# Patient Record
Sex: FEMALE | Race: WHITE | NOT HISPANIC OR LATINO | Employment: OTHER | URBAN - METROPOLITAN AREA
[De-identification: names, ages, dates, MRNs, and addresses within clinical notes are randomized per-mention and may not be internally consistent; named-entity substitution may affect disease eponyms.]

---

## 2017-01-12 DIAGNOSIS — J96.11 CHRONIC RESPIRATORY FAILURE WITH HYPOXIA (HCC): ICD-10-CM

## 2017-01-12 RX ORDER — ALBUTEROL SULFATE 90 UG/1
AEROSOL, METERED RESPIRATORY (INHALATION)
Qty: 1 INHALER | Refills: 5 | Status: SHIPPED | OUTPATIENT
Start: 2017-01-12 | End: 2017-07-18 | Stop reason: SDUPTHER

## 2017-01-12 NOTE — TELEPHONE ENCOUNTER
Was the patient seen in the last year in this department? Yes     Does patient have an active prescription for medications requested? No     Received Request Via: Patient Can

## 2017-01-12 NOTE — TELEPHONE ENCOUNTER
From: Eun Be  To: Libertad Estrella M.D.  Sent: 1/12/2017 8:53 AM PST  Subject: Medication Renewal Request    Original authorizing provider: JULIANNE Whitney would like a refill of the following medications:  VENTOLIN  (90 BASE) MCG/ACT Aero Soln inhalation aerosol [Libertad Estrella M.D.]    Preferred pharmacy: [facility]/PHARMACY #[id number] - [place], [place] - [address].    Comment:

## 2017-01-12 NOTE — TELEPHONE ENCOUNTER
Pt states she is out    Was the patient seen in the last year in this department? Yes 12/07/2016    Does patient have an active prescription for medications requested? No     Received Request Via: Patient

## 2017-01-13 RX ORDER — ALBUTEROL SULFATE 90 UG/1
2 AEROSOL, METERED RESPIRATORY (INHALATION) EVERY 6 HOURS
Qty: 18 INHALER | Refills: 0 | OUTPATIENT
Start: 2017-01-13

## 2017-01-25 ENCOUNTER — OFFICE VISIT (OUTPATIENT)
Dept: MEDICAL GROUP | Facility: PHYSICIAN GROUP | Age: 82
End: 2017-01-25
Payer: MEDICARE

## 2017-01-25 VITALS
SYSTOLIC BLOOD PRESSURE: 116 MMHG | HEIGHT: 70 IN | DIASTOLIC BLOOD PRESSURE: 52 MMHG | RESPIRATION RATE: 16 BRPM | HEART RATE: 70 BPM | BODY MASS INDEX: 18.61 KG/M2 | WEIGHT: 130 LBS | TEMPERATURE: 98.1 F | OXYGEN SATURATION: 93 %

## 2017-01-25 DIAGNOSIS — M48.061 SPINAL STENOSIS OF LUMBAR REGION: Primary | ICD-10-CM

## 2017-01-25 DIAGNOSIS — F51.04 CHRONIC INSOMNIA: ICD-10-CM

## 2017-01-25 DIAGNOSIS — F11.20 OPIOID TYPE DEPENDENCE, CONTINUOUS (HCC): ICD-10-CM

## 2017-01-25 DIAGNOSIS — Z02.89 PAIN MANAGEMENT CONTRACT SIGNED: ICD-10-CM

## 2017-01-25 DIAGNOSIS — E05.90 HYPERTHYROIDISM: ICD-10-CM

## 2017-01-25 PROCEDURE — G8482 FLU IMMUNIZE ORDER/ADMIN: HCPCS | Performed by: NURSE PRACTITIONER

## 2017-01-25 PROCEDURE — 1101F PT FALLS ASSESS-DOCD LE1/YR: CPT | Performed by: NURSE PRACTITIONER

## 2017-01-25 PROCEDURE — 99214 OFFICE O/P EST MOD 30 MIN: CPT | Performed by: NURSE PRACTITIONER

## 2017-01-25 PROCEDURE — 4040F PNEUMOC VAC/ADMIN/RCVD: CPT | Performed by: NURSE PRACTITIONER

## 2017-01-25 PROCEDURE — G8432 DEP SCR NOT DOC, RNG: HCPCS | Performed by: NURSE PRACTITIONER

## 2017-01-25 PROCEDURE — G8419 CALC BMI OUT NRM PARAM NOF/U: HCPCS | Performed by: NURSE PRACTITIONER

## 2017-01-25 PROCEDURE — 1036F TOBACCO NON-USER: CPT | Performed by: NURSE PRACTITIONER

## 2017-01-25 PROCEDURE — G8598 ASA/ANTIPLAT THER USED: HCPCS | Performed by: NURSE PRACTITIONER

## 2017-01-25 RX ORDER — OXYCODONE HYDROCHLORIDE 5 MG/1
5 TABLET ORAL EVERY 8 HOURS PRN
Qty: 90 TAB | Refills: 0 | Status: SHIPPED | OUTPATIENT
Start: 2017-01-25 | End: 2017-04-18 | Stop reason: SDUPTHER

## 2017-01-25 NOTE — MR AVS SNAPSHOT
"        Eun Be   2017 11:20 AM   Office Visit   MRN: [id number]    Department:  [facility]   Dept Phone:  [phone]    Description:  Female : 1935   Provider:  JAYCEE Grimaldo           Reason for Visit     Medication Refill oxycodone, Xanax      Allergies as of 2017     Allergen Noted Reactions    Asa [Aspirin] 2012       Latex 2009       Penicillins 2009       Tape 2009   Hives, Itching, Swelling    Wasp Venom 2016   Swelling      You were diagnosed with     Spinal stenosis of lumbar region   [618566]  -  Primary     Chronic insomnia   [007351]       Hyperthyroidism   [898416]       Opioid type dependence, continuous (CMS-HCC)   [304.01.ICD-9-CM]       Pain management contract signed   [[id number]]         Vital Signs     Blood Pressure Pulse Temperature Respirations Height Weight    116/52 mmHg 70 36.7 °C (98.1 °F) 16 1.778 m (5' 10\") 58.968 kg (130 lb)    Body Mass Index Oxygen Saturation Last Menstrual Period Smoking Status          18.65 kg/m2 93% 1963 Former Smoker        Basic Information     Date Of Birth Sex Race Ethnicity Preferred Language    1935 Female White Non- English      Your appointments     2017  1:00 PM   Follow Up Visit with Fadi Najjar, M.D.   [facility] ([facility])    [address]  [place] [address] [phone]           You will be receiving a confirmation call a few days before your appointment from our automated call confirmation system.            2017 10:15 AM   FOLLOW UP with Theresa Gruber M.D.   [facility] (--)    [address] [place] [address] [address]  [place] [address] [phone]            2017 11:20 AM   Established Patient with Libertad Estrella M.D.   [facility] [place]    [address] [facility] [place] [address] [phone]          " You will be receiving a confirmation call a few days before your appointment from our automated call confirmation system.              Problem List              ICD-10-CM Priority Class Noted - Resolved    Hypertension I10 Medium  9/28/2010 - Present    Hyperlipidemia E78.5 Low  9/28/2010 - Present    Chronic insomnia F51.04   9/28/2010 - Present    Vitamin D deficiency E55.9   9/28/2010 - Present    Osteopenia M85.80   9/28/2010 - Present    Atrial fibrillation (CMS-HCC) I48.91   11/5/2010 - Present    Chronic obstructive pulmonary disease (COPD) (CMS-HCC) J44.9   11/5/2010 - Present    Gastroesophageal reflux disease K21.9 Low  4/11/2011 - Present    Polycystic kidney disease Q61.3   11/30/2012 - Present    Mild cognitive impairment G31.84   2/25/2013 - Present    Carotid atherosclerosis I65.29   3/12/2013 - Present    Anxiety and depression F41.9, F32.9   3/12/2013 - Present    Schatzki's ring K22.2   4/12/2013 - Present    Nocturnal hypoxemia --- COPD G47.34 Medium  5/14/2013 - Present    Chronic lower back pain M54.5, G89.29   3/11/2014 - Present    Anaphylactic reaction to bee sting T63.441A   5/8/2014 - Present    Hyperthyroidism E05.90 High  6/27/2014 - Present    Spinal stenosis of lumbar region M48.06   7/7/2014 - Present    CKD (chronic kidney disease) N18.9   12/29/2014 - Present    Chronic respiratory failure (CMS-HCC) J96.10 Medium  5/29/2015 - Present    Opioid type dependence, continuous (CMS-HCC) F11.20 Medium  5/29/2015 - Present    Sedative, 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   6/3/2015 - Present    Risk for falls Z91.81   6/3/2015 - Present      Health Maintenance        Date Due Completion Dates    IMM ZOSTER VACCINE 11/29/1995 ---    IMM DTaP/Tdap/Td Vaccine (1 - Tdap) 12/18/2015 12/17/2015    BONE DENSITY 4/5/2021 4/5/2016, 10/12/2012, 10/11/2010            Current Immunizations     13-VALENT PCV PREVNAR 10/20/2016    Influenza TIV (IM) 10/1/2013    Influenza Vaccine Adult HD  10/20/2016    Influenza Vaccine Quad Inj (Pf) 12/17/2015    Pneumococcal polysaccharide vaccine (PPSV-23) 9/28/2010    TD Vaccine 12/17/2015      Below and/or attached are the medications your provider expects you to take. Review all of your home medications and newly ordered medications with your provider and/or pharmacist. Follow medication instructions as directed by your provider and/or pharmacist. Please keep your medication list with you and share with your provider. Update the information when medications are discontinued, doses are changed, or new medications (including over-the-counter products) are added; and carry medication information at all times in the event of emergency situations     Allergies:  ASA - (reactions not documented)     LATEX - (reactions not documented)     PENICILLINS - (reactions not documented)     TAPE - Hives,Itching,Swelling     WASP VENOM - Swelling               Medications  Valid as of: January 25, 2017 - 11:43 AM    Generic Name Brand Name Tablet Size Instructions for use    Albuterol Sulfate (Nebu Soln) PROVENTIL 2.5mg/3ml 3 mL by Nebulization route every four hours as needed for Shortness of Breath.        Albuterol Sulfate (Aero Soln) albuterol 108 (90 BASE) MCG/ACT INHALE 2 PUFFS BY MOUTH EVERY 6 HOURS AS NEEDED FOR SHORTNESS OF BREATH.        ALPRAZolam (Tab) XANAX 1 MG Take 1-2 Tabs by mouth at bedtime as needed for Sleep.        Clobetasol Propionate (Cream) TEMOVATE 0.05 %         Clopidogrel Bisulfate (Tab) PLAVIX 75 MG Take 1 Tab by mouth every day.        Clopidogrel Bisulfate (Tab) PLAVIX 75 MG TAKE 1 TABLET BY MOUTH EVERY DAY        Cyanocobalamin (Cap) B-12 1000 MCG Take 1,000 mg by mouth.        Fenofibrate Micronized (Cap) LOFIBRA 134 MG TAKE 1 CAPSULE BY MOUTH EVERY DAY.        Fiber   Take  by mouth.        Fluticasone Propionate (Suspension) FLONASE 50 MCG/ACT Spray 1 Spray in nose 2 times a day. Each Nostril        Fluticasone-Salmeterol (AEROSOL POWDER,  BREATH ACTIVATED) ADVAIR 250-50 MCG/DOSE Inhale 1 Puff by mouth every 12 hours.        Levothyroxine Sodium (Tab) SYNTHROID 100 MCG TAKE 1 TABLET BY MOUTH EVERY DAY.        Metoprolol Tartrate (Tab) LOPRESSOR 25 MG TAKE 1 TAB BY MOUTH EVERY DAY.        Omeprazole (CAPSULE DELAYED RELEASE) PRILOSEC 40 MG TAKE ONE CAPSULE BY MOUTH EVERY DAY        OxyCODONE HCl (Tab) ROXICODONE 5 MG Take 1 Tab by mouth every 8 hours as needed for Severe Pain.        OxyCODONE HCl (Tab) ROXICODONE 5 MG Take 1 Tab by mouth every 8 hours as needed for Severe Pain.        OxyCODONE HCl (Tab) ROXICODONE 5 MG Take 1 Tab by mouth every 8 hours as needed for Severe Pain.        Probiotic Product   Take  by mouth.        Propafenone HCl (Tab) RYTHMOL 150 MG Take 1 Tab by mouth 2 Times a Day.        Simvastatin (Tab) ZOCOR 20 MG TAKE 1 TABLET BY MOUTH IN THE EVENING        .                 Medicines prescribed today were sent to:     [facility]/PHARMACY #[id number] - [place], [place] - [address]    [address] [place] [address] [place] [address]    Phone: [phone] Fax: [phone]    Open 24 Hours?: No      Medication refill instructions:       If your prescription bottle indicates you have medication refills left, it is not necessary to call your provider’s office. Please contact your pharmacy and they will refill your medication.    If your prescription bottle indicates you do not have any refills left, you may request refills at any time through one of the following ways: The online [employer] system (except Urgent Care), by calling your provider’s office, or by asking your pharmacy to contact your provider’s office with a refill request. Medication refills are processed only during regular business hours and may not be available until the next business day. Your provider may request additional information or to have a follow-up visit with you prior to refilling your medication.   *Please Note: Medication refills are assigned a new Rx number when refilled  electronically. Your pharmacy may indicate that no refills were authorized even though a new prescription for the same medication is available at the pharmacy. Please request the medicine by name with the pharmacy before contacting your provider for a refill.        Your To Do List     Future Labs/Procedures Complete By Expires    TSH WITH REFLEX TO FT4  As directed 1/25/2018         [employer] Access Code: Activation code not generated  Current [employer] Status: Active

## 2017-01-25 NOTE — PROGRESS NOTES
Chief Complaint   Patient presents with   • Medication Refill     oxycodone, Xanax       HISTORY OF PRESENT ILLNESS: Patient is a 81 y.o. female established patient who presents today with her daughter to discuss medication refills.  She is a patient of Dr. Estrella, this is her first visit with myself.    Chronic pain recheck:   Last dose of controlled substance: 1/24/17  Chronic pain treated with Oxycodone taken 2-3 times a day.  She does admit that there are days that she would benefit from a fourth tablet.  Her pain level depends on how active she is.  Current alcohol or substance use: yes.  Alcohol was detected and her last urine sample in May.  Patient states that she does attend weekly champagne brunch on Sundays.  This is the only time a week that she has 1-2 glasses of champagne.  She states that she often spaces out her pain medication prior to and after the event.    Consequences of Chronic Opiate therapy:  (5 A's)  Analgesia:  improved  Activity:  improved  Adverse Events:  none  Aberrant Behaviors: no inappropriate refills requested, lost or stolen meds reported.   Affect/Mood: good grooming, full facial expressions, normal speech pattern and content, normal thought patterns, normal perception, good insight, normal reasoning    Nonnarcotic treatments that are being used: Tylenol occasionally, no NSAIDS due to GI upset    Pain management agreement initiated/updated and signed on: 5/2016  Urine drug screen done: 5/2016, which was reviewed today and is not consistent with prescribed medications.  Discussed positive alcohol result.  See above    I have reviewed the medical records, the Prescription Monitoring Program and I have determined that controlled substance treatment is medically indicated.    Allergies:Asa; Latex; Penicillins; Tape; and Wasp venom    Current Outpatient Prescriptions Ordered in [facility]   Medication Sig Dispense Refill   • oxycodone immediate-release (ROXICODONE) 5 MG Tab Take 1 Tab by  mouth every 8 hours as needed for Severe Pain. 90 Tab 0   • oxycodone immediate-release (ROXICODONE) 5 MG Tab Take 1 Tab by mouth every 8 hours as needed for Severe Pain. 90 Tab 0   • oxycodone immediate-release (ROXICODONE) 5 MG Tab Take 1 Tab by mouth every 8 hours as needed for Severe Pain. 90 Tab 0   • albuterol (VENTOLIN HFA) 108 (90 BASE) MCG/ACT Aero Soln inhalation aerosol INHALE 2 PUFFS BY MOUTH EVERY 6 HOURS AS NEEDED FOR SHORTNESS OF BREATH. 1 Inhaler 5   • albuterol (PROVENTIL) 2.5mg/3ml Nebu Soln solution for nebulization 3 mL by Nebulization route every four hours as needed for Shortness of Breath. 75 mL 1   • fluticasone (FLONASE) 50 MCG/ACT nasal spray Spray 1 Spray in nose 2 times a day. Each Nostril 1 Bottle 1   • fenofibrate micronized (LOFIBRA) 134 MG capsule TAKE 1 CAPSULE BY MOUTH EVERY DAY. 90 Cap 0   • levothyroxine (SYNTHROID) 100 MCG Tab TAKE 1 TABLET BY MOUTH EVERY DAY. 90 Tab 0   • simvastatin (ZOCOR) 20 MG Tab TAKE 1 TABLET BY MOUTH IN THE EVENING 90 Tab 0   • metoprolol (LOPRESSOR) 25 MG Tab TAKE 1 TAB BY MOUTH EVERY DAY. 90 Tab 0   • omeprazole (PRILOSEC) 40 MG delayed-release capsule TAKE ONE CAPSULE BY MOUTH EVERY DAY 90 Cap 0   • FIBER SELECT GUMMIES PO Take  by mouth.     • Probiotic Product (PROBIOTIC PO) Take  by mouth.     • alprazolam (XANAX) 1 MG Tab Take 1-2 Tabs by mouth at bedtime as needed for Sleep. 180 Tab 1   • clopidogrel (PLAVIX) 75 MG Tab TAKE 1 TABLET BY MOUTH EVERY DAY 90 Tab 3   • propafenone (RYTHMOL) 150 MG Tab Take 1 Tab by mouth 2 Times a Day. 180 Tab 3   • fluticasone-salmeterol (ADVAIR) 250-50 MCG/DOSE AEROSOL POWDER, BREATH ACTIVATED Inhale 1 Puff by mouth every 12 hours. 3 Inhaler 3   • Cyanocobalamin (B-12) 1000 MCG CAPS Take 1,000 mg by mouth.     • clobetasol (TEMOVATE) 0.05 % Cream   5   • clopidogrel (PLAVIX) 75 MG TABS Take 1 Tab by mouth every day. (Patient not taking: Reported on 12/7/2016) 90 Tab 2     No current Epic-ordered facility-administered  medications on file.       Past Medical History   Diagnosis Date   • Heart burn      hiatal hernia   • Pain      left side of back   • Personal history of venous thrombosis and embolism      right leg   • Unspecified disorder of thyroid    • CATARACT    • Hypertension    • Arrhythmia    • Rheumatic fever      4 or 5 years old   • Bronchitis    • Pneumonia         • Renal disorder      cysts   • Arthritis    • Backpain    • IGT (impaired glucose tolerance)    • GERD (gastroesophageal reflux disease)    • Hypothyroid    • Osteopenia    • Anxiety    • HTN (hypertension)    • COPD    • EMPHYSEMA    • MEDICAL HOME    • Hypertriglyceridemia 2013   • Chronic lower back pain 3/11/2014   • Rheumatic fever      as a kid per patient.       Social History   Substance Use Topics   • Smoking status: Former Smoker     Types: Cigarettes   • Smokeless tobacco: Never Used      Comment: quit  ppd x ?yrs (on and off for years since age 15)   • Alcohol Use: 0.0 oz/week     0 Standard drinks or equivalent per week      Comment: occasional/rare alcohol use       Family Status   Relation Status Death Age   • Mother     • Father     • Brother Alive      Family History   Problem Relation Age of Onset   • Heart Disease Other    • Stroke Brother        ROS: see above    Review of Systems   Constitutional: Negative for fever, chills, weight loss and malaise/fatigue.   HENT: Negative for ear pain, nosebleeds, congestion, sore throat and neck pain.    Eyes: Negative for blurred vision.   Respiratory: Negative for cough, sputum production, shortness of breath and wheezing.    Cardiovascular: Negative for chest pain, palpitations, orthopnea and leg swelling.   Gastrointestinal: Negative for heartburn, nausea, vomiting and abdominal pain.   Genitourinary: Negative for dysuria, urgency and frequency.   Musculoskeletal: Negative for myalgias, back pain and joint pain.   Skin: Negative for rash and itching.  "  Neurological: Negative for dizziness, tingling, tremors, sensory change, focal weakness and headaches.   Endo/Heme/Allergies: Does not bruise/bleed easily.   Psychiatric/Behavioral: Negative for depression, suicidal ideas and memory loss.  The patient is not nervous/anxious and does not have insomnia.        Exam:  Blood pressure 116/52, pulse 70, temperature 36.7 °C (98.1 °F), resp. rate 16, height 1.778 m (5' 10\"), weight 58.968 kg (130 lb), last menstrual period 01/01/1963, SpO2 93 %.  General:  Well nourished, well developed female in NAD  Head is grossly normal.  Neck: Thyroid is not enlarged.  Pulmonary: Normal effort.  Cardiovascular: Regular rate.   Extremities: no clubbing, cyanosis, or edema.  Psych:  Mood and affect are normal.  Answering questions appropriately with good eye contact.      Please note that this dictation was created using voice recognition software. I have made every reasonable attempt to correct obvious errors, but I expect that there are errors of grammar and possibly content that I did not discover before finalizing the note.    Assessment/Plan:    1. Spinal stenosis of lumbar region  oxycodone immediate-release (ROXICODONE) 5 MG Tab    oxycodone immediate-release (ROXICODONE) 5 MG Tab    oxycodone immediate-release (ROXICODONE) 5 MG Tab   2. Chronic insomnia     3. Hyperthyroidism  TSH WITH REFLEX TO FT4   4. Opioid type dependence, continuous (CMS-[facility])  oxycodone immediate-release (ROXICODONE) 5 MG Tab    oxycodone immediate-release (ROXICODONE) 5 MG Tab    oxycodone immediate-release (ROXICODONE) 5 MG Tab   5. Pain management contract signed  oxycodone immediate-release (ROXICODONE) 5 MG Tab    oxycodone immediate-release (ROXICODONE) 5 MG Tab    oxycodone immediate-release (ROXICODONE) 5 MG Tab        1.  Refill pain medication as previously prescribed, reiterated the concern of using Xanax, opiates and alcohol.  Patient verbalizes understanding.  2.  Plan on returning to clinic " in 3 months for pain medication refill with PCP.  3.  Patient appears to be due for fasting labs, orders given to combine with upcoming nephrology labs.

## 2017-02-22 RX ORDER — SIMVASTATIN 20 MG
TABLET ORAL
Qty: 90 TAB | Refills: 0 | Status: SHIPPED | OUTPATIENT
Start: 2017-02-22 | End: 2017-05-25 | Stop reason: SDUPTHER

## 2017-02-22 RX ORDER — OMEPRAZOLE 40 MG/1
CAPSULE, DELAYED RELEASE ORAL
Qty: 90 CAP | Refills: 0 | Status: SHIPPED | OUTPATIENT
Start: 2017-02-22 | End: 2017-05-22 | Stop reason: SDUPTHER

## 2017-02-22 RX ORDER — LEVOTHYROXINE SODIUM 0.1 MG/1
TABLET ORAL
Qty: 90 TAB | Refills: 0 | Status: SHIPPED | OUTPATIENT
Start: 2017-02-22 | End: 2017-05-22 | Stop reason: SDUPTHER

## 2017-02-22 NOTE — TELEPHONE ENCOUNTER
Was the patient seen in the last year in this department? Yes     Does patient have an active prescription for medications requested? No     Received Request Via: Pharmacy      Pt met protocol?: Yes, LABS FOR TSH AND LIPID 11/15 ORDERS 12/16 AND 1/17, OV OP 1/17 /52

## 2017-02-23 RX ORDER — LEVOTHYROXINE SODIUM 0.1 MG/1
TABLET ORAL
Refills: 0 | OUTPATIENT
Start: 2017-02-23

## 2017-02-27 RX ORDER — FENOFIBRATE 134 MG/1
CAPSULE ORAL
Qty: 90 CAP | Refills: 0 | Status: SHIPPED | OUTPATIENT
Start: 2017-02-27 | End: 2017-06-24 | Stop reason: SDUPTHER

## 2017-02-27 NOTE — TELEPHONE ENCOUNTER
Was the patient seen in the last year in this department? Yes     Does patient have an active prescription for medications requested? No     Received Request Via: Pharmacy      Pt met protocol?: Yes, OV last month, lipids checked 11/15.

## 2017-03-07 RX ORDER — FENOFIBRATE 134 MG/1
CAPSULE ORAL
Refills: 0 | OUTPATIENT
Start: 2017-03-07

## 2017-04-04 ENCOUNTER — HOSPITAL ENCOUNTER (OUTPATIENT)
Dept: LAB | Facility: MEDICAL CENTER | Age: 82
End: 2017-04-04
Attending: FAMILY MEDICINE
Payer: MEDICARE

## 2017-04-04 ENCOUNTER — HOSPITAL ENCOUNTER (OUTPATIENT)
Dept: LAB | Facility: MEDICAL CENTER | Age: 82
End: 2017-04-04
Attending: INTERNAL MEDICINE
Payer: MEDICARE

## 2017-04-04 DIAGNOSIS — E78.49 OTHER HYPERLIPIDEMIA: ICD-10-CM

## 2017-04-04 DIAGNOSIS — I10 ESSENTIAL HYPERTENSION: ICD-10-CM

## 2017-04-04 DIAGNOSIS — E05.90 HYPERTHYROIDISM: ICD-10-CM

## 2017-04-04 DIAGNOSIS — N18.9 CKD (CHRONIC KIDNEY DISEASE), UNSPECIFIED STAGE: ICD-10-CM

## 2017-04-04 LAB
ANION GAP SERPL CALC-SCNC: 9 MMOL/L (ref 0–11.9)
BUN SERPL-MCNC: 22 MG/DL (ref 8–22)
CALCIUM SERPL-MCNC: 10.6 MG/DL (ref 8.5–10.5)
CHLORIDE SERPL-SCNC: 105 MMOL/L (ref 96–112)
CHOLEST SERPL-MCNC: 172 MG/DL (ref 100–199)
CO2 SERPL-SCNC: 26 MMOL/L (ref 20–33)
CREAT SERPL-MCNC: 0.76 MG/DL (ref 0.5–1.4)
CREAT SERPL-MCNC: 0.8 MG/DL (ref 0.5–1.4)
GFR SERPL CREATININE-BSD FRML MDRD: >60 ML/MIN/1.73 M 2
GFR SERPL CREATININE-BSD FRML MDRD: >60 ML/MIN/1.73 M 2
GLUCOSE SERPL-MCNC: 88 MG/DL (ref 65–99)
HDLC SERPL-MCNC: 72 MG/DL
LDLC SERPL CALC-MCNC: 84 MG/DL
POTASSIUM SERPL-SCNC: 3.9 MMOL/L (ref 3.6–5.5)
SODIUM SERPL-SCNC: 140 MMOL/L (ref 135–145)
TRIGL SERPL-MCNC: 79 MG/DL (ref 0–149)
TSH SERPL DL<=0.005 MIU/L-ACNC: 0.63 UIU/ML (ref 0.3–3.7)

## 2017-04-04 PROCEDURE — 80048 BASIC METABOLIC PNL TOTAL CA: CPT

## 2017-04-12 ENCOUNTER — OFFICE VISIT (OUTPATIENT)
Dept: NEPHROLOGY | Facility: MEDICAL CENTER | Age: 82
End: 2017-04-12
Payer: MEDICARE

## 2017-04-12 VITALS
DIASTOLIC BLOOD PRESSURE: 70 MMHG | SYSTOLIC BLOOD PRESSURE: 110 MMHG | HEART RATE: 62 BPM | TEMPERATURE: 98 F | BODY MASS INDEX: 18.62 KG/M2 | RESPIRATION RATE: 14 BRPM | HEIGHT: 71 IN | WEIGHT: 133 LBS

## 2017-04-12 DIAGNOSIS — I10 ESSENTIAL HYPERTENSION: ICD-10-CM

## 2017-04-12 DIAGNOSIS — Q61.3 POLYCYSTIC KIDNEY DISEASE: ICD-10-CM

## 2017-04-12 DIAGNOSIS — N18.9 CKD (CHRONIC KIDNEY DISEASE), UNSPECIFIED STAGE: ICD-10-CM

## 2017-04-12 DIAGNOSIS — G89.29 CHRONIC BACK PAIN, UNSPECIFIED BACK LOCATION, UNSPECIFIED BACK PAIN LATERALITY: ICD-10-CM

## 2017-04-12 DIAGNOSIS — M54.9 CHRONIC BACK PAIN, UNSPECIFIED BACK LOCATION, UNSPECIFIED BACK PAIN LATERALITY: ICD-10-CM

## 2017-04-12 PROCEDURE — 4040F PNEUMOC VAC/ADMIN/RCVD: CPT | Performed by: INTERNAL MEDICINE

## 2017-04-12 PROCEDURE — 1036F TOBACCO NON-USER: CPT | Performed by: INTERNAL MEDICINE

## 2017-04-12 PROCEDURE — G8598 ASA/ANTIPLAT THER USED: HCPCS | Performed by: INTERNAL MEDICINE

## 2017-04-12 PROCEDURE — 99214 OFFICE O/P EST MOD 30 MIN: CPT | Performed by: INTERNAL MEDICINE

## 2017-04-12 PROCEDURE — G8420 CALC BMI NORM PARAMETERS: HCPCS | Performed by: INTERNAL MEDICINE

## 2017-04-12 PROCEDURE — 1101F PT FALLS ASSESS-DOCD LE1/YR: CPT | Performed by: INTERNAL MEDICINE

## 2017-04-12 PROCEDURE — G8432 DEP SCR NOT DOC, RNG: HCPCS | Performed by: INTERNAL MEDICINE

## 2017-04-12 ASSESSMENT — ENCOUNTER SYMPTOMS
SHORTNESS OF BREATH: 0
HYPERTENSION: 1
VOMITING: 0
BACK PAIN: 1
CHILLS: 0
FEVER: 0
NAUSEA: 0

## 2017-04-12 NOTE — PROGRESS NOTES
"Subjective:      Eun Be is a 81 y.o. female who presents with Hypertension and Chronic Kidney Disease            Hypertension  This is a chronic problem. The current episode started more than 1 year ago. The problem is unchanged. The problem is controlled. Pertinent negatives include no chest pain, peripheral edema or shortness of breath. Agents associated with hypertension include thyroid hormones. Risk factors for coronary artery disease include post-menopausal state. Past treatments include beta blockers. The current treatment provides significant improvement. There are no compliance problems.  Hypertensive end-organ damage includes kidney disease. Identifiable causes of hypertension include chronic renal disease.   Chronic Kidney Disease  This is a chronic problem. The current episode started more than 1 year ago. The problem occurs constantly. The problem has been unchanged. Pertinent negatives include no chest pain, chills, fever, nausea, urinary symptoms or vomiting.       Review of Systems   Constitutional: Negative for fever and chills.   Respiratory: Negative for shortness of breath.    Cardiovascular: Negative for chest pain and leg swelling.   Gastrointestinal: Negative for nausea and vomiting.   Genitourinary: Negative for dysuria, urgency and frequency.   Musculoskeletal: Positive for back pain and joint pain.          Objective:     /70 mmHg  Pulse 62  Temp(Src) 36.7 °C (98 °F) (Temporal)  Resp 14  Ht 1.803 m (5' 11\")  Wt 60.328 kg (133 lb)  BMI 18.56 kg/m2  LMP 01/01/1963     Physical Exam   Constitutional: She is oriented to person, place, and time. She appears well-developed and well-nourished. No distress.   HENT:   Head: Normocephalic.   Nose: Nose normal.   Eyes: Right eye exhibits no discharge. Left eye exhibits no discharge. No scleral icterus.   Cardiovascular: Normal rate and regular rhythm.    Pulmonary/Chest: Effort normal and breath sounds normal. "   Musculoskeletal: She exhibits no edema.   Neurological: She is alert and oriented to person, place, and time.   Skin: Skin is warm and dry. She is not diaphoretic.   Psychiatric: She has a normal mood and affect. Her behavior is normal.   Nursing note and vitals reviewed.              Assessment/Plan:     1. Essential hypertension  Controlled  Continue low-sodium diet    2. CKD (chronic kidney disease), unspecified stage  Stable  No uremic symptoms  Renal dose all medication  Avoid nephrotoxins    3. Polycystic kidney disease    4. Chronic back pain, unspecified back location, unspecified back pain laterality  Consider physical therapy

## 2017-04-12 NOTE — MR AVS SNAPSHOT
"        Eun Be   2017 1:00 PM   Office Visit   MRN: [id number]    Department:  [facility]   Dept Phone:  [phone]    Description:  Female : 1935   Provider:  Fadi Najjar, M.D.           Reason for Visit     Follow-Up           Allergies as of 2017     Allergen Noted Reactions    Asa [Aspirin] 2012       Latex 2009       Penicillins 2009       Tape 2009   Hives, Itching, Swelling    Wasp Venom 2016   Swelling      You were diagnosed with     Essential hypertension   [3103394]       CKD (chronic kidney disease), unspecified stage   [4653546]       Polycystic kidney disease   [940387]       Chronic back pain, unspecified back location, unspecified back pain laterality   [6644910]         Vital Signs     Blood Pressure Pulse Temperature Respirations Height Weight    110/70 mmHg 62 36.7 °C (98 °F) (Temporal) 14 1.803 m (5' 11\") 60.328 kg (133 lb)    Body Mass Index Last Menstrual Period Smoking Status             18.56 kg/m2 1963 Former Smoker         Basic Information     Date Of Birth Sex Race Ethnicity Preferred Language    1935 Female White Non- English      Your appointments     2017 10:15 AM   FOLLOW UP with Theresa Gruber M.D.   [facility] (--)    [address] [place] [address]  [facility] [id number] [phone]            2017 11:20 AM   Established Patient with Libertad Estrella M.D.   [facility] [address] [address] [phone]           You will be receiving a confirmation call a few days before your appointment from our automated call confirmation system.              Problem List              ICD-10-CM Priority Class Noted - Resolved    Hypertension I10 Medium  2010 - Present    Hyperlipidemia E78.5 Low  2010 - Present    Chronic insomnia F51.04   2010 - Present    Vitamin D deficiency " E55.9   9/28/2010 - Present    Osteopenia M85.80   9/28/2010 - Present    Atrial fibrillation (CMS-HCC) I48.91   11/5/2010 - Present    Chronic obstructive pulmonary disease (COPD) (CMS-HCC) J44.9   11/5/2010 - Present    Gastroesophageal reflux disease K21.9 Low  4/11/2011 - Present    Polycystic kidney disease Q61.3   11/30/2012 - Present    Mild cognitive impairment G31.84   2/25/2013 - Present    Carotid atherosclerosis I65.29   3/12/2013 - Present    Anxiety and depression F41.9, F32.9   3/12/2013 - Present    Schatzki's ring K22.2   4/12/2013 - Present    Nocturnal hypoxemia --- COPD G47.34 Medium  5/14/2013 - Present    Chronic lower back pain M54.5, G89.29   3/11/2014 - Present    Anaphylactic reaction to bee sting T63.441A   5/8/2014 - Present    Hyperthyroidism E05.90 High  6/27/2014 - Present    Spinal stenosis of lumbar region M48.06   7/7/2014 - Present    CKD (chronic kidney disease) N18.9   12/29/2014 - Present    Chronic respiratory failure (CMS-HCC) J96.10 Medium  5/29/2015 - Present    Opioid type dependence, continuous (CMS-HCC) F11.20 Medium  5/29/2015 - Present    Sedative, 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   6/3/2015 - Present    Risk for falls Z91.81   6/3/2015 - Present      Health Maintenance        Date Due Completion Dates    IMM ZOSTER VACCINE 11/29/1995 ---    IMM DTaP/Tdap/Td Vaccine (1 - Tdap) 12/18/2015 12/17/2015    BONE DENSITY 4/5/2021 4/5/2016, 10/12/2012, 10/11/2010            Current Immunizations     13-VALENT PCV PREVNAR 10/20/2016    Influenza TIV (IM) 10/1/2013    Influenza Vaccine Adult HD 10/20/2016    Influenza Vaccine Quad Inj (Pf) 12/17/2015    Pneumococcal polysaccharide vaccine (PPSV-23) 9/28/2010    TD Vaccine 12/17/2015      Below and/or attached are the medications your provider expects you to take. Review all of your home medications and newly ordered medications with your provider and/or pharmacist. Follow medication instructions as directed by  your provider and/or pharmacist. Please keep your medication list with you and share with your provider. Update the information when medications are discontinued, doses are changed, or new medications (including over-the-counter products) are added; and carry medication information at all times in the event of emergency situations     Allergies:  ASA - (reactions not documented)     LATEX - (reactions not documented)     PENICILLINS - (reactions not documented)     TAPE - Hives,Itching,Swelling     WASP VENOM - Swelling               Medications  Valid as of: April 12, 2017 -  1:18 PM    Generic Name Brand Name Tablet Size Instructions for use    Albuterol Sulfate (Nebu Soln) PROVENTIL 2.5mg/3ml 3 mL by Nebulization route every four hours as needed for Shortness of Breath.        Albuterol Sulfate (Aero Soln) albuterol 108 (90 BASE) MCG/ACT INHALE 2 PUFFS BY MOUTH EVERY 6 HOURS AS NEEDED FOR SHORTNESS OF BREATH.        ALPRAZolam (Tab) XANAX 1 MG Take 1-2 Tabs by mouth at bedtime as needed for Sleep.        Clobetasol Propionate (Cream) TEMOVATE 0.05 %         Clopidogrel Bisulfate (Tab) PLAVIX 75 MG Take 1 Tab by mouth every day.        Clopidogrel Bisulfate (Tab) PLAVIX 75 MG TAKE 1 TABLET BY MOUTH EVERY DAY        Cyanocobalamin (Cap) B-12 1000 MCG Take 1,000 mg by mouth.        Fenofibrate Micronized (Cap) LOFIBRA 134 MG TAKE 1 CAPSULE BY MOUTH EVERY DAY.        Fiber   Take  by mouth.        Fluticasone Propionate (Suspension) FLONASE 50 MCG/ACT Spray 1 Spray in nose 2 times a day. Each Nostril        Fluticasone-Salmeterol (AEROSOL POWDER, BREATH ACTIVATED) ADVAIR 250-50 MCG/DOSE Inhale 1 Puff by mouth every 12 hours.        Levothyroxine Sodium (Tab) SYNTHROID 100 MCG TAKE 1 TABLET BY MOUTH EVERY DAY.        Metoprolol Tartrate (Tab) LOPRESSOR 25 MG TAKE 1 TAB BY MOUTH EVERY DAY.        Omeprazole (CAPSULE DELAYED RELEASE) PRILOSEC 40 MG TAKE ONE CAPSULE BY MOUTH EVERY DAY        OxyCODONE HCl (Tab)  ROXICODONE 5 MG Take 1 Tab by mouth every 8 hours as needed for Severe Pain.        OxyCODONE HCl (Tab) ROXICODONE 5 MG Take 1 Tab by mouth every 8 hours as needed for Severe Pain.        OxyCODONE HCl (Tab) ROXICODONE 5 MG Take 1 Tab by mouth every 8 hours as needed for Severe Pain.        Probiotic Product   Take  by mouth.        Propafenone HCl (Tab) RYTHMOL 150 MG Take 1 Tab by mouth 2 Times a Day.        Simvastatin (Tab) ZOCOR 20 MG TAKE 1 TABLET BY MOUTH IN THE EVENING        .                 Medicines prescribed today were sent to:     [facility]/PHARMACY #[id number] - [place], [place] - [address] [place] [address]    [email or web address] [facility]. [place] [address]    Phone: [phone] Fax: [phone]    Open 24 Hours?: No      Medication refill instructions:       If your prescription bottle indicates you have medication refills left, it is not necessary to call your provider’s office. Please contact your pharmacy and they will refill your medication.    If your prescription bottle indicates you do not have any refills left, you may request refills at any time through one of the following ways: The online [employer] system (except Urgent Care), by calling your provider’s office, or by asking your pharmacy to contact your provider’s office with a refill request. Medication refills are processed only during regular business hours and may not be available until the next business day. Your provider may request additional information or to have a follow-up visit with you prior to refilling your medication.   *Please Note: Medication refills are assigned a new Rx number when refilled electronically. Your pharmacy may indicate that no refills were authorized even though a new prescription for the same medication is available at the pharmacy. Please request the medicine by name with the pharmacy before contacting your provider for a refill.        Your To Do List     Future Labs/Procedures Complete By Expires    BASIC METABOLIC PANEL  As directed  4/12/2018         [employer] Access Code: Activation code not generated  Current [employer] Status: Active

## 2017-04-14 ENCOUNTER — OFFICE VISIT (OUTPATIENT)
Dept: CARDIOLOGY | Facility: MEDICAL CENTER | Age: 82
End: 2017-04-14
Payer: MEDICARE

## 2017-04-14 VITALS
WEIGHT: 131 LBS | HEIGHT: 71 IN | DIASTOLIC BLOOD PRESSURE: 70 MMHG | OXYGEN SATURATION: 91 % | HEART RATE: 62 BPM | BODY MASS INDEX: 18.34 KG/M2 | SYSTOLIC BLOOD PRESSURE: 120 MMHG

## 2017-04-14 DIAGNOSIS — F41.9 ANXIETY AND DEPRESSION: ICD-10-CM

## 2017-04-14 DIAGNOSIS — I48.0 PAROXYSMAL ATRIAL FIBRILLATION (HCC): ICD-10-CM

## 2017-04-14 DIAGNOSIS — F32.A ANXIETY AND DEPRESSION: ICD-10-CM

## 2017-04-14 PROCEDURE — 99214 OFFICE O/P EST MOD 30 MIN: CPT | Performed by: INTERNAL MEDICINE

## 2017-04-14 PROCEDURE — 4040F PNEUMOC VAC/ADMIN/RCVD: CPT | Performed by: INTERNAL MEDICINE

## 2017-04-14 PROCEDURE — G8419 CALC BMI OUT NRM PARAM NOF/U: HCPCS | Performed by: INTERNAL MEDICINE

## 2017-04-14 PROCEDURE — G8598 ASA/ANTIPLAT THER USED: HCPCS | Performed by: INTERNAL MEDICINE

## 2017-04-14 PROCEDURE — 1036F TOBACCO NON-USER: CPT | Performed by: INTERNAL MEDICINE

## 2017-04-14 PROCEDURE — G8432 DEP SCR NOT DOC, RNG: HCPCS | Performed by: INTERNAL MEDICINE

## 2017-04-14 PROCEDURE — 1101F PT FALLS ASSESS-DOCD LE1/YR: CPT | Performed by: INTERNAL MEDICINE

## 2017-04-14 ASSESSMENT — ENCOUNTER SYMPTOMS
NERVOUS/ANXIOUS: 0
BRUISES/BLEEDS EASILY: 0
HEARTBURN: 0
DIZZINESS: 0
PALPITATIONS: 0
WEIGHT LOSS: 0
NAUSEA: 0
INSOMNIA: 0
LOSS OF CONSCIOUSNESS: 0

## 2017-04-14 NOTE — PROGRESS NOTES
Subjective:   Eun Be is a 80 y.o. female who presents today in follow-up in regards to her persistent atrial fibrillation on antiarrhythmics therapy and AV jared blockade    Her pain and anxiety are much more under control. She is very pleased with her primary doctor and has been stable for more than 6 months    She has rare palpitations, she has not changed anything else    Past Medical History   Diagnosis Date   • Heart burn      hiatal hernia   • Pain      left side of back   • Personal history of venous thrombosis and embolism      right leg   • Unspecified disorder of thyroid    • CATARACT    • Hypertension    • Arrhythmia    • Rheumatic fever      4 or 5 years old   • Bronchitis    • Pneumonia      1974   • Renal disorder      cysts   • Arthritis    • Backpain    • IGT (impaired glucose tolerance)    • GERD (gastroesophageal reflux disease)    • Hypothyroid    • Osteopenia    • Anxiety    • HTN (hypertension)    • COPD    • EMPHYSEMA    • MEDICAL HOME    • Hypertriglyceridemia 11/7/2013   • Chronic lower back pain 3/11/2014   • Rheumatic fever      as a kid per patient.     Past Surgical History   Procedure Laterality Date   • Hysterectomy, total abdominal  26 years old   • Pr excis/unroof renal cyst  1977   • Hysterectomy radical  1963   • Other  1963     hemorrhoidectomy   • Other  1977     cysts removed from left kidney   • Hemorrhoidectomy  1963     Family History   Problem Relation Age of Onset   • Heart Disease Other    • Stroke Brother      History   Smoking status   • Former Smoker   • Types: Cigarettes   Smokeless tobacco   • Never Used     Comment: quit 1990 1/2 ppd x ?yrs (on and off for years since age 15)     Allergies   Allergen Reactions   • Asa [Aspirin]    • Latex    • Penicillins    • Tape Hives, Itching and Swelling   • Wasp Venom Swelling     Outpatient Encounter Prescriptions as of 4/14/2017   Medication Sig Dispense Refill   • fenofibrate micronized (LOFIBRA) 134 MG capsule  TAKE 1 CAPSULE BY MOUTH EVERY DAY. 90 Cap 0   • levothyroxine (SYNTHROID) 100 MCG Tab TAKE 1 TABLET BY MOUTH EVERY DAY. 90 Tab 0   • simvastatin (ZOCOR) 20 MG Tab TAKE 1 TABLET BY MOUTH IN THE EVENING 90 Tab 0   • metoprolol (LOPRESSOR) 25 MG Tab TAKE 1 TAB BY MOUTH EVERY DAY. 90 Tab 0   • omeprazole (PRILOSEC) 40 MG delayed-release capsule TAKE ONE CAPSULE BY MOUTH EVERY DAY 90 Cap 0   • oxycodone immediate-release (ROXICODONE) 5 MG Tab Take 1 Tab by mouth every 8 hours as needed for Severe Pain. 90 Tab 0   • albuterol (VENTOLIN HFA) 108 (90 BASE) MCG/ACT Aero Soln inhalation aerosol INHALE 2 PUFFS BY MOUTH EVERY 6 HOURS AS NEEDED FOR SHORTNESS OF BREATH. 1 Inhaler 5   • albuterol (PROVENTIL) 2.5mg/3ml Nebu Soln solution for nebulization 3 mL by Nebulization route every four hours as needed for Shortness of Breath. 75 mL 1   • fluticasone (FLONASE) 50 MCG/ACT nasal spray Spray 1 Spray in nose 2 times a day. Each Nostril 1 Bottle 1   • FIBER SELECT GUMMIES PO Take  by mouth.     • Probiotic Product (PROBIOTIC PO) Take  by mouth.     • alprazolam (XANAX) 1 MG Tab Take 1-2 Tabs by mouth at bedtime as needed for Sleep. 180 Tab 1   • clopidogrel (PLAVIX) 75 MG Tab TAKE 1 TABLET BY MOUTH EVERY DAY 90 Tab 3   • clobetasol (TEMOVATE) 0.05 % Cream   5   • propafenone (RYTHMOL) 150 MG Tab Take 1 Tab by mouth 2 Times a Day. 180 Tab 3   • fluticasone-salmeterol (ADVAIR) 250-50 MCG/DOSE AEROSOL POWDER, BREATH ACTIVATED Inhale 1 Puff by mouth every 12 hours. 3 Inhaler 3   • Cyanocobalamin (B-12) 1000 MCG CAPS Take 1,000 mg by mouth.     • oxycodone immediate-release (ROXICODONE) 5 MG Tab Take 1 Tab by mouth every 8 hours as needed for Severe Pain. 90 Tab 0   • oxycodone immediate-release (ROXICODONE) 5 MG Tab Take 1 Tab by mouth every 8 hours as needed for Severe Pain. 90 Tab 0   • clopidogrel (PLAVIX) 75 MG TABS Take 1 Tab by mouth every day. (Patient not taking: Reported on 12/7/2016) 90 Tab 2     No facility-administered  "encounter medications on file as of 4/14/2017.     Review of Systems   Constitutional: Negative for weight loss and malaise/fatigue.   Cardiovascular: Negative for chest pain, palpitations (much better) and leg swelling.   Gastrointestinal: Negative for heartburn and nausea.   Musculoskeletal:        Stable on meds   Neurological: Negative for dizziness and loss of consciousness.   Endo/Heme/Allergies: Does not bruise/bleed easily.   Psychiatric/Behavioral: The patient is not nervous/anxious and does not have insomnia.    All other systems reviewed and are negative.       Objective:   /70 mmHg  Pulse 62  Ht 1.803 m (5' 11\")  Wt 59.421 kg (131 lb)  BMI 18.28 kg/m2  SpO2 91%  LMP 01/01/1963    Physical Exam   Constitutional: She is oriented to person, place, and time. She appears well-developed and well-nourished.   HENT:   Mouth/Throat: Oropharynx is clear and moist.   Eyes: Conjunctivae and EOM are normal.   Neck: Neck supple. No JVD present. No thyroid mass present.   Cardiovascular: Normal rate, regular rhythm, S1 normal, S2 normal, intact distal pulses and normal pulses.  PMI is not displaced.    No murmur heard.  Pulses:       Carotid pulses are 2+ on the right side, and 2+ on the left side.       Radial pulses are 2+ on the right side, and 2+ on the left side.   No peripheral edema.   Pulmonary/Chest: Effort normal and breath sounds normal. No respiratory distress.   Abdominal: Normal appearance. She exhibits no abdominal bruit. There is no hepatosplenomegaly.   Musculoskeletal: Normal range of motion. She exhibits no edema.        Lumbar back: She exhibits no tenderness and no spasm.   Neurological: She is alert and oriented to person, place, and time. She has normal strength.   Skin: Skin is warm and dry. No cyanosis. Nails show no clubbing.   Psychiatric: She has a normal mood and affect.       Assessment:     1. Anxiety and depression     2. Paroxysmal atrial fibrillation (CMS-HCC)   "       Medical Decision Making:  Today's Assessment / Status / Plan:     Atrial fibrillation, she is not interested aspirin therapy. She is not interested in systemic anticoagulation. She has been on Plavix because of a purported aspirin allergy. Because of this, we have elected to use low-dose anti-rhythmic therapy with AV jared blockade. We discussed this again in the setting of her risk of stroke. Her heart rhythm does sound normal today. We will be due for echo in 2018. We will do screening echocardiograms once every year or 2, we reviewed her last echocardiogram and we'll plan on doing it when I see her back in the next calendar year      Carotid duplex is due 2018. She has mild disease  Her anxiety and stress are amazingly well controlled.    RTC 12 months

## 2017-04-14 NOTE — MR AVS SNAPSHOT
"        Eun Erazo Haile   2017 10:15 AM   Office Visit   MRN: [id number]    Department:  [facility]   Dept Phone:  [phone]    Description:  Female : 1935   Provider:  Theresa Gruber M.D.           Reason for Visit     Follow-Up           Allergies as of 2017     Allergen Noted Reactions    Asa [Aspirin] 2012       Latex 2009       Penicillins 2009       Tape 2009   Hives, Itching, Swelling    Wasp Venom 2016   Swelling      Vital Signs     Blood Pressure Pulse Height Weight Body Mass Index Oxygen Saturation    120/70 mmHg 62 1.803 m (5' 11\") 59.421 kg (131 lb) 18.28 kg/m2 91%    Last Menstrual Period Smoking Status                1963 Former Smoker          Basic Information     Date Of Birth Sex Race Ethnicity Preferred Language    1935 Female White Non- English      Your appointments     2017 11:20 AM   Established Patient with Libertad Estrella M.D.   [address] [address]   [phone]           You will be receiving a confirmation call a few days before your appointment from our automated call confirmation system.              Problem List              ICD-10-CM Priority Class Noted - Resolved    Hypertension I10 Medium  2010 - Present    Hyperlipidemia E78.5 Low  2010 - Present    Chronic insomnia F51.04   2010 - Present    Vitamin D deficiency E55.9   2010 - Present    Osteopenia M85.80   2010 - Present    Atrial fibrillation (CMS-HCC) I48.91   2010 - Present    Chronic obstructive pulmonary disease (COPD) (CMS-HCC) J44.9   2010 - Present    Gastroesophageal reflux disease K21.9 Low  2011 - Present    Polycystic kidney disease Q61.3   2012 - Present    Mild cognitive impairment G31.84   2013 - Present    Carotid atherosclerosis I65.29   3/12/2013 - Present    Anxiety and depression F41.9, F32.9   " 3/12/2013 - Present    Schatzki's ring K22.2   4/12/2013 - Present    Nocturnal hypoxemia --- COPD G47.34 Medium  5/14/2013 - Present    Chronic lower back pain M54.5, G89.29   3/11/2014 - Present    Anaphylactic reaction to bee sting T63.441A   5/8/2014 - Present    Hyperthyroidism E05.90 High  6/27/2014 - Present    Spinal stenosis of lumbar region M48.06   7/7/2014 - Present    CKD (chronic kidney disease) N18.9   12/29/2014 - Present    Chronic respiratory failure (CMS-HCC) J96.10 Medium  5/29/2015 - Present    Opioid type dependence, continuous (CMS-HCC) F11.20 Medium  5/29/2015 - Present    Sedative, 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   6/3/2015 - Present    Risk for falls Z91.81   6/3/2015 - Present      Health Maintenance        Date Due Completion Dates    IMM ZOSTER VACCINE 11/29/1995 ---    IMM DTaP/Tdap/Td Vaccine (1 - Tdap) 12/18/2015 12/17/2015    BONE DENSITY 4/5/2021 4/5/2016, 10/12/2012, 10/11/2010            Current Immunizations     13-VALENT PCV PREVNAR 10/20/2016    Influenza TIV (IM) 10/1/2013    Influenza Vaccine Adult HD 10/20/2016    Influenza Vaccine Quad Inj (Pf) 12/17/2015    Pneumococcal polysaccharide vaccine (PPSV-23) 9/28/2010    TD Vaccine 12/17/2015      Below and/or attached are the medications your provider expects you to take. Review all of your home medications and newly ordered medications with your provider and/or pharmacist. Follow medication instructions as directed by your provider and/or pharmacist. Please keep your medication list with you and share with your provider. Update the information when medications are discontinued, doses are changed, or new medications (including over-the-counter products) are added; and carry medication information at all times in the event of emergency situations     Allergies:  ASA - (reactions not documented)     LATEX - (reactions not documented)     PENICILLINS - (reactions not documented)     TAPE - Hives,Itching,Swelling      WASP VENOM - Swelling               Medications  Valid as of: April 14, 2017 - 11:09 AM    Generic Name Brand Name Tablet Size Instructions for use    Albuterol Sulfate (Nebu Soln) PROVENTIL 2.5mg/3ml 3 mL by Nebulization route every four hours as needed for Shortness of Breath.        Albuterol Sulfate (Aero Soln) albuterol 108 (90 BASE) MCG/ACT INHALE 2 PUFFS BY MOUTH EVERY 6 HOURS AS NEEDED FOR SHORTNESS OF BREATH.        ALPRAZolam (Tab) XANAX 1 MG Take 1-2 Tabs by mouth at bedtime as needed for Sleep.        Clobetasol Propionate (Cream) TEMOVATE 0.05 %         Clopidogrel Bisulfate (Tab) PLAVIX 75 MG Take 1 Tab by mouth every day.        Clopidogrel Bisulfate (Tab) PLAVIX 75 MG TAKE 1 TABLET BY MOUTH EVERY DAY        Cyanocobalamin (Cap) B-12 1000 MCG Take 1,000 mg by mouth.        Fenofibrate Micronized (Cap) LOFIBRA 134 MG TAKE 1 CAPSULE BY MOUTH EVERY DAY.        Fiber   Take  by mouth.        Fluticasone Propionate (Suspension) FLONASE 50 MCG/ACT Spray 1 Spray in nose 2 times a day. Each Nostril        Fluticasone-Salmeterol (AEROSOL POWDER, BREATH ACTIVATED) ADVAIR 250-50 MCG/DOSE Inhale 1 Puff by mouth every 12 hours.        Levothyroxine Sodium (Tab) SYNTHROID 100 MCG TAKE 1 TABLET BY MOUTH EVERY DAY.        Metoprolol Tartrate (Tab) LOPRESSOR 25 MG TAKE 1 TAB BY MOUTH EVERY DAY.        Omeprazole (CAPSULE DELAYED RELEASE) PRILOSEC 40 MG TAKE ONE CAPSULE BY MOUTH EVERY DAY        OxyCODONE HCl (Tab) ROXICODONE 5 MG Take 1 Tab by mouth every 8 hours as needed for Severe Pain.        OxyCODONE HCl (Tab) ROXICODONE 5 MG Take 1 Tab by mouth every 8 hours as needed for Severe Pain.        OxyCODONE HCl (Tab) ROXICODONE 5 MG Take 1 Tab by mouth every 8 hours as needed for Severe Pain.        Probiotic Product   Take  by mouth.        Propafenone HCl (Tab) RYTHMOL 150 MG Take 1 Tab by mouth 2 Times a Day.        Simvastatin (Tab) ZOCOR 20 MG TAKE 1 TABLET BY MOUTH IN THE EVENING        .                    Medicines prescribed today were sent to:     [facility]/PHARMACY #[id number] - [place], [place] - [address] [place] [address]    [address] [place] [address] [place] [address]    Phone: [phone] Fax: [phone]    Open 24 Hours?: No      Medication refill instructions:       If your prescription bottle indicates you have medication refills left, it is not necessary to call your provider’s office. Please contact your pharmacy and they will refill your medication.    If your prescription bottle indicates you do not have any refills left, you may request refills at any time through one of the following ways: The online [employer] system (except Urgent Care), by calling your provider’s office, or by asking your pharmacy to contact your provider’s office with a refill request. Medication refills are processed only during regular business hours and may not be available until the next business day. Your provider may request additional information or to have a follow-up visit with you prior to refilling your medication.   *Please Note: Medication refills are assigned a new Rx number when refilled electronically. Your pharmacy may indicate that no refills were authorized even though a new prescription for the same medication is available at the pharmacy. Please request the medicine by name with the pharmacy before contacting your provider for a refill.           [employer] Access Code: Activation code not generated  Current [employer] Status: Active

## 2017-04-18 ENCOUNTER — TELEPHONE (OUTPATIENT)
Dept: MEDICAL GROUP | Facility: PHYSICIAN GROUP | Age: 82
End: 2017-04-18

## 2017-04-18 ENCOUNTER — OFFICE VISIT (OUTPATIENT)
Dept: MEDICAL GROUP | Facility: PHYSICIAN GROUP | Age: 82
End: 2017-04-18
Payer: MEDICARE

## 2017-04-18 VITALS
DIASTOLIC BLOOD PRESSURE: 72 MMHG | TEMPERATURE: 98.6 F | HEIGHT: 70 IN | HEART RATE: 70 BPM | BODY MASS INDEX: 19.26 KG/M2 | RESPIRATION RATE: 16 BRPM | WEIGHT: 134.5 LBS | SYSTOLIC BLOOD PRESSURE: 126 MMHG | OXYGEN SATURATION: 95 %

## 2017-04-18 DIAGNOSIS — F11.20 OPIOID TYPE DEPENDENCE, CONTINUOUS (HCC): ICD-10-CM

## 2017-04-18 DIAGNOSIS — F41.9 ANXIETY: ICD-10-CM

## 2017-04-18 DIAGNOSIS — M62.81 GENERALIZED MUSCLE WEAKNESS: ICD-10-CM

## 2017-04-18 DIAGNOSIS — R53.1 GENERALIZED WEAKNESS: ICD-10-CM

## 2017-04-18 DIAGNOSIS — Z79.891 CHRONIC USE OF OPIATE FOR THERAPEUTIC PURPOSE: ICD-10-CM

## 2017-04-18 DIAGNOSIS — Z02.89 PAIN MANAGEMENT CONTRACT SIGNED: ICD-10-CM

## 2017-04-18 DIAGNOSIS — M48.061 SPINAL STENOSIS OF LUMBAR REGION: ICD-10-CM

## 2017-04-18 DIAGNOSIS — Z12.11 SCREENING FOR COLON CANCER: ICD-10-CM

## 2017-04-18 PROCEDURE — G8598 ASA/ANTIPLAT THER USED: HCPCS | Performed by: FAMILY MEDICINE

## 2017-04-18 PROCEDURE — 1036F TOBACCO NON-USER: CPT | Performed by: FAMILY MEDICINE

## 2017-04-18 PROCEDURE — G8420 CALC BMI NORM PARAMETERS: HCPCS | Performed by: FAMILY MEDICINE

## 2017-04-18 PROCEDURE — G8432 DEP SCR NOT DOC, RNG: HCPCS | Performed by: FAMILY MEDICINE

## 2017-04-18 PROCEDURE — 99214 OFFICE O/P EST MOD 30 MIN: CPT | Performed by: FAMILY MEDICINE

## 2017-04-18 PROCEDURE — 1101F PT FALLS ASSESS-DOCD LE1/YR: CPT | Performed by: FAMILY MEDICINE

## 2017-04-18 PROCEDURE — 4040F PNEUMOC VAC/ADMIN/RCVD: CPT | Performed by: FAMILY MEDICINE

## 2017-04-18 RX ORDER — OXYCODONE HYDROCHLORIDE 5 MG/1
5 TABLET ORAL EVERY 8 HOURS PRN
Qty: 90 TAB | Refills: 0 | Status: SHIPPED | OUTPATIENT
Start: 2017-04-18 | End: 2017-07-18 | Stop reason: SDUPTHER

## 2017-04-18 RX ORDER — ALPRAZOLAM 1 MG/1
1-2 TABLET ORAL NIGHTLY PRN
Qty: 180 TAB | Refills: 1 | Status: SHIPPED
Start: 2017-04-18 | End: 2017-07-18 | Stop reason: SDUPTHER

## 2017-04-18 NOTE — MR AVS SNAPSHOT
"        Eun Erazo McNeal   2017 11:20 AM   Office Visit   MRN: [id number]    Department:  [facility]   Dept Phone:  [phone]    Description:  Female : 1935   Provider:  Libertad Estrella M.D.           Reason for Visit     Follow-Up labs    Medication Refill pain med, xanax      Allergies as of 2017     Allergen Noted Reactions    Asa [Aspirin] 2012       Latex 2009       Penicillins 2009       Tape 2009   Hives, Itching, Swelling    Wasp Venom 2016   Swelling      You were diagnosed with     Anxiety   [771502]       Screening for colon cancer   [264412]       Opioid type dependence, continuous (CMS-HCC)   [304.01.ICD-9-CM]       Spinal stenosis of lumbar region   [695781]       Pain management contract signed   [[id number]]       Chronic use of opiate for therapeutic purpose   [5358942]         Vital Signs     Blood Pressure Pulse Temperature Respirations Height Weight    126/72 mmHg 70 37 °C (98.6 °F) 16 1.778 m (5' 10\") 61.009 kg (134 lb 8 oz)    Body Mass Index Oxygen Saturation Last Menstrual Period Smoking Status          19.30 kg/m2 95% 1963 Former Smoker        Basic Information     Date Of Birth Sex Race Ethnicity Preferred Language    1935 Female White Non- English      Your appointments     2017 11:00 AM   Established Patient with Libertad Estrella M.D.   [address] [id number] [phone]           You will be receiving a confirmation call a few days before your appointment from our automated call confirmation system.              Problem List              ICD-10-CM Priority Class Noted - Resolved    Hypertension I10 Medium  2010 - Present    Hyperlipidemia E78.5 Low  2010 - Present    Chronic insomnia F51.04   2010 - Present    Vitamin D deficiency E55.9   2010 - Present    Osteopenia M85.80   2010 - Present    Atrial " fibrillation (CMS-HCC) I48.91   11/5/2010 - Present    Chronic obstructive pulmonary disease (COPD) (CMS-HCC) J44.9   11/5/2010 - Present    Gastroesophageal reflux disease K21.9 Low  4/11/2011 - Present    Polycystic kidney disease Q61.3   11/30/2012 - Present    Mild cognitive impairment G31.84   2/25/2013 - Present    Carotid atherosclerosis I65.29   3/12/2013 - Present    Anxiety and depression F41.9, F32.9   3/12/2013 - Present    Schatzki's ring K22.2   4/12/2013 - Present    Nocturnal hypoxemia --- COPD G47.34 Medium  5/14/2013 - Present    Chronic lower back pain M54.5, G89.29   3/11/2014 - Present    Anaphylactic reaction to bee sting T63.441A   5/8/2014 - Present    Hyperthyroidism E05.90 High  6/27/2014 - Present    Spinal stenosis of lumbar region M48.06   7/7/2014 - Present    CKD (chronic kidney disease) N18.9   12/29/2014 - Present    Chronic respiratory failure (CMS-HCC) J96.10 Medium  5/29/2015 - Present    Opioid type dependence, continuous (CMS-HCC) F11.20 Medium  5/29/2015 - Present    Sedative, 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   6/3/2015 - Present    Risk for falls Z91.81   6/3/2015 - Present      Health Maintenance        Date Due Completion Dates    IMM ZOSTER VACCINE 11/29/1995 ---    IMM DTaP/Tdap/Td Vaccine (1 - Tdap) 12/18/2015 12/17/2015    BONE DENSITY 4/5/2021 4/5/2016, 10/12/2012, 10/11/2010            Current Immunizations     13-VALENT PCV PREVNAR 10/20/2016    Influenza TIV (IM) 10/1/2013    Influenza Vaccine Adult HD 10/20/2016    Influenza Vaccine Quad Inj (Pf) 12/17/2015    Pneumococcal polysaccharide vaccine (PPSV-23) 9/28/2010    TD Vaccine 12/17/2015      Below and/or attached are the medications your provider expects you to take. Review all of your home medications and newly ordered medications with your provider and/or pharmacist. Follow medication instructions as directed by your provider and/or pharmacist. Please keep your medication list with you and  share with your provider. Update the information when medications are discontinued, doses are changed, or new medications (including over-the-counter products) are added; and carry medication information at all times in the event of emergency situations     Allergies:  ASA - (reactions not documented)     LATEX - (reactions not documented)     PENICILLINS - (reactions not documented)     TAPE - Hives,Itching,Swelling     WASP VENOM - Swelling               Medications  Valid as of: April 18, 2017 - 11:50 AM    Generic Name Brand Name Tablet Size Instructions for use    Albuterol Sulfate (Nebu Soln) PROVENTIL 2.5mg/3ml 3 mL by Nebulization route every four hours as needed for Shortness of Breath.        Albuterol Sulfate (Aero Soln) albuterol 108 (90 BASE) MCG/ACT INHALE 2 PUFFS BY MOUTH EVERY 6 HOURS AS NEEDED FOR SHORTNESS OF BREATH.        ALPRAZolam (Tab) XANAX 1 MG Take 1-2 Tabs by mouth at bedtime as needed for Sleep.        Clobetasol Propionate (Cream) TEMOVATE 0.05 %         Clopidogrel Bisulfate (Tab) PLAVIX 75 MG TAKE 1 TABLET BY MOUTH EVERY DAY        Cyanocobalamin (Cap) B-12 1000 MCG Take 1,000 mg by mouth.        Fenofibrate Micronized (Cap) LOFIBRA 134 MG TAKE 1 CAPSULE BY MOUTH EVERY DAY.        Fiber   Take  by mouth.        Fluticasone Propionate (Suspension) FLONASE 50 MCG/ACT Spray 1 Spray in nose 2 times a day. Each Nostril        Fluticasone-Salmeterol (AEROSOL POWDER, BREATH ACTIVATED) ADVAIR 250-50 MCG/DOSE Inhale 1 Puff by mouth every 12 hours.        Levothyroxine Sodium (Tab) SYNTHROID 100 MCG TAKE 1 TABLET BY MOUTH EVERY DAY.        Metoprolol Tartrate (Tab) LOPRESSOR 25 MG TAKE 1 TAB BY MOUTH EVERY DAY.        Omeprazole (CAPSULE DELAYED RELEASE) PRILOSEC 40 MG TAKE ONE CAPSULE BY MOUTH EVERY DAY        OxyCODONE HCl (Tab) ROXICODONE 5 MG Take 1 Tab by mouth every 8 hours as needed for Severe Pain.        OxyCODONE HCl (Tab) ROXICODONE 5 MG Take 1 Tab by mouth every 8 hours as needed for  Severe Pain.        OxyCODONE HCl (Tab) ROXICODONE 5 MG Take 1 Tab by mouth every 8 hours as needed for Severe Pain.        Probiotic Product   Take  by mouth.        Propafenone HCl (Tab) RYTHMOL 150 MG Take 1 Tab by mouth 2 Times a Day.        Simvastatin (Tab) ZOCOR 20 MG TAKE 1 TABLET BY MOUTH IN THE EVENING        .                 Medicines prescribed today were sent to:     [id number] - [place], [place] - [address]    [address] [place] [address]    Phone: [phone] Fax: [phone]    Open 24 Hours?: No      Medication refill instructions:       If your prescription bottle indicates you have medication refills left, it is not necessary to call your provider’s office. Please contact your pharmacy and they will refill your medication.    If your prescription bottle indicates you do not have any refills left, you may request refills at any time through one of the following ways: The online "[employer] system (except Urgent Care), by calling your provider’s office, or by asking your pharmacy to contact your provider’s office with a refill request. Medication refills are processed only during regular business hours and may not be available until the next business day. Your provider may request additional information or to have a follow-up visit with you prior to refilling your medication.   *Please Note: Medication refills are assigned a new Rx number when refilled electronically. Your pharmacy may indicate that no refills were authorized even though a new prescription for the same medication is available at the pharmacy. Please request the medicine by name with the pharmacy before contacting your provider for a refill.        Your To Do List     Future Labs/Procedures Complete By Expires    MISCELLANEOUS TEST  As directed 4/18/2018    Comments:    Nehemias         "[employer] Access Code: Activation code not generated  Current "[employer] Status: Active

## 2017-04-18 NOTE — PROGRESS NOTES
Chief Complaint   Patient presents with   • Follow-Up     labs   • Medication Refill     pain med, xanax       HISTORY OF PRESENT ILLNESS: Patient is a 81 y.o. female established patient here today for the following concerns:    1. Anxiety  Here today for follow up.  Needs refill on xanax 1 mg.  Has been stable on this medication for many years.  Still under tremendous stress at home with caring for her .      2. Screening for colon cancer  Requests Cologuard screen.      3. Opioid type dependence, continuous (CMS-HCC)  4. Spinal stenosis of lumbar region  5. Pain management contract signed  6. Chronic use of opiate for therapeutic purpose      Chronic pain recheck:   Last dose of controlled substance: today  Chronic pain treated with oxycodone taken 3 times a day    She  reports that she drinks alcohol.  She  reports that she does not use illicit drugs.    Consequences of Chronic Opiate therapy:  (5 A's)  Analgesia: Compared to no treatment or prior treatment, pain is currently improved  Activity: improved  Adverse Events: She denies dry mouth, itchy skin, nausea and sedation  Aberrant Behaviors: She reports she is taking medication as prescribed and is not veering from agreed treatment regimen. There have been no inappropriate refills or lost/stolen meds reported.   Affect/Mood: Pain is not impacting patient's mood.  Patient denies depression/ + anxiety.    Nonnarcotic treatments that are being used: Tylenol.   Treatment goals discussed.    Opioid Risk Score: due    Interpretation of Opioid Risk Score   Score 0-3 = Low risk of abuse. Do UDS at least once per year.  Score 4-7 = Moderate risk of abuse. Do UDS 1-4 times per year.  Score 8+ = High risk of abuse. Refer to specialist.    No order of CONTROLLED SUBSTANCE TREATMENT AGREEMENT is found.     UDS Summary     Patient has no health maintenance due at this time        Most recent UDS reviewed today and is consistent with prescribed medications.    I have  reviewed the medical records, the Prescription Monitoring Program and I have determined that controlled substance treatment is medically indicated.        Patient would like to start some PT to help with her generalized strength.  She has had generalized weakness and fatigue.  She is hoping some formal PT will help to restore some of the strength.     Past Medical, Social, and Family history reviewed and updated in EPIC    Allergies:Asa; Latex; Penicillins; Tape; and Wasp venom    Current Outpatient Prescriptions   Medication Sig Dispense Refill   • alprazolam (XANAX) 1 MG Tab Take 1-2 Tabs by mouth at bedtime as needed for Sleep. 180 Tab 1   • oxycodone immediate-release (ROXICODONE) 5 MG Tab Take 1 Tab by mouth every 8 hours as needed for Severe Pain. 90 Tab 0   • oxycodone immediate-release (ROXICODONE) 5 MG Tab Take 1 Tab by mouth every 8 hours as needed for Severe Pain. 90 Tab 0   • oxycodone immediate-release (ROXICODONE) 5 MG Tab Take 1 Tab by mouth every 8 hours as needed for Severe Pain. 90 Tab 0   • fenofibrate micronized (LOFIBRA) 134 MG capsule TAKE 1 CAPSULE BY MOUTH EVERY DAY. 90 Cap 0   • levothyroxine (SYNTHROID) 100 MCG Tab TAKE 1 TABLET BY MOUTH EVERY DAY. 90 Tab 0   • simvastatin (ZOCOR) 20 MG Tab TAKE 1 TABLET BY MOUTH IN THE EVENING 90 Tab 0   • metoprolol (LOPRESSOR) 25 MG Tab TAKE 1 TAB BY MOUTH EVERY DAY. 90 Tab 0   • omeprazole (PRILOSEC) 40 MG delayed-release capsule TAKE ONE CAPSULE BY MOUTH EVERY DAY 90 Cap 0   • albuterol (VENTOLIN HFA) 108 (90 BASE) MCG/ACT Aero Soln inhalation aerosol INHALE 2 PUFFS BY MOUTH EVERY 6 HOURS AS NEEDED FOR SHORTNESS OF BREATH. 1 Inhaler 5   • albuterol (PROVENTIL) 2.5mg/3ml Nebu Soln solution for nebulization 3 mL by Nebulization route every four hours as needed for Shortness of Breath. 75 mL 1   • fluticasone (FLONASE) 50 MCG/ACT nasal spray Spray 1 Spray in nose 2 times a day. Each Nostril 1 Bottle 1   • FIBER SELECT GUMMIES PO Take  by mouth.     •  "Probiotic Product (PROBIOTIC PO) Take  by mouth.     • clopidogrel (PLAVIX) 75 MG Tab TAKE 1 TABLET BY MOUTH EVERY DAY 90 Tab 3   • clobetasol (TEMOVATE) 0.05 % Cream   5   • propafenone (RYTHMOL) 150 MG Tab Take 1 Tab by mouth 2 Times a Day. 180 Tab 3   • fluticasone-salmeterol (ADVAIR) 250-50 MCG/DOSE AEROSOL POWDER, BREATH ACTIVATED Inhale 1 Puff by mouth every 12 hours. 3 Inhaler 3   • Cyanocobalamin (B-12) 1000 MCG CAPS Take 1,000 mg by mouth.       No current facility-administered medications for this visit.         ROS:  Review of Systems   Constitutional: Negative for fever, chills, weight loss and malaise/fatigue.   HENT: Negative for ear pain, nosebleeds, congestion, sore throat and neck pain.    Eyes: Negative for blurred vision.   Respiratory: Negative for cough, sputum production, shortness of breath and wheezing.    Cardiovascular: Negative for chest pain, palpitations,  and leg swelling.   Gastrointestinal: Negative for heartburn, nausea, vomiting, diarrhea and abdominal pain.   Genitourinary: Negative for dysuria, urgency and frequency.   Musculoskeletal: Negative for myalgias, back pain and joint pain.   Skin: Negative for rash and itching.   Neurological: Negative for dizziness, tingling, tremors, sensory change, focal weakness and headaches.   Endo/Heme/Allergies: Does not bruise/bleed easily.   Psychiatric/Behavioral: Negative for depression, anxiety, suicidal ideas, insomnia and memory loss.      Exam:  Blood pressure 126/72, pulse 70, temperature 37 °C (98.6 °F), resp. rate 16, height 1.778 m (5' 10\"), weight 61.009 kg (134 lb 8 oz), last menstrual period 01/01/1963, SpO2 95 %.    General:  Well nourished, well developed in NAD  Head is grossly normal.  Neck: Supple without JVD   Pulmonary:  Normal effort.   Cardiovascular: Regular rate  Extremities: no clubbing, cyanosis, or edema.  Psych: affect appropriate      Most recent labs reviewed and stable.     Please note that this dictation was " created using voice recognition software. I have made every reasonable attempt to correct obvious errors, but I expect that there are errors of grammar and possibly content that I did not discover before finalizing the note.    Assessment/Plan:  1. Anxiety  Renewed.    - alprazolam (XANAX) 1 MG Tab; Take 1-2 Tabs by mouth at bedtime as needed for Sleep.  Dispense: 180 Tab; Refill: 1    2. Screening for colon cancer  Cologuard  - MISCELLANEOUS TEST; Future    3. Opioid type dependence, continuous (CMS-HCC)  Overall impression that this patient is benefiting from opioid therapy and that the   benefits outweigh the risks of continued use. yes      - Controlled Substance Use Agreement current or updated today   - Urine drug screen current or ordered today   - Additional lab: CMP to assess liver and renal function   - Rx refill prescriptions are done for 3 months, No early refills. See orders   - Referral to pain management no      - oxycodone immediate-release (ROXICODONE) 5 MG Tab; Take 1 Tab by mouth every 8 hours as needed for Severe Pain.  Dispense: 90 Tab; Refill: 0  - oxycodone immediate-release (ROXICODONE) 5 MG Tab; Take 1 Tab by mouth every 8 hours as needed for Severe Pain.  Dispense: 90 Tab; Refill: 0  - oxycodone immediate-release (ROXICODONE) 5 MG Tab; Take 1 Tab by mouth every 8 hours as needed for Severe Pain.  Dispense: 90 Tab; Refill: 0    4. Spinal stenosis of lumbar region    - oxycodone immediate-release (ROXICODONE) 5 MG Tab; Take 1 Tab by mouth every 8 hours as needed for Severe Pain.  Dispense: 90 Tab; Refill: 0  - oxycodone immediate-release (ROXICODONE) 5 MG Tab; Take 1 Tab by mouth every 8 hours as needed for Severe Pain.  Dispense: 90 Tab; Refill: 0  - oxycodone immediate-release (ROXICODONE) 5 MG Tab; Take 1 Tab by mouth every 8 hours as needed for Severe Pain.  Dispense: 90 Tab; Refill: 0    5. Pain management contract signed    - oxycodone immediate-release (ROXICODONE) 5 MG Tab; Take 1 Tab  by mouth every 8 hours as needed for Severe Pain.  Dispense: 90 Tab; Refill: 0  - oxycodone immediate-release (ROXICODONE) 5 MG Tab; Take 1 Tab by mouth every 8 hours as needed for Severe Pain.  Dispense: 90 Tab; Refill: 0  - oxycodone immediate-release (ROXICODONE) 5 MG Tab; Take 1 Tab by mouth every 8 hours as needed for Severe Pain.  Dispense: 90 Tab; Refill: 0    6. Chronic use of opiate for therapeutic purpose      7. Generalized Weakness  Referral to PT

## 2017-05-04 DIAGNOSIS — I48.0 PAROXYSMAL ATRIAL FIBRILLATION (HCC): ICD-10-CM

## 2017-05-04 RX ORDER — PROPAFENONE HYDROCHLORIDE 150 MG/1
TABLET, COATED ORAL
Qty: 180 TAB | Refills: 1 | Status: SHIPPED | OUTPATIENT
Start: 2017-05-04 | End: 2017-11-28 | Stop reason: SDUPTHER

## 2017-05-22 RX ORDER — LEVOTHYROXINE SODIUM 0.1 MG/1
TABLET ORAL
Qty: 90 TAB | Refills: 3 | Status: SHIPPED | OUTPATIENT
Start: 2017-05-22 | End: 2018-03-31 | Stop reason: SDUPTHER

## 2017-05-22 RX ORDER — OMEPRAZOLE 40 MG/1
CAPSULE, DELAYED RELEASE ORAL
Qty: 90 CAP | Refills: 3 | Status: SHIPPED | OUTPATIENT
Start: 2017-05-22 | End: 2018-04-25 | Stop reason: SDUPTHER

## 2017-05-22 NOTE — TELEPHONE ENCOUNTER
Was the patient seen in the last year in this department? Yes     Does patient have an active prescription for medications requested? No     Received Request Via: Pharmacy      Pt met protocol?: Yes    LAST OV 04/18/2017    LAST TSH 04/04/2017    BP Readings from Last 1 Encounters:   04/18/17 126/72

## 2017-05-22 NOTE — TELEPHONE ENCOUNTER
Patient was last seen by PCP 4/17. Last TSH read 0.63 (4/17). Will refill for 12 months for TSH and PPI.

## 2017-05-25 RX ORDER — SIMVASTATIN 20 MG
TABLET ORAL
Qty: 90 TAB | Refills: 3 | Status: SHIPPED | OUTPATIENT
Start: 2017-05-25 | End: 2018-05-19 | Stop reason: SDUPTHER

## 2017-05-25 NOTE — TELEPHONE ENCOUNTER
Was the patient seen in the last year in this department? Yes     Does patient have an active prescription for medications requested? No     Received Request Via: Pharmacy      Pt met protocol?: Yes. OV 4/17   Lab Results   Component Value Date/Time    TRIGLYCERIDES 247* 10/04/2010 12:00 AM    HDL 72 04/04/2017 11:01 AM    HDL-P (TOTAL) 41.5 10/04/2010 12:00 AM

## 2017-05-26 NOTE — TELEPHONE ENCOUNTER
Pt has had OV within the 12 month protocol and lipid panel is current from 4/17. 12 month supply sent to pharmacy.   Lab Results   Component Value Date/Time    CHOLESTEROL, 04/04/2017 11:01 AM    LDL 84 04/04/2017 11:01 AM    HDL 72 04/04/2017 11:01 AM    TRIGLYCERIDES 79 04/04/2017 11:01 AM       Lab Results   Component Value Date/Time    SODIUM 140 04/04/2017 11:01 AM    POTASSIUM 3.9 04/04/2017 11:01 AM    CHLORIDE 105 04/04/2017 11:01 AM    CO2 26 04/04/2017 11:01 AM    GLUCOSE 88 04/04/2017 11:01 AM    BUN 22 04/04/2017 11:01 AM    CREATININE 0.80 04/04/2017 11:01 AM    CREATININE 0.76 04/04/2017 11:01 AM    BUN-CREATININE RATIO 25 03/21/2011 12:00 AM    GLOM FILT RATE, EST >59 03/21/2011 12:00 AM     Lab Results   Component Value Date/Time    ALKALINE PHOSPHATASE 27* 11/28/2015 10:46 AM    AST(SGOT) 12 11/28/2015 10:46 AM    ALT(SGPT) 12 11/28/2015 10:46 AM    TOTAL BILIRUBIN 0.8 11/28/2015 10:46 AM

## 2017-05-30 RX ORDER — FENOFIBRATE 134 MG/1
CAPSULE ORAL
Qty: 90 CAP | Refills: 1 | Status: SHIPPED | OUTPATIENT
Start: 2017-05-30 | End: 2017-06-26

## 2017-05-30 NOTE — TELEPHONE ENCOUNTER
Was the patient seen in the last year in this department? Yes     Does patient have an active prescription for medications requested? No     Received Request Via: Pharmacy      Pt met protocol?: Yes, OV last month   Lab Results   Component Value Date/Time    TRIGLYCERIDES 247* 10/04/2010 12:00 AM    HDL 72 04/04/2017 11:01 AM    HDL-P (TOTAL) 41.5 10/04/2010 12:00 AM

## 2017-05-30 NOTE — TELEPHONE ENCOUNTER
Pt has had OV within the 12 month protocol and lipid panel is current. 6 month supply sent to pharmacy.   Lab Results   Component Value Date/Time    CHOLESTEROL, 04/04/2017 11:01 AM    LDL 84 04/04/2017 11:01 AM    HDL 72 04/04/2017 11:01 AM    TRIGLYCERIDES 79 04/04/2017 11:01 AM       Lab Results   Component Value Date/Time    SODIUM 140 04/04/2017 11:01 AM    POTASSIUM 3.9 04/04/2017 11:01 AM    CHLORIDE 105 04/04/2017 11:01 AM    CO2 26 04/04/2017 11:01 AM    GLUCOSE 88 04/04/2017 11:01 AM    BUN 22 04/04/2017 11:01 AM    CREATININE 0.80 04/04/2017 11:01 AM    CREATININE 0.76 04/04/2017 11:01 AM    BUN-CREATININE RATIO 25 03/21/2011 12:00 AM    GLOM FILT RATE, EST >59 03/21/2011 12:00 AM     Lab Results   Component Value Date/Time    ALKALINE PHOSPHATASE 27* 11/28/2015 10:46 AM    AST(SGOT) 12 11/28/2015 10:46 AM    ALT(SGPT) 12 11/28/2015 10:46 AM    TOTAL BILIRUBIN 0.8 11/28/2015 10:46 AM

## 2017-06-06 RX ORDER — FENOFIBRATE 134 MG/1
CAPSULE ORAL
Refills: 0 | OUTPATIENT
Start: 2017-06-06

## 2017-06-07 RX ORDER — FENOFIBRATE 134 MG/1
CAPSULE ORAL
Refills: 0 | OUTPATIENT
Start: 2017-06-07

## 2017-06-07 NOTE — TELEPHONE ENCOUNTER
CALLED [facility] 06/07/2017 AND CONFIRMED PT STILL HAS REFILLS ON FILE. ELLEN

## 2017-06-09 RX ORDER — FENOFIBRATE 134 MG/1
CAPSULE ORAL
Refills: 0 | OUTPATIENT
Start: 2017-06-09

## 2017-06-12 RX ORDER — FENOFIBRATE 134 MG/1
CAPSULE ORAL
Qty: 90 CAP | Refills: 0 | OUTPATIENT
Start: 2017-06-12

## 2017-06-19 RX ORDER — FENOFIBRATE 134 MG/1
134 CAPSULE ORAL
Qty: 90 CAP | Refills: 1 | OUTPATIENT
Start: 2017-06-19

## 2017-06-19 NOTE — TELEPHONE ENCOUNTER
Was the patient seen in the last year in this department? Yes 04/18/2017    Does patient have an active prescription for medications requested? No     Received Request Via: Patient

## 2017-06-20 RX ORDER — FENOFIBRATE 134 MG/1
CAPSULE ORAL
Refills: 0 | OUTPATIENT
Start: 2017-06-20

## 2017-06-26 RX ORDER — FENOFIBRATE 134 MG/1
CAPSULE ORAL
Qty: 90 CAP | Refills: 1 | Status: SHIPPED | OUTPATIENT
Start: 2017-06-26 | End: 2017-12-20 | Stop reason: SDUPTHER

## 2017-06-26 NOTE — TELEPHONE ENCOUNTER
*CALLED [facility] 06/26/2017 CONFIRMED THEY DID NOT RECEIVE Rx WRITTEN ON 05/30/2017*  Was the patient seen in the last year in this department? Yes     Does patient have an active prescription for medications requested? Yes     Received Request Via: Pharmacy      Pt met protocol?: Yes    LAST OV 04/18/2017    BP Readings from Last 1 Encounters:   04/18/17 126/72     Lab Results   Component Value Date/Time    GLYCOHEMOGLOBIN 5.7 10/09/2012 10:42 AM        Lab Results   Component Value Date/Time    TRIGLYCERIDES 247* 10/04/2010 12:00 AM    HDL 72 04/04/2017 11:01 AM    HDL-P (TOTAL) 41.5 10/04/2010 12:00 AM

## 2017-07-17 ENCOUNTER — TELEPHONE (OUTPATIENT)
Dept: MEDICAL GROUP | Facility: PHYSICIAN GROUP | Age: 82
End: 2017-07-17

## 2017-07-17 NOTE — TELEPHONE ENCOUNTER
ESTABLISHED PATIENT PRE-VISIT PLANNING     Note: Patient will not be contacted if there is no indication to call.     1.  Reviewed notes from the last few office visits within the medical group: Yes    2.  If any orders were placed at last visit or intended to be done for this visit (i.e. 6 mos follow-up), do we have Results/Consult Notes?        •  Labs - Labs ordered, completed and results are in chart.       •  Imaging - Imaging was not ordered at last office visit.       •  Referrals - Referral ordered, patient was seen and consult notes are in chart. Care Teams updated  NO.    3. Is this appointment scheduled as a Hospital Follow-Up? No    4.  Immunizations were updated in Epic using WebIZ?: Epic matches WebIZ       •  Web Iz Recommendations: TDAP and ZOSTAVAX (Shingles)    5.  Patient is due for the following Health Maintenance Topics:   Health Maintenance Due   Topic Date Due   • IMM ZOSTER VACCINE  11/29/1995   • PFT SCREENING-FEV1 AND FEV/FVC RATIO / SPIROMETRY SHOULD BE PERFORMED ANNUALLY  09/28/2011   • IMM DTaP/Tdap/Td Vaccine (1 - Tdap) 12/18/2015   • Annual Wellness Visit  06/03/2016           6.  Patient was informed to arrive 15 min prior to their scheduled appointment and bring in their medication bottles.

## 2017-07-18 ENCOUNTER — OFFICE VISIT (OUTPATIENT)
Dept: MEDICAL GROUP | Facility: PHYSICIAN GROUP | Age: 82
End: 2017-07-18
Payer: MEDICARE

## 2017-07-18 VITALS
DIASTOLIC BLOOD PRESSURE: 62 MMHG | WEIGHT: 129 LBS | TEMPERATURE: 97.7 F | HEIGHT: 70 IN | HEART RATE: 58 BPM | OXYGEN SATURATION: 92 % | BODY MASS INDEX: 18.47 KG/M2 | RESPIRATION RATE: 16 BRPM | SYSTOLIC BLOOD PRESSURE: 122 MMHG

## 2017-07-18 DIAGNOSIS — M54.50 CHRONIC MIDLINE LOW BACK PAIN WITHOUT SCIATICA: ICD-10-CM

## 2017-07-18 DIAGNOSIS — F11.20 OPIOID TYPE DEPENDENCE, CONTINUOUS (HCC): ICD-10-CM

## 2017-07-18 DIAGNOSIS — F41.9 ANXIETY: ICD-10-CM

## 2017-07-18 DIAGNOSIS — Z02.89 PAIN MANAGEMENT CONTRACT SIGNED: ICD-10-CM

## 2017-07-18 DIAGNOSIS — G89.29 CHRONIC MIDLINE LOW BACK PAIN WITHOUT SCIATICA: ICD-10-CM

## 2017-07-18 DIAGNOSIS — J96.11 CHRONIC RESPIRATORY FAILURE WITH HYPOXIA (HCC): ICD-10-CM

## 2017-07-18 DIAGNOSIS — M48.061 SPINAL STENOSIS OF LUMBAR REGION: ICD-10-CM

## 2017-07-18 PROCEDURE — 99213 OFFICE O/P EST LOW 20 MIN: CPT | Performed by: FAMILY MEDICINE

## 2017-07-18 RX ORDER — OXYCODONE HYDROCHLORIDE 5 MG/1
5 TABLET ORAL EVERY 8 HOURS PRN
Qty: 90 TAB | Refills: 0 | Status: SHIPPED | OUTPATIENT
Start: 2017-07-18 | End: 2017-10-18

## 2017-07-18 RX ORDER — OXYCODONE HYDROCHLORIDE 5 MG/1
5 TABLET ORAL EVERY 8 HOURS PRN
Qty: 90 TAB | Refills: 0 | Status: SHIPPED | OUTPATIENT
Start: 2017-07-18 | End: 2017-09-21

## 2017-07-18 RX ORDER — ALPRAZOLAM 1 MG/1
1-2 TABLET ORAL NIGHTLY PRN
Qty: 180 TAB | Refills: 1 | Status: SHIPPED | OUTPATIENT
Start: 2017-07-18 | End: 2017-10-18 | Stop reason: SDUPTHER

## 2017-07-18 RX ORDER — ALBUTEROL SULFATE 90 UG/1
AEROSOL, METERED RESPIRATORY (INHALATION)
Qty: 1 INHALER | Refills: 5 | Status: SHIPPED | OUTPATIENT
Start: 2017-07-18 | End: 2018-01-18 | Stop reason: SDUPTHER

## 2017-07-18 RX ORDER — ALBUTEROL SULFATE 90 UG/1
AEROSOL, METERED RESPIRATORY (INHALATION)
Qty: 1 INHALER | Refills: 5 | Status: SHIPPED | OUTPATIENT
Start: 2017-07-18 | End: 2017-07-18 | Stop reason: SDUPTHER
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
## 2017-07-18 NOTE — MR AVS SNAPSHOT
"        Eun Erazo Haile   2017 11:00 AM   Office Visit   MRN: [id number]    Department:  [facility]   Dept Phone:  [phone]    Description:  Female : 1935   Provider:  Libertad Estrella M.D.           Reason for Visit     Follow-Up 3 mo fv     Medication Refill pain med      Allergies as of 2017     Allergen Noted Reactions    Asa [Aspirin] 2012       Latex 2009       Penicillins 2009       Tape 2009   Hives, Itching, Swelling    Wasp Venom 2016   Swelling      You were diagnosed with     Chronic respiratory failure with hypoxia (CMS-HCC)   [085668]       Opioid type dependence, continuous (CMS-HCC)   [304.01.ICD-9-CM]       Spinal stenosis of lumbar region   [750590]       Pain management contract signed   [[id number]]       Anxiety   [873498]       Chronic midline low back pain without sciatica   [5057943]         Vital Signs     Blood Pressure Pulse Temperature Respirations Height Weight    122/62 mmHg 58 36.5 °C (97.7 °F) 16 1.778 m (5' 10\") 58.514 kg (129 lb)    Body Mass Index Oxygen Saturation Last Menstrual Period Smoking Status          18.51 kg/m2 92% 1963 Former Smoker        Basic Information     Date Of Birth Sex Race Ethnicity Preferred Language    1935 Female White Non- English      Problem List              ICD-10-CM Priority Class Noted - Resolved    Hypertension I10 Medium  2010 - Present    Hyperlipidemia E78.5 Low  2010 - Present    Chronic insomnia F51.04   2010 - Present    Vitamin D deficiency E55.9   2010 - Present    Osteopenia M85.80   2010 - Present    Atrial fibrillation (CMS-HCC) I48.91   2010 - Present    Chronic obstructive pulmonary disease (COPD) (CMS-HCC) J44.9   2010 - Present    Gastroesophageal reflux disease K21.9 Low  2011 - Present    Polycystic kidney disease Q61.3   2012 - Present    Mild cognitive impairment G31.84   2013 - Present    Carotid " atherosclerosis I65.29   3/12/2013 - Present    Anxiety and depression F41.9, F32.9   3/12/2013 - Present    Schatzki's ring K22.2   4/12/2013 - Present    Nocturnal hypoxemia --- COPD G47.34 Medium  5/14/2013 - Present    Chronic lower back pain M54.5, G89.29   3/11/2014 - Present    Anaphylactic reaction to bee sting T63.441A   5/8/2014 - Present    Hyperthyroidism E05.90 High  6/27/2014 - Present    Spinal stenosis of lumbar region M48.06   7/7/2014 - Present    CKD (chronic kidney disease) N18.9   12/29/2014 - Present    Chronic respiratory failure (CMS-HCC) J96.10 Medium  5/29/2015 - Present    Opioid type dependence, continuous (CMS-HCC) F11.20 Medium  5/29/2015 - Present    Sedative, 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   6/3/2015 - Present    Risk for falls Z91.81   6/3/2015 - Present      Health Maintenance        Date Due Completion Dates    IMM ZOSTER VACCINE 11/29/1995 ---    COLON CANCER SCREENING ANNUAL FIT 8/16/2012 8/16/2011    IMM DTaP/Tdap/Td Vaccine (1 - Tdap) 12/18/2015 12/17/2015    IMM INFLUENZA (1) 9/1/2017 10/20/2016, 12/17/2015, 10/1/2013    BONE DENSITY 4/5/2021 4/5/2016, 10/12/2012, 10/11/2010            Current Immunizations     13-VALENT PCV PREVNAR 10/20/2016    Influenza TIV (IM) 10/1/2013    Influenza Vaccine Adult HD 10/20/2016    Influenza Vaccine Quad Inj (Pf) 12/17/2015    Pneumococcal polysaccharide vaccine (PPSV-23) 9/28/2010    TD Vaccine 12/17/2015      Below and/or attached are the medications your provider expects you to take. Review all of your home medications and newly ordered medications with your provider and/or pharmacist. Follow medication instructions as directed by your provider and/or pharmacist. Please keep your medication list with you and share with your provider. Update the information when medications are discontinued, doses are changed, or new medications (including over-the-counter products) are added; and carry medication information at all  times in the event of emergency situations     Allergies:  ASA - (reactions not documented)     LATEX - (reactions not documented)     PENICILLINS - (reactions not documented)     TAPE - Hives,Itching,Swelling     WASP VENOM - Swelling               Medications  Valid as of: July 18, 2017 - 11:25 AM    Generic Name Brand Name Tablet Size Instructions for use    Albuterol Sulfate (Nebu Soln) PROVENTIL 2.5mg/3ml 3 mL by Nebulization route every four hours as needed for Shortness of Breath.        Albuterol Sulfate (Aero Soln) albuterol 108 (90 BASE) MCG/ACT INHALE 2 PUFFS BY MOUTH EVERY 6 HOURS AS NEEDED FOR SHORTNESS OF BREATH.        ALPRAZolam (Tab) XANAX 1 MG Take 1-2 Tabs by mouth at bedtime as needed for Sleep or Anxiety.        Clobetasol Propionate (Cream) TEMOVATE 0.05 %         Clopidogrel Bisulfate (Tab) PLAVIX 75 MG TAKE 1 TABLET BY MOUTH EVERY DAY        Cyanocobalamin (Cap) B-12 1000 MCG Take 1,000 mg by mouth.        Fenofibrate Micronized (Cap) LOFIBRA 134 MG TAKE 1 CAPSULE BY MOUTH EVERY DAY.        Fiber   Take  by mouth.        Fluticasone Propionate (Suspension) FLONASE 50 MCG/ACT Spray 1 Spray in nose 2 times a day. Each Nostril        Fluticasone-Salmeterol (AEROSOL POWDER, BREATH ACTIVATED) ADVAIR 250-50 MCG/DOSE Inhale 1 Puff by mouth every 12 hours.        Levothyroxine Sodium (Tab) SYNTHROID 100 MCG TAKE 1 TABLET BY MOUTH EVERY DAY.        Metoprolol Tartrate (Tab) LOPRESSOR 25 MG TAKE 1 TAB BY MOUTH EVERY DAY.        Omeprazole (CAPSULE DELAYED RELEASE) PRILOSEC 40 MG TAKE ONE CAPSULE BY MOUTH EVERY DAY        OxyCODONE HCl (Tab) ROXICODONE 5 MG Take 1 Tab by mouth every 8 hours as needed for Severe Pain.        OxyCODONE HCl (Tab) ROXICODONE 5 MG Take 1 Tab by mouth every 8 hours as needed for Severe Pain. May refill today        OxyCODONE HCl (Tab) ROXICODONE 5 MG Take 1 Tab by mouth every 8 hours as needed for Severe Pain.        Probiotic Product   Take  by mouth.        Propafenone HCl  (Tab) RYTHMOL 150 MG TAKE 1 TAB BY MOUTH 2 TIMES A DAY.        Simvastatin (Tab) ZOCOR 20 MG TAKE 1 TABLET BY MOUTH IN THE EVENING        .                 Medicines prescribed today were sent to:     [facility] #[id number] - [place], [place] - [address] [address]    [address] [place] [address] [place] [address]    Phone: [phone] Fax: [phone]    Open 24 Hours?: No      Medication refill instructions:       If your prescription bottle indicates you have medication refills left, it is not necessary to call your provider’s office. Please contact your pharmacy and they will refill your medication.    If your prescription bottle indicates you do not have any refills left, you may request refills at any time through one of the following ways: The online [facility] system (except Urgent Care), by calling your provider’s office, or by asking your pharmacy to contact your provider’s office with a refill request. Medication refills are processed only during regular business hours and may not be available until the next business day. Your provider may request additional information or to have a follow-up visit with you prior to refilling your medication.   *Please Note: Medication refills are assigned a new Rx number when refilled electronically. Your pharmacy may indicate that no refills were authorized even though a new prescription for the same medication is available at the pharmacy. Please request the medicine by name with the pharmacy before contacting your provider for a refill.           [facility] Access Code: Activation code not generated  Current [facility] Status: Active

## 2017-08-15 ENCOUNTER — TELEPHONE (OUTPATIENT)
Dept: MEDICAL GROUP | Facility: PHYSICIAN GROUP | Age: 82
End: 2017-08-15

## 2017-08-15 NOTE — TELEPHONE ENCOUNTER
1. Caller Name: Eun Be                                           Call Back Number: [phone] (home)         Patient approves a detailed voicemail message: N\A    Pt needs an order for oxygen sent to [facility].  She has been using [facility]

## 2017-08-28 DIAGNOSIS — T78.40XD ALLERGY, SUBSEQUENT ENCOUNTER: ICD-10-CM

## 2017-08-29 RX ORDER — FLUTICASONE PROPIONATE 50 MCG
1 SPRAY, SUSPENSION (ML) NASAL 2 TIMES DAILY
Qty: 3 BOTTLE | Refills: 3 | Status: SHIPPED | OUTPATIENT
Start: 2017-08-29 | End: 2017-09-07

## 2017-08-29 NOTE — TELEPHONE ENCOUNTER
Was the patient seen in the last year in this department? Yes     Does patient have an active prescription for medications requested? No     Received Request Via: Pharmacy      Pt met protocol?: Yes, labs 4/17 ov 7/17

## 2017-09-07 DIAGNOSIS — T78.40XD ALLERGY, SUBSEQUENT ENCOUNTER: ICD-10-CM

## 2017-09-07 RX ORDER — FLUTICASONE PROPIONATE 50 MCG
1 SPRAY, SUSPENSION (ML) NASAL 2 TIMES DAILY
Qty: 3 BOTTLE | Refills: 3 | Status: SHIPPED | OUTPATIENT
Start: 2017-09-07 | End: 2018-09-11

## 2017-09-13 ENCOUNTER — TELEPHONE (OUTPATIENT)
Dept: CARDIOLOGY | Facility: MEDICAL CENTER | Age: 82
End: 2017-09-13

## 2017-09-14 NOTE — TELEPHONE ENCOUNTER
Theresa Gruber M.D.   Lazara Kramer R.N. 31 minutes ago (9:13 AM)      Agree   Would advise 12 lead ecg too when she can (Routing comment)      =============================================================    Called Nitish at [facility] and advised him of Dr. Gruber's instructions.    Patient will have the ECG done at her upcoming appointment (scheduled for 9/21/2017).    ENIO ANAYA

## 2017-09-14 NOTE — TELEPHONE ENCOUNTER
----- Message from Christine Hi sent at 9/13/2017  2:05 PM PDT -----  Regarding: Physical Therapist concerned about low heart rate  [facility]/Lazara Talbert @ [facility] said they have been working with the patient, and monitoring her heart rate, and they are a little concerned because it seems to be dropping down into the 40s. He can be reached at [phone].

## 2017-09-14 NOTE — TELEPHONE ENCOUNTER
Spoke to therapist who states her pulse has started going down in 40's last couple visits,  He sees her for generalized weakness and strengthening, He has been seeing her for months, he advised to call dtr Faviola who states it happens more when she is walking around, she is ok at rest. She advised to speak to pt, pt states she gets very breathless but has COPD,  Pt takes most of her meds in the am, pt is losing weight, no gaining but not sleeping well at night, no c/o edema, has taken no BP, offered appt in am with [facility] but they are unable to make, appt next week 9/21 SM advised check BP's and pulse with and without lightheadedness until seen and bring in, meds as per med list, to [facility] for further advice

## 2017-09-21 ENCOUNTER — OFFICE VISIT (OUTPATIENT)
Dept: CARDIOLOGY | Facility: MEDICAL CENTER | Age: 82
End: 2017-09-21
Payer: MEDICARE

## 2017-09-21 VITALS
BODY MASS INDEX: 18.18 KG/M2 | HEIGHT: 70 IN | RESPIRATION RATE: 14 BRPM | OXYGEN SATURATION: 95 % | DIASTOLIC BLOOD PRESSURE: 74 MMHG | WEIGHT: 127 LBS | HEART RATE: 72 BPM | SYSTOLIC BLOOD PRESSURE: 158 MMHG

## 2017-09-21 DIAGNOSIS — I49.1 PREMATURE ATRIAL BEATS: ICD-10-CM

## 2017-09-21 DIAGNOSIS — I48.0 PAROXYSMAL ATRIAL FIBRILLATION (HCC): ICD-10-CM

## 2017-09-21 DIAGNOSIS — I10 ESSENTIAL HYPERTENSION: ICD-10-CM

## 2017-09-21 DIAGNOSIS — E78.2 MIXED HYPERLIPIDEMIA: ICD-10-CM

## 2017-09-21 LAB — EKG IMPRESSION: NORMAL

## 2017-09-21 PROCEDURE — 99214 OFFICE O/P EST MOD 30 MIN: CPT | Performed by: NURSE PRACTITIONER

## 2017-09-21 PROCEDURE — 93000 ELECTROCARDIOGRAM COMPLETE: CPT | Performed by: NURSE PRACTITIONER

## 2017-09-21 ASSESSMENT — ENCOUNTER SYMPTOMS
CLAUDICATION: 0
ABDOMINAL PAIN: 0
MYALGIAS: 0
PND: 0
WEAKNESS: 0
DIZZINESS: 1
ORTHOPNEA: 0
COUGH: 0
INSOMNIA: 1
PALPITATIONS: 0
SHORTNESS OF BREATH: 1

## 2017-09-21 NOTE — PROGRESS NOTES
Subjective:   Eun Be is a 81 y.o. female who presents today With her daughter, Faviola, to follow-up on atrial fibrillation and a slow heart rate.    The patient has a physical therapist who is working with her at home. He noticed that during or after exercise her pulse was occasionally down in the 40s. The patient does not recognize any symptoms ago along with a slow pulse. The physical therapist was checking the pulse with a pulse oximeter.    Patient denies any palpitations or chest discomfort. She does get very short of breath with any exertion which causes her to feel lightheaded. She has COPD.    She is under a increased amount of stress as her brother  recently and her  is in the hospital dying. She also lost her cat recently.    She has a history of paroxysmal atrial fibrillation and is on Plavix for anticoagulation as she is allergic to aspirin. In the past she has not wanted to take full anticoagulation. She is on propafenone for rhythm control.   Past Medical History:   Diagnosis Date   • Chronic lower back pain 3/11/2014   • Hypertriglyceridemia 2013   • Anxiety    • Arrhythmia    • Arthritis    • Backpain    • Bronchitis    • CATARACT    • COPD    • EMPHYSEMA    • GERD (gastroesophageal reflux disease)    • Heart burn     hiatal hernia   • HTN (hypertension)    • Hypertension    • Hypothyroid    • IGT (impaired glucose tolerance)    • MEDICAL HOME    • Osteopenia    • Pain     left side of back   • Paroxysmal atrial fibrillation (CMS-HCC)    • Personal history of venous thrombosis and embolism     right leg   • Pneumonia        • Renal disorder     cysts   • Rheumatic fever     4 or 5 years old   • Rheumatic fever     as a kid per patient.   • Unspecified disorder of thyroid      Past Surgical History:   Procedure Laterality Date   • PB EXCIS/UNROOF RENAL CYST     • OTHER      cysts removed from left kidney   • HYSTERECTOMY RADICAL     • OTHER       hemorrhoidectomy   • HEMORRHOIDECTOMY  1963   • HYSTERECTOMY, TOTAL ABDOMINAL  26 years old     Family History   Problem Relation Age of Onset   • Stroke Brother    • Heart Disease Other      History   Smoking Status   • Former Smoker   • Types: Cigarettes   Smokeless Tobacco   • Never Used     Comment: quit 1990 1/2 ppd x ?yrs (on and off for years since age 15)     Allergies   Allergen Reactions   • Asa [Aspirin]    • Latex    • Penicillins    • Tape Hives, Itching and Swelling   • Wasp Venom Swelling     Outpatient Encounter Prescriptions as of 9/21/2017   Medication Sig Dispense Refill   • metoprolol (LOPRESSOR) 25 MG Tab Take 1/2 tablet in the AM and 1 tablet in the  Tab 3   • rivaroxaban (XARELTO) 15 MG Tab tablet Take 1 Tab by mouth with dinner. 30 Tab 11   • fluticasone (FLONASE) 50 MCG/ACT nasal spray SPRAY 1 SPRAY IN NOSE 2 TIMES A DAY. EACH NOSTRIL 3 Bottle 3   • albuterol (VENTOLIN HFA) 108 (90 BASE) MCG/ACT Aero Soln inhalation aerosol INHALE 2 PUFFS BY MOUTH EVERY 6 HOURS AS NEEDED FOR SHORTNESS OF BREATH. 1 Inhaler 5   • oxycodone immediate-release (ROXICODONE) 5 MG Tab Take 1 Tab by mouth every 8 hours as needed for Severe Pain. May refill today 90 Tab 0   • oxycodone immediate-release (ROXICODONE) 5 MG Tab Take 1 Tab by mouth every 8 hours as needed for Severe Pain. 90 Tab 0   • alprazolam (XANAX) 1 MG Tab Take 1-2 Tabs by mouth at bedtime as needed for Sleep or Anxiety. 180 Tab 1   • fenofibrate micronized (LOFIBRA) 134 MG capsule TAKE 1 CAPSULE BY MOUTH EVERY DAY. 90 Cap 1   • simvastatin (ZOCOR) 20 MG Tab TAKE 1 TABLET BY MOUTH IN THE EVENING 90 Tab 3   • levothyroxine (SYNTHROID) 100 MCG Tab TAKE 1 TABLET BY MOUTH EVERY DAY. 90 Tab 3   • omeprazole (PRILOSEC) 40 MG delayed-release capsule TAKE ONE CAPSULE BY MOUTH EVERY DAY 90 Cap 3   • propafenone (RYTHMOL) 150 MG Tab TAKE 1 TAB BY MOUTH 2 TIMES A DAY. 180 Tab 1   • FIBER SELECT GUMMIES PO Take  by mouth.     • Probiotic Product  "(PROBIOTIC PO) Take  by mouth.     • clobetasol (TEMOVATE) 0.05 % Cream Apply 1 Each to affected area(s) as needed.  5   • fluticasone-salmeterol (ADVAIR) 250-50 MCG/DOSE AEROSOL POWDER, BREATH ACTIVATED Inhale 1 Puff by mouth every 12 hours. 3 Inhaler 3   • Cyanocobalamin (B-12) 1000 MCG CAPS Take 1,000 mg by mouth every day at 6 PM.     • [DISCONTINUED] clopidogrel (PLAVIX) 75 MG Tab TAKE 1 TABLET BY MOUTH EVERY DAY 90 Tab 3   • [DISCONTINUED] oxycodone immediate-release (ROXICODONE) 5 MG Tab Take 1 Tab by mouth every 8 hours as needed for Severe Pain. (Patient not taking: Reported on 9/21/2017) 90 Tab 0   • [DISCONTINUED] metoprolol (LOPRESSOR) 25 MG Tab TAKE 1 TAB BY MOUTH EVERY DAY. 90 Tab 1   • albuterol (PROVENTIL) 2.5mg/3ml Nebu Soln solution for nebulization 3 mL by Nebulization route every four hours as needed for Shortness of Breath. 75 mL 1     No facility-administered encounter medications on file as of 9/21/2017.      Review of Systems   Constitutional: Positive for malaise/fatigue.   Respiratory: Positive for shortness of breath (exertion). Negative for cough.    Cardiovascular: Negative for chest pain, palpitations, orthopnea, claudication, leg swelling and PND.   Gastrointestinal: Negative for abdominal pain.   Musculoskeletal: Negative for myalgias.   Neurological: Positive for dizziness (when walking and breathing hard.). Negative for weakness.   Psychiatric/Behavioral: The patient has insomnia.         Objective:   /74   Pulse 72   Resp 14   Ht 1.778 m (5' 10\")   Wt 57.6 kg (127 lb)   LMP 01/01/1963   SpO2 95%   BMI 18.22 kg/m²     Physical Exam   Constitutional: She is oriented to person, place, and time. She appears well-developed and well-nourished.   Cachectic appearing female in no acute distress.   HENT:   Head: Normocephalic.   Eyes: Conjunctivae are normal.   Neck: No JVD present. No thyromegaly present.   Cardiovascular: Normal rate, regular rhythm, S1 normal, S2 normal and " normal heart sounds.   Occasional extrasystoles are present. Exam reveals no gallop, no S3, no S4 and no friction rub.    No murmur heard.  Pulmonary/Chest: Effort normal. No respiratory distress. She has decreased breath sounds (severely diminished throughout with some expiratory wheezes.). She has no wheezes. She has no rales.   Abdominal: Soft. Bowel sounds are normal. She exhibits no distension. There is no tenderness.   Musculoskeletal: She exhibits no edema.   Neurological: She is alert and oriented to person, place, and time.   Skin: Skin is warm and dry.   Psychiatric: She has a normal mood and affect.     Results for JULES DUNCAN (MRN [id number]) as of 9/21/2017 16:48   Ref. Range 4/4/2017 11:01   Sodium Latest Ref Range: 135 - 145 mmol/L 140   Potassium Latest Ref Range: 3.6 - 5.5 mmol/L 3.9   Chloride Latest Ref Range: 96 - 112 mmol/L 105   Co2 Latest Ref Range: 20 - 33 mmol/L 26   Anion Gap Latest Ref Range: 0.0 - 11.9  9.0   Glucose Latest Ref Range: 65 - 99 mg/dL 88   Bun Latest Ref Range: 8 - 22 mg/dL 22   Creatinine Latest Ref Range: 0.50 - 1.40 mg/dL 0.80   GFR If  Latest Ref Range: >60 mL/min/1.73 m 2 >60   GFR If Non  Latest Ref Range: >60 mL/min/1.73 m 2 >60   Calcium Latest Ref Range: 8.5 - 10.5 mg/dL 10.6 (H)   Results for JULES DUNCAN (MRN [id number]) as of 9/21/2017 16:48   Ref. Range 4/4/2017 11:01   Cholesterol,Tot Latest Ref Range: 100 - 199 mg/dL 172   Triglycerides Latest Ref Range: 0 - 149 mg/dL 79   HDL Latest Ref Range: >=40 mg/dL 72   LDL Latest Ref Range: <100 mg/dL 84       Today: EKG shows sinus rhythm with PACs. No atrial fibrillation.    Assessment:     1. Paroxysmal atrial fibrillation (CMS-[facility])  [facility] EPIPHANY EKG (Clinic Performed)   2. Premature atrial beats     3. Essential hypertension     4. Mixed hyperlipidemia         Medical Decision Making:  Today's Assessment / Status / Plan:   Paroxysmal atrial fibrillation: She is in sinus  rhythm today with PACs. She may of had some bradycardia from her antiarrhythmic in combination with metoprolol. Her heart rate of 40 may have been due to ectopy as well.    So that her heart does not go too slow or her blood pressure gets too low I will reduce metoprolol 25 mg to one half tablet in the morning and a full tablet in the evening. She will continue on her current dose of propafenone.    We had a discussion regarding the risk of stroke with atrial fibrillation. After understanding atrial fibrillation and the possibility of stroke and patient is willing to take anticoagulation. Even though she is not in atrial fibrillation currently she is willing to start Xarelto 15 mg daily. I will give further reduce dose since she has a history of chronic kidney disease.    Hypertension: Blood pressure is slightly elevated in the office today. I would not want her blood pressure too low. I would like her systolic blood pressure between 120 and 140 with occasional 150. She will monitor her blood pressure. I've encouraged her to not use the wrist cuff but to obtain an arm cuff.    Hyperlipidemia: Last lipids done in April were to goal. Continue current medication.    We will have her follow-up in 4-6 months with Dr. Theresa Gruber. She will follow up sooner with myself if problems.    Collaborating Provider: Dr. Theresa Gruber.

## 2017-10-18 ENCOUNTER — OFFICE VISIT (OUTPATIENT)
Dept: MEDICAL GROUP | Facility: PHYSICIAN GROUP | Age: 82
End: 2017-10-18
Payer: MEDICARE

## 2017-10-18 VITALS
TEMPERATURE: 97.4 F | RESPIRATION RATE: 16 BRPM | DIASTOLIC BLOOD PRESSURE: 76 MMHG | BODY MASS INDEX: 18.9 KG/M2 | HEIGHT: 70 IN | HEART RATE: 68 BPM | SYSTOLIC BLOOD PRESSURE: 130 MMHG | WEIGHT: 132 LBS | OXYGEN SATURATION: 94 %

## 2017-10-18 DIAGNOSIS — M48.062 SPINAL STENOSIS OF LUMBAR REGION WITH NEUROGENIC CLAUDICATION: ICD-10-CM

## 2017-10-18 DIAGNOSIS — F43.21 GRIEF REACTION: ICD-10-CM

## 2017-10-18 DIAGNOSIS — F11.90 CHRONIC, CONTINUOUS USE OF OPIOIDS: ICD-10-CM

## 2017-10-18 DIAGNOSIS — F41.9 ANXIETY: ICD-10-CM

## 2017-10-18 PROCEDURE — 99214 OFFICE O/P EST MOD 30 MIN: CPT | Performed by: FAMILY MEDICINE

## 2017-10-18 RX ORDER — ALPRAZOLAM 1 MG/1
1-2 TABLET ORAL NIGHTLY PRN
Qty: 180 TAB | Refills: 1 | Status: SHIPPED | OUTPATIENT
Start: 2017-10-18 | End: 2018-04-11 | Stop reason: SDUPTHER

## 2017-10-18 RX ORDER — OXYCODONE HYDROCHLORIDE AND ACETAMINOPHEN 5; 325 MG/1; MG/1
1 TABLET ORAL EVERY 8 HOURS PRN
Qty: 90 TAB | Refills: 0 | Status: SHIPPED | OUTPATIENT
Start: 2017-10-18 | End: 2018-01-18 | Stop reason: SINTOL

## 2017-10-18 NOTE — PROGRESS NOTES
"Chief Complaint   Patient presents with   • Follow-Up     3 mo fv        HISTORY OF PRESENT ILLNESS: Patient is a 81 y.o. female established patient here today for the following concerns:    1. Grief reaction  In the last 3 months, Eun has lost her brother, her beloved cat and her .  She has been having more trouble sleeping and \"shutting her mind off\".  She continues to have support with her daughter which has helped greatly.    2. Anxiety  Due for refill on xanax for sleep and anxiety which she finds has helped her cope with the insomnia.      3. Spinal stenosis of lumbar region with neurogenic claudication  4. Chronic, continuous use of opioids  HPI    Chronic pain recheck:   Last dose of controlled substance: yesterday  Chronic pain treated with oxycodone 5 mg IR taken 3 times a day    She  reports that she drinks alcohol.  She  reports that she does not use drugs.    Consequences of Chronic Opiate therapy:  (5 A's)  Analgesia: Compared to no treatment or prior treatment, pain is currently improved  Activity: improved  Adverse Events: She denies dry mouth, itchy skin, nausea and sedation  Aberrant Behaviors: She reports she is taking medication as prescribed and is not veering from agreed treatment regimen. There have been no inappropriate refills or lost/stolen meds reported.   Affect/Mood: Pain is impacting patient's mood.  Patient denies depression/anxiety.    Nonnarcotic treatments that are being used: muscle relaxers and topical agents.   Treatment goals discussed.    Opioid Risk Score: No Value exists for the : MERLE#[id number]    Interpretation of Opioid Risk Score   Score 0-3 = Low risk of abuse. Do UDS at least once per year.  Score 4-7 = Moderate risk of abuse. Do UDS 1-4 times per year.  Score 8+ = High risk of abuse. Refer to specialist.    No order of CONTROLLED SUBSTANCE TREATMENT AGREEMENT is found.     UDS Summary     Patient has no health maintenance due at this time        Most recent UDS " reviewed today and is consistent with prescribed medications.    I have reviewed the medical records, the Prescription Monitoring Program and I have determined that controlled substance treatment is medically indicated.      Past Medical, Social, and Family history reviewed and updated in EPIC    Allergies:Asa [aspirin]; Latex; Penicillins; Tape; and Wasp venom    Current Outpatient Prescriptions   Medication Sig Dispense Refill   • alprazolam (XANAX) 1 MG Tab Take 1-2 Tabs by mouth at bedtime as needed for Sleep or Anxiety. 180 Tab 1   • oxycodone-acetaminophen (PERCOCET) 5-325 MG Tab Take 1 Tab by mouth every 8 hours as needed for Severe Pain. 90 Tab 0   • oxycodone-acetaminophen (PERCOCET) 5-325 MG Tab Take 1 Tab by mouth every 8 hours as needed. 90 Tab 0   • oxycodone-acetaminophen (PERCOCET) 5-325 MG Tab Take 1 Tab by mouth every 8 hours as needed. 90 Tab 0   • metoprolol (LOPRESSOR) 25 MG Tab Take 1/2 tablet in the AM and 1 tablet in the  Tab 3   • rivaroxaban (XARELTO) 15 MG Tab tablet Take 1 Tab by mouth with dinner. 30 Tab 11   • fluticasone (FLONASE) 50 MCG/ACT nasal spray SPRAY 1 SPRAY IN NOSE 2 TIMES A DAY. EACH NOSTRIL 3 Bottle 3   • albuterol (VENTOLIN HFA) 108 (90 BASE) MCG/ACT Aero Soln inhalation aerosol INHALE 2 PUFFS BY MOUTH EVERY 6 HOURS AS NEEDED FOR SHORTNESS OF BREATH. 1 Inhaler 5   • fenofibrate micronized (LOFIBRA) 134 MG capsule TAKE 1 CAPSULE BY MOUTH EVERY DAY. 90 Cap 1   • simvastatin (ZOCOR) 20 MG Tab TAKE 1 TABLET BY MOUTH IN THE EVENING 90 Tab 3   • levothyroxine (SYNTHROID) 100 MCG Tab TAKE 1 TABLET BY MOUTH EVERY DAY. 90 Tab 3   • omeprazole (PRILOSEC) 40 MG delayed-release capsule TAKE ONE CAPSULE BY MOUTH EVERY DAY 90 Cap 3   • propafenone (RYTHMOL) 150 MG Tab TAKE 1 TAB BY MOUTH 2 TIMES A DAY. 180 Tab 1   • FIBER SELECT GUMMIES PO Take  by mouth.     • Probiotic Product (PROBIOTIC PO) Take  by mouth.     • clobetasol (TEMOVATE) 0.05 % Cream Apply 1 Each to affected area(s)  "as needed.  5   • fluticasone-salmeterol (ADVAIR) 250-50 MCG/DOSE AEROSOL POWDER, BREATH ACTIVATED Inhale 1 Puff by mouth every 12 hours. 3 Inhaler 3   • Cyanocobalamin (B-12) 1000 MCG CAPS Take 1,000 mg by mouth every day at 6 PM.     • albuterol (PROVENTIL) 2.5mg/3ml Nebu Soln solution for nebulization 3 mL by Nebulization route every four hours as needed for Shortness of Breath. 75 mL 1     No current facility-administered medications for this visit.          ROS:  Review of Systems   Constitutional: Negative for fever, chills, weight loss and malaise/fatigue.   HENT: Negative for ear pain, nosebleeds, congestion, sore throat and neck pain.    Eyes: Negative for blurred vision.   Respiratory: Negative for cough, sputum production, shortness of breath and wheezing.    Cardiovascular: Negative for chest pain, palpitations,  and leg swelling.   Gastrointestinal: Negative for heartburn, nausea, vomiting, diarrhea and abdominal pain.   Genitourinary: Negative for dysuria, urgency and frequency.   Musculoskeletal: Negative for myalgias, back pain and joint pain.   Skin: Negative for rash and itching.   Neurological: Negative for dizziness, tingling, tremors, sensory change, focal weakness and headaches.   Endo/Heme/Allergies: Does not bruise/bleed easily.   Psychiatric/Behavioral: Negative for depression, anxiety, suicidal ideas, insomnia and memory loss.      Exam:  Blood pressure 130/76, pulse 68, temperature 36.3 °C (97.4 °F), resp. rate 16, height 1.778 m (5' 10\"), weight 59.9 kg (132 lb), last menstrual period 01/01/1963, SpO2 94 %.    General:  Well nourished, well developed in NAD  Head is grossly normal.  Neck: Supple without JVD   Pulmonary:  Normal effort.   Cardiovascular: Regular rate  Extremities: no clubbing, cyanosis, or edema.  Psych: affect appropriate      Please note that this dictation was created using voice recognition software. I have made every reasonable attempt to correct obvious errors, but I " expect that there are errors of grammar and possibly content that I did not discover before finalizing the note.    Assessment/Plan:  1. Grief reaction  Continue supportive care with family, if needed we can involve grief counseling.  May continue xanax.     2. Anxiety  Counseled again about BBW and FDA warnings about accidental overdose of use of both medications. She will not take the xanax with her oxycodone or with any alcohol.   - alprazolam (XANAX) 1 MG Tab; Take 1-2 Tabs by mouth at bedtime as needed for Sleep or Anxiety.  Dispense: 180 Tab; Refill: 1    3. Spinal stenosis of lumbar region with neurogenic claudication  Overall impression that this patient is benefiting from opioid therapy and that the   benefits outweigh the risks of continued use. yes      - Controlled Substance Use Agreement current or updated today   - Urine drug screen current or ordered today   - Additional lab: CMP to assess liver and renal function   - Rx refill prescriptions are done for 3 months, No early refills. See orders   - Referral to pain management no      - oxycodone-acetaminophen (PERCOCET) 5-325 MG Tab; Take 1 Tab by mouth every 8 hours as needed for Severe Pain.  Dispense: 90 Tab; Refill: 0  - oxycodone-acetaminophen (PERCOCET) 5-325 MG Tab; Take 1 Tab by mouth every 8 hours as needed.  Dispense: 90 Tab; Refill: 0  - oxycodone-acetaminophen (PERCOCET) 5-325 MG Tab; Take 1 Tab by mouth every 8 hours as needed.  Dispense: 90 Tab; Refill: 0    4. Chronic, continuous use of opioids    - oxycodone-acetaminophen (PERCOCET) 5-325 MG Tab; Take 1 Tab by mouth every 8 hours as needed for Severe Pain.  Dispense: 90 Tab; Refill: 0  - oxycodone-acetaminophen (PERCOCET) 5-325 MG Tab; Take 1 Tab by mouth every 8 hours as needed.  Dispense: 90 Tab; Refill: 0  - oxycodone-acetaminophen (PERCOCET) 5-325 MG Tab; Take 1 Tab by mouth every 8 hours as needed.  Dispense: 90 Tab; Refill: 0    3 month follow up sooner prn.

## 2017-10-31 NOTE — TELEPHONE ENCOUNTER
Dr Kevin Mckeon was recently written by cardiology to metoprolol 25 1/2 tab in AM and 1 tab in PM. Would you like to keep this sig?

## 2017-10-31 NOTE — TELEPHONE ENCOUNTER
*Medication that is being requested is a different dose that has been prescribed by cardio provider*  Was the patient seen in the last year in this department? Yes     Does patient have an active prescription for medications requested? Yes     Received Request Via: Pharmacy      Pt met protocol?: Yes Last OV 10/2017  BP Readings from Last 1 Encounters:   10/18/17 130/76

## 2017-11-28 DIAGNOSIS — I48.0 PAROXYSMAL ATRIAL FIBRILLATION (HCC): ICD-10-CM

## 2017-11-29 RX ORDER — PROPAFENONE HYDROCHLORIDE 150 MG/1
TABLET, COATED ORAL
Qty: 180 TAB | Refills: 1 | Status: SHIPPED | OUTPATIENT
Start: 2017-11-29 | End: 2018-04-23 | Stop reason: SDUPTHER

## 2017-12-20 RX ORDER — FENOFIBRATE 134 MG/1
CAPSULE ORAL
Qty: 90 CAP | Refills: 1 | Status: SHIPPED | OUTPATIENT
Start: 2017-12-20 | End: 2018-06-30 | Stop reason: SDUPTHER

## 2017-12-20 NOTE — TELEPHONE ENCOUNTER
Was the patient seen in the last year in this department? Yes     Does patient have an active prescription for medications requested? No     Received Request Via: Pharmacy      Pt met protocol?: Yes    OV 10/17

## 2017-12-21 NOTE — TELEPHONE ENCOUNTER
Pt has had OV within the 12 month protocol and lipid panel is current. 6 month supply sent to pharmacy.   Lab Results   Component Value Date/Time    CHOLSTRLTOT 172 04/04/2017 11:01 AM    LDL 84 04/04/2017 11:01 AM    HDL 72 04/04/2017 11:01 AM    TRIGLYCERIDE 79 04/04/2017 11:01 AM       Lab Results   Component Value Date/Time    SODIUM 140 04/04/2017 11:01 AM    POTASSIUM 3.9 04/04/2017 11:01 AM    CHLORIDE 105 04/04/2017 11:01 AM    CO2 26 04/04/2017 11:01 AM    GLUCOSE 88 04/04/2017 11:01 AM    BUN 22 04/04/2017 11:01 AM    CREATININE 0.80 04/04/2017 11:01 AM    CREATININE 0.76 04/04/2017 11:01 AM    CREATININE 0.76 03/21/2011 12:00 AM    BUNCREATRAT 25 03/21/2011 12:00 AM    GLOMRATE >59 03/21/2011 12:00 AM     Lab Results   Component Value Date/Time    ALKPHOSPHAT 27 (L) 11/28/2015 10:46 AM    ASTSGOT 12 11/28/2015 10:46 AM    ALTSGPT 12 11/28/2015 10:46 AM    TBILIRUBIN 0.8 11/28/2015 10:46 AM

## 2018-01-03 DIAGNOSIS — I48.91 ATRIAL FIBRILLATION, UNSPECIFIED TYPE (HCC): ICD-10-CM

## 2018-01-18 ENCOUNTER — OFFICE VISIT (OUTPATIENT)
Dept: MEDICAL GROUP | Facility: PHYSICIAN GROUP | Age: 83
End: 2018-01-18
Payer: MEDICARE

## 2018-01-18 VITALS
SYSTOLIC BLOOD PRESSURE: 128 MMHG | DIASTOLIC BLOOD PRESSURE: 74 MMHG | HEIGHT: 70 IN | WEIGHT: 130.5 LBS | OXYGEN SATURATION: 92 % | BODY MASS INDEX: 18.68 KG/M2 | TEMPERATURE: 97.7 F | RESPIRATION RATE: 16 BRPM | HEART RATE: 64 BPM

## 2018-01-18 DIAGNOSIS — G89.29 CHRONIC MIDLINE LOW BACK PAIN WITHOUT SCIATICA: ICD-10-CM

## 2018-01-18 DIAGNOSIS — M54.50 CHRONIC MIDLINE LOW BACK PAIN WITHOUT SCIATICA: ICD-10-CM

## 2018-01-18 DIAGNOSIS — I48.0 PAROXYSMAL ATRIAL FIBRILLATION (HCC): ICD-10-CM

## 2018-01-18 DIAGNOSIS — F41.9 ANXIETY AND DEPRESSION: ICD-10-CM

## 2018-01-18 DIAGNOSIS — F32.A ANXIETY AND DEPRESSION: ICD-10-CM

## 2018-01-18 DIAGNOSIS — J96.11 CHRONIC RESPIRATORY FAILURE WITH HYPOXIA (HCC): ICD-10-CM

## 2018-01-18 DIAGNOSIS — J43.9 PULMONARY EMPHYSEMA, UNSPECIFIED EMPHYSEMA TYPE (HCC): ICD-10-CM

## 2018-01-18 DIAGNOSIS — M48.062 SPINAL STENOSIS OF LUMBAR REGION WITH NEUROGENIC CLAUDICATION: ICD-10-CM

## 2018-01-18 DIAGNOSIS — Z23 NEED FOR INFLUENZA VACCINATION: ICD-10-CM

## 2018-01-18 PROCEDURE — 90662 IIV NO PRSV INCREASED AG IM: CPT | Performed by: FAMILY MEDICINE

## 2018-01-18 PROCEDURE — 99214 OFFICE O/P EST MOD 30 MIN: CPT | Mod: 25 | Performed by: FAMILY MEDICINE

## 2018-01-18 PROCEDURE — G0008 ADMIN INFLUENZA VIRUS VAC: HCPCS | Performed by: FAMILY MEDICINE

## 2018-01-18 RX ORDER — ALBUTEROL SULFATE 90 UG/1
AEROSOL, METERED RESPIRATORY (INHALATION)
Qty: 1 INHALER | Refills: 11 | Status: SHIPPED | OUTPATIENT
Start: 2018-01-18 | End: 2019-04-03

## 2018-01-18 ASSESSMENT — PATIENT HEALTH QUESTIONNAIRE - PHQ9
CLINICAL INTERPRETATION OF PHQ2 SCORE: 1
5. POOR APPETITE OR OVEREATING: 0 - NOT AT ALL
SUM OF ALL RESPONSES TO PHQ QUESTIONS 1-9: 9

## 2018-01-18 NOTE — PROGRESS NOTES
Chief Complaint   Patient presents with   • Follow-Up     meds       HISTORY OF PRESENT ILLNESS: Patient is a 82 y.o. female established patient here today for the following concerns:    Eun is brought in by her daughter today for follow up.  She reports that she has since started on xarelto.  She is concerned it may be causing a bit of a skin rash and upset stomach but is unsure if it is this medication.  She is still struggling a bit with anxiety and depression through grieving.  She reports that she has been using the xanax when needed for anxiety and her sensation of breathlessness with COPD.  She has been using the oxygen a bit more at home and at night.  She continues on her inhaled medications and does request a refill on albuterol.      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  She has been using percocet but reports that it only take the edge off for about 1 hour and then is in pain again.  She is very much willing to stop this medication because of the side effects she has heard about and finds it to not be very helpful.       Past Medical, Social, and Family history reviewed and updated in EPIC    Allergies:Asa [aspirin]; Latex; Penicillins; Tape; and Wasp venom    Current Outpatient Prescriptions   Medication Sig Dispense Refill   • albuterol (VENTOLIN HFA) 108 (90 Base) MCG/ACT Aero Soln inhalation aerosol INHALE 2 PUFFS BY MOUTH EVERY 6 HOURS AS NEEDED FOR SHORTNESS OF BREATH. 1 Inhaler 11   • rivaroxaban (XARELTO) 15 MG Tab tablet Take 1 Tab by mouth with dinner. 90 Tab 3   • fenofibrate micronized (LOFIBRA) 134 MG capsule TAKE 1 CAPSULE BY MOUTH EVERY DAY. 90 Cap 1   • propafenone (RYTHMOL) 150 MG Tab TAKE 1 TAB BY MOUTH 2 TIMES A DAY. 180 Tab 1   • alprazolam (XANAX) 1 MG Tab Take 1-2 Tabs by mouth at bedtime as needed for Sleep or Anxiety. 180 Tab 1   • metoprolol (LOPRESSOR) 25 MG Tab Take 1/2 tablet in the AM and 1 tablet in the   Tab 3   • fluticasone (FLONASE) 50 MCG/ACT nasal spray SPRAY 1 SPRAY IN NOSE 2 TIMES A DAY. EACH NOSTRIL 3 Bottle 3   • simvastatin (ZOCOR) 20 MG Tab TAKE 1 TABLET BY MOUTH IN THE EVENING 90 Tab 3   • levothyroxine (SYNTHROID) 100 MCG Tab TAKE 1 TABLET BY MOUTH EVERY DAY. 90 Tab 3   • omeprazole (PRILOSEC) 40 MG delayed-release capsule TAKE ONE CAPSULE BY MOUTH EVERY DAY 90 Cap 3   • albuterol (PROVENTIL) 2.5mg/3ml Nebu Soln solution for nebulization 3 mL by Nebulization route every four hours as needed for Shortness of Breath. 75 mL 1   • FIBER SELECT GUMMIES PO Take  by mouth.     • Probiotic Product (PROBIOTIC PO) Take  by mouth.     • clobetasol (TEMOVATE) 0.05 % Cream Apply 1 Each to affected area(s) as needed.  5   • fluticasone-salmeterol (ADVAIR) 250-50 MCG/DOSE AEROSOL POWDER, BREATH ACTIVATED Inhale 1 Puff by mouth every 12 hours. 3 Inhaler 3   • Cyanocobalamin (B-12) 1000 MCG CAPS Take 1,000 mg by mouth every day at 6 PM.     • metoprolol (LOPRESSOR) 25 MG Tab TAKE 1 TAB BY MOUTH EVERY DAY. 90 Tab 1     No current facility-administered medications for this visit.          ROS:  Review of Systems   Constitutional: Negative for fever, chills, weight loss and malaise/fatigue.   HENT: Negative for ear pain, nosebleeds, congestion, sore throat and neck pain.    Eyes: Negative for blurred vision.   Respiratory: Negative for cough, sputum production, shortness of breath and wheezing.    Cardiovascular: Negative for chest pain, palpitations,  and leg swelling.   Gastrointestinal: Negative for heartburn, nausea, vomiting, diarrhea and abdominal pain.   Genitourinary: Negative for dysuria, urgency and frequency.   Musculoskeletal: Negative for myalgias, back pain and joint pain.   Skin: Negative for rash and itching.   Neurological: Negative for dizziness, tingling, tremors, sensory change, focal weakness and headaches.   Endo/Heme/Allergies: Does not bruise/bleed easily.   Psychiatric/Behavioral: Negative  "for depression, anxiety, suicidal ideas, insomnia and memory loss.      Exam:  Blood pressure 128/74, pulse 64, temperature 36.5 °C (97.7 °F), resp. rate 16, height 1.778 m (5' 10\"), weight 59.2 kg (130 lb 8 oz), last menstrual period 01/01/1963, SpO2 92 %.    General:  Well nourished, well developed in NAD  Head is grossly normal.  Neck: Supple without JVD   Pulmonary:  Normal effort.   Cardiovascular: Regular rate  Extremities: no clubbing, cyanosis, or edema.  Psych: affect appropriate      Please note that this dictation was created using voice recognition software. I have made every reasonable attempt to correct obvious errors, but I expect that there are errors of grammar and possibly content that I did not discover before finalizing the note.    Assessment/Plan:  1. Chronic respiratory failure with hypoxia (CMS-HCC)  Renewed   - albuterol (VENTOLIN HFA) 108 (90 Base) MCG/ACT Aero Soln inhalation aerosol; INHALE 2 PUFFS BY MOUTH EVERY 6 HOURS AS NEEDED FOR SHORTNESS OF BREATH.  Dispense: 1 Inhaler; Refill: 11    2. Spinal stenosis of lumbar region with neurogenic claudication  Will start Tylenol ES Arthritis every 6-8 hours prn.  Stop oxycodone.      3. Chronic midline low back pain without sciatica  As above.     4. Paroxysmal atrial fibrillation (CMS-HCC)  Continue current medications, will follow up with cardiology to determine if she needs to change the xarelto.     5. Anxiety and depression  Continue with the xanax for now.  May consider alternatives after she has weaned off the percocet.  It does help a lot with her respiratory air hunger though.      7. Need for influenza vaccination    - INFLUENZA VACCINE, HIGH DOSE (65+ ONLY)    3 month follow up        "

## 2018-01-23 ENCOUNTER — TELEPHONE (OUTPATIENT)
Dept: CARDIOLOGY | Facility: MEDICAL CENTER | Age: 83
End: 2018-01-23

## 2018-01-23 ENCOUNTER — OFFICE VISIT (OUTPATIENT)
Dept: CARDIOLOGY | Facility: MEDICAL CENTER | Age: 83
End: 2018-01-23
Payer: MEDICARE

## 2018-01-23 VITALS
OXYGEN SATURATION: 91 % | SYSTOLIC BLOOD PRESSURE: 100 MMHG | HEIGHT: 70 IN | DIASTOLIC BLOOD PRESSURE: 60 MMHG | BODY MASS INDEX: 18.75 KG/M2 | WEIGHT: 131 LBS | HEART RATE: 72 BPM

## 2018-01-23 DIAGNOSIS — I48.20 CHRONIC ATRIAL FIBRILLATION (HCC): ICD-10-CM

## 2018-01-23 DIAGNOSIS — Z79.01 ANTICOAGULATED: ICD-10-CM

## 2018-01-23 DIAGNOSIS — I48.91 ATRIAL FIBRILLATION, UNSPECIFIED TYPE (HCC): ICD-10-CM

## 2018-01-23 PROCEDURE — 99214 OFFICE O/P EST MOD 30 MIN: CPT | Performed by: INTERNAL MEDICINE

## 2018-01-23 ASSESSMENT — ENCOUNTER SYMPTOMS
WEIGHT LOSS: 0
BRUISES/BLEEDS EASILY: 0
INSOMNIA: 0
NERVOUS/ANXIOUS: 0
PALPITATIONS: 0
NAUSEA: 0
DIZZINESS: 0
HEARTBURN: 0
PND: 0
LOSS OF CONSCIOUSNESS: 0

## 2018-01-23 NOTE — PROGRESS NOTES
Subjective:   Eun Be is a 82 y.o. female who presents today in follow-up in regards to her persistent atrial fibrillation on antiarrhythmics therapy and AV jared blockade    Her pain and anxiety are much more under control. She is still very pleased with her primary doctor and has been stable for more than 6 months    She has rare palpitations, she has not changed anything else  When they happen she gets extreme anxiety and takes Xanax  Seen by the nurse practitioner in September. Frustrated that she was put on anticoagulation. Off Plavix  Aspirin allergy    Thinks that anticoagulation is making her more short of breath  Only taking half of metoprolol in the morning because of purportedly low heart rates    Past Medical History:   Diagnosis Date   • Anxiety    • Arrhythmia    • Arthritis    • Backpain    • Bronchitis    • CATARACT    • Chronic lower back pain 3/11/2014   • COPD    • EMPHYSEMA    • GERD (gastroesophageal reflux disease)    • Heart burn     hiatal hernia   • HTN (hypertension)    • Hypertension    • Hypertriglyceridemia 11/7/2013   • Hypothyroid    • IGT (impaired glucose tolerance)    • MEDICAL HOME    • Osteopenia    • Pain     left side of back   • Paroxysmal atrial fibrillation (CMS-HCC)    • Personal history of venous thrombosis and embolism     right leg   • Pneumonia     1974   • Renal disorder     cysts   • Rheumatic fever     4 or 5 years old   • Rheumatic fever     as a kid per patient.   • Unspecified disorder of thyroid      Past Surgical History:   Procedure Laterality Date   • PB EXCIS/UNROOF RENAL CYST  1977   • OTHER  1977    cysts removed from left kidney   • HYSTERECTOMY RADICAL  1963   • OTHER  1963    hemorrhoidectomy   • HEMORRHOIDECTOMY  1963   • HYSTERECTOMY, TOTAL ABDOMINAL  26 years old     Family History   Problem Relation Age of Onset   • Stroke Brother    • Heart Disease Other      History   Smoking Status   • Former Smoker   • Types: Cigarettes   Smokeless  Tobacco   • Never Used     Comment: quit 1990 1/2 ppd x ?yrs (on and off for years since age 15)     Allergies   Allergen Reactions   • Asa [Aspirin]    • Latex    • Penicillins    • Tape Hives, Itching and Swelling   • Wasp Venom Swelling     Outpatient Encounter Prescriptions as of 1/23/2018   Medication Sig Dispense Refill   • rivaroxaban (XARELTO) 15 MG Tab tablet Take 1 Tab by mouth with dinner. 90 Tab 3   • albuterol (VENTOLIN HFA) 108 (90 Base) MCG/ACT Aero Soln inhalation aerosol INHALE 2 PUFFS BY MOUTH EVERY 6 HOURS AS NEEDED FOR SHORTNESS OF BREATH. 1 Inhaler 11   • fenofibrate micronized (LOFIBRA) 134 MG capsule TAKE 1 CAPSULE BY MOUTH EVERY DAY. 90 Cap 1   • propafenone (RYTHMOL) 150 MG Tab TAKE 1 TAB BY MOUTH 2 TIMES A DAY. 180 Tab 1   • alprazolam (XANAX) 1 MG Tab Take 1-2 Tabs by mouth at bedtime as needed for Sleep or Anxiety. 180 Tab 1   • metoprolol (LOPRESSOR) 25 MG Tab Take 1/2 tablet in the AM and 1 tablet in the  Tab 3   • fluticasone (FLONASE) 50 MCG/ACT nasal spray SPRAY 1 SPRAY IN NOSE 2 TIMES A DAY. EACH NOSTRIL 3 Bottle 3   • simvastatin (ZOCOR) 20 MG Tab TAKE 1 TABLET BY MOUTH IN THE EVENING 90 Tab 3   • levothyroxine (SYNTHROID) 100 MCG Tab TAKE 1 TABLET BY MOUTH EVERY DAY. 90 Tab 3   • omeprazole (PRILOSEC) 40 MG delayed-release capsule TAKE ONE CAPSULE BY MOUTH EVERY DAY 90 Cap 3   • albuterol (PROVENTIL) 2.5mg/3ml Nebu Soln solution for nebulization 3 mL by Nebulization route every four hours as needed for Shortness of Breath. 75 mL 1   • FIBER SELECT GUMMIES PO Take  by mouth.     • Probiotic Product (PROBIOTIC PO) Take  by mouth.     • clobetasol (TEMOVATE) 0.05 % Cream Apply 1 Each to affected area(s) as needed.  5   • Cyanocobalamin (B-12) 1000 MCG CAPS Take 1,000 mg by mouth every day at 6 PM.     • [DISCONTINUED] rivaroxaban (XARELTO) 15 MG Tab tablet Take 1 Tab by mouth with dinner. 90 Tab 3   • [DISCONTINUED] rivaroxaban (XARELTO) 15 MG Tab tablet Take 1 Tab by mouth  "with dinner. 90 Tab 3   • metoprolol (LOPRESSOR) 25 MG Tab TAKE 1 TAB BY MOUTH EVERY DAY. (Patient not taking: Reported on 1/23/2018) 90 Tab 1   • fluticasone-salmeterol (ADVAIR) 250-50 MCG/DOSE AEROSOL POWDER, BREATH ACTIVATED Inhale 1 Puff by mouth every 12 hours. (Patient not taking: Reported on 1/23/2018) 3 Inhaler 3     No facility-administered encounter medications on file as of 1/23/2018.      Review of Systems   Constitutional: Negative for malaise/fatigue and weight loss.   Cardiovascular: Negative for chest pain, palpitations (rare but bothersome), leg swelling and PND.   Gastrointestinal: Negative for heartburn and nausea.   Musculoskeletal:        Stable on meds   Skin: Negative for rash.   Neurological: Negative for dizziness and loss of consciousness.   Endo/Heme/Allergies: Does not bruise/bleed easily.   Psychiatric/Behavioral: The patient is not nervous/anxious and does not have insomnia.    All other systems reviewed and are negative.       Objective:   /60   Pulse 72   Ht 1.778 m (5' 10\")   Wt 59.4 kg (131 lb)   LMP 01/01/1963   SpO2 91%   BMI 18.80 kg/m²     Physical Exam   Constitutional: She is oriented to person, place, and time. She appears well-developed and well-nourished.   HENT:   Head: Normocephalic and atraumatic.   Mouth/Throat: Oropharynx is clear and moist.   Eyes: EOM are normal. Pupils are equal, round, and reactive to light.   Neck: Neck supple. No JVD present. No thyroid mass present.   Cardiovascular: Normal rate, regular rhythm, S1 normal, S2 normal, intact distal pulses and normal pulses.  PMI is not displaced.    No murmur heard.  Pulses:       Carotid pulses are 2+ on the right side, and 2+ on the left side.       Radial pulses are 2+ on the right side, and 2+ on the left side.   No peripheral edema.   Pulmonary/Chest: Effort normal and breath sounds normal. No respiratory distress.   Abdominal: Normal appearance and bowel sounds are normal. She exhibits no " abdominal bruit. There is no hepatosplenomegaly.   Musculoskeletal: Normal range of motion. She exhibits no edema.        Lumbar back: She exhibits no tenderness and no spasm.   Neurological: She is alert and oriented to person, place, and time. She has normal strength. Coordination normal.   Skin: Skin is warm and dry. No rash noted. No cyanosis. Nails show no clubbing.   Psychiatric: She has a normal mood and affect.       Assessment:     1. Chronic atrial fibrillation (CMS-HCC)     2. Anticoagulated     3. Atrial fibrillation, unspecified type (CMS-HCC)  rivaroxaban (XARELTO) 15 MG Tab tablet    DISCONTINUED: rivaroxaban (XARELTO) 15 MG Tab tablet       Medical Decision Making:  Today's Assessment / Status / Plan:     Atrial fibrillation, persistent   Normal echo 2014   Normal intervals and EKG last September   Talked for more than 10 minutes about the risks and benefits of and coagulation   CBC was ordered by primary, GFR is normal   It is a costly for her so I gave her a prescription for free sample month   Talked about the risk of stroke which is quite high. She voices understanding   I will call her in a week to see if she feels better   I did increase her metoprolol to a full pill twice a day   Extra propafenone when necessary , will order echocardiogram if still symptomatic     RTC 6 months   Discussed situational anxiety   Comfort her in regards to loss of her  earlier this year   She has a new dog after the death of her cat   In with her daughter as always       Carotid duplex is due later this year. She has mild disease  Xanax and pain medication through primary   RTC 6 months

## 2018-01-23 NOTE — TELEPHONE ENCOUNTER
----- Message from Theresa Gruber M.D. sent at 1/23/2018 12:34 PM PST -----  Regarding: FT  Can you please see my note 1/23 - see if she feels better..  Tx!!

## 2018-02-19 ENCOUNTER — OFFICE VISIT (OUTPATIENT)
Dept: URGENT CARE | Facility: PHYSICIAN GROUP | Age: 83
End: 2018-02-19
Payer: MEDICARE

## 2018-02-19 ENCOUNTER — HOSPITAL ENCOUNTER (OUTPATIENT)
Dept: RADIOLOGY | Facility: MEDICAL CENTER | Age: 83
End: 2018-02-19
Attending: EMERGENCY MEDICINE
Payer: MEDICARE

## 2018-02-19 VITALS
HEART RATE: 63 BPM | HEIGHT: 70 IN | WEIGHT: 134 LBS | DIASTOLIC BLOOD PRESSURE: 78 MMHG | SYSTOLIC BLOOD PRESSURE: 130 MMHG | OXYGEN SATURATION: 91 % | TEMPERATURE: 99.2 F | RESPIRATION RATE: 20 BRPM | BODY MASS INDEX: 19.18 KG/M2

## 2018-02-19 DIAGNOSIS — R06.02 SOB (SHORTNESS OF BREATH): ICD-10-CM

## 2018-02-19 PROCEDURE — 71046 X-RAY EXAM CHEST 2 VIEWS: CPT

## 2018-02-19 PROCEDURE — 99214 OFFICE O/P EST MOD 30 MIN: CPT | Performed by: EMERGENCY MEDICINE

## 2018-02-19 RX ORDER — METHYLPREDNISOLONE 4 MG/1
TABLET ORAL
Qty: 1 KIT | Refills: 0 | Status: SHIPPED | OUTPATIENT
Start: 2018-02-19 | End: 2018-07-26

## 2018-02-19 RX ORDER — DOXYCYCLINE HYCLATE 100 MG
100 TABLET ORAL 2 TIMES DAILY
Qty: 20 TAB | Refills: 0 | Status: SHIPPED | OUTPATIENT
Start: 2018-02-19 | End: 2018-07-19

## 2018-02-19 ASSESSMENT — ENCOUNTER SYMPTOMS
WHEEZING: 1
SENSORY CHANGE: 0
HALLUCINATIONS: 0
EYE DISCHARGE: 0
NAUSEA: 0
EYE REDNESS: 0
SINUS PAIN: 0
FEVER: 0
NECK PAIN: 0
MYALGIAS: 0
CHILLS: 0
COUGH: 1
NERVOUS/ANXIOUS: 0
SHORTNESS OF BREATH: 1
VOMITING: 0
SPEECH CHANGE: 0

## 2018-02-20 NOTE — PROGRESS NOTES
Subjective:      Eun Be is a 82 y.o. female who presents with Shortness of Breath (Wheezing, SOB x2weeks)            HPI  Patient is a pleasant female complaining of shortness of breath for the past 2 weeks. Patient has a history of COPD she declined to even consider going to the emergency department. She denies any nausea vomiting diarrhea.    Allergies   Allergen Reactions   • Asa [Aspirin]    • Latex    • Penicillins    • Tape Hives, Itching and Swelling   • Wasp Venom Swelling     Social History     Social History   • Marital status:      Spouse name: N/A   • Number of children: N/A   • Years of education: N/A     Occupational History   • Not on file.     Social History Main Topics   • Smoking status: Former Smoker     Packs/day: 0.25     Years: 30.00     Types: Cigarettes   • Smokeless tobacco: Never Used      Comment: quit 1990 1/2 ppd x ?yrs (on and off for years since age 15)   • Alcohol use 0.0 oz/week      Comment: occasional/rare alcohol use   • Drug use: No   • Sexual activity: No     Other Topics Concern   • Not on file     Social History Narrative   • No narrative on file     Past Medical History:   Diagnosis Date   • Anxiety    • Arrhythmia    • Arthritis    • Backpain    • Bronchitis    • CATARACT    • Chronic lower back pain 3/11/2014   • COPD    • EMPHYSEMA    • GERD (gastroesophageal reflux disease)    • Heart burn     hiatal hernia   • HTN (hypertension)    • Hypertension    • Hypertriglyceridemia 11/7/2013   • Hypothyroid    • IGT (impaired glucose tolerance)    • MEDICAL HOME    • Osteopenia    • Pain     left side of back   • Paroxysmal atrial fibrillation (CMS-[facility])    • Personal history of venous thrombosis and embolism     right leg   • Pneumonia     1974   • Renal disorder     cysts   • Rheumatic fever     4 or 5 years old   • Rheumatic fever     as a kid per patient.   • Unspecified disorder of thyroid      Current Outpatient Prescriptions on File Prior to Visit    Medication Sig Dispense Refill   • rivaroxaban (XARELTO) 15 MG Tab tablet Take 1 Tab by mouth with dinner. 90 Tab 3   • albuterol (VENTOLIN HFA) 108 (90 Base) MCG/ACT Aero Soln inhalation aerosol INHALE 2 PUFFS BY MOUTH EVERY 6 HOURS AS NEEDED FOR SHORTNESS OF BREATH. 1 Inhaler 11   • fenofibrate micronized (LOFIBRA) 134 MG capsule TAKE 1 CAPSULE BY MOUTH EVERY DAY. 90 Cap 1   • propafenone (RYTHMOL) 150 MG Tab TAKE 1 TAB BY MOUTH 2 TIMES A DAY. 180 Tab 1   • metoprolol (LOPRESSOR) 25 MG Tab TAKE 1 TAB BY MOUTH EVERY DAY. 90 Tab 1   • alprazolam (XANAX) 1 MG Tab Take 1-2 Tabs by mouth at bedtime as needed for Sleep or Anxiety. 180 Tab 1   • metoprolol (LOPRESSOR) 25 MG Tab Take 1/2 tablet in the AM and 1 tablet in the  Tab 3   • fluticasone (FLONASE) 50 MCG/ACT nasal spray SPRAY 1 SPRAY IN NOSE 2 TIMES A DAY. EACH NOSTRIL 3 Bottle 3   • simvastatin (ZOCOR) 20 MG Tab TAKE 1 TABLET BY MOUTH IN THE EVENING 90 Tab 3   • levothyroxine (SYNTHROID) 100 MCG Tab TAKE 1 TABLET BY MOUTH EVERY DAY. 90 Tab 3   • omeprazole (PRILOSEC) 40 MG delayed-release capsule TAKE ONE CAPSULE BY MOUTH EVERY DAY 90 Cap 3   • FIBER SELECT GUMMIES PO Take  by mouth.     • Probiotic Product (PROBIOTIC PO) Take  by mouth.     • clobetasol (TEMOVATE) 0.05 % Cream Apply 1 Each to affected area(s) as needed.  5   • fluticasone-salmeterol (ADVAIR) 250-50 MCG/DOSE AEROSOL POWDER, BREATH ACTIVATED Inhale 1 Puff by mouth every 12 hours. 3 Inhaler 3   • Cyanocobalamin (B-12) 1000 MCG CAPS Take 1,000 mg by mouth every day at 6 PM.     • albuterol (PROVENTIL) 2.5mg/3ml Nebu Soln solution for nebulization 3 mL by Nebulization route every four hours as needed for Shortness of Breath. 75 mL 1     No current facility-administered medications on file prior to visit.      Family History   Problem Relation Age of Onset   • Stroke Brother    • Heart Disease Other      Review of Systems   Constitutional: Negative for chills and fever.   HENT: Negative for  "congestion and sinus pain.    Eyes: Negative for discharge and redness.   Respiratory: Positive for cough, shortness of breath and wheezing.    Cardiovascular: Negative for chest pain.   Gastrointestinal: Negative for nausea and vomiting.   Musculoskeletal: Negative for myalgias and neck pain.   Skin: Negative for rash.   Neurological: Negative for sensory change and speech change.   Psychiatric/Behavioral: Negative for hallucinations. The patient is not nervous/anxious.           Objective:     /78   Pulse 63   Temp 37.3 °C (99.2 °F)   Resp 20   Ht 1.778 m (5' 10\")   Wt 60.8 kg (134 lb)   LMP 01/01/1963   SpO2 91%   Breastfeeding? No   BMI 19.23 kg/m²      Physical Exam   Constitutional: She appears well-developed and well-nourished. No distress.   Very spry alert female.   HENT:   Head: Normocephalic and atraumatic.   Right Ear: External ear normal.   Left Ear: External ear normal.   Eyes: Conjunctivae are normal. Left eye exhibits no discharge.   Neck: Normal range of motion. No tracheal deviation present. No thyromegaly present.   Cardiovascular: Normal rate.    Pulmonary/Chest: Effort normal. No respiratory distress. She has wheezes (patient used her albuterol prior to arrival wheezing at a minimum.).   Abdominal: She exhibits no distension. There is no tenderness.   Musculoskeletal: Normal range of motion. She exhibits no edema.   Skin: Skin is warm. No rash noted. She is not diaphoretic. No erythema.   Psychiatric: She has a normal mood and affect. Her behavior is normal.   Nursing note and vitals reviewed.            Chest x-ray COPD.  Assessment/Plan:     1. SOB (shortness of breath)    - DX-CHEST-2 VIEWS; Future  - MethylPREDNISolone (MEDROL DOSEPAK) 4 MG Tablet Therapy Pack; Follow the directions on the pack.  Dispense: 1 Kit; Refill: 0  - doxycycline (VIBRAMYCIN) 100 MG Tab; Take 1 Tab by mouth 2 times a day.  Dispense: 20 Tab; Refill: 0          As mentioned in the Patient was adamant " that she did not want to be admitted or even go to the emergency department.  I am recommending the patient initiate/ continue hydration efforts including the use of a vaporizer/humidifier/ netti pot. I also recommend the pt, initiate Mucinex.. In addition the patient will initiate the prescribed prescription medication/s: Patient will be given a Medrol Dosepak to use as directed as well as Vibramycin for this 100 mg twice a day for 10 days. If the patient's condition exacerbates with worsening dysphagia, shortness of breath, uncontrolled fever, headache or chest pressure he/she will return immediately to the urgent care or go to  the emergency department for further evaluation.    Patient does agree if she deteriorates she will reconsider going to the emergency department as a failure for outpatient therapy of COPD exacerbation.    Rocky Merrill

## 2018-03-05 DIAGNOSIS — J96.11 CHRONIC RESPIRATORY FAILURE WITH HYPOXIA (HCC): ICD-10-CM

## 2018-03-06 RX ORDER — ALBUTEROL SULFATE 90 UG/1
AEROSOL, METERED RESPIRATORY (INHALATION)
Qty: 18 INHALER | Refills: 3 | OUTPATIENT
Start: 2018-03-06

## 2018-03-29 ENCOUNTER — TELEPHONE (OUTPATIENT)
Dept: CARDIOLOGY | Facility: MEDICAL CENTER | Age: 83
End: 2018-03-29

## 2018-03-29 DIAGNOSIS — I48.91 ATRIAL FIBRILLATION, UNSPECIFIED TYPE (HCC): ICD-10-CM

## 2018-03-29 NOTE — TELEPHONE ENCOUNTER
"----- Message from Chichi Chacko sent at 3/29/2018  2:56 PM PDT -----  Regarding: xarelto samples?  Contact: [phone]  LA/fortino    Pt's daughter Faviola calling for (possibly) samples of xarelto.  She is asking what the name of the clinic is that LA spoke with pt about during 1/23 appt. LA told pt to take a script to \"the clinic\" to get lower cost xarelto.  Please call .    ===============================================================    Sample Rx for Xarelto sent to [facility]     Called daughter and notified, daughter appreciative and verbalizes understanding, discussed applying for patient assistance program also with daughter as we can't promise there will be a sample every month, daughter understands, information regarding Patient Assistance Program provided to pt, provided her also of the phone number and address of [facility]     "

## 2018-04-11 DIAGNOSIS — F41.9 ANXIETY: ICD-10-CM

## 2018-04-11 RX ORDER — ALPRAZOLAM 1 MG/1
TABLET ORAL
Qty: 60 TAB | Refills: 0 | Status: SHIPPED
Start: 2018-04-11 | End: 2018-05-11 | Stop reason: SDUPTHER

## 2018-04-16 ENCOUNTER — HOSPITAL ENCOUNTER (OUTPATIENT)
Dept: LAB | Facility: MEDICAL CENTER | Age: 83
End: 2018-04-16
Attending: FAMILY MEDICINE
Payer: MEDICARE

## 2018-04-16 DIAGNOSIS — F32.A ANXIETY AND DEPRESSION: ICD-10-CM

## 2018-04-16 DIAGNOSIS — J43.9 PULMONARY EMPHYSEMA, UNSPECIFIED EMPHYSEMA TYPE (HCC): ICD-10-CM

## 2018-04-16 DIAGNOSIS — J96.11 CHRONIC RESPIRATORY FAILURE WITH HYPOXIA (HCC): ICD-10-CM

## 2018-04-16 DIAGNOSIS — I48.0 PAROXYSMAL ATRIAL FIBRILLATION (HCC): ICD-10-CM

## 2018-04-16 DIAGNOSIS — F41.9 ANXIETY AND DEPRESSION: ICD-10-CM

## 2018-04-16 LAB
ALBUMIN SERPL BCP-MCNC: 4.1 G/DL (ref 3.2–4.9)
ALBUMIN/GLOB SERPL: 1.6 G/DL
ALP SERPL-CCNC: 31 U/L (ref 30–99)
ALT SERPL-CCNC: 9 U/L (ref 2–50)
ANION GAP SERPL CALC-SCNC: 5 MMOL/L (ref 0–11.9)
AST SERPL-CCNC: 12 U/L (ref 12–45)
BASOPHILS # BLD AUTO: 0.9 % (ref 0–1.8)
BASOPHILS # BLD: 0.03 K/UL (ref 0–0.12)
BILIRUB SERPL-MCNC: 0.8 MG/DL (ref 0.1–1.5)
BUN SERPL-MCNC: 23 MG/DL (ref 8–22)
CALCIUM SERPL-MCNC: 10.2 MG/DL (ref 8.5–10.5)
CHLORIDE SERPL-SCNC: 105 MMOL/L (ref 96–112)
CHOLEST SERPL-MCNC: 141 MG/DL (ref 100–199)
CO2 SERPL-SCNC: 31 MMOL/L (ref 20–33)
CREAT SERPL-MCNC: 0.91 MG/DL (ref 0.5–1.4)
EOSINOPHIL # BLD AUTO: 0.46 K/UL (ref 0–0.51)
EOSINOPHIL NFR BLD: 14.2 % (ref 0–6.9)
ERYTHROCYTE [DISTWIDTH] IN BLOOD BY AUTOMATED COUNT: 47.5 FL (ref 35.9–50)
GLOBULIN SER CALC-MCNC: 2.6 G/DL (ref 1.9–3.5)
GLUCOSE SERPL-MCNC: 89 MG/DL (ref 65–99)
HCT VFR BLD AUTO: 44.7 % (ref 37–47)
HDLC SERPL-MCNC: 67 MG/DL
HGB BLD-MCNC: 14.4 G/DL (ref 12–16)
IMM GRANULOCYTES # BLD AUTO: 0.01 K/UL (ref 0–0.11)
IMM GRANULOCYTES NFR BLD AUTO: 0.3 % (ref 0–0.9)
LDLC SERPL CALC-MCNC: 59 MG/DL
LYMPHOCYTES # BLD AUTO: 0.81 K/UL (ref 1–4.8)
LYMPHOCYTES NFR BLD: 25 % (ref 22–41)
MCH RBC QN AUTO: 31.6 PG (ref 27–33)
MCHC RBC AUTO-ENTMCNC: 32.2 G/DL (ref 33.6–35)
MCV RBC AUTO: 98.2 FL (ref 81.4–97.8)
MONOCYTES # BLD AUTO: 0.18 K/UL (ref 0–0.85)
MONOCYTES NFR BLD AUTO: 5.6 % (ref 0–13.4)
NEUTROPHILS # BLD AUTO: 1.75 K/UL (ref 2–7.15)
NEUTROPHILS NFR BLD: 54 % (ref 44–72)
NRBC # BLD AUTO: 0 K/UL
NRBC BLD-RTO: 0 /100 WBC
PLATELET # BLD AUTO: 178 K/UL (ref 164–446)
PMV BLD AUTO: 10.7 FL (ref 9–12.9)
POTASSIUM SERPL-SCNC: 4 MMOL/L (ref 3.6–5.5)
PROT SERPL-MCNC: 6.7 G/DL (ref 6–8.2)
RBC # BLD AUTO: 4.55 M/UL (ref 4.2–5.4)
SODIUM SERPL-SCNC: 141 MMOL/L (ref 135–145)
TRIGL SERPL-MCNC: 73 MG/DL (ref 0–149)
TSH SERPL DL<=0.005 MIU/L-ACNC: 1.07 UIU/ML (ref 0.38–5.33)
WBC # BLD AUTO: 3.2 K/UL (ref 4.8–10.8)

## 2018-04-16 PROCEDURE — 80061 LIPID PANEL: CPT

## 2018-04-16 PROCEDURE — 84443 ASSAY THYROID STIM HORMONE: CPT

## 2018-04-16 PROCEDURE — 85025 COMPLETE CBC W/AUTO DIFF WBC: CPT

## 2018-04-16 PROCEDURE — 36415 COLL VENOUS BLD VENIPUNCTURE: CPT

## 2018-04-16 PROCEDURE — 80053 COMPREHEN METABOLIC PANEL: CPT

## 2018-04-19 ENCOUNTER — OFFICE VISIT (OUTPATIENT)
Dept: MEDICAL GROUP | Facility: PHYSICIAN GROUP | Age: 83
End: 2018-04-19
Payer: MEDICARE

## 2018-04-19 VITALS
WEIGHT: 131.17 LBS | SYSTOLIC BLOOD PRESSURE: 126 MMHG | TEMPERATURE: 98 F | RESPIRATION RATE: 18 BRPM | DIASTOLIC BLOOD PRESSURE: 88 MMHG | BODY MASS INDEX: 18.78 KG/M2 | HEIGHT: 70 IN | HEART RATE: 64 BPM | OXYGEN SATURATION: 97 %

## 2018-04-19 DIAGNOSIS — R06.2 WHEEZING: ICD-10-CM

## 2018-04-19 DIAGNOSIS — L30.9 DERMATITIS: ICD-10-CM

## 2018-04-19 DIAGNOSIS — I48.20 CHRONIC ATRIAL FIBRILLATION (HCC): ICD-10-CM

## 2018-04-19 DIAGNOSIS — J44.1 COPD EXACERBATION (HCC): ICD-10-CM

## 2018-04-19 PROCEDURE — 94640 AIRWAY INHALATION TREATMENT: CPT | Performed by: FAMILY MEDICINE

## 2018-04-19 PROCEDURE — 99214 OFFICE O/P EST MOD 30 MIN: CPT | Mod: 25 | Performed by: FAMILY MEDICINE

## 2018-04-19 RX ORDER — IPRATROPIUM BROMIDE AND ALBUTEROL SULFATE 2.5; .5 MG/3ML; MG/3ML
3 SOLUTION RESPIRATORY (INHALATION) ONCE
Status: COMPLETED | OUTPATIENT
Start: 2018-04-19 | End: 2018-04-19

## 2018-04-19 RX ORDER — PREDNISONE 10 MG/1
TABLET ORAL
Qty: 63 TAB | Refills: 0 | Status: SHIPPED | OUTPATIENT
Start: 2018-04-19 | End: 2018-10-19

## 2018-04-19 RX ORDER — HYDROXYZINE 50 MG/1
50 TABLET, FILM COATED ORAL 3 TIMES DAILY PRN
Qty: 90 TAB | Refills: 0 | Status: SHIPPED | OUTPATIENT
Start: 2018-04-19 | End: 2018-05-16 | Stop reason: SDUPTHER

## 2018-04-19 RX ADMIN — IPRATROPIUM BROMIDE AND ALBUTEROL SULFATE 3 ML: 2.5; .5 SOLUTION RESPIRATORY (INHALATION) at 11:50

## 2018-04-19 NOTE — PROGRESS NOTES
"Chief Complaint   Patient presents with   • Follow-Up     3 mo fv    • Cough     x \"a couple of weeks\"   • Nasal Congestion   • Wheezing       HISTORY OF PRESENT ILLNESS: Patient is a 82 y.o. female established patient here today for the following concerns:      Eun is brought in by her daughter today for worsening respiratory symptoms of cough congestion wheezing increased need for rescue inhalers. She was seen in the urgent care with similar symptoms and given antibiotics and steroids which she reports did improve things initially but when she tapered off the steroids she had return of symptoms with a vengeance. She does not have her own nebulizer at home. Needing her rescue inhaler. Home oxygen testing she says has been around 89%. She does use oxygen in the evening. No fevers or chills. Cough is nonproductive.    In addition does have a rash over the extremities and the neck that is quite pruritic. She reports that any of the over-the-counter medications have not been helpful. She feels that it might be the Xarelto that she found causing it. It did improve with the use of oral steroids previously.    Past Medical, Social, and Family history reviewed and updated in EPIC    Allergies:Asa [aspirin]; Latex; Penicillins; Tape; and Wasp venom    Current Outpatient Prescriptions   Medication Sig Dispense Refill   • predniSONE (DELTASONE) 10 MG Tab Start with 60 mg for 3 days then reduce by 10 mg every 3 days until gone. 63 Tab 0   • ipratropium (ATROVENT) 0.02 % Solution 1 mL by Nebulization route 2 times a day. 100 Bullet 11   • albuterol (PROVENTIL) 2.5mg/0.5ml Nebu Soln 0.5 mL by Nebulization route every four hours as needed for Shortness of Breath. 100 mL 11   • hydrOXYzine HCl (ATARAX) 50 MG Tab Take 1 Tab by mouth 3 times a day as needed for Itching. 90 Tab 0   • ALPRAZolam (XANAX) 1 MG Tab TAKE 1 TO 2 TABLETS BY MOUTH AT BEDTIME AS NEEDED FOR SLEEP OR ANXIETY 60 Tab 0   • levothyroxine (SYNTHROID) 100 MCG " Tab TAKE 1 TABLET BY MOUTH EVERY DAY. 90 Tab 0   • rivaroxaban (XARELTO) 15 MG Tab tablet Take 1 Tab by mouth with dinner. 30 Tab 0   • albuterol (VENTOLIN HFA) 108 (90 Base) MCG/ACT Aero Soln inhalation aerosol INHALE 2 PUFFS BY MOUTH EVERY 6 HOURS AS NEEDED FOR SHORTNESS OF BREATH. 1 Inhaler 11   • fenofibrate micronized (LOFIBRA) 134 MG capsule TAKE 1 CAPSULE BY MOUTH EVERY DAY. 90 Cap 1   • propafenone (RYTHMOL) 150 MG Tab TAKE 1 TAB BY MOUTH 2 TIMES A DAY. 180 Tab 1   • metoprolol (LOPRESSOR) 25 MG Tab TAKE 1 TAB BY MOUTH EVERY DAY. 90 Tab 1   • metoprolol (LOPRESSOR) 25 MG Tab Take 1/2 tablet in the AM and 1 tablet in the  Tab 3   • fluticasone (FLONASE) 50 MCG/ACT nasal spray SPRAY 1 SPRAY IN NOSE 2 TIMES A DAY. EACH NOSTRIL 3 Bottle 3   • simvastatin (ZOCOR) 20 MG Tab TAKE 1 TABLET BY MOUTH IN THE EVENING 90 Tab 3   • omeprazole (PRILOSEC) 40 MG delayed-release capsule TAKE ONE CAPSULE BY MOUTH EVERY DAY 90 Cap 3   • albuterol (PROVENTIL) 2.5mg/3ml Nebu Soln solution for nebulization 3 mL by Nebulization route every four hours as needed for Shortness of Breath. 75 mL 1   • FIBER SELECT GUMMIES PO Take  by mouth.     • Probiotic Product (PROBIOTIC PO) Take  by mouth.     • clobetasol (TEMOVATE) 0.05 % Cream Apply 1 Each to affected area(s) as needed.  5   • fluticasone-salmeterol (ADVAIR) 250-50 MCG/DOSE AEROSOL POWDER, BREATH ACTIVATED Inhale 1 Puff by mouth every 12 hours. 3 Inhaler 3   • Cyanocobalamin (B-12) 1000 MCG CAPS Take 1,000 mg by mouth every day at 6 PM.     • MethylPREDNISolone (MEDROL DOSEPAK) 4 MG Tablet Therapy Pack Follow the directions on the pack. 1 Kit 0   • doxycycline (VIBRAMYCIN) 100 MG Tab Take 1 Tab by mouth 2 times a day. 20 Tab 0     Current Facility-Administered Medications   Medication Dose Route Frequency Provider Last Rate Last Dose   • ipratropium-albuterol (DUONEB) nebulizer solution 3 mL  3 mL Nebulization Once Libertad L Sameer, M.D.             ROS:  Review of Systems  "  Constitutional: Negative for fever, chills, weight loss and malaise/fatigue.   HENT: Negative for ear pain, nosebleeds, congestion, sore throat and neck pain.    Eyes: Negative for blurred vision.   Respiratory: Per HPI  Cardiovascular: Negative for chest pain, palpitations,  and leg swelling.   Gastrointestinal: Negative for heartburn, nausea, vomiting, diarrhea and abdominal pain.   Genitourinary: Negative for dysuria, urgency and frequency.   Musculoskeletal: Negative for myalgias, back pain and joint pain.   Skin: Positive for rash and itching.   Neurological: Negative for dizziness, tingling, tremors, sensory change, focal weakness and headaches.   Endo/Heme/Allergies: Does not bruise/bleed easily.   Psychiatric/Behavioral: Negative for depression, anxiety, suicidal ideas, insomnia and memory loss.      Exam:  Blood pressure 126/88, pulse 64, temperature 36.7 °C (98 °F), resp. rate 18, height 1.778 m (5' 10\"), weight 59.5 kg (131 lb 2.8 oz), last menstrual period 01/01/1963, SpO2 97 %.    General:  Well nourished, well developed in NAD  Head is grossly normal.  Neck: Supple without JVD   Pulmonary:  Normal effort. Scattered wheezing throughout both inspiratory and expiratory. No rhonchi or rales  Cardiovascular: Regular rate and rhythm  Extremities: no clubbing, cyanosis, or edema.  Psych: affect appropriate      Please note that this dictation was created using voice recognition software. I have made every reasonable attempt to correct obvious errors, but I expect that there are errors of grammar and possibly content that I did not discover before finalizing the note.    Assessment/Plan:  1. COPD exacerbation (CMS-[facility])  Resume steroids, home nebulizer. Treatment in office to improve aeration and decreased wheezing.  - predniSONE (DELTASONE) 10 MG Tab; Start with 60 mg for 3 days then reduce by 10 mg every 3 days until gone.  Dispense: 63 Tab; Refill: 0  - ipratropium-albuterol (DUONEB) nebulizer solution 3 " mL; 3 mL by Nebulization route Once.  - ipratropium (ATROVENT) 0.02 % Solution; 1 mL by Nebulization route 2 times a day.  Dispense: 100 Bullet; Refill: 11  - albuterol (PROVENTIL) 2.5mg/0.5ml Nebu Soln; 0.5 mL by Nebulization route every four hours as needed for Shortness of Breath.  Dispense: 100 mL; Refill: 11    2. Wheezing  - predniSONE (DELTASONE) 10 MG Tab; Start with 60 mg for 3 days then reduce by 10 mg every 3 days until gone.  Dispense: 63 Tab; Refill: 0  - ipratropium-albuterol (DUONEB) nebulizer solution 3 mL; 3 mL by Nebulization route Once.  - ipratropium (ATROVENT) 0.02 % Solution; 1 mL by Nebulization route 2 times a day.  Dispense: 100 Bullet; Refill: 11  - albuterol (PROVENTIL) 2.5mg/0.5ml Nebu Soln; 0.5 mL by Nebulization route every four hours as needed for Shortness of Breath.  Dispense: 100 mL; Refill: 11    3. Dermatitis  Trial of  - hydrOXYzine HCl (ATARAX) 50 MG Tab; Take 1 Tab by mouth 3 times a day as needed for Itching.  Dispense: 90 Tab; Refill: 0  Steroids should also help improve, if rash persists may need to look at possible drug allergy    4. Chronic afib  Rate controlled, anticoagulated.     Follow-up in 2 weeks sooner when necessary

## 2018-04-23 DIAGNOSIS — I48.0 PAROXYSMAL ATRIAL FIBRILLATION (HCC): ICD-10-CM

## 2018-04-24 ENCOUNTER — TELEPHONE (OUTPATIENT)
Dept: MEDICAL GROUP | Facility: PHYSICIAN GROUP | Age: 83
End: 2018-04-24

## 2018-04-24 NOTE — TELEPHONE ENCOUNTER
VOICEMAIL  1. Caller Name: Faviola/dtr                      Call Back Number: [phone]    2. Message: pt's dtr states they have had someone from [facility] come out twice to show them how to use her nebulizer and they still don't know how.  She is asking if they can bring it in sometime on Thurs and have someone here show them how.    3. Patient approves office to leave a detailed voicemail/MyChart message: N\A

## 2018-04-25 NOTE — TELEPHONE ENCOUNTER
Was the patient seen in the last year in this department? Yes     Does patient have an active prescription for medications requested? No     Received Request Via: Pharmacy      Pt met protocol?: Yes    LAST OV 04/19/2018

## 2018-04-26 ENCOUNTER — TELEPHONE (OUTPATIENT)
Dept: CARDIOLOGY | Facility: MEDICAL CENTER | Age: 83
End: 2018-04-26

## 2018-04-26 ENCOUNTER — NON-PROVIDER VISIT (OUTPATIENT)
Dept: MEDICAL GROUP | Facility: PHYSICIAN GROUP | Age: 83
End: 2018-04-26
Payer: MEDICARE

## 2018-04-26 DIAGNOSIS — I48.91 ATRIAL FIBRILLATION, UNSPECIFIED TYPE (HCC): ICD-10-CM

## 2018-04-26 RX ORDER — PROPAFENONE HYDROCHLORIDE 150 MG/1
TABLET, COATED ORAL
Qty: 180 TAB | Refills: 3 | Status: SHIPPED | OUTPATIENT
Start: 2018-04-26 | End: 2019-06-28 | Stop reason: SDUPTHER

## 2018-04-26 RX ORDER — OMEPRAZOLE 40 MG/1
CAPSULE, DELAYED RELEASE ORAL
Qty: 90 CAP | Refills: 0 | Status: SHIPPED | OUTPATIENT
Start: 2018-04-26 | End: 2018-07-22 | Stop reason: SDUPTHER

## 2018-04-26 NOTE — TELEPHONE ENCOUNTER
"----- Message from Chichi Chacko sent at 4/26/2018 11:51 AM PDT -----  Regarding: metoprolol script clarification  Contact: [phone]  [place]/fortino    Pt's daughter Faviola Morales calling for metoprolol script clarification, she \"may\" need a script called in sooner rather than later.     Faviola also asking if she can get another sample supply of xarelto.    Please call Faviola at [phone]    ==============================================================    Upon chart review last OV notes from Dr Gruber 1/23/18:    I did increase her metoprolol to a full pill twice a day.     Called pt's daughter (Faviola), per daughter they've picked up pt's Metoprolol today and her Rx stil says Metoprolol 25mg 1/2 tab in AM and 1 tab in PM, informed daughter will send updated Rx to [facility], she also requested for some more Xarelto samples to be sent to [facility], per daughter they are still working on the Patient Assistance Program application.     New Rx for Metoprolol 25mg PO BID sent to [facility]    Sample Rx for Xarelto sent to [facility]     "

## 2018-04-26 NOTE — PROGRESS NOTES
Eun Be is a 82 y.o. female here for a non-provider visit for nebulizer instruction    If abnormal was an in office provider notified today (if so, indicate provider)? No  Routed to PCP? No

## 2018-05-01 ENCOUNTER — OFFICE VISIT (OUTPATIENT)
Dept: NEPHROLOGY | Facility: MEDICAL CENTER | Age: 83
End: 2018-05-01
Payer: MEDICARE

## 2018-05-01 VITALS
BODY MASS INDEX: 19.04 KG/M2 | WEIGHT: 136 LBS | HEART RATE: 64 BPM | TEMPERATURE: 97.8 F | SYSTOLIC BLOOD PRESSURE: 126 MMHG | HEIGHT: 71 IN | DIASTOLIC BLOOD PRESSURE: 74 MMHG | OXYGEN SATURATION: 96 % | RESPIRATION RATE: 14 BRPM

## 2018-05-01 DIAGNOSIS — N18.30 STAGE 3 CHRONIC KIDNEY DISEASE (HCC): ICD-10-CM

## 2018-05-01 DIAGNOSIS — I10 ESSENTIAL HYPERTENSION: ICD-10-CM

## 2018-05-01 PROCEDURE — 99214 OFFICE O/P EST MOD 30 MIN: CPT | Performed by: INTERNAL MEDICINE

## 2018-05-01 ASSESSMENT — ENCOUNTER SYMPTOMS
WEAKNESS: 1
HYPERTENSION: 1
NAUSEA: 0
TREMORS: 1
VOMITING: 0
SHORTNESS OF BREATH: 0
DEPRESSION: 1
NERVOUS/ANXIOUS: 1

## 2018-05-01 NOTE — PROGRESS NOTES
"Subjective:      Eun Be is a 82 y.o. female who presents with Hypertension and Chronic Kidney Disease            Patient seems to be under a lot of stress, she lost her  as well as her brother in the last year      Hypertension   This is a chronic problem. The current episode started more than 1 year ago. The problem is unchanged. The problem is controlled. Pertinent negatives include no chest pain, peripheral edema or shortness of breath. Agents associated with hypertension include thyroid hormones. Risk factors for coronary artery disease include post-menopausal state. Past treatments include beta blockers. The current treatment provides significant improvement. There are no compliance problems.  Hypertensive end-organ damage includes kidney disease. Identifiable causes of hypertension include chronic renal disease.   Chronic Kidney Disease   This is a chronic problem. The current episode started more than 1 year ago. The problem occurs constantly. The problem has been unchanged. Associated symptoms include weakness. Pertinent negatives include no chest pain, nausea, urinary symptoms or vomiting.       Review of Systems   Respiratory: Negative for shortness of breath.    Cardiovascular: Negative for chest pain and leg swelling.   Gastrointestinal: Negative for nausea and vomiting.   Genitourinary: Negative for dysuria and hematuria.   Neurological: Positive for tremors and weakness.   Psychiatric/Behavioral: Positive for depression. The patient is nervous/anxious.           Objective:     /74   Pulse 64   Temp 36.6 °C (97.8 °F)   Resp 14   Ht 1.803 m (5' 11\")   Wt 61.7 kg (136 lb)   LMP 01/01/1963   SpO2 96%   BMI 18.97 kg/m²      Physical Exam   Constitutional: She is oriented to person, place, and time. She appears well-developed and well-nourished. No distress.   HENT:   Right Ear: External ear normal.   Left Ear: External ear normal.   Nose: Nose normal.   Eyes: Conjunctivae " are normal. Right eye exhibits no discharge. Left eye exhibits no discharge. No scleral icterus.   Cardiovascular: Normal rate and regular rhythm.    Pulmonary/Chest: Effort normal and breath sounds normal. No respiratory distress. She has no wheezes.   Musculoskeletal: She exhibits no edema.   Neurological: She is alert and oriented to person, place, and time.   Skin: Skin is warm. She is not diaphoretic.   Psychiatric: Her mood appears anxious. She exhibits a depressed mood.   Nursing note and vitals reviewed.              Assessment/Plan:     1. Essential hypertension  Blood pressure is controlled  Continue low-sodium diet    2. Stage 3 chronic kidney disease  Stable  No uremic symptoms  Renal dose all medication  Avoid nephrotoxins      I had a long discussion with the patient and her daughter about her stress and possible depression, I advised her to consider seeing a specialist.

## 2018-05-06 ENCOUNTER — OFFICE VISIT (OUTPATIENT)
Dept: URGENT CARE | Facility: PHYSICIAN GROUP | Age: 83
End: 2018-05-06
Payer: MEDICARE

## 2018-05-06 ENCOUNTER — HOSPITAL ENCOUNTER (OUTPATIENT)
Facility: MEDICAL CENTER | Age: 83
End: 2018-05-06
Attending: FAMILY MEDICINE
Payer: MEDICARE

## 2018-05-06 VITALS
BODY MASS INDEX: 19.18 KG/M2 | RESPIRATION RATE: 16 BRPM | DIASTOLIC BLOOD PRESSURE: 68 MMHG | HEIGHT: 71 IN | OXYGEN SATURATION: 93 % | TEMPERATURE: 98.9 F | WEIGHT: 137 LBS | SYSTOLIC BLOOD PRESSURE: 140 MMHG | HEART RATE: 56 BPM

## 2018-05-06 DIAGNOSIS — R82.90 ABNORMAL URINALYSIS: ICD-10-CM

## 2018-05-06 DIAGNOSIS — N39.0 ACUTE UTI: ICD-10-CM

## 2018-05-06 DIAGNOSIS — R53.83 FATIGUE, UNSPECIFIED TYPE: ICD-10-CM

## 2018-05-06 LAB
APPEARANCE UR: NORMAL
BILIRUB UR STRIP-MCNC: NORMAL MG/DL
COLOR UR AUTO: NORMAL
GLUCOSE UR STRIP.AUTO-MCNC: NORMAL MG/DL
KETONES UR STRIP.AUTO-MCNC: NORMAL MG/DL
LEUKOCYTE ESTERASE UR QL STRIP.AUTO: NORMAL
NITRITE UR QL STRIP.AUTO: NORMAL
PH UR STRIP.AUTO: 6 [PH] (ref 5–8)
PROT UR QL STRIP: NORMAL MG/DL
RBC UR QL AUTO: NORMAL
SP GR UR STRIP.AUTO: 1.01
UROBILINOGEN UR STRIP-MCNC: NORMAL MG/DL

## 2018-05-06 PROCEDURE — 81002 URINALYSIS NONAUTO W/O SCOPE: CPT | Performed by: FAMILY MEDICINE

## 2018-05-06 PROCEDURE — 87086 URINE CULTURE/COLONY COUNT: CPT

## 2018-05-06 PROCEDURE — 87077 CULTURE AEROBIC IDENTIFY: CPT

## 2018-05-06 PROCEDURE — 99214 OFFICE O/P EST MOD 30 MIN: CPT | Performed by: FAMILY MEDICINE

## 2018-05-06 PROCEDURE — 87186 SC STD MICRODIL/AGAR DIL: CPT

## 2018-05-06 ASSESSMENT — ENCOUNTER SYMPTOMS
MYALGIAS: 0
WEIGHT LOSS: 0
EYE REDNESS: 0
EYE DISCHARGE: 0
FLANK PAIN: 0

## 2018-05-06 NOTE — PROGRESS NOTES
"Subjective:      Eun Be is a 82 y.o. female who presents with Dizziness (dizziness, fatigue x2 weeks; recently finished a course of prednisone)            2 weeks lightheaded. Fatigue. Possibly associated with recent prednisone use. Urine frequency but drinking more fluids recently. Intermittent HA.  PMH atrial fibrillation and has had similar sx's with this in the past. No fever/chills. No OTC medications. No other aggravating or alleviating factors.          Review of Systems   Constitutional: Negative for weight loss.   Eyes: Negative for discharge and redness.   Cardiovascular: Negative for leg swelling.   Genitourinary: Negative for flank pain and hematuria.   Musculoskeletal: Negative for joint pain and myalgias.   Skin: Negative for itching and rash.     .  Medications, Allergies, and current problem list reviewed today in Epic         Objective:     /68   Pulse (!) 56   Temp 37.2 °C (98.9 °F)   Resp 16   Ht 1.803 m (5' 11\")   Wt 62.1 kg (137 lb)   LMP 01/01/1963   SpO2 93%   BMI 19.11 kg/m²      Physical Exam   Constitutional: She appears well-developed and well-nourished. No distress.   HENT:   Head: Normocephalic.   Right Ear: External ear normal.   Left Ear: External ear normal.   Eyes: Conjunctivae are normal.   Neck: Neck supple.   Cardiovascular: Normal rate, regular rhythm and normal heart sounds.    No murmur heard.  Pulmonary/Chest: Effort normal and breath sounds normal.   Lymphadenopathy:     She has no cervical adenopathy.   Neurological:   Speech is clear. Patient is appropriate and cooperative.     Skin: Skin is warm and dry. No rash noted.               Assessment/Plan:   UA reviewed    1. Fatigue, unspecified type  CBC WITH DIFFERENTIAL    BASIC METABOLIC PANEL    URINE CULTURE(NEW)    POCT Urinalysis   2. Abnormal urinalysis  URINE CULTURE(NEW)   3. Acute UTI  sulfamethoxazole-trimethoprim (BACTRIM DS) 800-160 MG tablet     f/u culture and labs    Differential " diagnosis, natural history, supportive care, and indications for immediate follow-up discussed at length.

## 2018-05-08 DIAGNOSIS — R53.83 FATIGUE, UNSPECIFIED TYPE: ICD-10-CM

## 2018-05-08 DIAGNOSIS — R82.90 ABNORMAL URINALYSIS: ICD-10-CM

## 2018-05-10 LAB
BACTERIA UR CULT: ABNORMAL
BACTERIA UR CULT: ABNORMAL
SIGNIFICANT IND 70042: ABNORMAL
SITE SITE: ABNORMAL
SOURCE SOURCE: ABNORMAL

## 2018-05-10 RX ORDER — SULFAMETHOXAZOLE AND TRIMETHOPRIM 800; 160 MG/1; MG/1
1 TABLET ORAL EVERY 12 HOURS
Qty: 10 TAB | Refills: 0 | Status: SHIPPED | OUTPATIENT
Start: 2018-05-10 | End: 2018-05-15

## 2018-05-11 DIAGNOSIS — F41.9 ANXIETY: ICD-10-CM

## 2018-05-14 RX ORDER — ALPRAZOLAM 1 MG/1
TABLET ORAL
Qty: 60 TAB | Refills: 0 | Status: SHIPPED
Start: 2018-05-14 | End: 2018-06-13

## 2018-05-15 DIAGNOSIS — F41.9 ANXIETY: ICD-10-CM

## 2018-05-15 RX ORDER — ALPRAZOLAM 1 MG/1
TABLET ORAL
Refills: 0 | OUTPATIENT
Start: 2018-05-15 | End: 2018-06-14

## 2018-05-16 DIAGNOSIS — L30.9 DERMATITIS: ICD-10-CM

## 2018-05-17 RX ORDER — HYDROXYZINE 50 MG/1
50 TABLET, FILM COATED ORAL 3 TIMES DAILY PRN
Qty: 90 TAB | Refills: 0 | Status: SHIPPED | OUTPATIENT
Start: 2018-05-17 | End: 2018-06-12

## 2018-05-21 RX ORDER — SIMVASTATIN 20 MG
TABLET ORAL
Qty: 90 TAB | Refills: 3 | Status: SHIPPED | OUTPATIENT
Start: 2018-05-21 | End: 2019-04-02 | Stop reason: SDUPTHER

## 2018-05-21 NOTE — TELEPHONE ENCOUNTER
Pt has had OV within the 12 month protocol and lipid panel is current. 12 month supply sent to pharmacy.   Lab Results   Component Value Date/Time    CHOLSTRLTOT 141 04/16/2018 10:29 AM    LDL 59 04/16/2018 10:29 AM    HDL 67 04/16/2018 10:29 AM    TRIGLYCERIDE 73 04/16/2018 10:29 AM       Lab Results   Component Value Date/Time    SODIUM 141 04/16/2018 10:29 AM    POTASSIUM 4.0 04/16/2018 10:29 AM    CHLORIDE 105 04/16/2018 10:29 AM    CO2 31 04/16/2018 10:29 AM    GLUCOSE 89 04/16/2018 10:29 AM    BUN 23 (H) 04/16/2018 10:29 AM    CREATININE 0.91 04/16/2018 10:29 AM    CREATININE 0.76 03/21/2011 12:00 AM    BUNCREATRAT 25 03/21/2011 12:00 AM    GLOMRATE >59 03/21/2011 12:00 AM     Lab Results   Component Value Date/Time    ALKPHOSPHAT 31 04/16/2018 10:29 AM    ASTSGOT 12 04/16/2018 10:29 AM    ALTSGPT 9 04/16/2018 10:29 AM    TBILIRUBIN 0.8 04/16/2018 10:29 AM

## 2018-05-31 ENCOUNTER — TELEPHONE (OUTPATIENT)
Dept: CARDIOLOGY | Facility: MEDICAL CENTER | Age: 83
End: 2018-05-31

## 2018-05-31 DIAGNOSIS — I48.91 ATRIAL FIBRILLATION, UNSPECIFIED TYPE (HCC): ICD-10-CM

## 2018-05-31 NOTE — TELEPHONE ENCOUNTER
----- Message from Chichi Chacko sent at 5/31/2018  2:22 PM PDT -----  Regarding: samples xarelto  Contact: [phone]  [place]/fortino    Pt's daughter Faviola calling to ask for more samples of xarelto. Please call Faviola at .    ======================================================    Sample Rx for Xarelto sent to [facility]    Called pt's daughter (Faviola) and notified, daughter verbalizes understanding

## 2018-06-11 DIAGNOSIS — L30.9 DERMATITIS: ICD-10-CM

## 2018-06-12 RX ORDER — HYDROXYZINE 50 MG/1
TABLET, FILM COATED ORAL
Qty: 90 TAB | Refills: 3 | Status: SHIPPED | OUTPATIENT
Start: 2018-06-12 | End: 2018-12-11 | Stop reason: SDUPTHER

## 2018-06-12 NOTE — TELEPHONE ENCOUNTER
Was the patient seen in the last year in this department? Yes     Does patient have an active prescription for medications requested? No     Received Request Via: Pharmacy      Pt met protocol?: No    OV 4/18

## 2018-06-14 DIAGNOSIS — F41.9 ANXIETY: ICD-10-CM

## 2018-06-15 RX ORDER — ALPRAZOLAM 1 MG/1
TABLET ORAL
Qty: 60 TAB | Refills: 0 | Status: SHIPPED
Start: 2018-06-15 | End: 2018-07-14 | Stop reason: SDUPTHER

## 2018-06-29 ENCOUNTER — TELEPHONE (OUTPATIENT)
Dept: CARDIOLOGY | Facility: MEDICAL CENTER | Age: 83
End: 2018-06-29

## 2018-06-29 DIAGNOSIS — I48.91 ATRIAL FIBRILLATION, UNSPECIFIED TYPE (HCC): ICD-10-CM

## 2018-06-29 NOTE — TELEPHONE ENCOUNTER
----- Message from Sherry Rivas sent at 6/29/2018  1:39 PM PDT -----  Regarding: Patient's daughter wants call back about getting samples of Xarelto  [place]/Debi    Patient's daughter, Faviola, wants a call back at [phone]. Her mother wants to get samples of Xarelto.    =============================================================    Sample Rx for Xarelto sent to [facility]    Called pt's daughter (Faviola) and notified, she verbalizes understanding

## 2018-07-02 RX ORDER — FENOFIBRATE 134 MG/1
CAPSULE ORAL
Qty: 90 CAP | Refills: 2 | Status: SHIPPED | OUTPATIENT
Start: 2018-07-02 | End: 2018-07-19

## 2018-07-02 NOTE — TELEPHONE ENCOUNTER
Pt has had OV within the 12 month protocol and lipid panel is current. 9 month supply sent to pharmacy.   Lab Results   Component Value Date/Time    CHOLSTRLTOT 141 04/16/2018 10:29 AM    LDL 59 04/16/2018 10:29 AM    HDL 67 04/16/2018 10:29 AM    TRIGLYCERIDE 73 04/16/2018 10:29 AM       Lab Results   Component Value Date/Time    SODIUM 141 04/16/2018 10:29 AM    POTASSIUM 4.0 04/16/2018 10:29 AM    CHLORIDE 105 04/16/2018 10:29 AM    CO2 31 04/16/2018 10:29 AM    GLUCOSE 89 04/16/2018 10:29 AM    BUN 23 (H) 04/16/2018 10:29 AM    CREATININE 0.91 04/16/2018 10:29 AM    CREATININE 0.76 03/21/2011 12:00 AM    BUNCREATRAT 25 03/21/2011 12:00 AM    GLOMRATE >59 03/21/2011 12:00 AM     Lab Results   Component Value Date/Time    ALKPHOSPHAT 31 04/16/2018 10:29 AM    ASTSGOT 12 04/16/2018 10:29 AM    ALTSGPT 9 04/16/2018 10:29 AM    TBILIRUBIN 0.8 04/16/2018 10:29 AM

## 2018-07-02 NOTE — TELEPHONE ENCOUNTER
Was the patient seen in the last year in this department? Yes     Does patient have an active prescription for medications requested? No     Received Request Via: Pharmacy      Pt met protocol?: Yes pt last ov 1/18   Lab Results  Component Value Date/Time   CHOLSTRLTOT 141 04/16/2018 1029   TRIGLYCERIDE 73 04/16/2018 1029   HDL 67 04/16/2018 1029   LDL 59 04/16/2018 1029

## 2018-07-03 ENCOUNTER — APPOINTMENT (RX ONLY)
Dept: URBAN - METROPOLITAN AREA CLINIC 4 | Facility: CLINIC | Age: 83
Setting detail: DERMATOLOGY
End: 2018-07-03

## 2018-07-03 DIAGNOSIS — L82.1 OTHER SEBORRHEIC KERATOSIS: ICD-10-CM

## 2018-07-03 DIAGNOSIS — L20.89 OTHER ATOPIC DERMATITIS: ICD-10-CM

## 2018-07-03 DIAGNOSIS — L81.4 OTHER MELANIN HYPERPIGMENTATION: ICD-10-CM

## 2018-07-03 DIAGNOSIS — T07XXXA INSECT BITE, NONVENOMOUS, OF OTHER, MULTIPLE, AND UNSPECIFIED SITES, WITHOUT MENTION OF INFECTION: ICD-10-CM

## 2018-07-03 DIAGNOSIS — D22 MELANOCYTIC NEVI: ICD-10-CM

## 2018-07-03 DIAGNOSIS — D18.0 HEMANGIOMA: ICD-10-CM

## 2018-07-03 PROBLEM — D22.62 MELANOCYTIC NEVI OF LEFT UPPER LIMB, INCLUDING SHOULDER: Status: ACTIVE | Noted: 2018-07-03

## 2018-07-03 PROBLEM — D18.01 HEMANGIOMA OF SKIN AND SUBCUTANEOUS TISSUE: Status: ACTIVE | Noted: 2018-07-03

## 2018-07-03 PROBLEM — E03.9 HYPOTHYROIDISM, UNSPECIFIED: Status: ACTIVE | Noted: 2018-07-03

## 2018-07-03 PROBLEM — I48.91 UNSPECIFIED ATRIAL FIBRILLATION: Status: ACTIVE | Noted: 2018-07-03

## 2018-07-03 PROBLEM — D22.39 MELANOCYTIC NEVI OF OTHER PARTS OF FACE: Status: ACTIVE | Noted: 2018-07-03

## 2018-07-03 PROBLEM — J44.9 CHRONIC OBSTRUCTIVE PULMONARY DISEASE, UNSPECIFIED: Status: ACTIVE | Noted: 2018-07-03

## 2018-07-03 PROBLEM — D22.61 MELANOCYTIC NEVI OF RIGHT UPPER LIMB, INCLUDING SHOULDER: Status: ACTIVE | Noted: 2018-07-03

## 2018-07-03 PROBLEM — L20.84 INTRINSIC (ALLERGIC) ECZEMA: Status: ACTIVE | Noted: 2018-07-03

## 2018-07-03 PROBLEM — S00.86XA INSECT BITE (NONVENOMOUS) OF OTHER PART OF HEAD, INITIAL ENCOUNTER: Status: ACTIVE | Noted: 2018-07-03

## 2018-07-03 PROBLEM — E78.5 HYPERLIPIDEMIA, UNSPECIFIED: Status: ACTIVE | Noted: 2018-07-03

## 2018-07-03 PROBLEM — I10 ESSENTIAL (PRIMARY) HYPERTENSION: Status: ACTIVE | Noted: 2018-07-03

## 2018-07-03 PROBLEM — D22.5 MELANOCYTIC NEVI OF TRUNK: Status: ACTIVE | Noted: 2018-07-03

## 2018-07-03 PROBLEM — D48.5 NEOPLASM OF UNCERTAIN BEHAVIOR OF SKIN: Status: ACTIVE | Noted: 2018-07-03

## 2018-07-03 PROCEDURE — 99214 OFFICE O/P EST MOD 30 MIN: CPT | Mod: 25

## 2018-07-03 PROCEDURE — 11100: CPT

## 2018-07-03 PROCEDURE — ? COUNSELING

## 2018-07-03 PROCEDURE — ? BIOPSY BY SHAVE METHOD

## 2018-07-03 PROCEDURE — ? PRESCRIPTION

## 2018-07-03 PROCEDURE — ? SUNSCREEN RECOMMENDATIONS

## 2018-07-03 RX ORDER — TRIAMCINOLONE ACETONIDE 1 MG/G
CREAM TOPICAL BID
Qty: 1 | Refills: 3 | Status: ERX | COMMUNITY
Start: 2018-07-03

## 2018-07-03 RX ADMIN — TRIAMCINOLONE ACETONIDE: 1 CREAM TOPICAL at 18:55

## 2018-07-03 ASSESSMENT — LOCATION ZONE DERM
LOCATION ZONE: NECK
LOCATION ZONE: FACE
LOCATION ZONE: ARM
LOCATION ZONE: TRUNK

## 2018-07-03 ASSESSMENT — LOCATION DETAILED DESCRIPTION DERM
LOCATION DETAILED: RIGHT PROXIMAL DORSAL FOREARM
LOCATION DETAILED: RIGHT SUPERIOR MEDIAL BUCCAL CHEEK
LOCATION DETAILED: MID TRAPEZIAL NECK
LOCATION DETAILED: LEFT SUPERIOR FOREHEAD
LOCATION DETAILED: LEFT MID-UPPER BACK
LOCATION DETAILED: LEFT SUPERIOR MEDIAL UPPER BACK
LOCATION DETAILED: UPPER STERNUM
LOCATION DETAILED: STERNAL NOTCH
LOCATION DETAILED: LEFT LATERAL SUPERIOR CHEST
LOCATION DETAILED: LEFT MEDIAL UPPER BACK
LOCATION DETAILED: RIGHT LATERAL SUPERIOR CHEST
LOCATION DETAILED: LEFT INFERIOR CENTRAL MALAR CHEEK
LOCATION DETAILED: LEFT PROXIMAL DORSAL FOREARM

## 2018-07-03 ASSESSMENT — LOCATION SIMPLE DESCRIPTION DERM
LOCATION SIMPLE: LEFT UPPER BACK
LOCATION SIMPLE: RIGHT FOREARM
LOCATION SIMPLE: LEFT FOREARM
LOCATION SIMPLE: TRAPEZIAL NECK
LOCATION SIMPLE: LEFT FOREHEAD
LOCATION SIMPLE: LEFT CHEEK
LOCATION SIMPLE: CHEST
LOCATION SIMPLE: RIGHT CHEEK

## 2018-07-03 NOTE — PROCEDURE: BIOPSY BY SHAVE METHOD
Biopsy Method: Personna blade
Post-Care Instructions: I reviewed with the patient in detail post-care instructions. Patient is to keep the biopsy site dry overnight, and then apply bacitracin twice daily until healed. Patient may apply hydrogen peroxide soaks to remove any crusting.
Lab Facility: 
Wound Care: Bacitracin
Type Of Destruction Used: Curettage
Billing Type: Third-Party Bill
Lab: [id number]
Depth Of Biopsy: dermis
Hemostasis: Drysol
Bill For Surgical Tray: no
Anesthesia Type: 1% lidocaine with epinephrine
Was A Bandage Applied: Yes
Detail Level: Detailed
Size Of Lesion In Cm: 0
Anesthesia Volume In Cc: 2
Cryotherapy Text: The wound bed was treated with cryotherapy after the biopsy was performed.
Dressing: bandage
Electrodesiccation And Curettage Text: The wound bed was treated with electrodesiccation and curettage after the biopsy was performed.
Curettage Text: The wound bed was treated with curettage after the biopsy was performed.
Silver Nitrate Text: The wound bed was treated with silver nitrate after the biopsy was performed.
Biopsy Type: H and E
Notification Instructions: Patient will be notified of biopsy results. However, patient instructed to call the office if not contacted within 2 weeks.
Consent: Written consent was obtained and risks were reviewed including but not limited to scarring, infection, bleeding, scabbing, incomplete removal, nerve damage and allergy to anesthesia.
Electrodesiccation Text: The wound bed was treated with electrodesiccation after the biopsy was performed.

## 2018-07-06 DIAGNOSIS — I48.91 ATRIAL FIBRILLATION, UNSPECIFIED TYPE (HCC): ICD-10-CM

## 2018-07-14 DIAGNOSIS — F41.9 ANXIETY: ICD-10-CM

## 2018-07-17 RX ORDER — ALPRAZOLAM 1 MG/1
TABLET ORAL
Qty: 60 TAB | Refills: 0 | Status: SHIPPED
Start: 2018-07-17 | End: 2018-08-15 | Stop reason: SDUPTHER

## 2018-07-19 ENCOUNTER — OFFICE VISIT (OUTPATIENT)
Dept: MEDICAL GROUP | Facility: PHYSICIAN GROUP | Age: 83
End: 2018-07-19
Payer: MEDICARE

## 2018-07-19 VITALS
RESPIRATION RATE: 16 BRPM | DIASTOLIC BLOOD PRESSURE: 62 MMHG | HEART RATE: 62 BPM | HEIGHT: 71 IN | OXYGEN SATURATION: 96 % | TEMPERATURE: 98 F | BODY MASS INDEX: 18.62 KG/M2 | SYSTOLIC BLOOD PRESSURE: 114 MMHG | WEIGHT: 133 LBS

## 2018-07-19 DIAGNOSIS — J43.9 PULMONARY EMPHYSEMA, UNSPECIFIED EMPHYSEMA TYPE (HCC): ICD-10-CM

## 2018-07-19 DIAGNOSIS — K52.9 CHRONIC DIARRHEA: ICD-10-CM

## 2018-07-19 DIAGNOSIS — K22.2 SCHATZKI'S RING: ICD-10-CM

## 2018-07-19 PROCEDURE — 99214 OFFICE O/P EST MOD 30 MIN: CPT | Performed by: FAMILY MEDICINE

## 2018-07-19 RX ORDER — MONTELUKAST SODIUM 4 MG/1
1-2 TABLET, CHEWABLE ORAL 2 TIMES DAILY
Qty: 180 TAB | Refills: 3 | Status: SHIPPED | OUTPATIENT
Start: 2018-07-19 | End: 2018-08-02 | Stop reason: SINTOL

## 2018-07-19 RX ORDER — MONTELUKAST SODIUM 4 MG/1
1-2 TABLET, CHEWABLE ORAL 2 TIMES DAILY
Qty: 180 TAB | Refills: 3 | Status: SHIPPED | OUTPATIENT
Start: 2018-07-19 | End: 2018-07-19 | Stop reason: SDUPTHER

## 2018-07-19 RX ORDER — COLESTIPOL HYDROCHLORIDE 5 G/5G
5 GRANULE, FOR SUSPENSION ORAL 2 TIMES DAILY
Qty: 60 PACKET | Refills: 11 | Status: SHIPPED | OUTPATIENT
Start: 2018-07-19 | End: 2018-07-26

## 2018-07-19 NOTE — PROGRESS NOTES
Chief Complaint   Patient presents with   • Follow-Up     3 mo fv        HISTORY OF PRESENT ILLNESS: Patient is a 82 y.o. female established patient here today for the following concerns:    Se is brought in by her daughter today for general follow up. Most bothersome has been irregular bowel habits with constipation and diarrhea.  She has been using occasional otc meds with explosive results.  This limits her ability to leave the house.      Has continued on PPI therapy for hiatal hernia and Schatski's ring.  She notes some dysphagia and choking at times but does not want to go back for dilation at this time.     Lastly, Rpeorts that she has not been using the albuterol nebs because she doesn't like the way it makes her feel.  She also reports that the cost of the inhaler is getting to be too much.  She has not had any recent COPD exacerbations.  She is using her oxygen nightly with some good improvements.  Her daughter wonders if she also needs it during the day.  Eun declines to check as she says should would not be caught wearing it in public or dragging around a tank  Past Medical, Social, and Family history reviewed and updated in EPIC    Allergies:Asa [aspirin]; Latex; Penicillins; Tape; and Wasp venom    Current Outpatient Prescriptions   Medication Sig Dispense Refill   • ALPRAZolam (XANAX) 1 MG Tab TAKE 1 TO 2 TABLETS BY MOUTH AT BEDTIME AS NEEDED FOR ANXIETY OR SLEEP FOR UP TO 30 DAYS 60 Tab 0   • hydrOXYzine HCl (ATARAX) 50 MG Tab TAKE 1 TABLET BY MOUTH 3 TIMES A DAY AS NEEDED FOR ITCHING. 90 Tab 3   • omeprazole (PRILOSEC) 40 MG delayed-release capsule TAKE ONE CAPSULE BY MOUTH EVERY DAY 90 Cap 0   • metoprolol (LOPRESSOR) 25 MG Tab Take 1 Tab by mouth 2 times a day. 180 Tab 2   • ipratropium (ATROVENT) 0.02 % Solution 1 mL by Nebulization route 2 times a day. 100 Bullet 11   • albuterol (PROVENTIL) 2.5mg/0.5ml Nebu Soln 0.5 mL by Nebulization route every four hours as needed for Shortness of  Breath. 100 mL 11   • levothyroxine (SYNTHROID) 100 MCG Tab TAKE 1 TABLET BY MOUTH EVERY DAY. 90 Tab 0   • albuterol (VENTOLIN HFA) 108 (90 Base) MCG/ACT Aero Soln inhalation aerosol INHALE 2 PUFFS BY MOUTH EVERY 6 HOURS AS NEEDED FOR SHORTNESS OF BREATH. 1 Inhaler 11   • fluticasone (FLONASE) 50 MCG/ACT nasal spray SPRAY 1 SPRAY IN NOSE 2 TIMES A DAY. EACH NOSTRIL 3 Bottle 3   • albuterol (PROVENTIL) 2.5mg/3ml Nebu Soln solution for nebulization 3 mL by Nebulization route every four hours as needed for Shortness of Breath. 75 mL 1   • rivaroxaban (XARELTO) 15 MG Tab tablet Take 1 Tab by mouth with dinner. 30 Tab 0   • simvastatin (ZOCOR) 20 MG Tab TAKE 1 TABLET BY MOUTH IN THE EVENING 90 Tab 3   • propafenone (RYTHMOL) 150 MG Tab TAKE 1 TABLET BY MOUTH TWICE A  Tab 3   • predniSONE (DELTASONE) 10 MG Tab Start with 60 mg for 3 days then reduce by 10 mg every 3 days until gone. 63 Tab 0   • MethylPREDNISolone (MEDROL DOSEPAK) 4 MG Tablet Therapy Pack Follow the directions on the pack. 1 Kit 0   • FIBER SELECT GUMMIES PO Take  by mouth.     • Probiotic Product (PROBIOTIC PO) Take  by mouth.     • clobetasol (TEMOVATE) 0.05 % Cream Apply 1 Each to affected area(s) as needed.  5   • Cyanocobalamin (B-12) 1000 MCG CAPS Take 1,000 mg by mouth every day at 6 PM.       No current facility-administered medications for this visit.          ROS:  Review of Systems   Constitutional: Negative for fever, chills, weight loss and malaise/fatigue.   HENT: Negative for ear pain, nosebleeds, congestion, sore throat and neck pain.    Eyes: Negative for blurred vision.   Respiratory: Negative for cough, sputum production, shortness of breath and wheezing.    Cardiovascular: Negative for chest pain, palpitations,  and leg swelling.   Gastrointestinal: Negative for heartburn, nausea, vomiting, diarrhea and abdominal pain.   Genitourinary: Negative for dysuria, urgency and frequency.   Musculoskeletal: Negative for myalgias, back  "pain and joint pain.   Skin: Negative for rash and itching.   Neurological: Negative for dizziness, tingling, tremors, sensory change, focal weakness and headaches.   Endo/Heme/Allergies: Does not bruise/bleed easily.   Psychiatric/Behavioral: Negative for depression, anxiety, suicidal ideas, insomnia and memory loss.      Exam:  Blood pressure 114/62, pulse 62, temperature 36.7 °C (98 °F), resp. rate 16, height 1.803 m (5' 11\"), weight 60.3 kg (133 lb), last menstrual period 01/01/1963, SpO2 96 %.    General:  Well nourished, well developed in NAD  Head is grossly normal.  Neck: Supple without JVD   Pulmonary:  Normal effort.   Cardiovascular: Regular rate  Extremities: no clubbing, cyanosis, or edema.  Psych: affect appropriate      Please note that this dictation was created using voice recognition software. I have made every reasonable attempt to correct obvious errors, but I expect that there are errors of grammar and possibly content that I did not discover before finalizing the note.    Assessment/Plan:  1. Chronic diarrhea  D/c the fenofibrate and try  - colestipol (COLESTID) 5 GM pk; Take 1pk  by mouth 2 times a day.  Dispense: 180 Tab; Refill: 3    2. Schatzki's ring  Continue PPI, lots of hydration. follw up with GI as planned.     3. Pulmonary emphysema, unspecified emphysema type (HCC)  May try 1/2 bullet (doing half the time) on neb with slow breathing when needed as this is more cost effective and efficient for her COPD  Continue nocturnal O2      Follow up in 3 months, Daughter will My -chart progress with bowel.   If still struggling, we may have a drug holiday from much of her meds (except for the blood thinner) to see if this is med side effect.       "

## 2018-07-23 RX ORDER — OMEPRAZOLE 40 MG/1
CAPSULE, DELAYED RELEASE ORAL
Qty: 90 CAP | Refills: 2 | Status: SHIPPED | OUTPATIENT
Start: 2018-07-23 | End: 2019-04-16 | Stop reason: SDUPTHER

## 2018-07-23 RX ORDER — LEVOTHYROXINE SODIUM 0.1 MG/1
TABLET ORAL
Qty: 90 TAB | Refills: 2 | Status: SHIPPED | OUTPATIENT
Start: 2018-07-23 | End: 2019-04-09

## 2018-07-23 NOTE — TELEPHONE ENCOUNTER
Was the patient seen in the last year in this department? Yes     Does patient have an active prescription for medications requested? No     Received Request Via: Pharmacy    Pt met protocol?: Yes     Last OV 07/2018  TSH   Date Value Ref Range Status   04/16/2018 1.070 0.380 - 5.330 uIU/mL Final     Comment:     Please note new reference ranges effective 12/14/2017 10:00 AM  Pregnant Females, 1st Trimester  0.050-3.700  Pregnant Females, 2nd Trimester  0.310-4.350  Pregnant Females, 3rd Trimester  0.410-5.180

## 2018-07-24 RX ORDER — LEVOTHYROXINE SODIUM 0.1 MG/1
TABLET ORAL
Refills: 0 | OUTPATIENT
Start: 2018-07-24

## 2018-07-24 NOTE — TELEPHONE ENCOUNTER
Was the patient seen in the last year in this department? Yes     Does patient have an active prescription for medications requested? No     Received Request Via: Pharmacy      Pt met protocol?: Yes, OV last week, TSH checked 4/18 (within range)

## 2018-07-26 ENCOUNTER — OFFICE VISIT (OUTPATIENT)
Dept: CARDIOLOGY | Facility: MEDICAL CENTER | Age: 83
End: 2018-07-26
Payer: MEDICARE

## 2018-07-26 VITALS
SYSTOLIC BLOOD PRESSURE: 90 MMHG | DIASTOLIC BLOOD PRESSURE: 60 MMHG | BODY MASS INDEX: 19.04 KG/M2 | HEART RATE: 70 BPM | HEIGHT: 70 IN | WEIGHT: 133 LBS | OXYGEN SATURATION: 91 %

## 2018-07-26 DIAGNOSIS — I65.23 ATHEROSCLEROSIS OF BOTH CAROTID ARTERIES: ICD-10-CM

## 2018-07-26 DIAGNOSIS — Z79.01 ANTICOAGULATED: ICD-10-CM

## 2018-07-26 DIAGNOSIS — I48.20 CHRONIC ATRIAL FIBRILLATION (HCC): ICD-10-CM

## 2018-07-26 DIAGNOSIS — E78.2 MIXED HYPERLIPIDEMIA: ICD-10-CM

## 2018-07-26 DIAGNOSIS — I10 ESSENTIAL HYPERTENSION: ICD-10-CM

## 2018-07-26 PROCEDURE — 99214 OFFICE O/P EST MOD 30 MIN: CPT | Performed by: INTERNAL MEDICINE

## 2018-07-26 ASSESSMENT — ENCOUNTER SYMPTOMS
PND: 0
LOSS OF CONSCIOUSNESS: 0
NERVOUS/ANXIOUS: 0
NAUSEA: 0
WEIGHT LOSS: 0
BRUISES/BLEEDS EASILY: 0
INSOMNIA: 0
HEARTBURN: 0
DIZZINESS: 0
PALPITATIONS: 0

## 2018-07-26 NOTE — PROGRESS NOTES
Subjective:   Eun Be is a 82 y.o. female who presents today in follow-up in regards to her persistent atrial fibrillation on antiarrhythmics therapy and AV jared blockade and hypertension compounded by anxiety    Her pain and anxiety are still much more under control. She is still very pleased with her primary doctor and has been stable for more than 1 year      She has rare palpitations, she has not changed anything else  When they happen she gets extreme anxiety and takes Xanax  Still tolerating anticoagulation, no falls thinking about medical marijuana still off her chronic pain meds        Past Medical History:   Diagnosis Date   • Anxiety    • Arrhythmia    • Arthritis    • Backpain    • Bronchitis    • CATARACT    • Chronic lower back pain 3/11/2014   • COPD    • EMPHYSEMA    • GERD (gastroesophageal reflux disease)    • Heart burn     hiatal hernia   • HTN (hypertension)    • Hypertension    • Hypertriglyceridemia 11/7/2013   • Hypothyroid    • IGT (impaired glucose tolerance)    • MEDICAL HOME    • Osteopenia    • Pain     left side of back   • Paroxysmal atrial fibrillation (HCC)    • Personal history of venous thrombosis and embolism     right leg   • Pneumonia     1974   • Renal disorder     cysts   • Rheumatic fever     4 or 5 years old   • Rheumatic fever     as a kid per patient.   • Unspecified disorder of thyroid      Past Surgical History:   Procedure Laterality Date   • PB EXCIS/UNROOF RENAL CYST  1977   • OTHER  1977    cysts removed from left kidney   • HYSTERECTOMY RADICAL  1963   • OTHER  1963    hemorrhoidectomy   • HEMORRHOIDECTOMY  1963   • HYSTERECTOMY, TOTAL ABDOMINAL  26 years old     Family History   Problem Relation Age of Onset   • Stroke Brother    • Heart Disease Other      History   Smoking Status   • Former Smoker   • Packs/day: 0.25   • Years: 30.00   • Types: Cigarettes   Smokeless Tobacco   • Never Used     Comment: quit 1990 1/2 ppd x ?yrs (on and off for years  since age 15)     Allergies   Allergen Reactions   • Asa [Aspirin]    • Latex    • Penicillins    • Tape Hives, Itching and Swelling   • Wasp Venom Swelling     Outpatient Encounter Prescriptions as of 7/26/2018   Medication Sig Dispense Refill   • levothyroxine (SYNTHROID) 100 MCG Tab TAKE 1 TABLET BY MOUTH EVERY DAY. 90 Tab 2   • omeprazole (PRILOSEC) 40 MG delayed-release capsule TAKE 1 CAPSULE BY MOUTH EVERY DAY 90 Cap 2   • colestipol (COLESTID) 1 GM Tab Take 1-2 Tabs by mouth 2 times a day. 180 Tab 3   • ALPRAZolam (XANAX) 1 MG Tab TAKE 1 TO 2 TABLETS BY MOUTH AT BEDTIME AS NEEDED FOR ANXIETY OR SLEEP FOR UP TO 30 DAYS 60 Tab 0   • rivaroxaban (XARELTO) 15 MG Tab tablet Take 1 Tab by mouth with dinner. 30 Tab 0   • hydrOXYzine HCl (ATARAX) 50 MG Tab TAKE 1 TABLET BY MOUTH 3 TIMES A DAY AS NEEDED FOR ITCHING. 90 Tab 3   • simvastatin (ZOCOR) 20 MG Tab TAKE 1 TABLET BY MOUTH IN THE EVENING 90 Tab 3   • propafenone (RYTHMOL) 150 MG Tab TAKE 1 TABLET BY MOUTH TWICE A  Tab 3   • metoprolol (LOPRESSOR) 25 MG Tab Take 1 Tab by mouth 2 times a day. 180 Tab 2   • ipratropium (ATROVENT) 0.02 % Solution 1 mL by Nebulization route 2 times a day. 100 Bullet 11   • albuterol (PROVENTIL) 2.5mg/0.5ml Nebu Soln 0.5 mL by Nebulization route every four hours as needed for Shortness of Breath. 100 mL 11   • albuterol (VENTOLIN HFA) 108 (90 Base) MCG/ACT Aero Soln inhalation aerosol INHALE 2 PUFFS BY MOUTH EVERY 6 HOURS AS NEEDED FOR SHORTNESS OF BREATH. 1 Inhaler 11   • fluticasone (FLONASE) 50 MCG/ACT nasal spray SPRAY 1 SPRAY IN NOSE 2 TIMES A DAY. EACH NOSTRIL 3 Bottle 3   • albuterol (PROVENTIL) 2.5mg/3ml Nebu Soln solution for nebulization 3 mL by Nebulization route every four hours as needed for Shortness of Breath. 75 mL 1   • FIBER SELECT GUMMIES PO Take  by mouth.     • Probiotic Product (PROBIOTIC PO) Take  by mouth.     • clobetasol (TEMOVATE) 0.05 % Cream Apply 1 Each to affected area(s) as needed.  5   •  "Cyanocobalamin (B-12) 1000 MCG CAPS Take 1,000 mg by mouth every day at 6 PM.     • [DISCONTINUED] colestipol (COLESTID) 5 GM Pack Take 1 Packet by mouth 2 times a day for 30 days. (Patient not taking: Reported on 7/26/2018) 60 Packet 11   • predniSONE (DELTASONE) 10 MG Tab Start with 60 mg for 3 days then reduce by 10 mg every 3 days until gone. (Patient not taking: Reported on 7/26/2018) 63 Tab 0   • [DISCONTINUED] MethylPREDNISolone (MEDROL DOSEPAK) 4 MG Tablet Therapy Pack Follow the directions on the pack. (Patient not taking: Reported on 7/26/2018) 1 Kit 0     No facility-administered encounter medications on file as of 7/26/2018.      Review of Systems   Constitutional: Negative for malaise/fatigue and weight loss.   Cardiovascular: Negative for chest pain, palpitations, leg swelling and PND.   Gastrointestinal: Negative for heartburn and nausea.   Musculoskeletal:        Stable on meds   Skin: Negative for rash.   Neurological: Negative for dizziness and loss of consciousness.   Endo/Heme/Allergies: Does not bruise/bleed easily.   Psychiatric/Behavioral: The patient is not nervous/anxious and does not have insomnia.    All other systems reviewed and are negative.       Objective:   BP (!) 90/60   Pulse 70   Ht 1.778 m (5' 10\")   Wt 60.3 kg (133 lb)   LMP 01/01/1963   SpO2 91%   BMI 19.08 kg/m²     Physical Exam   Constitutional: She is oriented to person, place, and time. She appears well-developed and well-nourished.   Patient seen and examined today again, changes noted below   HENT:   Head: Normocephalic and atraumatic.   Mouth/Throat: Oropharynx is clear and moist.   Eyes: Pupils are equal, round, and reactive to light. EOM are normal.   Neck: Neck supple. No JVD present. No thyroid mass present.   Cardiovascular: Normal rate, regular rhythm, S1 normal, S2 normal, intact distal pulses and normal pulses.  PMI is not displaced.    No murmur heard.  Pulses:       Carotid pulses are 2+ on the right " side, and 2+ on the left side.       Radial pulses are 2+ on the right side, and 2+ on the left side.   No peripheral edema.   Pulmonary/Chest: Effort normal and breath sounds normal. No respiratory distress.   Abdominal: Normal appearance and bowel sounds are normal. She exhibits no abdominal bruit. There is no hepatosplenomegaly.   Musculoskeletal: Normal range of motion. She exhibits no edema.        Lumbar back: She exhibits no tenderness and no spasm.   Neurological: She is alert and oriented to person, place, and time. She has normal strength. Coordination normal.   Skin: Skin is warm and dry. No rash noted. No cyanosis. Nails show no clubbing.   Psychiatric: She has a normal mood and affect. Her behavior is normal.       Assessment:     1. Anticoagulated     2. Atherosclerosis of both carotid arteries  Carotid Duplex   3. Essential hypertension  Echocardiogram Comp w/o Cont   4. Mixed hyperlipidemia     5. Chronic atrial fibrillation (HCC)         Medical Decision Making:  Today's Assessment / Status / Plan:     Atrial fibrillation, persistent   Normal echo 2014   Due for repeat this year which I ordered anti-  Normal intervals and EKG last September   Talked for more than 10 minutes about the risks and benefits of and in April coagulation   CBC was ordered by primary, GFR is normal   It is a costly for her so I gave her a prescription for free sample month again, she was thankful   continue on class 1c not ideal -but we are screening with EKG arrhythmic,  Talked about the risk of stroke which is quite high. She voices understanding   AV jared blocker annual EKG and CMP for renal function    I will call her in a week to see if she feels better   I did increase her metoprolol to a full pill twice a day   Extra propafenone when necessary , will order echocardiogram if still symptomatic     Discussed situational anxiety as we always do  Comfort her in regards to loss of her  earlier this year   She has a  new dog In with her daughter as always       Carotid duplex is due later this year. She had mild disease 2013    RTC 6 months    Physical Exam   Constitutional: She is oriented to person, place, and time. She appears well-developed and well-nourished.   Patient seen and examined today again, changes noted below   HENT:   Head: Normocephalic and atraumatic.   Mouth/Throat: Oropharynx is clear and moist.   Eyes: Pupils are equal, round, and reactive to light. EOM are normal.   Neck: Neck supple. No JVD present. No thyroid mass present.   Cardiovascular: Normal rate, regular rhythm, S1 normal, S2 normal, intact distal pulses and normal pulses.  PMI is not displaced.    No murmur heard.  Pulses:       Carotid pulses are 2+ on the right side, and 2+ on the left side.       Radial pulses are 2+ on the right side, and 2+ on the left side.   No peripheral edema.   Pulmonary/Chest: Effort normal and breath sounds normal. No respiratory distress.   Abdominal: Normal appearance and bowel sounds are normal. She exhibits no abdominal bruit. There is no hepatosplenomegaly.   Musculoskeletal: Normal range of motion. She exhibits no edema.        Lumbar back: She exhibits no tenderness and no spasm.   Neurological: She is alert and oriented to person, place, and time. She has normal strength. Coordination normal.   Skin: Skin is warm and dry. No rash noted. No cyanosis. Nails show no clubbing.   Psychiatric: She has a normal mood and affect. Her behavior is normal.

## 2018-08-02 RX ORDER — FENOFIBRATE 134 MG/1
134 CAPSULE ORAL
Qty: 90 CAP | Refills: 3 | Status: SHIPPED | OUTPATIENT
Start: 2018-08-02 | End: 2019-08-03 | Stop reason: SDUPTHER

## 2018-08-03 ENCOUNTER — TELEPHONE (OUTPATIENT)
Dept: CARDIOLOGY | Facility: MEDICAL CENTER | Age: 83
End: 2018-08-03

## 2018-08-03 DIAGNOSIS — I48.91 ATRIAL FIBRILLATION, UNSPECIFIED TYPE (HCC): ICD-10-CM

## 2018-08-03 NOTE — TELEPHONE ENCOUNTER
----- Message from Chichi Chacko sent at 8/3/2018  3:55 PM PDT -----  Regarding: xarelto samples  Contact: [phone]  Carol    Pt's daughter Faviola calling for samples of xarelto.  Please call Faviola on cell# [phone]    =============================================================    Sample Rx for Xarelto sent to [facility].     Called pt's daughter (Faviola) and notified, daughter verbalizes understanding

## 2018-08-09 ENCOUNTER — HOSPITAL ENCOUNTER (OUTPATIENT)
Dept: RADIOLOGY | Facility: MEDICAL CENTER | Age: 83
End: 2018-08-09
Attending: INTERNAL MEDICINE
Payer: MEDICARE

## 2018-08-09 ENCOUNTER — HOSPITAL ENCOUNTER (OUTPATIENT)
Dept: CARDIOLOGY | Facility: MEDICAL CENTER | Age: 83
End: 2018-08-09
Attending: INTERNAL MEDICINE
Payer: MEDICARE

## 2018-08-09 DIAGNOSIS — I10 ESSENTIAL HYPERTENSION: ICD-10-CM

## 2018-08-09 DIAGNOSIS — I65.23 ATHEROSCLEROSIS OF BOTH CAROTID ARTERIES: ICD-10-CM

## 2018-08-09 PROCEDURE — 93306 TTE W/DOPPLER COMPLETE: CPT

## 2018-08-09 PROCEDURE — 93308 TTE F-UP OR LMTD: CPT | Mod: 26 | Performed by: INTERNAL MEDICINE

## 2018-08-09 PROCEDURE — 93880 EXTRACRANIAL BILAT STUDY: CPT

## 2018-08-09 PROCEDURE — 93880 EXTRACRANIAL BILAT STUDY: CPT | Mod: 26 | Performed by: INTERNAL MEDICINE

## 2018-08-10 LAB
LV EJECT FRACT  99904: 55
LV EJECT FRACT MOD 2C 99903: 55.36
LV EJECT FRACT MOD 4C 99902: 66.75
LV EJECT FRACT MOD BP 99901: 60.04

## 2018-08-13 ENCOUNTER — TELEPHONE (OUTPATIENT)
Dept: CARDIOLOGY | Facility: MEDICAL CENTER | Age: 83
End: 2018-08-13

## 2018-08-13 NOTE — TELEPHONE ENCOUNTER
----- Message from Debi Mosley R.N. sent at 8/13/2018 11:29 AM PDT -----      ----- Message -----  From: Theresa Gruber M.D.  Sent: 8/13/2018  11:04 AM  To: Carol Selby R.N.    Very normal & reassuring studies  Great news

## 2018-08-15 DIAGNOSIS — F41.9 ANXIETY: ICD-10-CM

## 2018-08-16 RX ORDER — ALPRAZOLAM 1 MG/1
TABLET ORAL
Qty: 60 TAB | Refills: 0 | Status: SHIPPED
Start: 2018-08-16 | End: 2018-09-15

## 2018-08-16 NOTE — TELEPHONE ENCOUNTER
Was the patient seen in the last year in this department? Yes     Does patient have an active prescription for medications requested? No     Received Request Via: Pharmacy    Pt met protocol?: Yes     Last OV 04/2018    Lab Results  Component Value Date/Time   CHOLSTRLTOT 141 04/16/2018 1029   TRIGLYCERIDE 73 04/16/2018 1029   HDL 67 04/16/2018 1029   LDL 59 04/16/2018 1029            Oriented - self; Oriented - place; Oriented - time

## 2018-08-17 ENCOUNTER — PATIENT MESSAGE (OUTPATIENT)
Dept: MEDICAL GROUP | Facility: PHYSICIAN GROUP | Age: 83
End: 2018-08-17

## 2018-08-17 RX ORDER — ALBUTEROL SULFATE 90 UG/1
2 AEROSOL, METERED RESPIRATORY (INHALATION) EVERY 6 HOURS PRN
Qty: 8.5 G | Refills: 11 | Status: SHIPPED | OUTPATIENT
Start: 2018-08-17 | End: 2019-02-12 | Stop reason: SDUPTHER

## 2018-08-31 ENCOUNTER — TELEPHONE (OUTPATIENT)
Dept: CARDIOLOGY | Facility: MEDICAL CENTER | Age: 83
End: 2018-08-31

## 2018-08-31 DIAGNOSIS — I48.91 ATRIAL FIBRILLATION, UNSPECIFIED TYPE (HCC): ICD-10-CM

## 2018-08-31 NOTE — TELEPHONE ENCOUNTER
----- Message from Chichi Chacko sent at 8/31/2018 10:13 AM PDT -----  Regarding: xarelto samples  Contact: [phone]  LA/letha Thomas's daughter Faviola Morales calling for xarelto samples. Please call Faviola at .

## 2018-08-31 NOTE — TELEPHONE ENCOUNTER
Sent Rx for samples to [facility]. Called pt's daughter to let let her know.

## 2018-09-10 DIAGNOSIS — T78.40XD ALLERGY, SUBSEQUENT ENCOUNTER: ICD-10-CM

## 2018-09-11 RX ORDER — FLUTICASONE PROPIONATE 50 MCG
1 SPRAY, SUSPENSION (ML) NASAL 2 TIMES DAILY
Qty: 3 BOTTLE | Refills: 0 | Status: SHIPPED | OUTPATIENT
Start: 2018-09-11 | End: 2022-12-27

## 2018-09-17 DIAGNOSIS — F41.9 ANXIETY: ICD-10-CM

## 2018-09-18 RX ORDER — ALPRAZOLAM 1 MG/1
1 TABLET ORAL NIGHTLY PRN
Qty: 60 TAB | Refills: 2 | Status: SHIPPED
Start: 2018-09-18 | End: 2018-10-18

## 2018-10-03 ENCOUNTER — TELEPHONE (OUTPATIENT)
Dept: CARDIOLOGY | Facility: MEDICAL CENTER | Age: 83
End: 2018-10-03

## 2018-10-03 DIAGNOSIS — I48.91 ATRIAL FIBRILLATION, UNSPECIFIED TYPE (HCC): ICD-10-CM

## 2018-10-03 NOTE — TELEPHONE ENCOUNTER
MOISES Maradiaga/Debi     Patient's daughter, Faviola, wants to get samples of Xarelto for her mother. She can be reached at [phone].      Sample Rx for Xarelto sent to [facility].    Called pt's daughter (Faviola) and notified, daughter verbalizes understanding

## 2018-10-04 DIAGNOSIS — I48.91 ATRIAL FIBRILLATION, UNSPECIFIED TYPE (HCC): ICD-10-CM

## 2018-10-05 NOTE — TELEPHONE ENCOUNTER
MOISES Ramirez/Debi Deleon at [facility] called and said Xarelto was sent to [facility] pharmacy, it was supposed to go to [facility]. He can be reached at [phone].      Sample Rx for Xarelto resend to [facility]    Called Pancho back and notified, he confirmed that they received sample Rx and will contact pt

## 2018-10-13 ENCOUNTER — PATIENT MESSAGE (OUTPATIENT)
Dept: MEDICAL GROUP | Facility: PHYSICIAN GROUP | Age: 83
End: 2018-10-13

## 2018-10-19 ENCOUNTER — OFFICE VISIT (OUTPATIENT)
Dept: MEDICAL GROUP | Facility: PHYSICIAN GROUP | Age: 83
End: 2018-10-19
Payer: MEDICARE

## 2018-10-19 VITALS
DIASTOLIC BLOOD PRESSURE: 62 MMHG | WEIGHT: 135 LBS | TEMPERATURE: 97.8 F | OXYGEN SATURATION: 93 % | BODY MASS INDEX: 19.33 KG/M2 | HEART RATE: 56 BPM | HEIGHT: 70 IN | SYSTOLIC BLOOD PRESSURE: 122 MMHG | RESPIRATION RATE: 18 BRPM

## 2018-10-19 DIAGNOSIS — K59.00 CONSTIPATION, UNSPECIFIED CONSTIPATION TYPE: ICD-10-CM

## 2018-10-19 DIAGNOSIS — J43.8 OTHER EMPHYSEMA (HCC): ICD-10-CM

## 2018-10-19 DIAGNOSIS — F41.9 ANXIETY: ICD-10-CM

## 2018-10-19 DIAGNOSIS — G47.34 NOCTURNAL HYPOXEMIA: ICD-10-CM

## 2018-10-19 DIAGNOSIS — Z23 NEED FOR INFLUENZA VACCINATION: ICD-10-CM

## 2018-10-19 PROCEDURE — 99214 OFFICE O/P EST MOD 30 MIN: CPT | Mod: 25 | Performed by: FAMILY MEDICINE

## 2018-10-19 PROCEDURE — 90662 IIV NO PRSV INCREASED AG IM: CPT | Performed by: FAMILY MEDICINE

## 2018-10-19 PROCEDURE — G0008 ADMIN INFLUENZA VIRUS VAC: HCPCS | Performed by: FAMILY MEDICINE

## 2018-10-19 NOTE — PROGRESS NOTES
Chief Complaint   Patient presents with   • Diarrhea     fv    • Constipation       HISTORY OF PRESENT ILLNESS: Patient is a 82 y.o. female established patient here today for the following concerns:    1. Other emphysema (HCC)  2. Nocturnal hypoxemia --- COPD  Here for follow up on COPD with hypoxia with O2 at night.  She reports that her breathing has been doing better since starting the maintenance inhaler Dulera.  Still uses the albuterol or oxygen in the middle of the day.  No productive cough.  No increase in wheezing.     3. Constipation, unspecified constipation type  Still having some intermittent issues with constipation.  She reports that she takes the miralax nearly daily.  For the most part it is significantly better, but finds every once in a while she is still having trouble passing hard stools.      4. Anxiety  She continues on Xanax primarily at bedtime for sleep, but occasional requires it during the day for anxiety and breathlessness sensation.  She has tried alternatives without tolerance and we have settled on this medication that she has taken for years.      5. Need for influenza vaccination  Due for flu vaccine.     Needs DMV form completed.  I informed Eun today that I do have to disclose the xanax as a potential problem for driving.  She knows to not take it before driving and informs me she does not do much of any driving any more.       Past Medical, Social, and Family history reviewed and updated in EPIC    Allergies:Asa [aspirin]; Latex; Penicillins; Tape; and Wasp venom    Current Outpatient Prescriptions   Medication Sig Dispense Refill   • rivaroxaban (XARELTO) 15 MG Tab tablet Take 1 Tab by mouth with dinner. 30 Tab 0   • fluticasone (FLONASE) 50 MCG/ACT nasal spray SPRAY 1 SPRAY IN NOSE 2 TIMES A DAY. EACH NOSTRIL 3 Bottle 0   • Mometasone Furo-Formoterol Fum (DULERA) 200-5 MCG/ACT Aerosol Inhale 1 Puff by mouth 2 Times a Day. 1 Inhaler 11   • albuterol 108 (90 Base) MCG/ACT  Aero Soln inhalation aerosol Inhale 2 Puffs by mouth every 6 hours as needed for Shortness of Breath. 8.5 g 11   • fenofibrate micronized (LOFIBRA) 134 MG capsule Take 1 Cap by mouth every day. 90 Cap 3   • levothyroxine (SYNTHROID) 100 MCG Tab TAKE 1 TABLET BY MOUTH EVERY DAY. 90 Tab 2   • omeprazole (PRILOSEC) 40 MG delayed-release capsule TAKE 1 CAPSULE BY MOUTH EVERY DAY 90 Cap 2   • hydrOXYzine HCl (ATARAX) 50 MG Tab TAKE 1 TABLET BY MOUTH 3 TIMES A DAY AS NEEDED FOR ITCHING. 90 Tab 3   • simvastatin (ZOCOR) 20 MG Tab TAKE 1 TABLET BY MOUTH IN THE EVENING 90 Tab 3   • propafenone (RYTHMOL) 150 MG Tab TAKE 1 TABLET BY MOUTH TWICE A  Tab 3   • metoprolol (LOPRESSOR) 25 MG Tab Take 1 Tab by mouth 2 times a day. 180 Tab 2   • ipratropium (ATROVENT) 0.02 % Solution 1 mL by Nebulization route 2 times a day. 100 Bullet 11   • albuterol (PROVENTIL) 2.5mg/0.5ml Nebu Soln 0.5 mL by Nebulization route every four hours as needed for Shortness of Breath. 100 mL 11   • albuterol (VENTOLIN HFA) 108 (90 Base) MCG/ACT Aero Soln inhalation aerosol INHALE 2 PUFFS BY MOUTH EVERY 6 HOURS AS NEEDED FOR SHORTNESS OF BREATH. 1 Inhaler 11   • albuterol (PROVENTIL) 2.5mg/3ml Nebu Soln solution for nebulization 3 mL by Nebulization route every four hours as needed for Shortness of Breath. 75 mL 1   • FIBER SELECT GUMMIES PO Take  by mouth.     • Probiotic Product (PROBIOTIC PO) Take  by mouth.     • clobetasol (TEMOVATE) 0.05 % Cream Apply 1 Each to affected area(s) as needed.  5   • Cyanocobalamin (B-12) 1000 MCG CAPS Take 1,000 mg by mouth every day at 6 PM.       No current facility-administered medications for this visit.          ROS:  Review of Systems   Constitutional: Negative for fever, chills, weight loss and malaise/fatigue.   HENT: Negative for ear pain, nosebleeds, congestion, sore throat and neck pain.    Eyes: Negative for blurred vision.   Respiratory: Negative for cough, sputum production, baseline shortness of  "breath and wheezing.    Cardiovascular: Negative for chest pain, palpitations,  and leg swelling.   Gastrointestinal: Negative for heartburn, nausea, vomiting, diarrhea and abdominal pain.   Genitourinary: Negative for dysuria, urgency and frequency.   Musculoskeletal: Negative for myalgias, back pain and joint pain.   Skin: Negative for rash and itching.   Neurological: Negative for dizziness, tingling, tremors, sensory change, focal weakness and headaches.   Endo/Heme/Allergies: Does not bruise/bleed easily.   Psychiatric/Behavioral: Negative for depression, +anxiety, suicidal ideas, insomnia and memory loss.      Exam:  Blood pressure 122/62, pulse (!) 56, temperature 36.6 °C (97.8 °F), resp. rate 18, height 1.778 m (5' 10\"), weight 61.2 kg (135 lb), last menstrual period 01/01/1963, SpO2 93 %, not currently breastfeeding.    General:  Well nourished, well developed in NAD  Head is grossly normal.  Neck: Supple without JVD   Pulmonary:  Normal effort.   Cardiovascular: Regular rate  Extremities: no clubbing, cyanosis, or edema.  Psych: affect appropriate      Please note that this dictation was created using voice recognition software. I have made every reasonable attempt to correct obvious errors, but I expect that there are errors of grammar and possibly content that I did not discover before finalizing the note.    Assessment/Plan:  1. Other emphysema (HCC)  2. Nocturnal hypoxemia --- COPD  Stable continue inhaled medications, O2, and updated flu vaccine.     3. Constipation, unspecified constipation type  Continue miralax and increase fiber and water intake .    4. Anxiety  Stable.  Continue prn xanax.  Counseled on risks and benefits.     5. Need for influenza vaccination  - Influenza Vaccine, High Dose (65+ Only)    Follow up 3 months.         "

## 2018-10-30 ENCOUNTER — TELEPHONE (OUTPATIENT)
Dept: CARDIOLOGY | Facility: MEDICAL CENTER | Age: 83
End: 2018-10-30

## 2018-10-30 DIAGNOSIS — I48.91 ATRIAL FIBRILLATION, UNSPECIFIED TYPE (HCC): ICD-10-CM

## 2018-10-30 NOTE — TELEPHONE ENCOUNTER
MOISES Maradiaga/Debi     Patient's daughter, Faviola, wants a call back at [phone] to get samples of Xarelto for her mother.      Sample Rx for Xarelto sent to [facility]    Called pt's daughter (Faviola) and notified, she verbalizes understanding

## 2018-11-29 ENCOUNTER — TELEPHONE (OUTPATIENT)
Dept: CARDIOLOGY | Facility: MEDICAL CENTER | Age: 83
End: 2018-11-29

## 2018-11-29 DIAGNOSIS — I48.91 ATRIAL FIBRILLATION, UNSPECIFIED TYPE (HCC): ICD-10-CM

## 2018-11-30 NOTE — TELEPHONE ENCOUNTER
Nanda Mendiola, Med Ass't  Debi Mosley, RDIVINA.   Phone Number: [phone]             Patients daughter called to see if any samples were avail, the pt is almost out.     LA/Debi     Patient's daughter, Faviola, wants a call back at [phone] to get samples of Xarelto for her mother.     Thank you,   Nanda [phone]      Sample Rx sent.     Called pt's daughter (Faviola) and notified, daughter verbalizes understanding

## 2018-12-11 DIAGNOSIS — L30.9 DERMATITIS: ICD-10-CM

## 2018-12-12 RX ORDER — HYDROXYZINE 50 MG/1
TABLET, FILM COATED ORAL
Qty: 90 TAB | Refills: 2 | Status: SHIPPED | OUTPATIENT
Start: 2018-12-12 | End: 2019-03-23 | Stop reason: SDUPTHER

## 2018-12-12 NOTE — TELEPHONE ENCOUNTER
Was the patient seen in the last year in this department? Yes    Does patient have an active prescription for medications requested? No     Received Request Via: Patient      Pt met protocol?: Yes    LAST OV 10/19/2018

## 2018-12-31 ENCOUNTER — TELEPHONE (OUTPATIENT)
Dept: CARDIOLOGY | Facility: MEDICAL CENTER | Age: 83
End: 2018-12-31

## 2018-12-31 DIAGNOSIS — F41.9 ANXIETY: ICD-10-CM

## 2018-12-31 DIAGNOSIS — I48.91 ATRIAL FIBRILLATION, UNSPECIFIED TYPE (HCC): ICD-10-CM

## 2018-12-31 NOTE — TELEPHONE ENCOUNTER
Chichi Mosley R.N.   Phone Number: [phone]             LA/fortino     Pt's daughter Faviola calling for xarelto samples.       Please call Faviola [phone].        Sample Rx for Xarelto sent to [facility]    Attempted to call pt's daughter (Faviola), no answer, detailed vm left that Sample Rx is already sent over

## 2019-01-02 RX ORDER — ALPRAZOLAM 1 MG/1
1 TABLET ORAL NIGHTLY PRN
Qty: 60 TAB | Refills: 2 | Status: SHIPPED
Start: 2019-01-02 | End: 2019-02-01

## 2019-01-04 ENCOUNTER — TELEPHONE (OUTPATIENT)
Dept: MEDICAL GROUP | Facility: PHYSICIAN GROUP | Age: 84
End: 2019-01-04

## 2019-01-04 NOTE — TELEPHONE ENCOUNTER
Future Appointments       Provider Department Center    1/8/2019 11:20 AM Libertad Estrella M.D. [facility]    2/26/2019 2:15 PM Theresa Gruber M.D. [facility]         ESTABLISHED PATIENT PRE-VISIT PLANNING     Patient was NOT contacted to complete PVP.      1.  Reviewed notes from the last few office visits within the medical group: Yes    2.  If any orders were placed at last visit or intended to be done for this visit (i.e. 6 mos follow-up), do we have Results/Consult Notes?        •  Labs - Labs were not ordered at last office visit.       •  Imaging - Imaging was not ordered at last office visit.       •  Referrals - No referrals were ordered at last office visit.    3. Is this appointment scheduled as a Hospital Follow-Up? No    4.  Immunizations were updated in Epic using WebIZ?: Yes       •  Web Iz Recommendations: PNEUMOVAX (PPSV23), TDAP and SHINGRIX (Shingles)    5.  Patient is due for the following Health Maintenance Topics:   Health Maintenance Due   Topic Date Due   • Annual Wellness Visit  06/03/2016     6. Orders for overdue Health Maintenance topics pended in Pre-Charting? YES    7.  AHA (MDX) form printed for Provider? YES    8.  Patient was NOT informed to arrive 15 min prior to their scheduled appointment and bring in their medication bottles.

## 2019-01-08 ENCOUNTER — OFFICE VISIT (OUTPATIENT)
Dept: MEDICAL GROUP | Facility: PHYSICIAN GROUP | Age: 84
End: 2019-01-08
Payer: MEDICARE

## 2019-01-08 VITALS
SYSTOLIC BLOOD PRESSURE: 112 MMHG | RESPIRATION RATE: 14 BRPM | BODY MASS INDEX: 20.33 KG/M2 | DIASTOLIC BLOOD PRESSURE: 64 MMHG | HEART RATE: 60 BPM | WEIGHT: 142 LBS | HEIGHT: 70 IN | OXYGEN SATURATION: 97 % | TEMPERATURE: 97.2 F

## 2019-01-08 DIAGNOSIS — J43.8 OTHER EMPHYSEMA (HCC): ICD-10-CM

## 2019-01-08 DIAGNOSIS — F33.41 RECURRENT MAJOR DEPRESSIVE DISORDER, IN PARTIAL REMISSION (HCC): ICD-10-CM

## 2019-01-08 DIAGNOSIS — E78.00 PURE HYPERCHOLESTEROLEMIA: ICD-10-CM

## 2019-01-08 DIAGNOSIS — J96.11 CHRONIC RESPIRATORY FAILURE WITH HYPOXIA (HCC): ICD-10-CM

## 2019-01-08 DIAGNOSIS — H61.23 BILATERAL IMPACTED CERUMEN: ICD-10-CM

## 2019-01-08 DIAGNOSIS — H61.23 BILATERAL HEARING LOSS DUE TO CERUMEN IMPACTION: ICD-10-CM

## 2019-01-08 DIAGNOSIS — E03.9 ACQUIRED HYPOTHYROIDISM: ICD-10-CM

## 2019-01-08 DIAGNOSIS — H60.551: ICD-10-CM

## 2019-01-08 DIAGNOSIS — I48.20 CHRONIC ATRIAL FIBRILLATION (HCC): ICD-10-CM

## 2019-01-08 DIAGNOSIS — R19.7 DIARRHEA, UNSPECIFIED TYPE: ICD-10-CM

## 2019-01-08 DIAGNOSIS — F41.1 GENERALIZED ANXIETY DISORDER: ICD-10-CM

## 2019-01-08 DIAGNOSIS — F13.20 SEDATIVE, HYPNOTIC OR ANXIOLYTIC DEPENDENCE (HCC): ICD-10-CM

## 2019-01-08 DIAGNOSIS — H93.8X3 SENSATION OF FULLNESS IN BOTH EARS: ICD-10-CM

## 2019-01-08 DIAGNOSIS — N18.30 STAGE 3 CHRONIC KIDNEY DISEASE (HCC): ICD-10-CM

## 2019-01-08 PROCEDURE — 99214 OFFICE O/P EST MOD 30 MIN: CPT | Mod: 25 | Performed by: FAMILY MEDICINE

## 2019-01-08 PROCEDURE — 8041 PR SCP AHA: Performed by: FAMILY MEDICINE

## 2019-01-08 PROCEDURE — 69210 REMOVE IMPACTED EAR WAX UNI: CPT | Performed by: FAMILY MEDICINE

## 2019-01-08 RX ORDER — NEOMYCIN SULFATE, POLYMYXIN B SULFATE, HYDROCORTISONE 3.5; 10000; 1 MG/ML; [USP'U]/ML; MG/ML
4 SOLUTION/ DROPS AURICULAR (OTIC) 2 TIMES DAILY
Qty: 1 BOTTLE | Refills: 0 | Status: SHIPPED | OUTPATIENT
Start: 2019-01-08 | End: 2019-01-08 | Stop reason: SDUPTHER

## 2019-01-08 RX ORDER — NEOMYCIN SULFATE, POLYMYXIN B SULFATE, HYDROCORTISONE 3.5; 10000; 1 MG/ML; [USP'U]/ML; MG/ML
4 SOLUTION/ DROPS AURICULAR (OTIC) 2 TIMES DAILY
Qty: 1 BOTTLE | Refills: 0 | Status: SHIPPED | OUTPATIENT
Start: 2019-01-08 | End: 2019-08-21

## 2019-01-08 NOTE — PROGRESS NOTES
Chief Complaint   Patient presents with   • Diarrhea     fv    • Ear Fullness     rt ear        HISTORY OF PRESENT ILLNESS: Patient is a 83 y.o. female established patient here today for the following concerns:    1. Chronic atrial fibrillation (HCC)  Here for follow up.  Reports feeling well.  Denies any new CP, Palpitations.  Rate is controlled.  Continues on Xarelto for anticoagulation.     2. Other emphysema ([facility])  3. Chronic respiratory failure with hypoxia ([facility])  Reports that her breathing has been doing well.  She continues on Dulera and albuterol as needed.  No recent exacerbations.  She reports that she has been adherent with her oxygen at night.      4. Stage 3 chronic kidney disease ([facility])  Stable.  Avoiding NSAID use.  She is due for recheck on kidney function     5. Recurrent major depressive disorder, in partial remission ([facility])  6. Generalized anxiety disorder  Reports that her mood has been doing pretty well.  She continues with Anxiety, which she is still using xanax for.  Doing pretty well with this.  No increase in use.      7. Sedative, hypnotic or anxiolytic dependence ([facility])  As mentioned above.  Has not increased use.  Now off chronic opioid therapy     8. Diarrhea, unspecified type  She reports that the constipation is very well controlled with Miralax, but has on occasion ran into diarrhea.  She has reduced the miralax.     9. Sensation of fullness in both ears  12. Bilateral hearing loss due to cerumen impaction  13. Bilateral impacted cerumen  14. Reactive otitis externa of right ear, unspecified chronicity  Reports bilateral ear fullness and decreased hearing.  No fevers, chills.  No discharge.  No pain.      10. Acquired hypothyroidism  Continues on levothyroxine.  Has had 8 lb weight gain.  She reports no change in hair or skin.  Due for recheck     11. Pure hypercholesterolemia  Continues on statin therapy with indication of carotid atherosclerosis and Afib.  Tolerating well.  Last lipids  well controlled.         Past Medical, Social, and Family history reviewed and updated in EPIC    Allergies:Asa [aspirin]; Latex; Penicillins; Tape; and Wasp venom    Current Outpatient Prescriptions   Medication Sig Dispense Refill   • NEOMYCIN-POLYMYXIN-HC, OTIC, 1 % Solution Place 4 Drops in ear 2 Times a Day. 1 Bottle 0   • ALPRAZolam (XANAX) 1 MG Tab Take 1 Tab by mouth at bedtime as needed for Sleep for up to 30 days. 60 Tab 2   • rivaroxaban (XARELTO) 15 MG Tab tablet Take 1 Tab by mouth with dinner. 30 Tab 0   • hydrOXYzine HCl (ATARAX) 50 MG Tab TAKE 1 TABLET BY MOUTH 3 TIMES A DAY AS NEEDED FOR ITCHING. 90 Tab 2   • fluticasone (FLONASE) 50 MCG/ACT nasal spray SPRAY 1 SPRAY IN NOSE 2 TIMES A DAY. EACH NOSTRIL 3 Bottle 0   • Mometasone Furo-Formoterol Fum (DULERA) 200-5 MCG/ACT Aerosol Inhale 1 Puff by mouth 2 Times a Day. 1 Inhaler 11   • albuterol 108 (90 Base) MCG/ACT Aero Soln inhalation aerosol Inhale 2 Puffs by mouth every 6 hours as needed for Shortness of Breath. 8.5 g 11   • fenofibrate micronized (LOFIBRA) 134 MG capsule Take 1 Cap by mouth every day. 90 Cap 3   • levothyroxine (SYNTHROID) 100 MCG Tab TAKE 1 TABLET BY MOUTH EVERY DAY. 90 Tab 2   • omeprazole (PRILOSEC) 40 MG delayed-release capsule TAKE 1 CAPSULE BY MOUTH EVERY DAY 90 Cap 2   • simvastatin (ZOCOR) 20 MG Tab TAKE 1 TABLET BY MOUTH IN THE EVENING 90 Tab 3   • propafenone (RYTHMOL) 150 MG Tab TAKE 1 TABLET BY MOUTH TWICE A  Tab 3   • metoprolol (LOPRESSOR) 25 MG Tab Take 1 Tab by mouth 2 times a day. 180 Tab 2   • ipratropium (ATROVENT) 0.02 % Solution 1 mL by Nebulization route 2 times a day. 100 Bullet 11   • albuterol (PROVENTIL) 2.5mg/0.5ml Nebu Soln 0.5 mL by Nebulization route every four hours as needed for Shortness of Breath. 100 mL 11   • albuterol (VENTOLIN HFA) 108 (90 Base) MCG/ACT Aero Soln inhalation aerosol INHALE 2 PUFFS BY MOUTH EVERY 6 HOURS AS NEEDED FOR SHORTNESS OF BREATH. 1 Inhaler 11   • albuterol  "(PROVENTIL) 2.5mg/3ml Nebu Soln solution for nebulization 3 mL by Nebulization route every four hours as needed for Shortness of Breath. 75 mL 1   • FIBER SELECT GUMMIES PO Take  by mouth.     • Probiotic Product (PROBIOTIC PO) Take  by mouth.     • clobetasol (TEMOVATE) 0.05 % Cream Apply 1 Each to affected area(s) as needed.  5   • Cyanocobalamin (B-12) 1000 MCG CAPS Take 1,000 mg by mouth every day at 6 PM.       No current facility-administered medications for this visit.          ROS:  Review of Systems   Constitutional: Negative for fever, chills, weight loss and malaise/fatigue.   HENT: Negative for ear pain, nosebleeds, congestion, sore throat and neck pain.    Eyes: Negative for blurred vision.   Respiratory: Negative for cough, sputum production, shortness of breath and wheezing.    Cardiovascular: Negative for chest pain, palpitations,  and leg swelling.   Gastrointestinal: Negative for heartburn, nausea, vomiting, diarrhea and abdominal pain.   Genitourinary: Negative for dysuria, urgency and frequency.   Musculoskeletal: Negative for myalgias, back pain and joint pain.   Skin: Negative for rash and itching.   Neurological: Negative for dizziness, tingling, tremors, sensory change, focal weakness and headaches.   Endo/Heme/Allergies: Does not bruise/bleed easily.   Psychiatric/Behavioral: Negative for depression, anxiety, suicidal ideas, insomnia and memory loss.      Exam:  Blood pressure 112/64, pulse 60, temperature 36.2 °C (97.2 °F), resp. rate 14, height 1.778 m (5' 10\"), weight 64.4 kg (142 lb), last menstrual period 01/01/1963, SpO2 97 %, not currently breastfeeding.    General:  Well nourished, well developed in NAD  Head is grossly normal.  Neck: Supple without JVD   Pulmonary:  Normal effort.   Cardiovascular: Regular rate  Extremities: no clubbing, cyanosis, or edema.  Psych: affect appropriate      Please note that this dictation was created using voice recognition software. I have made every " reasonable attempt to correct obvious errors, but I expect that there are errors of grammar and possibly content that I did not discover before finalizing the note.    Assessment/Plan:  1. Chronic atrial fibrillation (HCC)  Continue xarelto, metoprolol  - CBC WITH DIFFERENTIAL; Future    2. Other emphysema (HCC)  Continue inhaled meds and O2    3. Chronic respiratory failure with hypoxia (HCC)  Continue inhaled meds and O2    4. Stage 3 chronic kidney disease (HCC)  - COMP METABOLIC PANEL; Future    5. Recurrent major depressive disorder, in partial remission (HCC)  6. Generalized anxiety disorder  7. Sedative, hypnotic or anxiolytic dependence (HCC)  Patient counseled.  Continue current medications.     8. Diarrhea, unspecified type  Will continue to work on fiber and hydration and reduce miralax as needed.     9. Sensation of fullness in both ears  Attempted to irrigate today, attempted debridement with speculum and alligator forecepts, abandoned due to discomfort and small abrasion to canal.     10. Acquired hypothyroidism  Continue levothyroxine  - TSH WITH REFLEX TO FT4; Future    11. Pure hypercholesterolemia  Continue simvastatin   - COMP METABOLIC PANEL; Future  - Lipid Profile; Future    12. Bilateral hearing loss due to cerumen impaction  - NEOMYCIN-POLYMYXIN-HC, OTIC, 1 % Solution; Place 4 Drops in ear 2 Times a Day.  Dispense: 1 Bottle; Refill: 0    13. Bilateral impacted cerumen  As above.     14. Reactive otitis externa of right ear, unspecified chronicity  - NEOMYCIN-POLYMYXIN-HC, OTIC, 1 % Solution; Place 4 Drops in ear 2 Times a Day.  Dispense: 1 Bottle; Refill: 0    3 month follow up

## 2019-01-16 ENCOUNTER — NON-PROVIDER VISIT (OUTPATIENT)
Dept: MEDICAL GROUP | Facility: PHYSICIAN GROUP | Age: 84
End: 2019-01-16
Payer: MEDICARE

## 2019-01-16 DIAGNOSIS — H61.21 IMPACTED CERUMEN OF RIGHT EAR: ICD-10-CM

## 2019-01-16 PROCEDURE — 99999 PR NO CHARGE: CPT | Performed by: FAMILY MEDICINE

## 2019-01-16 NOTE — PROGRESS NOTES
Eun Be is a 83 y.o. female here for a non-provider visit for ear lavage     If abnormal was an in office provider notified today (if so, indicate provider)? No  Routed to PCP? Yes

## 2019-01-30 ENCOUNTER — TELEPHONE (OUTPATIENT)
Dept: CARDIOLOGY | Facility: MEDICAL CENTER | Age: 84
End: 2019-01-30

## 2019-01-30 DIAGNOSIS — I48.91 ATRIAL FIBRILLATION, UNSPECIFIED TYPE (HCC): ICD-10-CM

## 2019-01-30 NOTE — TELEPHONE ENCOUNTER
MOISES Maradiaga/Debi     Patient's daughter, Faviola, wants to get samples of Xarelto for her mother. She wants a call back at [phone].      Sample Rx for Xarelto sent to [facility]    Attempted to call pt, no answer, detailed vm left that samples is already sent to [facility]

## 2019-01-31 ENCOUNTER — PATIENT MESSAGE (OUTPATIENT)
Dept: MEDICAL GROUP | Facility: PHYSICIAN GROUP | Age: 84
End: 2019-01-31

## 2019-01-31 RX ORDER — ALBUTEROL SULFATE 90 UG/1
2 AEROSOL, METERED RESPIRATORY (INHALATION) EVERY 6 HOURS PRN
Qty: 8.5 G | Refills: 11 | Status: SHIPPED | OUTPATIENT
Start: 2019-01-31 | End: 2019-04-03

## 2019-02-12 RX ORDER — ALBUTEROL SULFATE 90 UG/1
2 AEROSOL, METERED RESPIRATORY (INHALATION) EVERY 6 HOURS PRN
Qty: 8.5 G | Refills: 11 | Status: SHIPPED | OUTPATIENT
Start: 2019-02-12 | End: 2019-04-03

## 2019-02-12 NOTE — TELEPHONE ENCOUNTER
Was the patient seen in the last year in this department? Yes    Does patient have an active prescription for medications requested? No     Received Request Via: Patient   Patient needs this sent to her local pharmacy.  Not the [facility].  Thank you.  
Scheduled for abdominal myomectomy on 02/08/17. Pre op instructions, famotidine chlorhexidine gluconate soap given and explained. Pt verbalized understanding. Pt instructed to bring urine specimen on day of surgery, container given to pt who verbalized understanding.

## 2019-02-26 ENCOUNTER — PATIENT MESSAGE (OUTPATIENT)
Dept: CARDIOLOGY | Facility: MEDICAL CENTER | Age: 84
End: 2019-02-26

## 2019-02-26 DIAGNOSIS — I48.91 ATRIAL FIBRILLATION, UNSPECIFIED TYPE (HCC): ICD-10-CM

## 2019-03-23 DIAGNOSIS — L30.9 DERMATITIS: ICD-10-CM

## 2019-03-25 RX ORDER — HYDROXYZINE 50 MG/1
TABLET, FILM COATED ORAL
Qty: 90 TAB | Refills: 2 | Status: SHIPPED | OUTPATIENT
Start: 2019-03-25 | End: 2020-03-29 | Stop reason: SDUPTHER

## 2019-03-28 ENCOUNTER — HOSPITAL ENCOUNTER (OUTPATIENT)
Dept: LAB | Facility: MEDICAL CENTER | Age: 84
End: 2019-03-28
Attending: INTERNAL MEDICINE
Payer: MEDICARE

## 2019-03-28 ENCOUNTER — HOSPITAL ENCOUNTER (OUTPATIENT)
Dept: LAB | Facility: MEDICAL CENTER | Age: 84
End: 2019-03-28
Attending: FAMILY MEDICINE
Payer: MEDICARE

## 2019-03-28 DIAGNOSIS — I10 ESSENTIAL HYPERTENSION: ICD-10-CM

## 2019-03-28 DIAGNOSIS — N18.30 STAGE 3 CHRONIC KIDNEY DISEASE (HCC): ICD-10-CM

## 2019-03-28 DIAGNOSIS — E78.00 PURE HYPERCHOLESTEROLEMIA: ICD-10-CM

## 2019-03-28 DIAGNOSIS — I48.20 CHRONIC ATRIAL FIBRILLATION (HCC): ICD-10-CM

## 2019-03-28 DIAGNOSIS — E03.9 ACQUIRED HYPOTHYROIDISM: ICD-10-CM

## 2019-03-28 LAB
ALBUMIN SERPL BCP-MCNC: 4.1 G/DL (ref 3.2–4.9)
ALBUMIN/GLOB SERPL: 1.6 G/DL
ALP SERPL-CCNC: 27 U/L (ref 30–99)
ALT SERPL-CCNC: 11 U/L (ref 2–50)
ANION GAP SERPL CALC-SCNC: 8 MMOL/L (ref 0–11.9)
ANION GAP SERPL CALC-SCNC: 8 MMOL/L (ref 0–11.9)
AST SERPL-CCNC: 14 U/L (ref 12–45)
BASOPHILS # BLD AUTO: 0.9 % (ref 0–1.8)
BASOPHILS # BLD: 0.05 K/UL (ref 0–0.12)
BILIRUB SERPL-MCNC: 0.8 MG/DL (ref 0.1–1.5)
BUN SERPL-MCNC: 27 MG/DL (ref 8–22)
BUN SERPL-MCNC: 28 MG/DL (ref 8–22)
CALCIUM SERPL-MCNC: 9.8 MG/DL (ref 8.5–10.5)
CALCIUM SERPL-MCNC: 9.9 MG/DL (ref 8.5–10.5)
CHLORIDE SERPL-SCNC: 108 MMOL/L (ref 96–112)
CHLORIDE SERPL-SCNC: 108 MMOL/L (ref 96–112)
CHOLEST SERPL-MCNC: 171 MG/DL (ref 100–199)
CO2 SERPL-SCNC: 27 MMOL/L (ref 20–33)
CO2 SERPL-SCNC: 27 MMOL/L (ref 20–33)
CREAT SERPL-MCNC: 0.96 MG/DL (ref 0.5–1.4)
CREAT SERPL-MCNC: 0.97 MG/DL (ref 0.5–1.4)
EOSINOPHIL # BLD AUTO: 0.2 K/UL (ref 0–0.51)
EOSINOPHIL NFR BLD: 3.7 % (ref 0–6.9)
ERYTHROCYTE [DISTWIDTH] IN BLOOD BY AUTOMATED COUNT: 46.7 FL (ref 35.9–50)
FASTING STATUS PATIENT QL REPORTED: NORMAL
FASTING STATUS PATIENT QL REPORTED: NORMAL
GLOBULIN SER CALC-MCNC: 2.6 G/DL (ref 1.9–3.5)
GLUCOSE SERPL-MCNC: 71 MG/DL (ref 65–99)
GLUCOSE SERPL-MCNC: 72 MG/DL (ref 65–99)
HCT VFR BLD AUTO: 42.6 % (ref 37–47)
HDLC SERPL-MCNC: 75 MG/DL
HGB BLD-MCNC: 14.1 G/DL (ref 12–16)
IMM GRANULOCYTES # BLD AUTO: 0.03 K/UL (ref 0–0.11)
IMM GRANULOCYTES NFR BLD AUTO: 0.6 % (ref 0–0.9)
LDLC SERPL CALC-MCNC: 83 MG/DL
LYMPHOCYTES # BLD AUTO: 0.9 K/UL (ref 1–4.8)
LYMPHOCYTES NFR BLD: 16.9 % (ref 22–41)
MCH RBC QN AUTO: 32.2 PG (ref 27–33)
MCHC RBC AUTO-ENTMCNC: 33.1 G/DL (ref 33.6–35)
MCV RBC AUTO: 97.3 FL (ref 81.4–97.8)
MONOCYTES # BLD AUTO: 0.29 K/UL (ref 0–0.85)
MONOCYTES NFR BLD AUTO: 5.4 % (ref 0–13.4)
NEUTROPHILS # BLD AUTO: 3.87 K/UL (ref 2–7.15)
NEUTROPHILS NFR BLD: 72.5 % (ref 44–72)
NRBC # BLD AUTO: 0 K/UL
NRBC BLD-RTO: 0 /100 WBC
PLATELET # BLD AUTO: 189 K/UL (ref 164–446)
PMV BLD AUTO: 10.6 FL (ref 9–12.9)
POTASSIUM SERPL-SCNC: 4.3 MMOL/L (ref 3.6–5.5)
POTASSIUM SERPL-SCNC: 4.3 MMOL/L (ref 3.6–5.5)
PROT SERPL-MCNC: 6.7 G/DL (ref 6–8.2)
RBC # BLD AUTO: 4.38 M/UL (ref 4.2–5.4)
SODIUM SERPL-SCNC: 143 MMOL/L (ref 135–145)
SODIUM SERPL-SCNC: 143 MMOL/L (ref 135–145)
TRIGL SERPL-MCNC: 65 MG/DL (ref 0–149)
TSH SERPL DL<=0.005 MIU/L-ACNC: 0.42 UIU/ML (ref 0.38–5.33)
WBC # BLD AUTO: 5.3 K/UL (ref 4.8–10.8)

## 2019-03-28 PROCEDURE — 80048 BASIC METABOLIC PNL TOTAL CA: CPT

## 2019-03-28 PROCEDURE — 80053 COMPREHEN METABOLIC PANEL: CPT

## 2019-03-28 PROCEDURE — 85025 COMPLETE CBC W/AUTO DIFF WBC: CPT

## 2019-03-28 PROCEDURE — 84443 ASSAY THYROID STIM HORMONE: CPT

## 2019-03-28 PROCEDURE — 36415 COLL VENOUS BLD VENIPUNCTURE: CPT

## 2019-03-28 PROCEDURE — 80061 LIPID PANEL: CPT

## 2019-04-01 ENCOUNTER — TELEPHONE (OUTPATIENT)
Dept: CARDIOLOGY | Facility: MEDICAL CENTER | Age: 84
End: 2019-04-01

## 2019-04-01 DIAGNOSIS — I48.91 ATRIAL FIBRILLATION, UNSPECIFIED TYPE (HCC): ICD-10-CM

## 2019-04-01 NOTE — TELEPHONE ENCOUNTER
MOISES Ramirez/Debi       Patient's daughter left a message on the [employer], she is calling to speak to you about getting samples of Xarelto for her mother. She can be reached at [phone].      Sample Rx for Xarelto sent to [facility]     Attempted to call pt's daughter to notify, no answer, detailed vm left that sample request is already sent

## 2019-04-03 ENCOUNTER — OFFICE VISIT (OUTPATIENT)
Dept: CARDIOLOGY | Facility: MEDICAL CENTER | Age: 84
End: 2019-04-03
Payer: MEDICARE

## 2019-04-03 VITALS
BODY MASS INDEX: 20.62 KG/M2 | HEART RATE: 56 BPM | WEIGHT: 144 LBS | SYSTOLIC BLOOD PRESSURE: 102 MMHG | HEIGHT: 70 IN | OXYGEN SATURATION: 93 % | DIASTOLIC BLOOD PRESSURE: 62 MMHG

## 2019-04-03 DIAGNOSIS — I48.20 CHRONIC ATRIAL FIBRILLATION (HCC): ICD-10-CM

## 2019-04-03 DIAGNOSIS — I10 ESSENTIAL HYPERTENSION: ICD-10-CM

## 2019-04-03 DIAGNOSIS — I65.23 ATHEROSCLEROSIS OF BOTH CAROTID ARTERIES: ICD-10-CM

## 2019-04-03 DIAGNOSIS — Z79.01 ANTICOAGULATED: ICD-10-CM

## 2019-04-03 LAB — EKG IMPRESSION: NORMAL

## 2019-04-03 PROCEDURE — 99214 OFFICE O/P EST MOD 30 MIN: CPT | Performed by: INTERNAL MEDICINE

## 2019-04-03 PROCEDURE — 93000 ELECTROCARDIOGRAM COMPLETE: CPT | Performed by: INTERNAL MEDICINE

## 2019-04-03 RX ORDER — SIMVASTATIN 20 MG
TABLET ORAL
Qty: 90 TAB | Refills: 3 | Status: SHIPPED | OUTPATIENT
Start: 2019-04-03 | End: 2019-10-03 | Stop reason: SDUPTHER

## 2019-04-03 RX ORDER — ALPRAZOLAM 1 MG/1
TABLET ORAL
Refills: 2 | COMMUNITY
Start: 2019-03-09 | End: 2019-04-09 | Stop reason: SDUPTHER

## 2019-04-03 ASSESSMENT — ENCOUNTER SYMPTOMS
FALLS: 0
COUGH: 0
DEPRESSION: 0
NAUSEA: 0
BRUISES/BLEEDS EASILY: 0
BLURRED VISION: 0
SHORTNESS OF BREATH: 0
HEARTBURN: 0
FEVER: 0
DOUBLE VISION: 0
WEIGHT LOSS: 0
HEADACHES: 0
DIZZINESS: 0
INSOMNIA: 0
LOSS OF CONSCIOUSNESS: 0
NERVOUS/ANXIOUS: 0
PALPITATIONS: 0
PND: 0

## 2019-04-03 NOTE — TELEPHONE ENCOUNTER
Pt has had OV within the 12 month protocol and lipid panel is current. 12 month supply sent to pharmacy.   Lab Results   Component Value Date/Time    CHOLSTRLTOT 171 03/28/2019 10:11 AM    LDL 83 03/28/2019 10:11 AM    HDL 75 03/28/2019 10:11 AM    TRIGLYCERIDE 65 03/28/2019 10:11 AM       Lab Results   Component Value Date/Time    SODIUM 143 03/28/2019 10:11 AM    SODIUM 143 03/28/2019 10:11 AM    POTASSIUM 4.3 03/28/2019 10:11 AM    POTASSIUM 4.3 03/28/2019 10:11 AM    CHLORIDE 108 03/28/2019 10:11 AM    CHLORIDE 108 03/28/2019 10:11 AM    CO2 27 03/28/2019 10:11 AM    CO2 27 03/28/2019 10:11 AM    GLUCOSE 71 03/28/2019 10:11 AM    GLUCOSE 72 03/28/2019 10:11 AM    BUN 27 (H) 03/28/2019 10:11 AM    BUN 28 (H) 03/28/2019 10:11 AM    CREATININE 0.96 03/28/2019 10:11 AM    CREATININE 0.97 03/28/2019 10:11 AM    CREATININE 0.76 03/21/2011 12:00 AM    BUNCREATRAT 25 03/21/2011 12:00 AM    GLOMRATE >59 03/21/2011 12:00 AM     Lab Results   Component Value Date/Time    ALKPHOSPHAT 27 (L) 03/28/2019 10:11 AM    ASTSGOT 14 03/28/2019 10:11 AM    ALTSGPT 11 03/28/2019 10:11 AM    TBILIRUBIN 0.8 03/28/2019 10:11 AM

## 2019-04-03 NOTE — TELEPHONE ENCOUNTER
Was the patient seen in the last year in this department? Yes    Does patient have an active prescription for medications requested? No     Received Request Via: Pharmacy      Pt met protocol?: Yes, labs 3/19 ov 1/19

## 2019-04-03 NOTE — PROGRESS NOTES
Chief Complaint   Patient presents with   • Atrial Fibrillation       Subjective:   Eun Be is a 83 y.o. female who presents today in follow-up in regards to her persistent atrial fibrillation on systemic anticoagulation also history of DVT PE    In with her daughter Faviola is always  I have been seeing her for almost 10 years  Good year although does still struggles with her chronic pain  Eating well compliant on her medications does have a glass of wine at night    Past Medical History:   Diagnosis Date   • Anxiety    • Arrhythmia    • Arthritis    • Backpain    • Bronchitis    • CATARACT    • Chronic lower back pain 3/11/2014   • COPD    • EMPHYSEMA    • GERD (gastroesophageal reflux disease)    • Heart burn     hiatal hernia   • HTN (hypertension)    • Hypertension    • Hypertriglyceridemia 11/7/2013   • Hypothyroid    • IGT (impaired glucose tolerance)    • MEDICAL HOME    • Osteopenia    • Pain     left side of back   • Paroxysmal atrial fibrillation (HCC)    • Personal history of venous thrombosis and embolism     right leg   • Pneumonia     1974   • Renal disorder     cysts   • Rheumatic fever     4 or 5 years old   • Rheumatic fever     as a kid per patient.   • Unspecified disorder of thyroid      Past Surgical History:   Procedure Laterality Date   • PB EXCIS/UNROOF RENAL CYST  1977   • OTHER  1977    cysts removed from left kidney   • HYSTERECTOMY RADICAL  1963   • OTHER  1963    hemorrhoidectomy   • HEMORRHOIDECTOMY  1963   • HYSTERECTOMY, TOTAL ABDOMINAL  26 years old     Family History   Problem Relation Age of Onset   • Stroke Brother    • Heart Disease Other      Social History     Social History   • Marital status:      Spouse name: N/A   • Number of children: N/A   • Years of education: N/A     Occupational History   • Not on file.     Social History Main Topics   • Smoking status: Former Smoker     Packs/day: 0.25     Years: 30.00     Types: Cigarettes   • Smokeless tobacco: Never  Used      Comment: quit 1990 1/2 ppd x ?yrs (on and off for years since age 15)   • Alcohol use 0.0 oz/week      Comment: occasional/rare alcohol use   • Drug use: No   • Sexual activity: No     Other Topics Concern   • Not on file     Social History Narrative   • No narrative on file     Allergies   Allergen Reactions   • Asa [Aspirin]    • Latex    • Penicillins    • Tape Hives, Itching and Swelling   • Wasp Venom Swelling     Outpatient Encounter Prescriptions as of 4/3/2019   Medication Sig Dispense Refill   • rivaroxaban (XARELTO) 15 MG Tab tablet Take 1 Tab by mouth with dinner. 30 Tab 0   • hydrOXYzine HCl (ATARAX) 50 MG Tab TAKE 1 TABLET BY MOUTH 3 TIMES A DAY AS NEEDED FOR ITCHING. 90 Tab 2   • albuterol 108 (90 Base) MCG/ACT Aero Soln inhalation aerosol Inhale 2 Puffs by mouth every 6 hours as needed for Shortness of Breath. 8.5 g 11   • albuterol 108 (90 Base) MCG/ACT Aero Soln inhalation aerosol Inhale 2 Puffs by mouth every 6 hours as needed for Shortness of Breath. PROAIR please 8.5 g 11   • NEOMYCIN-POLYMYXIN-HC, OTIC, 1 % Solution Place 4 Drops in ear 2 Times a Day. 1 Bottle 0   • fluticasone (FLONASE) 50 MCG/ACT nasal spray SPRAY 1 SPRAY IN NOSE 2 TIMES A DAY. EACH NOSTRIL 3 Bottle 0   • Mometasone Furo-Formoterol Fum (DULERA) 200-5 MCG/ACT Aerosol Inhale 1 Puff by mouth 2 Times a Day. 1 Inhaler 11   • fenofibrate micronized (LOFIBRA) 134 MG capsule Take 1 Cap by mouth every day. 90 Cap 3   • levothyroxine (SYNTHROID) 100 MCG Tab TAKE 1 TABLET BY MOUTH EVERY DAY. 90 Tab 2   • omeprazole (PRILOSEC) 40 MG delayed-release capsule TAKE 1 CAPSULE BY MOUTH EVERY DAY 90 Cap 2   • simvastatin (ZOCOR) 20 MG Tab TAKE 1 TABLET BY MOUTH IN THE EVENING 90 Tab 3   • propafenone (RYTHMOL) 150 MG Tab TAKE 1 TABLET BY MOUTH TWICE A  Tab 3   • metoprolol (LOPRESSOR) 25 MG Tab Take 1 Tab by mouth 2 times a day. 180 Tab 2   • ipratropium (ATROVENT) 0.02 % Solution 1 mL by Nebulization route 2 times a day. 100  "Bullet 11   • albuterol (PROVENTIL) 2.5mg/0.5ml Nebu Soln 0.5 mL by Nebulization route every four hours as needed for Shortness of Breath. 100 mL 11   • albuterol (VENTOLIN HFA) 108 (90 Base) MCG/ACT Aero Soln inhalation aerosol INHALE 2 PUFFS BY MOUTH EVERY 6 HOURS AS NEEDED FOR SHORTNESS OF BREATH. 1 Inhaler 11   • albuterol (PROVENTIL) 2.5mg/3ml Nebu Soln solution for nebulization 3 mL by Nebulization route every four hours as needed for Shortness of Breath. 75 mL 1   • FIBER SELECT GUMMIES PO Take  by mouth.     • Probiotic Product (PROBIOTIC PO) Take  by mouth.     • clobetasol (TEMOVATE) 0.05 % Cream Apply 1 Each to affected area(s) as needed.  5   • Cyanocobalamin (B-12) 1000 MCG CAPS Take 1,000 mg by mouth every day at 6 PM.       No facility-administered encounter medications on file as of 4/3/2019.      Review of Systems   Constitutional: Negative for fever, malaise/fatigue and weight loss.   HENT: Negative for hearing loss and tinnitus.    Eyes: Negative for blurred vision and double vision.   Respiratory: Negative for cough and shortness of breath.    Cardiovascular: Negative for chest pain, palpitations, leg swelling and PND.   Gastrointestinal: Negative for heartburn and nausea.   Genitourinary: Negative for dysuria and urgency.   Musculoskeletal: Negative for falls and joint pain.   Skin: Negative for itching and rash.   Neurological: Negative for dizziness, loss of consciousness and headaches.   Endo/Heme/Allergies: Negative for environmental allergies. Does not bruise/bleed easily.   Psychiatric/Behavioral: Negative for depression. The patient is not nervous/anxious and does not have insomnia.    All other systems reviewed and are negative.       Objective:   Ht 1.778 m (5' 10\")   LMP 01/01/1963   BMI 20.37 kg/m²     Physical Exam   Constitutional: She is oriented to person, place, and time. She appears well-developed.   cacectic - walks w shuffle   HENT:   Head: Normocephalic and atraumatic. "   Mouth/Throat: No oropharyngeal exudate.   Eyes: Pupils are equal, round, and reactive to light. EOM are normal. No scleral icterus.   Cardiovascular: Normal rate and intact distal pulses.  Exam reveals no gallop.    No murmur heard.  Pulmonary/Chest: Breath sounds normal. She is in respiratory distress.   Abdominal: She exhibits no distension.   Neurological: She is alert and oriented to person, place, and time. No cranial nerve deficit.   No noted tremor   Skin: Skin is warm and dry. No rash noted.   Psychiatric: She has a normal mood and affect. Her behavior is normal.       Assessment:     1. Essential hypertension     2. Chronic atrial fibrillation (HCC)     3. Atherosclerosis of both carotid arteries     4. Anticoagulated         Medical Decision Making:  Today's Assessment / Status / Plan:     Atrial fibrillation  We looked at the images from her echocardiogram from last summer.  Preserved left ventricle ejection fraction, no valve disease or pulmonary hypertension  May well relate to her chronic lung disease  Repeat EKG today because she is on long-term propafenone.  Recommend nuclear perfusion imaging study every 3-5 years, we looked at the images from the one in 2014.  No structural heart disease no ischemia  Talked again at length about risks and benefits of anticoagulation.  High stroke risk although her risk of falling is there too  Rate control as is    Blood pressure  Runs low  Talked about compression stockings, I am pleased she is gaining weight no evidence of heart failure  Talked about decreasing medications if she is dizzy or orthostatic  We will keep me up-to-date    Atherosclerosis of the carotids  Looked at images of vascular duplex 2018, no change, still mild  Watchful surveillance, statin    Long-term planning, as always we talked about end-of-life care planning, they are very conservative  RTC 6-12 months sooner with concerns.  Blood work excellent no anemia normal kidney function  The  Xarelto is very expensive and we get them samples

## 2019-04-05 ENCOUNTER — TELEPHONE (OUTPATIENT)
Dept: URGENT CARE | Facility: PHYSICIAN GROUP | Age: 84
End: 2019-04-05

## 2019-04-05 NOTE — TELEPHONE ENCOUNTER
Future Appointments       Provider Department Center    4/9/2019 11:20 AM Libertad Estrella M.D. [facility]    4/30/2019 1:15 PM Fadi Najjar, M.D. [facility] [address] [place] [address]    10/8/2019 11:30 AM Theresa Gruber M.D. [facility]         ESTABLISHED PATIENT PRE-VISIT PLANNING     Patient was NOT contacted to complete PVP.       1.  Reviewed notes from the last few office visits within the medical group: Yes    2.  If any orders were placed at last visit or intended to be done for this visit (i.e. 6 mos follow-up), do we have Results/Consult Notes?        •  Labs - Labs ordered, completed on 03/28/2019 and results are in chart.       •  Imaging - Imaging was not ordered at last office visit.       •  Referrals - No referrals were ordered at last office visit.    3. Is this appointment scheduled as a Hospital Follow-Up? No    4.  Immunizations were updated in K121 using WebIZ?: Yes       •  Web Iz Recommendations: TDAP, VARICELLA (Chicken Pox)  and SHINGRIX (Shingles)    5.  Patient is due for the following Health Maintenance Topics:   Health Maintenance Due   Topic Date Due   • Annual Wellness Visit  06/03/2016     6. Orders for overdue Health Maintenance topics pended in Pre-Charting? NO    7.  AHA (MDX) form printed for Provider? No, already completed    8.  Patient was NOT informed to arrive 15 min prior to their scheduled appointment and bring in their medication bottles.

## 2019-04-09 ENCOUNTER — OFFICE VISIT (OUTPATIENT)
Dept: MEDICAL GROUP | Facility: PHYSICIAN GROUP | Age: 84
End: 2019-04-09
Payer: MEDICARE

## 2019-04-09 VITALS
HEIGHT: 70 IN | WEIGHT: 144.4 LBS | BODY MASS INDEX: 20.67 KG/M2 | RESPIRATION RATE: 16 BRPM | OXYGEN SATURATION: 94 % | DIASTOLIC BLOOD PRESSURE: 66 MMHG | SYSTOLIC BLOOD PRESSURE: 104 MMHG | HEART RATE: 58 BPM | TEMPERATURE: 97.5 F

## 2019-04-09 DIAGNOSIS — J96.11 CHRONIC RESPIRATORY FAILURE WITH HYPOXIA (HCC): ICD-10-CM

## 2019-04-09 DIAGNOSIS — I48.91 ATRIAL FIBRILLATION, UNSPECIFIED TYPE (HCC): ICD-10-CM

## 2019-04-09 DIAGNOSIS — F13.20 SEDATIVE, HYPNOTIC OR ANXIOLYTIC DEPENDENCE (HCC): ICD-10-CM

## 2019-04-09 DIAGNOSIS — N18.30 STAGE 3 CHRONIC KIDNEY DISEASE (HCC): ICD-10-CM

## 2019-04-09 DIAGNOSIS — I48.20 CHRONIC ATRIAL FIBRILLATION (HCC): ICD-10-CM

## 2019-04-09 DIAGNOSIS — J43.8 OTHER EMPHYSEMA (HCC): ICD-10-CM

## 2019-04-09 DIAGNOSIS — F33.41 RECURRENT MAJOR DEPRESSIVE DISORDER, IN PARTIAL REMISSION (HCC): ICD-10-CM

## 2019-04-09 DIAGNOSIS — E03.9 HYPOTHYROIDISM (ACQUIRED): ICD-10-CM

## 2019-04-09 DIAGNOSIS — I65.23 ATHEROSCLEROSIS OF BOTH CAROTID ARTERIES: ICD-10-CM

## 2019-04-09 DIAGNOSIS — F41.9 ANXIETY: ICD-10-CM

## 2019-04-09 DIAGNOSIS — F51.04 CHRONIC INSOMNIA: ICD-10-CM

## 2019-04-09 PROCEDURE — 8041 PR SCP AHA: Performed by: FAMILY MEDICINE

## 2019-04-09 PROCEDURE — 99214 OFFICE O/P EST MOD 30 MIN: CPT | Performed by: FAMILY MEDICINE

## 2019-04-09 RX ORDER — ALBUTEROL SULFATE 90 UG/1
AEROSOL, METERED RESPIRATORY (INHALATION)
COMMUNITY
Start: 2019-04-03 | End: 2019-10-03 | Stop reason: SDUPTHER

## 2019-04-09 RX ORDER — ALPRAZOLAM 1 MG/1
TABLET ORAL
Qty: 90 TAB | Refills: 1 | Status: SHIPPED | OUTPATIENT
Start: 2019-04-09 | End: 2019-05-09

## 2019-04-09 RX ORDER — LEVOTHYROXINE SODIUM 88 UG/1
88 TABLET ORAL
Qty: 90 TAB | Refills: 3 | Status: SHIPPED | OUTPATIENT
Start: 2019-04-09 | End: 2019-10-03 | Stop reason: SDUPTHER

## 2019-04-09 NOTE — PATIENT INSTRUCTIONS
Today, your Healthcare Provider may have discussed the following recommendations:    1. Exercise and Physical Activity  According to the American Heart Association, it is recommended to engage in physical activity regularly and to aim for 150 minutes of moderate-intensity aerobic activity per week.  Your Healthcare Provider may have recommended taking the stairs instead of the elevator, starting or maintaining a walking program or strength-training program.    2. Emotional Well-being  Mental and emotional well-being is essential to overall health.  Your Healthcare Provider may have encouraged you to build strong, positive relationships with family and friends, become more involved in your community (by volunteering or joining a spiritual community), or focus on self-care.    3. Fall and Injury Prevention  To prevent falls and injuries and also improve your balance, your Healthcare Provider may have suggested that you use a cane or walker, start an exercise of physical therapy program, or have your vision and/or hearing tested.    4. Urinary Leakage (Urinary Incontinence)  To control or manage the leakage of urine, your Healthcare Provider may have recommended you start bladder training exercises (such as Kegel exercises), a trial of a medication or a referral to see a specialist to discuss surgical options.      Medication changes:    Levothyroxine has been changed to 88 mcg daily  Metoprolol has been changed to 1/2 tab (12.5) twice daily       Labs to be done in 3 months.

## 2019-04-09 NOTE — PROGRESS NOTES
Chief Complaint   Patient presents with   • Atrial Fibrillation     fv    • Diarrhea     fv    • Hyperthyroidism       HISTORY OF PRESENT ILLNESS: Patient is a 83 y.o. female established patient here today for the following concerns:    1. Sedative, hypnotic or anxiolytic dependence (HCC)  2. Anxiety  3. Chronic insomnia  Here today for follow up on xanax use.  Typically will use at bedtime to help with anxiety and insomnia.   Has not required increasing doses.  Needs refills.      4. Hypothyroidism (acquired)  Noted low TSH, has been having more cramping and diarrhea lately.      5. Atrial fibrillation, unspecified type ([facility])  Continues on anticoagulation, rate and rhythm control.  No bleeding noted.  Has had some lower pressures and HR with some dizziness.   On metoprolol 25 mg BID.     6. Chronic respiratory failure with hypoxia ([facility])  Continues on O2 therapy at night and ad senia for hypoxia and due to COPD.  Continues on rescue therapy, Dulera daily.      7. Recurrent major depressive disorder, in partial remission ([facility])  Previous trials of SSRI not helpful.  Doing ok without for now.  No SI/HI>       Past Medical, Social, and Family history reviewed and updated in EPIC    Allergies:Asa [aspirin]; Latex; Penicillins; Tape; and Wasp venom    Current Outpatient Prescriptions   Medication Sig Dispense Refill   • albuterol 108 (90 Base) MCG/ACT Aero Soln inhalation aerosol      • levothyroxine (SYNTHROID) 88 MCG Tab Take 1 Tab by mouth Every morning on an empty stomach. 90 Tab 3   • ALPRAZolam (XANAX) 1 MG Tab TAKE 1 TABLET BY MOUTH AT BEDTIME AS NEEDED FOR SLEEP FOR 90 DAYS. F41.9 90 Tab 1   • metoprolol (LOPRESSOR) 25 MG Tab Take 0.5 Tabs by mouth 2 times a day. 180 Tab 3   • simvastatin (ZOCOR) 20 MG Tab TAKE 1 TABLET BY MOUTH IN THE EVENING 90 Tab 3   • rivaroxaban (XARELTO) 15 MG Tab tablet Take 1 Tab by mouth with dinner. 30 Tab 0   • hydrOXYzine HCl (ATARAX) 50 MG Tab TAKE 1 TABLET BY MOUTH 3 TIMES A DAY AS  NEEDED FOR ITCHING. 90 Tab 2   • NEOMYCIN-POLYMYXIN-HC, OTIC, 1 % Solution Place 4 Drops in ear 2 Times a Day. 1 Bottle 0   • fluticasone (FLONASE) 50 MCG/ACT nasal spray SPRAY 1 SPRAY IN NOSE 2 TIMES A DAY. EACH NOSTRIL 3 Bottle 0   • Mometasone Furo-Formoterol Fum (DULERA) 200-5 MCG/ACT Aerosol Inhale 1 Puff by mouth 2 Times a Day. 1 Inhaler 11   • fenofibrate micronized (LOFIBRA) 134 MG capsule Take 1 Cap by mouth every day. 90 Cap 3   • omeprazole (PRILOSEC) 40 MG delayed-release capsule TAKE 1 CAPSULE BY MOUTH EVERY DAY 90 Cap 2   • propafenone (RYTHMOL) 150 MG Tab TAKE 1 TABLET BY MOUTH TWICE A  Tab 3   • albuterol (PROVENTIL) 2.5mg/3ml Nebu Soln solution for nebulization 3 mL by Nebulization route every four hours as needed for Shortness of Breath. 75 mL 1   • FIBER SELECT GUMMIES PO Take  by mouth.     • Probiotic Product (PROBIOTIC PO) Take  by mouth.     • clobetasol (TEMOVATE) 0.05 % Cream Apply 1 Each to affected area(s) as needed.  5   • Cyanocobalamin (B-12) 1000 MCG CAPS Take 1,000 mg by mouth every day at 6 PM.       No current facility-administered medications for this visit.          ROS:  Review of Systems   Constitutional: Negative for fever, chills, weight loss and malaise/fatigue.   HENT: Negative for ear pain, nosebleeds, congestion, sore throat and neck pain.    Eyes: Negative for blurred vision.   Respiratory: Negative for cough, sputum production, shortness of breath and wheezing.    Cardiovascular: Negative for chest pain, palpitations,  and leg swelling.   Gastrointestinal: Negative for heartburn, nausea, vomiting, diarrhea and abdominal pain.   Genitourinary: Negative for dysuria, urgency and frequency.   Musculoskeletal: Negative for myalgias, back pain and joint pain.   Skin: Negative for rash and itching.   Neurological: Negative for dizziness, tingling, tremors, sensory change, focal weakness and headaches.   Endo/Heme/Allergies: Does not bruise/bleed easily.  "  Psychiatric/Behavioral: Negative for depression, anxiety, suicidal ideas, insomnia and memory loss.      Exam:  /66   Pulse (!) 58   Temp 36.4 °C (97.5 °F)   Resp 16   Ht 1.778 m (5' 10\")   Wt 65.5 kg (144 lb 6.4 oz)   SpO2 94%     General:  Well nourished, well developed in NAD  Head is grossly normal.  Neck: Supple without JVD   Pulmonary:  Normal effort.   Cardiovascular: Regular rate  Extremities: no clubbing, cyanosis, or edema.  Psych: affect appropriate      Please note that this dictation was created using voice recognition software. I have made every reasonable attempt to correct obvious errors, but I expect that there are errors of grammar and possibly content that I did not discover before finalizing the note.    Assessment/Plan:  1. Sedative, hypnotic or anxiolytic dependence (HCC)  2. Anxiety  - ALPRAZolam (XANAX) 1 MG Tab; TAKE 1 TABLET BY MOUTH AT BEDTIME AS NEEDED FOR SLEEP FOR 90 DAYS. F41.9  Dispense: 90 Tab; Refill: 1    3. Chronic insomnia  - ALPRAZolam (XANAX) 1 MG Tab; TAKE 1 TABLET BY MOUTH AT BEDTIME AS NEEDED FOR SLEEP FOR 90 DAYS. F41.9  Dispense: 90 Tab; Refill: 1    4. Hypothyroidism (acquired)  Reduce dose.   - levothyroxine (SYNTHROID) 88 MCG Tab; Take 1 Tab by mouth Every morning on an empty stomach.  Dispense: 90 Tab; Refill: 3  - TSH; Future  - FREE THYROXINE; Future  - TRIIDOTHYRONINE; Future  - TSH; Future    5. Atrial fibrillation, unspecified type (HCC)  Continue rhythmol, xarelto, reduce Beta blockade.   - metoprolol (LOPRESSOR) 25 MG Tab; Take 0.5 Tabs by mouth 2 times a day.  Dispense: 180 Tab; Refill: 3  - Comp Metabolic Panel; Future    6. Chronic respiratory failure with hypoxia (HCC)  COPD stable   Continue O2, continue inhaled medications      7. Recurrent major depressive disorder, in partial remission (HCC)  Stable.  Continue with family support               Annual Health Assessment Questions:    1.  Are you currently engaging in any exercise or physical " activity? No    2.  How would you describe your mood or emotional well-being today? fine    3.  Have you had any falls in the last year? No    4.  Have you noticed any problems with your balance or had difficulty walking? Yes    5.  In the last six months have you experienced any leakage of urine? No    6. DPA/Advanced Directive: Patient does not have an Advanced Directive.  A packet and workshop information was given on Advanced Directives.

## 2019-04-16 DIAGNOSIS — E03.9 HYPOTHYROIDISM (ACQUIRED): ICD-10-CM

## 2019-04-17 RX ORDER — OMEPRAZOLE 40 MG/1
CAPSULE, DELAYED RELEASE ORAL
Qty: 90 CAP | Refills: 1 | Status: SHIPPED | OUTPATIENT
Start: 2019-04-17 | End: 2019-10-03 | Stop reason: SDUPTHER

## 2019-04-30 ENCOUNTER — APPOINTMENT (OUTPATIENT)
Dept: NEPHROLOGY | Facility: MEDICAL CENTER | Age: 84
End: 2019-04-30
Payer: MEDICARE

## 2019-04-30 DIAGNOSIS — I48.91 ATRIAL FIBRILLATION, UNSPECIFIED TYPE (HCC): ICD-10-CM

## 2019-05-13 ENCOUNTER — OFFICE VISIT (OUTPATIENT)
Dept: URGENT CARE | Facility: PHYSICIAN GROUP | Age: 84
End: 2019-05-13
Payer: MEDICARE

## 2019-05-13 VITALS
DIASTOLIC BLOOD PRESSURE: 62 MMHG | HEART RATE: 63 BPM | WEIGHT: 144 LBS | RESPIRATION RATE: 14 BRPM | TEMPERATURE: 97.7 F | BODY MASS INDEX: 20.66 KG/M2 | OXYGEN SATURATION: 96 % | SYSTOLIC BLOOD PRESSURE: 124 MMHG

## 2019-05-13 DIAGNOSIS — I48.20 CHRONIC A-FIB (HCC): Primary | ICD-10-CM

## 2019-05-13 DIAGNOSIS — R00.0 RAPID HEARTBEAT: ICD-10-CM

## 2019-05-13 PROCEDURE — 99214 OFFICE O/P EST MOD 30 MIN: CPT | Performed by: NURSE PRACTITIONER

## 2019-05-13 PROCEDURE — 93000 ELECTROCARDIOGRAM COMPLETE: CPT | Performed by: NURSE PRACTITIONER

## 2019-05-13 NOTE — PROGRESS NOTES
Subjective:     Eun Be is a 83 y.o. female who presents for Abnormal EKG (had an a-fib attack lastnight and was told to get checked today)       Other   This is a new problem. The problem has been resolved. Pertinent negatives include no chest pain.     Patient has a history of chronic a-fib. She takes an antiarrhythmic in the beta-blocker. She saw her cardiologist about a month ago. In the middle the night she was measuring her oxygen saturation as she uses oxygen and noticed that her heart rate was also elevated. She pushed her life alert button and EMS responded on scene. She reports having felt lightheaded and anxious. They performed an EKG and advised her to follow-up in clinic after her symptoms resolved. Patient reports similar episodes in the past.    PMH:  has a past medical history of Anxiety; Arrhythmia; Arthritis; Backpain; Bronchitis; CATARACT; Chronic lower back pain (3/11/2014); COPD; EMPHYSEMA; GERD (gastroesophageal reflux disease); Heart burn; HTN (hypertension); Hypertension; Hypertriglyceridemia (11/7/2013); Hypothyroid; IGT (impaired glucose tolerance); MEDICAL HOME; Osteopenia; Pain; Paroxysmal atrial fibrillation (HCC); Personal history of venous thrombosis and embolism; Pneumonia; Renal disorder; Rheumatic fever; Rheumatic fever; and Unspecified disorder of thyroid. She also has no past medical history of Breast cancer (HCC).    MEDS:   Current Outpatient Prescriptions:   •  rivaroxaban (XARELTO) 15 MG Tab tablet, Take 1 Tab by mouth with dinner., Disp: 30 Tab, Rfl: 0  •  omeprazole (PRILOSEC) 40 MG delayed-release capsule, TAKE 1 CAPSULE BY MOUTH EVERY DAY, Disp: 90 Cap, Rfl: 1  •  levothyroxine (SYNTHROID) 88 MCG Tab, Take 1 Tab by mouth Every morning on an empty stomach., Disp: 90 Tab, Rfl: 3  •  metoprolol (LOPRESSOR) 25 MG Tab, Take 0.5 Tabs by mouth 2 times a day., Disp: 180 Tab, Rfl: 3  •  simvastatin (ZOCOR) 20 MG Tab, TAKE 1 TABLET BY MOUTH IN THE EVENING, Disp: 90 Tab,  Rfl: 3  •  hydrOXYzine HCl (ATARAX) 50 MG Tab, TAKE 1 TABLET BY MOUTH 3 TIMES A DAY AS NEEDED FOR ITCHING., Disp: 90 Tab, Rfl: 2  •  NEOMYCIN-POLYMYXIN-HC, OTIC, 1 % Solution, Place 4 Drops in ear 2 Times a Day., Disp: 1 Bottle, Rfl: 0  •  fluticasone (FLONASE) 50 MCG/ACT nasal spray, SPRAY 1 SPRAY IN NOSE 2 TIMES A DAY. EACH NOSTRIL, Disp: 3 Bottle, Rfl: 0  •  Mometasone Furo-Formoterol Fum (DULERA) 200-5 MCG/ACT Aerosol, Inhale 1 Puff by mouth 2 Times a Day., Disp: 1 Inhaler, Rfl: 11  •  fenofibrate micronized (LOFIBRA) 134 MG capsule, Take 1 Cap by mouth every day., Disp: 90 Cap, Rfl: 3  •  propafenone (RYTHMOL) 150 MG Tab, TAKE 1 TABLET BY MOUTH TWICE A DAY, Disp: 180 Tab, Rfl: 3  •  FIBER SELECT GUMMIES PO, Take  by mouth., Disp: , Rfl:   •  Probiotic Product (PROBIOTIC PO), Take  by mouth., Disp: , Rfl:   •  clobetasol (TEMOVATE) 0.05 % Cream, Apply 1 Each to affected area(s) as needed., Disp: , Rfl: 5  •  Cyanocobalamin (B-12) 1000 MCG CAPS, Take 1,000 mg by mouth every day at 6 PM., Disp: , Rfl:   •  albuterol 108 (90 Base) MCG/ACT Aero Soln inhalation aerosol, , Disp: , Rfl:   •  albuterol (PROVENTIL) 2.5mg/3ml Nebu Soln solution for nebulization, 3 mL by Nebulization route every four hours as needed for Shortness of Breath., Disp: 75 mL, Rfl: 1    ALLERGIES:   Allergies   Allergen Reactions   • Asa [Aspirin]    • Latex    • Penicillins    • Tape Hives, Itching and Swelling   • Wasp Venom Swelling     SURGHX:   Past Surgical History:   Procedure Laterality Date   • PB EXCIS/UNROOF RENAL CYST  1977   • OTHER  1977    cysts removed from left kidney   • HYSTERECTOMY RADICAL  1963   • OTHER  1963    hemorrhoidectomy   • HEMORRHOIDECTOMY  1963   • HYSTERECTOMY, TOTAL ABDOMINAL  26 years old     SOCHX:  reports that she has quit smoking. Her smoking use included Cigarettes. She has a 7.50 pack-year smoking history. She has never used smokeless tobacco. She reports that she drinks alcohol. She reports that she  does not use drugs.     FH: Reviewed with patient, not pertinent to this visit.     Review of Systems   Constitutional: Negative.  Negative for malaise/fatigue.   Respiratory: Negative.  Negative for shortness of breath.    Cardiovascular: Negative.  Negative for chest pain and palpitations.   Neurological: Negative.  Negative for dizziness, tingling, sensory change and focal weakness.   All other systems reviewed and are negative.    Objective:     /62 (BP Location: Right arm, Patient Position: Sitting, BP Cuff Size: Adult)   Pulse 63   Temp 36.5 °C (97.7 °F) (Temporal)   Resp 14   Wt 65.3 kg (144 lb)   LMP 01/01/1963   SpO2 96%   BMI 20.66 kg/m²     Physical Exam   Constitutional: She is oriented to person, place, and time. She appears well-developed and well-nourished. She is cooperative.  Non-toxic appearance. No distress.   HENT:   Right Ear: External ear normal.   Left Ear: External ear normal.   Nose: Nose normal.   Mouth/Throat: Mucous membranes are normal.   Eyes: Conjunctivae and EOM are normal.   Neck: Normal range of motion.   Cardiovascular: Normal rate, regular rhythm, normal heart sounds and normal pulses.    Pulmonary/Chest: Effort normal and breath sounds normal. No respiratory distress. She has no decreased breath sounds.   Abdominal: Bowel sounds are normal.   Musculoskeletal: Normal range of motion. She exhibits no deformity.   Neurological: She is alert and oriented to person, place, and time. She has normal strength. No sensory deficit.   Skin: Skin is warm, dry and intact. Capillary refill takes less than 2 seconds.   Psychiatric: She has a normal mood and affect. Her behavior is normal.   Vitals reviewed.    EKG: per my read, normal sinus 66, no acute ST abnormalities       Assessment/Plan:     1. Chronic a-fib (HCC)  - EKG - Clinic Performed    2. Rapid heartbeat    Patient brought in EKG strip performed by EMS which appears as afib with RVR with ventricular rate of 146. EKG in  clinic today NSR 66, no acute ST abnormalities. Compared to last EKG done by cardiology, no significant change. Patient denies chest pain, shortness of breath, lightheadedness, dizziness, or weakness at this time. VS stable, NAD. Advised close monitoring and follow-up with cardiology. Warning signs reviewed, strict ER precautions advised.    Patient advised to: Return for 1) Symptoms don't improve or worsen, or go to ER, 2) Follow up with primary care in 7-10 days.    Differential diagnosis, natural history, supportive care, and indications for immediate follow-up discussed. All questions answered. Patient agrees with the plan of care.

## 2019-05-14 ASSESSMENT — ENCOUNTER SYMPTOMS
CONSTITUTIONAL NEGATIVE: 1
RESPIRATORY NEGATIVE: 1
FOCAL WEAKNESS: 0
CARDIOVASCULAR NEGATIVE: 1
SHORTNESS OF BREATH: 0
SENSORY CHANGE: 0
DIZZINESS: 0
PALPITATIONS: 0
TINGLING: 0
NEUROLOGICAL NEGATIVE: 1

## 2019-05-15 ENCOUNTER — TELEPHONE (OUTPATIENT)
Dept: CARDIOLOGY | Facility: MEDICAL CENTER | Age: 84
End: 2019-05-15

## 2019-05-15 DIAGNOSIS — I48.91 ATRIAL FIBRILLATION, UNSPECIFIED TYPE (HCC): ICD-10-CM

## 2019-05-15 NOTE — TELEPHONE ENCOUNTER
"Eun Erazo Haile Gruber M.D. 5 hours ago (10:57 AM)         Mom had an a-fib \"attack\" at 2am on Monday May 6th. I went over and stayed with her until her vitals were normal, but she was pretty shook up. Then one week later, she had another. She said that her pulse was 140, so she hit her Life Alert and the paramedics came. They took an EKG and showed her where she had been in a-fib, and gave her a copy. I spoke with the paramedic on the phone, and he recommended she follow up at Urgent Care with another EKG and blood work. My brother took her to  a few hours later. They did an EKG, but did not give her a copy, and they did not do any blood work. They told her to contact her cardiologist, which is the reason for this message. Should she get blood work, or adjust medication? Thanks, Faviola Morales         Eun Be   Geo 2 hours ago (1:32 PM)         Yes she takes both of those. She says she has been feeling more dizzy and shaky lately and has had to use her oxygen more. She is nervous to leave the house. I can't send the EKG until I get home. With her now and don't have the capability at her house. -JULIANNE Quarles R.N. Sorry I think I deleted her my chart message.  I reviewed the EKG.  I think she needs some reassurance.  She is on an anticoagulant so she will not have a stroke nor is this an emergency.  She can easily take 1 or 2 extra metoprolol anytime of day when she has 1 of these attacks.  That should make the palpitations go away.  The EKG is normal and reassuring     We just checked all of her blood work including her thyroid in March which was all reassuring.  Hope that helps, let me know          Called pt, discussed Dr Gruber recommendations, pt agreed and verbalizes understanding    New Rx for Metoprolol 12.5mg PO BID, mat take extra 1-2 tabs PRN palpitations sent to [facility]       "

## 2019-05-21 ENCOUNTER — OFFICE VISIT (OUTPATIENT)
Dept: NEPHROLOGY | Facility: MEDICAL CENTER | Age: 84
End: 2019-05-21
Payer: MEDICARE

## 2019-05-21 VITALS
HEIGHT: 71 IN | OXYGEN SATURATION: 94 % | HEART RATE: 64 BPM | SYSTOLIC BLOOD PRESSURE: 124 MMHG | TEMPERATURE: 97.6 F | BODY MASS INDEX: 20.58 KG/M2 | WEIGHT: 147 LBS | DIASTOLIC BLOOD PRESSURE: 72 MMHG | RESPIRATION RATE: 14 BRPM

## 2019-05-21 DIAGNOSIS — F41.9 ANXIETY: ICD-10-CM

## 2019-05-21 DIAGNOSIS — N18.30 STAGE 3 CHRONIC KIDNEY DISEASE (HCC): ICD-10-CM

## 2019-05-21 PROCEDURE — 99214 OFFICE O/P EST MOD 30 MIN: CPT | Performed by: INTERNAL MEDICINE

## 2019-05-21 ASSESSMENT — ENCOUNTER SYMPTOMS
COUGH: 0
VOMITING: 0
SHORTNESS OF BREATH: 0
CHILLS: 0
INSOMNIA: 1
FEVER: 0
NAUSEA: 0

## 2019-05-21 NOTE — PROGRESS NOTES
"Subjective:      Eun Be is a 83 y.o. female who presents with Chronic Kidney Disease            Chronic Kidney Disease   This is a chronic problem. The current episode started more than 1 year ago. The problem occurs constantly. The problem has been unchanged. Pertinent negatives include no chest pain, chills, coughing, fever, nausea, urinary symptoms or vomiting. Nothing aggravates the symptoms. She has tried nothing for the symptoms.       Review of Systems   Constitutional: Negative for chills, fever and malaise/fatigue.   Respiratory: Negative for cough and shortness of breath.    Cardiovascular: Negative for chest pain and leg swelling.   Gastrointestinal: Negative for nausea and vomiting.   Genitourinary: Negative for dysuria, frequency and urgency.   Psychiatric/Behavioral: The patient has insomnia.           Objective:     /72 (BP Location: Right arm, Patient Position: Sitting)   Pulse 64   Temp 36.4 °C (97.6 °F)   Resp 14   Ht 1.803 m (5' 11\")   Wt 66.7 kg (147 lb)   LMP 01/01/1963   SpO2 94%   BMI 20.50 kg/m²      Physical Exam   Constitutional: She is oriented to person, place, and time. She appears well-developed and well-nourished. No distress.   HENT:   Head: Normocephalic and atraumatic.   Right Ear: External ear normal.   Left Ear: External ear normal.   Nose: Nose normal.   Eyes: Conjunctivae are normal. Right eye exhibits no discharge. Left eye exhibits no discharge. No scleral icterus.   Cardiovascular: Normal rate and regular rhythm.    Pulmonary/Chest: Effort normal and breath sounds normal. No respiratory distress.   Musculoskeletal: She exhibits no edema.   Neurological: She is alert and oriented to person, place, and time.   Skin: Skin is warm. She is not diaphoretic.   Psychiatric: She has a normal mood and affect. Her behavior is normal.   Nursing note and vitals reviewed.    Past Medical History:   Diagnosis Date   • Anxiety    • Arrhythmia    • Arthritis    • " Backpain    • Bronchitis    • CATARACT    • Chronic lower back pain 3/11/2014   • COPD    • EMPHYSEMA    • GERD (gastroesophageal reflux disease)    • Heart burn     hiatal hernia   • HTN (hypertension)    • Hypertension    • Hypertriglyceridemia 11/7/2013   • Hypothyroid    • IGT (impaired glucose tolerance)    • MEDICAL HOME    • Osteopenia    • Pain     left side of back   • Paroxysmal atrial fibrillation (HCC)    • Personal history of venous thrombosis and embolism     right leg   • Pneumonia     1974   • Renal disorder     cysts   • Rheumatic fever     4 or 5 years old   • Rheumatic fever     as a kid per patient.   • Unspecified disorder of thyroid      Social History     Social History   • Marital status:      Spouse name: N/A   • Number of children: N/A   • Years of education: N/A     Occupational History   • Not on file.     Social History Main Topics   • Smoking status: Former Smoker     Packs/day: 0.25     Years: 30.00     Types: Cigarettes   • Smokeless tobacco: Never Used      Comment: quit 1990 1/2 ppd x ?yrs (on and off for years since age 15)   • Alcohol use 0.0 oz/week      Comment: occasional/rare alcohol use   • Drug use: No   • Sexual activity: No     Other Topics Concern   • Not on file     Social History Narrative   • No narrative on file     Family History   Problem Relation Age of Onset   • Stroke Brother    • Heart Disease Other      Recent Labs      03/28/19   1011   ALBUMIN  4.1   HDL  75   TRIGLYCERIDE  65   SODIUM  143  143   POTASSIUM  4.3  4.3   CHLORIDE  108  108   CO2  27  27   BUN  28*  27*   CREATININE  0.97  0.96               Assessment/Plan:     1. Stage 3 chronic kidney disease (HCC)  Stable  No uremic symptoms  Renal dose of medication  Avoid nephrotoxins  Continue same medication regimen      2. Anxiety  Consider psychiatry evaluation    Over 50% of this 25 minute visit was spent on counseling and coordination of care regarding diet, side effects, and  complication.

## 2019-06-03 DIAGNOSIS — I48.91 ATRIAL FIBRILLATION, UNSPECIFIED TYPE (HCC): ICD-10-CM

## 2019-06-14 ENCOUNTER — PATIENT MESSAGE (OUTPATIENT)
Dept: MEDICAL GROUP | Facility: PHYSICIAN GROUP | Age: 84
End: 2019-06-14

## 2019-06-14 DIAGNOSIS — F41.9 ANXIETY: ICD-10-CM

## 2019-06-18 RX ORDER — ALPRAZOLAM 1 MG/1
1 TABLET ORAL 2 TIMES DAILY PRN
Qty: 180 TAB | Refills: 1 | Status: SHIPPED
Start: 2019-06-18 | End: 2019-07-18 | Stop reason: SDUPTHER

## 2019-06-27 ENCOUNTER — PATIENT OUTREACH (OUTPATIENT)
Dept: HEALTH INFORMATION MANAGEMENT | Facility: OTHER | Age: 84
End: 2019-06-27

## 2019-06-27 NOTE — PROGRESS NOTES
1. [facility] Verified: yes    2. Verify PCP: yes    3. Review and add  to Care Team: yes    5. Reviewed/Updated the following with patient:       •   Communication Preference Obtained? YES  • MyChart Activation: already active       •   E-Mail Address Obtained? YES       •   Appointment Day and Time Preferences? NO       •   Preferred Pharmacy? YES       •   Preferred Lab? YES    6. Care Gap Scheduling (Attempt to Schedule EACH Overdue Care Gap!)    WILL DISCUSS AWV AT UPCOMING APPT

## 2019-06-28 DIAGNOSIS — I48.0 PAROXYSMAL ATRIAL FIBRILLATION (HCC): ICD-10-CM

## 2019-06-28 RX ORDER — PROPAFENONE HYDROCHLORIDE 150 MG/1
TABLET, COATED ORAL
Qty: 200 TAB | Refills: 3 | Status: SHIPPED | OUTPATIENT
Start: 2019-06-28 | End: 2019-10-03 | Stop reason: SDUPTHER

## 2019-07-05 ENCOUNTER — TELEPHONE (OUTPATIENT)
Dept: CARDIOLOGY | Facility: MEDICAL CENTER | Age: 84
End: 2019-07-05

## 2019-07-05 DIAGNOSIS — I48.91 ATRIAL FIBRILLATION, UNSPECIFIED TYPE (HCC): ICD-10-CM

## 2019-07-05 NOTE — TELEPHONE ENCOUNTER
Eun Gruber M.D. 2 days ago         Could you please order samples of Xarelto [facility]? Last pill will be on Sunday. Thank you.        Xarelto samples sent to [facility], Pt notified through [employer], encouraged pt as well to apply for Patient Assistance Program if not able to afford medications

## 2019-07-17 ENCOUNTER — TELEPHONE (OUTPATIENT)
Dept: MEDICAL GROUP | Facility: PHYSICIAN GROUP | Age: 84
End: 2019-07-17

## 2019-07-17 NOTE — TELEPHONE ENCOUNTER
Future Appointments       Provider Department Center    7/18/2019 11:20 AM Libertad Estrella M.D. [facility]    10/8/2019 11:30 AM Theresa Gruber M.D. [facility]         ESTABLISHED PATIENT PRE-VISIT PLANNING     Patient was NOT contacted to complete PVP.       1.  Reviewed notes from the last few office visits within the medical group: Yes    2.  If any orders were placed at last visit or intended to be done for this visit (i.e. 6 mos follow-up), do we have Results/Consult Notes?        •  Labs - Labs ordered, NOT completed. Patient advised to complete prior to next appointment.       •  Imaging - Imaging was not ordered at last office visit.       •  Referrals - No referrals were ordered at last office visit.    3. Is this appointment scheduled as a Hospital Follow-Up? No    4.  Immunizations were updated in [employer] using WebIZ?: Yes       •  Web Iz Recommendations: FLU, TDAP, VARICELLA (Chicken Pox)  and SHINGRIX (Shingles)    5.  Patient is due for the following Health Maintenance Topics:   Health Maintenance Due   Topic Date Due   • Annual Wellness Visit  06/03/2016     6. Orders for overdue Health Maintenance topics pended in Pre-Charting? NO    7.  AHA (MDX) form printed for Provider? No, already completed    8.  Patient was NOT informed to arrive 15 min prior to their scheduled appointment and bring in their medication bottles.

## 2019-07-18 ENCOUNTER — OFFICE VISIT (OUTPATIENT)
Dept: MEDICAL GROUP | Facility: PHYSICIAN GROUP | Age: 84
End: 2019-07-18
Payer: MEDICARE

## 2019-07-18 ENCOUNTER — HOSPITAL ENCOUNTER (OUTPATIENT)
Dept: LAB | Facility: MEDICAL CENTER | Age: 84
End: 2019-07-18
Attending: FAMILY MEDICINE
Payer: MEDICARE

## 2019-07-18 VITALS
HEIGHT: 71 IN | OXYGEN SATURATION: 93 % | BODY MASS INDEX: 20.16 KG/M2 | TEMPERATURE: 99.4 F | SYSTOLIC BLOOD PRESSURE: 110 MMHG | RESPIRATION RATE: 18 BRPM | DIASTOLIC BLOOD PRESSURE: 62 MMHG | HEART RATE: 68 BPM | WEIGHT: 144 LBS

## 2019-07-18 DIAGNOSIS — I48.0 PAROXYSMAL ATRIAL FIBRILLATION (HCC): ICD-10-CM

## 2019-07-18 DIAGNOSIS — F41.9 ANXIETY: ICD-10-CM

## 2019-07-18 DIAGNOSIS — K59.09 CHRONIC CONSTIPATION: ICD-10-CM

## 2019-07-18 DIAGNOSIS — E03.9 HYPOTHYROIDISM (ACQUIRED): ICD-10-CM

## 2019-07-18 LAB
T3 SERPL-MCNC: 65.4 NG/DL (ref 60–181)
T4 FREE SERPL-MCNC: 1.21 NG/DL (ref 0.53–1.43)
TSH SERPL DL<=0.005 MIU/L-ACNC: 0.43 UIU/ML (ref 0.38–5.33)

## 2019-07-18 PROCEDURE — 99214 OFFICE O/P EST MOD 30 MIN: CPT | Performed by: FAMILY MEDICINE

## 2019-07-18 PROCEDURE — 84439 ASSAY OF FREE THYROXINE: CPT

## 2019-07-18 PROCEDURE — 36415 COLL VENOUS BLD VENIPUNCTURE: CPT

## 2019-07-18 PROCEDURE — 84480 ASSAY TRIIODOTHYRONINE (T3): CPT

## 2019-07-18 PROCEDURE — 84443 ASSAY THYROID STIM HORMONE: CPT

## 2019-07-18 RX ORDER — FENOFIBRATE 134 MG/1
CAPSULE ORAL
Refills: 3 | COMMUNITY
Start: 2019-05-09 | End: 2019-07-15

## 2019-07-18 RX ORDER — ALPRAZOLAM 1 MG/1
1 TABLET ORAL 2 TIMES DAILY PRN
Qty: 180 TAB | Refills: 1 | Status: ON HOLD | OUTPATIENT
Start: 2019-07-18 | End: 2019-09-18 | Stop reason: SDUPTHER

## 2019-07-18 RX ORDER — FENOFIBRATE 134 MG/1
CAPSULE ORAL
COMMUNITY
Start: 2019-04-23 | End: 2019-07-16

## 2019-07-18 RX ORDER — FENOFIBRATE 134 MG/1
CAPSULE ORAL
COMMUNITY
Start: 2019-05-09 | End: 2019-07-16

## 2019-07-18 NOTE — PROGRESS NOTES
Chief Complaint   Patient presents with   • Anxiety     fv    • COPD       HISTORY OF PRESENT ILLNESS: Patient is a 83 y.o. female established patient here today for the following concerns:    1. Anxiety  Here for follow up on anxiety.  Last refill her dose had been reduced to 30 tabs per 30 days, which she reports did not last her the full time.  She has not had a fill since April.  She typically would take xanax twice daily, but also tells me that if she has a good day she wont take it at all.  She mostly gets anxious when she feels lonely.  Her neighbor who had taken over for looking after her has gone into the same care home that her  went to before he .  She requests refill today.  She declines counseling or psych consultation.      2. Paroxysmal atrial fibrillation (HCC)  Patient also had an episode 2 months ago of afib with RVR in which EMS was contacted.  She tells me that she declined transport to ER.  Since then has not had too many more episodes of fluttering.  She is anticoagulated.  She was seen in UC the a couple days later which repeat EKG showed rate back into the 60s.  No SOB.  She is due for recheck on thyroid replacement as we had lowered the dose previously in hopes to help with her afib and anxiety.     3. Chronic constipation  Reports continued ongoing issues with constipation that is not relieved with stool softeners, miralax.  She is due for recheck on thyroid.  constipation has been chronic.  Did have work up with GI in .  Has declined additional colon cancer screening since.  She is hoping to try the advertised linzess for constipation.        Past Medical, Social, and Family history reviewed and updated in EPIC    Allergies:Asa [aspirin]; Latex; Penicillins; Tape; and Wasp venom    Current Outpatient Prescriptions   Medication Sig Dispense Refill   • ALPRAZolam (XANAX) 1 MG Tab Take 1 Tab by mouth 2 times a day as needed for Sleep or Anxiety for up to 90 days. 180 Tab 1   •  Linaclotide (LINZESS) 145 MCG Cap Take 1 Cap by mouth every day for 30 days. 30 Cap 11   • rivaroxaban (XARELTO) 15 MG Tab tablet Take 1 Tab by mouth with dinner. 30 Tab 0   • propafenone (RYTHMOL) 150 MG Tab TAKE 1 TABLET BY MOUTH TWICE A  Tab 3   • metoprolol (LOPRESSOR) 25 MG Tab Take 0.5 Tabs by mouth 2 times a day. May take 1-2 tabs daily PRN palpitations 270 Tab 3   • omeprazole (PRILOSEC) 40 MG delayed-release capsule TAKE 1 CAPSULE BY MOUTH EVERY DAY 90 Cap 1   • albuterol 108 (90 Base) MCG/ACT Aero Soln inhalation aerosol      • levothyroxine (SYNTHROID) 88 MCG Tab Take 1 Tab by mouth Every morning on an empty stomach. 90 Tab 3   • simvastatin (ZOCOR) 20 MG Tab TAKE 1 TABLET BY MOUTH IN THE EVENING 90 Tab 3   • hydrOXYzine HCl (ATARAX) 50 MG Tab TAKE 1 TABLET BY MOUTH 3 TIMES A DAY AS NEEDED FOR ITCHING. 90 Tab 2   • NEOMYCIN-POLYMYXIN-HC, OTIC, 1 % Solution Place 4 Drops in ear 2 Times a Day. 1 Bottle 0   • fluticasone (FLONASE) 50 MCG/ACT nasal spray SPRAY 1 SPRAY IN NOSE 2 TIMES A DAY. EACH NOSTRIL 3 Bottle 0   • Mometasone Furo-Formoterol Fum (DULERA) 200-5 MCG/ACT Aerosol Inhale 1 Puff by mouth 2 Times a Day. 1 Inhaler 11   • fenofibrate micronized (LOFIBRA) 134 MG capsule Take 1 Cap by mouth every day. 90 Cap 3   • FIBER SELECT GUMMIES PO Take  by mouth.     • Probiotic Product (PROBIOTIC PO) Take  by mouth.     • clobetasol (TEMOVATE) 0.05 % Cream Apply 1 Each to affected area(s) as needed.  5   • Cyanocobalamin (B-12) 1000 MCG CAPS Take 1,000 mg by mouth every day at 6 PM.     • albuterol (PROVENTIL) 2.5mg/3ml Nebu Soln solution for nebulization 3 mL by Nebulization route every four hours as needed for Shortness of Breath. 75 mL 1     No current facility-administered medications for this visit.          ROS:  Review of Systems   Constitutional: Negative for fever, chills, weight loss and malaise/fatigue.   HENT: Negative for ear pain, nosebleeds, congestion, sore throat and neck pain.   "  Eyes: Negative for blurred vision.   Respiratory: Negative for cough, sputum production, shortness of breath and wheezing.    Cardiovascular: Negative for chest pain, palpitations,  and leg swelling.   Gastrointestinal: Negative for heartburn, nausea, vomiting, diarrhea and abdominal pain.   Genitourinary: Negative for dysuria, urgency and frequency.   Musculoskeletal: Negative for myalgias, back pain and joint pain.   Skin: Negative for rash and itching.   Neurological: Negative for dizziness, tingling, tremors, sensory change, focal weakness and headaches.   Endo/Heme/Allergies: Does not bruise/bleed easily.   Psychiatric/Behavioral: Negative for depression, anxiety, suicidal ideas, insomnia and memory loss.      Exam:  /62 (BP Location: Right arm, Patient Position: Sitting, BP Cuff Size: Adult)   Pulse 68   Temp 37.4 °C (99.4 °F)   Resp 18   Ht 1.803 m (5' 11\")   Wt 65.3 kg (144 lb)   SpO2 93%     General:  Well nourished, well developed in NAD  Head is grossly normal.  Neck: Supple without JVD   Pulmonary:  Normal effort.   Cardiovascular: Regular rate  Extremities: no clubbing, cyanosis, or edema.  Psych: affect appropriate      Please note that this dictation was created using voice recognition software. I have made every reasonable attempt to correct obvious errors, but I expect that there are errors of grammar and possibly content that I did not discover before finalizing the note.    Assessment/Plan:  1. Anxiety  Continue for now.  Discussed risks and benefits including cognitive impairment, risk for falls.  Declines trial of alternatives or consult with psychiatry.   - ALPRAZolam (XANAX) 1 MG Tab; Take 1 Tab by mouth 2 times a day as needed for Sleep or Anxiety for up to 90 days.  Dispense: 180 Tab; Refill: 1    2. Paroxysmal atrial fibrillation (HCC)  Continue current meds, check lytes and thyroid.    If continues to have episodes of RVR will ask for additional follow up with cardiology. "     3. Chronic constipation  Trial of  - Linaclotide (LINZESS) 145 MCG Cap; Take 1 Cap by mouth every day for 30 days.  Dispense: 30 Cap; Refill: 11  May consider additional work up including imaging - barium enema.  No stigmata of cancer at this time.

## 2019-08-05 ENCOUNTER — TELEPHONE (OUTPATIENT)
Dept: CARDIOLOGY | Facility: MEDICAL CENTER | Age: 84
End: 2019-08-05

## 2019-08-05 DIAGNOSIS — I48.91 ATRIAL FIBRILLATION, UNSPECIFIED TYPE (HCC): ICD-10-CM

## 2019-08-05 RX ORDER — FENOFIBRATE 134 MG/1
CAPSULE ORAL
Qty: 100 CAP | Refills: 3 | Status: SHIPPED | OUTPATIENT
Start: 2019-08-05 | End: 2019-10-03 | Stop reason: SDUPTHER

## 2019-08-05 NOTE — TELEPHONE ENCOUNTER
Was the patient seen in the last year in this department? Yes    Does patient have an active prescription for medications requested? No     Received Request Via: Pharmacy      Pt met protocol?: Yes    LAST OV 7/18/2019    BP Readings from Last 1 Encounters:   07/18/19 110/62     Lab Results   Component Value Date/Time    CHOLSTRLTOT 171 03/28/2019 10:11 AM    LDL 83 03/28/2019 10:11 AM    HDL 75 03/28/2019 10:11 AM    TRIGLYCERIDE 65 03/28/2019 10:11 AM       Lab Results   Component Value Date/Time    SODIUM 143 03/28/2019 10:11 AM    SODIUM 143 03/28/2019 10:11 AM    POTASSIUM 4.3 03/28/2019 10:11 AM    POTASSIUM 4.3 03/28/2019 10:11 AM    CHLORIDE 108 03/28/2019 10:11 AM    CHLORIDE 108 03/28/2019 10:11 AM    CO2 27 03/28/2019 10:11 AM    CO2 27 03/28/2019 10:11 AM    GLUCOSE 71 03/28/2019 10:11 AM    GLUCOSE 72 03/28/2019 10:11 AM    BUN 27 (H) 03/28/2019 10:11 AM    BUN 28 (H) 03/28/2019 10:11 AM    CREATININE 0.96 03/28/2019 10:11 AM    CREATININE 0.97 03/28/2019 10:11 AM    CREATININE 0.76 03/21/2011 12:00 AM    BUNCREATRAT 25 03/21/2011 12:00 AM    GLOMRATE >59 03/21/2011 12:00 AM     Lab Results   Component Value Date/Time    ALKPHOSPHAT 27 (L) 03/28/2019 10:11 AM    ASTSGOT 14 03/28/2019 10:11 AM    ALTSGPT 11 03/28/2019 10:11 AM    TBILIRUBIN 0.8 03/28/2019 10:11 AM

## 2019-08-05 NOTE — TELEPHONE ENCOUNTER
"Prescription Question     Chiquita Lawrence, Med Ass't routed conversation to You 55 minutes ago (8:26 AM)      Eun Be Letitia L, M.D. 3 days ago         Yes, the [facility] on [address] But if there are samples available this month, that would be good too. I can  Monday or Tuesday. Thanks, Faviola Morales     You  Eun Be 3 days ago         Juan Salmeron,     We can send a prescription for the Xarelto to the [facility] in [place] if you'd like. Samples are still an option we just can't guarantee supply every month that's why we advise our patients have a back up and apply for the patient assistance program. Because of Eun's history of atrial fibrillation, it would still be advisable for her to continue the medication. Please confirm that [facility] is still the pharmacy of your choice and I'll be happy to send it there.     Thanks,     Christelle ANAYA/Dr. Yasmani Hidalgo Med Ass't routed conversation to You 3 days ago      Eun Be Letitia L, M.D. 4 days ago         Since Mom getting Xarelto samples is no longer a \"viable option\", can you please make sure that she has a current prescription available to fill at her pharmacy? She has enough until Monday. Her MyChart shows no refills available. She is wondering if she even needs to keep taking it. Please advise. Thanks, Faviola Morales       Prescriptions reordered at [facility] in [place] and samples at [facility].      "

## 2019-08-12 ENCOUNTER — HOSPITAL ENCOUNTER (OUTPATIENT)
Facility: MEDICAL CENTER | Age: 84
End: 2019-08-12
Attending: EMERGENCY MEDICINE | Admitting: INTERNAL MEDICINE
Payer: MEDICARE

## 2019-08-12 ENCOUNTER — PATIENT OUTREACH (OUTPATIENT)
Dept: HEALTH INFORMATION MANAGEMENT | Facility: OTHER | Age: 84
End: 2019-08-12

## 2019-08-12 ENCOUNTER — APPOINTMENT (OUTPATIENT)
Dept: RADIOLOGY | Facility: MEDICAL CENTER | Age: 84
End: 2019-08-12
Attending: EMERGENCY MEDICINE
Payer: MEDICARE

## 2019-08-12 VITALS
WEIGHT: 161.82 LBS | BODY MASS INDEX: 22.65 KG/M2 | HEIGHT: 71 IN | HEART RATE: 72 BPM | DIASTOLIC BLOOD PRESSURE: 64 MMHG | RESPIRATION RATE: 18 BRPM | SYSTOLIC BLOOD PRESSURE: 152 MMHG | TEMPERATURE: 98.1 F | OXYGEN SATURATION: 94 %

## 2019-08-12 DIAGNOSIS — I48.91 ATRIAL FIBRILLATION, UNSPECIFIED TYPE (HCC): Primary | ICD-10-CM

## 2019-08-12 DIAGNOSIS — E83.42 HYPOMAGNESEMIA: ICD-10-CM

## 2019-08-12 DIAGNOSIS — R00.0 INCREASED HEART RATE: ICD-10-CM

## 2019-08-12 DIAGNOSIS — R79.89 ELEVATED TROPONIN: ICD-10-CM

## 2019-08-12 PROBLEM — I48.0 PAROXYSMAL ATRIAL FIBRILLATION WITH RVR (HCC): Status: ACTIVE | Noted: 2019-08-12

## 2019-08-12 PROBLEM — K21.9 GERD (GASTROESOPHAGEAL REFLUX DISEASE): Status: ACTIVE | Noted: 2019-08-12

## 2019-08-12 LAB
ALBUMIN SERPL BCP-MCNC: 4.2 G/DL (ref 3.2–4.9)
ALBUMIN/GLOB SERPL: 1.8 G/DL
ALP SERPL-CCNC: 41 U/L (ref 30–99)
ALT SERPL-CCNC: 9 U/L (ref 2–50)
ANION GAP SERPL CALC-SCNC: 9 MMOL/L (ref 0–11.9)
AST SERPL-CCNC: 11 U/L (ref 12–45)
BASOPHILS # BLD AUTO: 0.9 % (ref 0–1.8)
BASOPHILS # BLD: 0.04 K/UL (ref 0–0.12)
BILIRUB SERPL-MCNC: 0.4 MG/DL (ref 0.1–1.5)
BUN SERPL-MCNC: 23 MG/DL (ref 8–22)
CALCIUM SERPL-MCNC: 10.1 MG/DL (ref 8.5–10.5)
CHLORIDE SERPL-SCNC: 105 MMOL/L (ref 96–112)
CO2 SERPL-SCNC: 26 MMOL/L (ref 20–33)
CREAT SERPL-MCNC: 0.9 MG/DL (ref 0.5–1.4)
EKG IMPRESSION: NORMAL
EOSINOPHIL # BLD AUTO: 0.15 K/UL (ref 0–0.51)
EOSINOPHIL NFR BLD: 3.4 % (ref 0–6.9)
ERYTHROCYTE [DISTWIDTH] IN BLOOD BY AUTOMATED COUNT: 44 FL (ref 35.9–50)
GLOBULIN SER CALC-MCNC: 2.3 G/DL (ref 1.9–3.5)
GLUCOSE SERPL-MCNC: 120 MG/DL (ref 65–99)
HCT VFR BLD AUTO: 41.5 % (ref 37–47)
HGB BLD-MCNC: 13.6 G/DL (ref 12–16)
IMM GRANULOCYTES # BLD AUTO: 0.01 K/UL (ref 0–0.11)
IMM GRANULOCYTES NFR BLD AUTO: 0.2 % (ref 0–0.9)
LYMPHOCYTES # BLD AUTO: 1.06 K/UL (ref 1–4.8)
LYMPHOCYTES NFR BLD: 23.8 % (ref 22–41)
MAGNESIUM SERPL-MCNC: 1.3 MG/DL (ref 1.5–2.5)
MCH RBC QN AUTO: 31.1 PG (ref 27–33)
MCHC RBC AUTO-ENTMCNC: 32.8 G/DL (ref 33.6–35)
MCV RBC AUTO: 94.7 FL (ref 81.4–97.8)
MONOCYTES # BLD AUTO: 0.33 K/UL (ref 0–0.85)
MONOCYTES NFR BLD AUTO: 7.4 % (ref 0–13.4)
NEUTROPHILS # BLD AUTO: 2.87 K/UL (ref 2–7.15)
NEUTROPHILS NFR BLD: 64.3 % (ref 44–72)
NRBC # BLD AUTO: 0 K/UL
NRBC BLD-RTO: 0 /100 WBC
PLATELET # BLD AUTO: 212 K/UL (ref 164–446)
PMV BLD AUTO: 10 FL (ref 9–12.9)
POTASSIUM SERPL-SCNC: 3.6 MMOL/L (ref 3.6–5.5)
PROT SERPL-MCNC: 6.5 G/DL (ref 6–8.2)
RBC # BLD AUTO: 4.38 M/UL (ref 4.2–5.4)
SODIUM SERPL-SCNC: 140 MMOL/L (ref 135–145)
TROPONIN T SERPL-MCNC: 19 NG/L (ref 6–19)
TROPONIN T SERPL-MCNC: 27 NG/L (ref 6–19)
TSH SERPL DL<=0.005 MIU/L-ACNC: 1.21 UIU/ML (ref 0.38–5.33)
WBC # BLD AUTO: 4.5 K/UL (ref 4.8–10.8)

## 2019-08-12 PROCEDURE — 700105 HCHG RX REV CODE 258: Performed by: EMERGENCY MEDICINE

## 2019-08-12 PROCEDURE — 80053 COMPREHEN METABOLIC PANEL: CPT

## 2019-08-12 PROCEDURE — 36415 COLL VENOUS BLD VENIPUNCTURE: CPT

## 2019-08-12 PROCEDURE — 96365 THER/PROPH/DIAG IV INF INIT: CPT

## 2019-08-12 PROCEDURE — G0378 HOSPITAL OBSERVATION PER HR: HCPCS

## 2019-08-12 PROCEDURE — 700111 HCHG RX REV CODE 636 W/ 250 OVERRIDE (IP): Performed by: EMERGENCY MEDICINE

## 2019-08-12 PROCEDURE — 84484 ASSAY OF TROPONIN QUANT: CPT

## 2019-08-12 PROCEDURE — 85025 COMPLETE CBC W/AUTO DIFF WBC: CPT

## 2019-08-12 PROCEDURE — 700102 HCHG RX REV CODE 250 W/ 637 OVERRIDE(OP): Performed by: INTERNAL MEDICINE

## 2019-08-12 PROCEDURE — 96376 TX/PRO/DX INJ SAME DRUG ADON: CPT

## 2019-08-12 PROCEDURE — 93005 ELECTROCARDIOGRAM TRACING: CPT | Performed by: EMERGENCY MEDICINE

## 2019-08-12 PROCEDURE — 99285 EMERGENCY DEPT VISIT HI MDM: CPT

## 2019-08-12 PROCEDURE — 83735 ASSAY OF MAGNESIUM: CPT

## 2019-08-12 PROCEDURE — A9270 NON-COVERED ITEM OR SERVICE: HCPCS | Performed by: INTERNAL MEDICINE

## 2019-08-12 PROCEDURE — 84443 ASSAY THYROID STIM HORMONE: CPT

## 2019-08-12 PROCEDURE — 96375 TX/PRO/DX INJ NEW DRUG ADDON: CPT

## 2019-08-12 PROCEDURE — 71045 X-RAY EXAM CHEST 1 VIEW: CPT

## 2019-08-12 PROCEDURE — 99236 HOSP IP/OBS SAME DATE HI 85: CPT | Performed by: INTERNAL MEDICINE

## 2019-08-12 PROCEDURE — 700101 HCHG RX REV CODE 250: Performed by: EMERGENCY MEDICINE

## 2019-08-12 RX ORDER — METOPROLOL TARTRATE 1 MG/ML
5 INJECTION, SOLUTION INTRAVENOUS
Status: DISCONTINUED | OUTPATIENT
Start: 2019-08-12 | End: 2019-08-12 | Stop reason: HOSPADM

## 2019-08-12 RX ORDER — ACETAMINOPHEN 325 MG/1
650 TABLET ORAL EVERY 6 HOURS PRN
Status: DISCONTINUED | OUTPATIENT
Start: 2019-08-12 | End: 2019-08-12 | Stop reason: HOSPADM

## 2019-08-12 RX ORDER — ALPRAZOLAM 1 MG/1
1 TABLET ORAL 2 TIMES DAILY PRN
Status: DISCONTINUED | OUTPATIENT
Start: 2019-08-12 | End: 2019-08-12 | Stop reason: HOSPADM

## 2019-08-12 RX ORDER — METOPROLOL TARTRATE 1 MG/ML
5 INJECTION, SOLUTION INTRAVENOUS ONCE
Status: COMPLETED | OUTPATIENT
Start: 2019-08-12 | End: 2019-08-12

## 2019-08-12 RX ORDER — PROPAFENONE HYDROCHLORIDE 150 MG/1
150 TABLET, COATED ORAL 2 TIMES DAILY
Status: DISCONTINUED | OUTPATIENT
Start: 2019-08-12 | End: 2019-08-12 | Stop reason: HOSPADM

## 2019-08-12 RX ORDER — BUDESONIDE AND FORMOTEROL FUMARATE DIHYDRATE 160; 4.5 UG/1; UG/1
2 AEROSOL RESPIRATORY (INHALATION) 2 TIMES DAILY
Status: DISCONTINUED | OUTPATIENT
Start: 2019-08-12 | End: 2019-08-12 | Stop reason: HOSPADM

## 2019-08-12 RX ORDER — ONDANSETRON 2 MG/ML
4 INJECTION INTRAMUSCULAR; INTRAVENOUS EVERY 4 HOURS PRN
Status: DISCONTINUED | OUTPATIENT
Start: 2019-08-12 | End: 2019-08-12 | Stop reason: HOSPADM

## 2019-08-12 RX ORDER — AMOXICILLIN 250 MG
2 CAPSULE ORAL 2 TIMES DAILY
Status: DISCONTINUED | OUTPATIENT
Start: 2019-08-12 | End: 2019-08-12 | Stop reason: HOSPADM

## 2019-08-12 RX ORDER — SIMVASTATIN 40 MG
20 TABLET ORAL EVERY EVENING
Status: DISCONTINUED | OUTPATIENT
Start: 2019-08-12 | End: 2019-08-12 | Stop reason: HOSPADM

## 2019-08-12 RX ORDER — MAGNESIUM SULFATE 1 G/100ML
1 INJECTION INTRAVENOUS ONCE
Status: COMPLETED | OUTPATIENT
Start: 2019-08-12 | End: 2019-08-12

## 2019-08-12 RX ORDER — LEVOTHYROXINE SODIUM 88 UG/1
88 TABLET ORAL
Status: DISCONTINUED | OUTPATIENT
Start: 2019-08-12 | End: 2019-08-12 | Stop reason: HOSPADM

## 2019-08-12 RX ORDER — SODIUM CHLORIDE 9 MG/ML
500 INJECTION, SOLUTION INTRAVENOUS ONCE
Status: COMPLETED | OUTPATIENT
Start: 2019-08-12 | End: 2019-08-12

## 2019-08-12 RX ORDER — BISACODYL 10 MG
10 SUPPOSITORY, RECTAL RECTAL
Status: DISCONTINUED | OUTPATIENT
Start: 2019-08-12 | End: 2019-08-12 | Stop reason: HOSPADM

## 2019-08-12 RX ORDER — OMEPRAZOLE 20 MG/1
40 CAPSULE, DELAYED RELEASE ORAL DAILY
Status: DISCONTINUED | OUTPATIENT
Start: 2019-08-12 | End: 2019-08-12 | Stop reason: HOSPADM

## 2019-08-12 RX ORDER — ONDANSETRON 4 MG/1
4 TABLET, ORALLY DISINTEGRATING ORAL EVERY 4 HOURS PRN
Status: DISCONTINUED | OUTPATIENT
Start: 2019-08-12 | End: 2019-08-12 | Stop reason: HOSPADM

## 2019-08-12 RX ORDER — POLYETHYLENE GLYCOL 3350 17 G/17G
1 POWDER, FOR SOLUTION ORAL
Status: DISCONTINUED | OUTPATIENT
Start: 2019-08-12 | End: 2019-08-12 | Stop reason: HOSPADM

## 2019-08-12 RX ORDER — IPRATROPIUM BROMIDE AND ALBUTEROL SULFATE 2.5; .5 MG/3ML; MG/3ML
3 SOLUTION RESPIRATORY (INHALATION)
Status: DISCONTINUED | OUTPATIENT
Start: 2019-08-12 | End: 2019-08-12 | Stop reason: HOSPADM

## 2019-08-12 RX ADMIN — METOPROLOL TARTRATE 5 MG: 1 INJECTION, SOLUTION INTRAVENOUS at 01:38

## 2019-08-12 RX ADMIN — OMEPRAZOLE 40 MG: 20 CAPSULE, DELAYED RELEASE ORAL at 06:42

## 2019-08-12 RX ADMIN — PROPAFENONE HYDROCHLORIDE 150 MG: 150 TABLET, FILM COATED ORAL at 06:43

## 2019-08-12 RX ADMIN — MAGNESIUM SULFATE IN DEXTROSE 1 G: 10 INJECTION, SOLUTION INTRAVENOUS at 03:12

## 2019-08-12 RX ADMIN — METOPROLOL TARTRATE 25 MG: 25 TABLET, FILM COATED ORAL at 06:44

## 2019-08-12 RX ADMIN — METOPROLOL TARTRATE 5 MG: 1 INJECTION, SOLUTION INTRAVENOUS at 03:23

## 2019-08-12 RX ADMIN — BUDESONIDE AND FORMOTEROL FUMARATE DIHYDRATE 2 PUFF: 160; 4.5 AEROSOL RESPIRATORY (INHALATION) at 07:42

## 2019-08-12 RX ADMIN — ALPRAZOLAM 1 MG: 1 TABLET ORAL at 13:09

## 2019-08-12 RX ADMIN — LEVOTHYROXINE SODIUM 88 MCG: 88 TABLET ORAL at 06:43

## 2019-08-12 RX ADMIN — SODIUM CHLORIDE 500 ML: 9 INJECTION, SOLUTION INTRAVENOUS at 01:38

## 2019-08-12 ASSESSMENT — COPD QUESTIONNAIRES
DURING THE PAST 4 WEEKS HOW MUCH DID YOU FEEL SHORT OF BREATH: SOME OF THE TIME
COPD SCREENING SCORE: 7
HAVE YOU SMOKED AT LEAST 100 CIGARETTES IN YOUR ENTIRE LIFE: YES
DO YOU EVER COUGH UP ANY MUCUS OR PHLEGM?: YES, A FEW DAYS A WEEK OR MONTH

## 2019-08-12 ASSESSMENT — ENCOUNTER SYMPTOMS
CHILLS: 0
SHORTNESS OF BREATH: 0
NAUSEA: 0
COUGH: 0
ABDOMINAL PAIN: 0
BACK PAIN: 0
MYALGIAS: 0
SEIZURES: 0
LOSS OF CONSCIOUSNESS: 0
SPUTUM PRODUCTION: 0
BLURRED VISION: 0
HEADACHES: 0
VOMITING: 0
BLOOD IN STOOL: 0
NECK PAIN: 0
PALPITATIONS: 1
FOCAL WEAKNESS: 0
SORE THROAT: 0
FEVER: 0
DIARRHEA: 0
DIZZINESS: 0
WHEEZING: 0
BRUISES/BLEEDS EASILY: 0
DIAPHORESIS: 0
FLANK PAIN: 0

## 2019-08-12 ASSESSMENT — PATIENT HEALTH QUESTIONNAIRE - PHQ9
1. LITTLE INTEREST OR PLEASURE IN DOING THINGS: NOT AT ALL
SUM OF ALL RESPONSES TO PHQ9 QUESTIONS 1 AND 2: 0
2. FEELING DOWN, DEPRESSED, IRRITABLE, OR HOPELESS: NOT AT ALL

## 2019-08-12 ASSESSMENT — LIFESTYLE VARIABLES
AVERAGE NUMBER OF DAYS PER WEEK YOU HAVE A DRINK CONTAINING ALCOHOL: 5
EVER HAD A DRINK FIRST THING IN THE MORNING TO STEADY YOUR NERVES TO GET RID OF A HANGOVER: NO
EVER FELT BAD OR GUILTY ABOUT YOUR DRINKING: NO
TOTAL SCORE: 0
DOES PATIENT WANT TO STOP DRINKING: NO
HOW MANY TIMES IN THE PAST YEAR HAVE YOU HAD 5 OR MORE DRINKS IN A DAY: 0
SUBSTANCE_ABUSE: 0
TOTAL SCORE: 0
TOTAL SCORE: 0
ON A TYPICAL DAY WHEN YOU DRINK ALCOHOL HOW MANY DRINKS DO YOU HAVE: 1
ALCOHOL_USE: YES
CONSUMPTION TOTAL: NEGATIVE
HAVE YOU EVER FELT YOU SHOULD CUT DOWN ON YOUR DRINKING: NO
EVER_SMOKED: YES
HAVE PEOPLE ANNOYED YOU BY CRITICIZING YOUR DRINKING: NO
EVER_SMOKED: YES

## 2019-08-12 ASSESSMENT — COGNITIVE AND FUNCTIONAL STATUS - GENERAL
MOBILITY SCORE: 24
SUGGESTED CMS G CODE MODIFIER MOBILITY: CH
DAILY ACTIVITIY SCORE: 24
SUGGESTED CMS G CODE MODIFIER DAILY ACTIVITY: CH

## 2019-08-12 NOTE — ED PROVIDER NOTES
ED Provider Note     8/12/2019  1:22 AM    Means of Arrival: Walk In  History obtained by: patient, daughter  Limitations: none    CHIEF COMPLAINT  Chief Complaint   Patient presents with   • Palpitations       HPI  Eun Be is a 83 y.o. female with history of paroxysmal A. fib presenting with acute onset of increased heart rate.  She says she had a normal day today and then this evening approximately 2 to 3 hours ago noticed that her heart was beating fast.  She put a pulse ox on her finger and saw that it was 130s.  She is now here with her daughter for evaluation.  She denies any chest pain.  No shortness of breath.  She reports taking metoprolol as directed earlier today.  No abdominal pain, no vomiting, no pain with urination.    REVIEW OF SYSTEMS  Review of Systems   Constitutional: Negative for fever.   HENT: Negative for congestion.    Respiratory: Negative for cough and shortness of breath.    Cardiovascular: Positive for palpitations. Negative for chest pain and leg swelling.   Gastrointestinal: Negative for abdominal pain, nausea and vomiting.   Genitourinary: Negative for dysuria and frequency.   Musculoskeletal: Negative for back pain and neck pain.   Neurological: Negative for dizziness, focal weakness, loss of consciousness and headaches.   Psychiatric/Behavioral: Negative for substance abuse.     See HPI for further details.    PAST MEDICAL HISTORY   has a past medical history of Anxiety, Arrhythmia, Arthritis, Backpain, Bronchitis, CATARACT, Chronic lower back pain (3/11/2014), COPD, EMPHYSEMA, GERD (gastroesophageal reflux disease), Heart burn, HTN (hypertension), Hypertension, Hypertriglyceridemia (11/7/2013), Hypothyroid, IGT (impaired glucose tolerance), MEDICAL HOME, Osteopenia, Pain, Paroxysmal atrial fibrillation (HCC), Personal history of venous thrombosis and embolism, Pneumonia, Renal disorder, Rheumatic fever, Rheumatic fever, and Unspecified disorder of thyroid.    SOCIAL  "HISTORY  Social History     Tobacco Use   • Smoking status: Former Smoker     Packs/day: 0.25     Years: 30.00     Pack years: 7.50     Types: Cigarettes   • Smokeless tobacco: Never Used   • Tobacco comment: quit 1990 1/2 ppd x ?yrs (on and off for years since age 15)   Substance and Sexual Activity   • Alcohol use: Yes     Alcohol/week: 0.0 oz     Comment: occasional/rare alcohol use   • Drug use: No   • Sexual activity: Never       SURGICAL HISTORY   has a past surgical history that includes hysterectomy, total abdominal (26 years old); excis/unroof renal cyst (1977); hysterectomy radical (1963); other (1963); other (1977); and hemorrhoidectomy (1963).    CURRENT MEDICATIONS  Home Medications    **Home medications have not yet been reviewed for this encounter**         ALLERGIES  Allergies   Allergen Reactions   • Asa [Aspirin]    • Latex    • Penicillins    • Tape Hives, Itching and Swelling   • Wasp Venom Swelling       PHYSICAL EXAM  VITAL SIGNS: /65   Pulse 99   Temp 36.7 °C (98 °F) (Temporal)   Resp (!) 26   Ht 1.803 m (5' 11\")   Wt 63.5 kg (140 lb)   LMP 01/01/1963   SpO2 95%   BMI 19.53 kg/m²     Pulse ox interpretation: I interpret this pulse ox as normal.  Constitutional: Alert in no apparent distress.  Pleasant 83-year-old woman.  HENT: No signs of trauma, Bilateral external ears normal, Nose normal.   Eyes: Pupils are equal, Conjunctiva normal, Non-icteric.   Neck: Normal range of motion, No tenderness, Supple, No stridor.   Cardiovascular: Increased rate with irregularly irregular rhythm, no murmurs. Symmetric distal pulses. No cyanosis of extremities. No peripheral edema of extremities.  Thorax & Lungs: Normal breath sounds, No respiratory distress, No wheezing, No chest tenderness.   Abdomen: Soft, No tenderness, No masses, No pulsatile masses. No peritoneal signs.  Skin: Warm, Dry, No erythema, No rash.   Back: No midline bony tenderness, No CVA tenderness.   Musculoskeletal: Good " range of motion in all major joints. No tenderness to palpation or major deformities noted.   Neurologic: Alert and oriented to person place time and situation, ambulatory, 5/5 strength in all extremities.  Psychiatric: Affect normal, Judgment normal, Mood normal.   Physical Exam      DIAGNOSTIC STUDIES / PROCEDURES    EKG  Results for orders placed or performed during the hospital encounter of 19   EKG   Result Value Ref Range    Report       [facility] Emergency Dept.    Test Date:  2019  Pt Name:    JULES DUNCAN               Department: ER  MRN:        [id number]                      Room:        08  Gender:     Female                       Technician: [id number]  :        [id number]                   Requested By:ER TRIAGE PROTOCOL  Order #:    [id number]                    Reading MD: Chris Quiroz II, MD    Measurements  Intervals                                Axis  Rate:       131                          P:  IL:                                      QRS:        5  QRSD:       98                           T:          108  QT:         328  QTc:        485    Interpretive Statements  Non sinus rhythm with no consistent pwave, irregular  rate 131  Normal QTc  No ST elevation or depression, normal twaves  Atrial arrhythmia fib vs flutter with increased rate    Compared to ECG 2019 12:46:57  T-wave abnormality now present  Sinus bradycardia no longer present  Intraventricular conduction d elay no longer present    Electronically Signed On 2019 1:36:41 PDT by Chris Quiroz II, MD           LABS  Pertinent Labs & Imaging studies reviewed. (See chart for details)    RADIOLOGY  Pertinent Labs & Imaging studies reviewed. (See chart for details)    COURSE & MEDICAL DECISION MAKING  Pertinent Labs & Imaging studies reviewed. (See chart for details)    1:22 AM This is an emergent evaluation of a  83 y.o. female with paroxysmal atrial fibrillation presents with concerns for  increased heart rate.  She has a heart rate approximately 130s that is irregularly irregular rhythm.  See EKG read above.  She is not having chest pain or shortness of breath.  Difference of diagnosis includes electrolyte abnormalities, dehydration, low suspicion for infection.  Low suspicion for MI but will get a troponin to look for any signs of ischemia.  Will give metoprolol 5mg IV once now for rate control and a 500cc bolus of NS for tachycardia.     3:05 AM  Magnesium is low at 1.3.  This will be replaced.  After Toprol 5 mill grams IV heart rate is in the low 100s now.  Troponin T level is slightly elevated at 27.  Her chest x-ray is normal.  I believe she would benefit from observation admission to replace magnesium, trend troponin, and continue rate control treatment for atrial fibrillation.  I spoke with Dr. Jamil and he agrees with admission.  I also went over the plan with Ms. Thomas and her daughter and they are agreeable with admission.    FINAL IMPRESSION    ICD-10-CM   1. Atrial fibrillation, unspecified type (HCC) I48.91   2. Increased heart rate R00.0   3. Hypomagnesemia E83.42   4. Elevated troponin R74.8            This dictation was created using voice recognition software. The accuracy of the dictation is limited to the abilities of the software. I expect there may be some errors of grammar and possibly content. The nursing notes were reviewed and certain aspects of this information were incorporated into this note.    Electronically signed by: Chris Quiroz II, 8/12/2019 1:22 AM

## 2019-08-12 NOTE — CARE PLAN
Problem: Communication  Goal: The ability to communicate needs accurately and effectively will improve  Outcome: PROGRESSING AS EXPECTED   Patient able to communicate effectively. A&Ox4

## 2019-08-12 NOTE — ASSESSMENT & PLAN NOTE
Likely type II NSTEMI in the setting of A. fib with RVR  Continuous cardiac monitoring with serial EKG and troponin

## 2019-08-12 NOTE — ED TRIAGE NOTES
Pt had onset of palpitations PTA, at first pt thought it was anxiety, symptoms continued so she activated EMS. Pt noted to be in a-fib with RVR per medic monitor. Pt denies CP. SOB initially, but pt states that has resolved.

## 2019-08-12 NOTE — ASSESSMENT & PLAN NOTE
IV metoprolol for heart rate sustained greater than 130  Increased oral metoprolol to 25 mg twice daily  Continue Rythmol  Continue Xarelto  Check TSH  Continuous cardiac monitoring

## 2019-08-12 NOTE — PROGRESS NOTES
Pt dcd via private vehicle to home. Family and escort present. All discharge education reviewed and given to pt as well as facility copy. LDAs & monitor removed. No complaints/concerns at this time.

## 2019-08-12 NOTE — DISCHARGE INSTRUCTIONS
Discharge Instructions    Discharged to home by car with relative. Discharged via wheelchair, hospital escort: Yes.  Special equipment needed: Not Applicable    Be sure to schedule a follow-up appointment with your primary care doctor or any specialists as instructed.     Discharge Plan:   Smoking Cessation Offered: Patient Counseled  Influenza Vaccine Indication: Not indicated: Previously immunized this influenza season and > 8 years of age    I understand that a diet low in cholesterol, fat, and sodium is recommended for good health. Unless I have been given specific instructions below for another diet, I accept this instruction as my diet prescription.   Other diet: Cardiac    Special Instructions: None    · Is patient discharged on Warfarin / Coumadin?   No     Rivaroxaban oral tablets  What is this medicine?  RIVAROXABAN (ri va MARTINA a ban) is an anticoagulant (blood thinner). It is used to treat blood clots in the lungs or in the veins. It is also used after knee or hip surgeries to prevent blood clots. It is also used to lower the chance of stroke in people with a medical condition called atrial fibrillation.  This medicine may be used for other purposes; ask your health care provider or pharmacist if you have questions.  COMMON BRAND NAME(S): Xarelto, Xarelto Starter Pack  What should I tell my health care provider before I take this medicine?  They need to know if you have any of these conditions:  -bleeding disorders  -bleeding in the brain  -blood in your stools (black or tarry stools) or if you have blood in your vomit  -history of stomach bleeding  -kidney disease  -liver disease  -low blood counts, like low white cell, platelet, or red cell counts  -recent or planned spinal or epidural procedure  -take medicines that treat or prevent blood clots  -an unusual or allergic reaction to rivaroxaban, other medicines, foods, dyes, or preservatives  -pregnant or trying to get pregnant  -breast-feeding  How  should I use this medicine?  Take this medicine by mouth with a glass of water. Follow the directions on the prescription label. Take your medicine at regular intervals. Do not take it more often than directed. Do not stop taking except on your doctor's advice. Stopping this medicine may increase your risk of a blood clot. Be sure to refill your prescription before you run out of medicine.  If you are taking this medicine after hip or knee replacement surgery, take it with or without food. If you are taking this medicine for atrial fibrillation, take it with your evening meal. If you are taking this medicine to treat blood clots, take it with food at the same time each day. If you are unable to swallow your tablet, you may crush the tablet and mix it in applesauce. Then, immediately eat the applesauce. You should eat more food right after you eat the applesauce containing the crushed tablet.  Talk to your pediatrician regarding the use of this medicine in children. Special care may be needed.  Overdosage: If you think you have taken too much of this medicine contact a poison control center or emergency room at once.  NOTE: This medicine is only for you. Do not share this medicine with others.  What if I miss a dose?  If you take your medicine once a day and miss a dose, take the missed dose as soon as you remember. If you take your medicine twice a day and miss a dose, take the missed dose immediately. In this instance, 2 tablets may be taken at the same time. The next day you should take 1 tablet twice a day as directed.  What may interact with this medicine?  Do not take this medicine with any of the following medications:  -defibrotide  This medicine may also interact with the following medications:  -aspirin and aspirin-like medicines  -certain antibiotics like erythromycin, azithromycin, and clarithromycin  -certain medicines for fungal infections like ketoconazole and itraconazole  -certain medicines for  irregular heart beat like amiodarone, quinidine, dronedarone  -certain medicines for seizures like carbamazepine, phenytoin  -certain medicines that treat or prevent blood clots like warfarin, enoxaparin, and dalteparin  -conivaptan  -diltiazem  -felodipine  -indinavir  -lopinavir; ritonavir  -NSAIDS, medicines for pain and inflammation, like ibuprofen or naproxen  -ranolazine  -rifampin  -ritonavir  -SNRIs, medicines for depression, like desvenlafaxine, duloxetine, levomilnacipran, venlafaxine  -SSRIs, medicines for depression, like citalopram, escitalopram, fluoxetine, fluvoxamine, paroxetine, sertraline  -[place]'s wort  -verapamil  This list may not describe all possible interactions. Give your health care provider a list of all the medicines, herbs, non-prescription drugs, or dietary supplements you use. Also tell them if you smoke, drink alcohol, or use illegal drugs. Some items may interact with your medicine.  What should I watch for while using this medicine?  Visit your doctor or health care professional for regular checks on your progress.  Notify your doctor or health care professional and seek emergency treatment if you develop breathing problems; changes in vision; chest pain; severe, sudden headache; pain, swelling, warmth in the leg; trouble speaking; sudden numbness or weakness of the face, arm or leg. These can be signs that your condition has gotten worse.  If you are going to have surgery or other procedure, tell your doctor that you are taking this medicine.  What side effects may I notice from receiving this medicine?  Side effects that you should report to your doctor or health care professional as soon as possible:  -allergic reactions like skin rash, itching or hives, swelling of the face, lips, or tongue  -back pain  -redness, blistering, peeling or loosening of the skin, including inside the mouth  -signs and symptoms of bleeding such as bloody or black, tarry stools; red or dark-brown  urine; spitting up blood or brown material that looks like coffee grounds; red spots on the skin; unusual bruising or bleeding from the eye, gums, or nose  Side effects that usually do not require medical attention (report to your doctor or health care professional if they continue or are bothersome):  -dizziness  -muscle pain  This list may not describe all possible side effects. Call your doctor for medical advice about side effects. You may report side effects to FDA at 4-805-QGQ-0616.  Where should I keep my medicine?  Keep out of the reach of children.  Store at room temperature between 15 and 30 degrees C (59 and 86 degrees F). Throw away any unused medicine after the expiration date.  NOTE: This sheet is a summary. It may not cover all possible information. If you have questions about this medicine, talk to your doctor, pharmacist, or health care provider.  © 2018 [employer]vier/Gold Standard (2017-09-06 16:29:33)      Atrial Fibrillation  Introduction  Atrial fibrillation is a type of heartbeat that is irregular or fast (rapid). If you have this condition, your heart keeps quivering in a weird (chaotic) way. This condition can make it so your heart cannot pump blood normally. Having this condition gives a person more risk for stroke, heart failure, and other heart problems. There are different types of atrial fibrillation. Talk with your doctor to learn about the type that you have.  Follow these instructions at home:  · Take over-the-counter and prescription medicines only as told by your doctor.  · If your doctor prescribed a blood-thinning medicine, take it exactly as told. Taking too much of it can cause bleeding. If you do not take enough of it, you will not have the protection that you need against stroke and other problems.  · Do not use any tobacco products. These include cigarettes, chewing tobacco, and e-cigarettes. If you need help quitting, ask your doctor.  · If you have apnea (obstructive sleep  apnea), manage it as told by your doctor.  · Do not drink alcohol.  · Do not drink beverages that have caffeine. These include coffee, soda, and tea.  · Maintain a healthy weight. Do not use diet pills unless your doctor says they are safe for you. Diet pills may make heart problems worse.  · Follow diet instructions as told by your doctor.  · Exercise regularly as told by your doctor.  · Keep all follow-up visits as told by your doctor. This is important.  Contact a doctor if:  · You notice a change in the speed, rhythm, or strength of your heartbeat.  · You are taking a blood-thinning medicine and you notice more bruising.  · You get tired more easily when you move or exercise.  Get help right away if:  · You have pain in your chest or your belly (abdomen).  · You have sweating or weakness.  · You feel sick to your stomach (nauseous).  · You notice blood in your throw up (vomit), poop (stool), or pee (urine).  · You are short of breath.  · You suddenly have swollen feet and ankles.  · You feel dizzy.  · Your suddenly get weak or numb in your face, arms, or legs, especially if it happens on one side of your body.  · You have trouble talking, trouble understanding, or both.  · Your face or your eyelid droops on one side.  These symptoms may be an emergency. Do not wait to see if the symptoms will go away. Get medical help right away. Call your local emergency services (911 in the [place] Do not drive yourself to the hospital.   This information is not intended to replace advice given to you by your health care provider. Make sure you discuss any questions you have with your health care provider.  Document Released: 09/26/2009 Document Revised: 05/25/2017 Document Reviewed: 04/13/2016  © 2017 Elsevier        Depression / Suicide Risk    As you are discharged from this [facility] facility, it is important to learn how to keep safe from harming yourself.    Recognize the warning signs:  · Abrupt changes in  personality, positive or negative- including increase in energy   · Giving away possessions  · Change in eating patterns- significant weight changes-  positive or negative  · Change in sleeping patterns- unable to sleep or sleeping all the time   · Unwillingness or inability to communicate  · Depression  · Unusual sadness, discouragement and loneliness  · Talk of wanting to die  · Neglect of personal appearance   · Rebelliousness- reckless behavior  · Withdrawal from people/activities they love  · Confusion- inability to concentrate     If you or a loved one observes any of these behaviors or has concerns about self-harm, here's what you can do:  · Talk about it- your feelings and reasons for harming yourself  · Remove any means that you might use to hurt yourself (examples: pills, rope, extension cords, firearm)  · Get professional help from the community (Mental Health, Substance Abuse, psychological counseling)  · Do not be alone:Call your Safe Contact- someone whom you trust who will be there for you.  · Call your local CRISIS HOTLINE 745-1879 or 015-497-2303  · Call your local Children's Mobile Crisis Response Team [place] (141) 668-0768 or www.Hyperpia  · Call the toll free National Suicide Prevention Hotlines   · National Suicide Prevention Lifeline 554-728-ANSY (7122)  · National Hope Line Network 800-SUICIDE (337-8918)

## 2019-08-12 NOTE — DISCHARGE SUMMARY
Discharge Summary    CHIEF COMPLAINT ON ADMISSION  Chief Complaint   Patient presents with   • Palpitations       Reason for Admission  EMS     Admission Date  8/12/2019    CODE STATUS  Full Code    HPI & HOSPITAL COURSE  This is a 83 y.o. female here with palpitation and chest pain.  Please refer to H&P for detail.  The patient has history of paroxysmal atrial fibrillation and she is on metoprolol and Xarelto.  She saw Dr. Rai as an outpatient.  According to her visit with Dr. Rai earlier this year she was planning to do stress test for her.  Her chest pain has already resolved.  Since she is here I recommended that she should pursue stress test today.  But she refused to get a stress test and she stated that she wanted to follow-up with Dr. Rai as an outpatient and arrange that as an outpatient.  She was also found to have A. fib with RVR and responded well to IV medication.  Currently the patient heart rate is well controlled in the 70-80 range.  I saw and examined the patient upon discharge.  She was instructed to go to nearest emergency room if her symptoms recur.    Please call [phone] to schedule PCP appointment for patient.    Required specialty appointments include:   Dr. HEATHER rai in 1 week      Therefore, she is discharged in fair and stable condition to home with close outpatient follow-up.        Discharge Date  8/12/19    FOLLOW UP ITEMS POST DISCHARGE      DISCHARGE DIAGNOSES  Active Problems:    Paroxysmal atrial fibrillation with RVR (HCC) POA: Yes    Elevated troponin POA: Yes    Hypomagnesemia POA: Yes    GERD (gastroesophageal reflux disease) POA: Yes    Mixed hyperlipidemia POA: Yes  Resolved Problems:    * No resolved hospital problems. *      FOLLOW UP  Future Appointments   Date Time Provider Department Center   10/8/2019 11:30 AM Theresa Rai M.D. [facility] None   10/23/2019 11:20 AM Libertad sEtrella M.D. [facility] [place]     Libertad Estrella M.D.  [address]  [place]  [address]  [facility] [address]  [phone]          Theresa Gruber M.D.  [address]  [address]  [place] [id number] [phone]    In 1 week        MEDICATIONS ON DISCHARGE     Medication List      CHANGE how you take these medications      Instructions   metoprolol 25 MG Tabs  What changed:    · how much to take  · when to take this  · additional instructions  Commonly known as:  LOPRESSOR   Take 1 Tab by mouth 2 Times a Day.  Dose:  25 mg        CONTINUE taking these medications      Instructions   * albuterol 2.5mg/3ml Nebu solution for nebulization  Commonly known as:  PROVENTIL   Doctor's comments:  Authorization of a refill request.  3 mL by Nebulization route every four hours as needed for Shortness of Breath.  Dose:  2.5 mg     * albuterol 108 (90 Base) MCG/ACT Aers inhalation aerosol      ALPRAZolam 1 MG Tabs  Commonly known as:  XANAX   Take 1 Tab by mouth 2 times a day as needed for Sleep or Anxiety for up to 90 days.  Dose:  1 mg     B-12 1000 MCG Caps   Take 1,000 mg by mouth every day at 6 PM.  Dose:  1,000 mg     clobetasol 0.05 % Crea  Commonly known as:  TEMOVATE   Apply 1 Each to affected area(s) as needed.  Dose:  1 Each     fenofibrate micronized 134 MG capsule  Commonly known as:  LOFIBRA   Doctor's comments:  Insurance will pay for 100 day supply, please cancel previous orders that have 30 or 90 day supply  TAKE 1 CAPSULE BY MOUTH EVERY DAY     FIBER SELECT GUMMIES PO   Take  by mouth.     fluticasone 50 MCG/ACT nasal spray  Commonly known as:  FLONASE   SPRAY 1 SPRAY IN NOSE 2 TIMES A DAY. EACH NOSTRIL  Dose:  1 Spray     hydrOXYzine HCl 50 MG Tabs  Commonly known as:  ATARAX   TAKE 1 TABLET BY MOUTH 3 TIMES A DAY AS NEEDED FOR ITCHING.     levothyroxine 88 MCG Tabs  Commonly known as:  SYNTHROID   Take 1 Tab by mouth Every morning on an empty stomach.  Dose:  88 mcg     Linaclotide 145 MCG Caps   Take 1 Cap by mouth every day for 30 days.  Dose:  1 Cap     Mometasone  Furo-Formoterol Fum 200-5 MCG/ACT Aero   Inhale 1 Puff by mouth 2 Times a Day.  Dose:  1 Puff     NEOMYCIN-POLYMYXIN-HC (OTIC) 1 % Soln   Place 4 Drops in ear 2 Times a Day.  Dose:  4 Drop     omeprazole 40 MG delayed-release capsule  Commonly known as:  PRILOSEC   TAKE 1 CAPSULE BY MOUTH EVERY DAY     PROBIOTIC PO   Take  by mouth.     propafenone 150 MG Tabs  Commonly known as:  RYTHMOL   Doctor's comments:  Patient's plan is requesting a 100 day supply. Thank you  TAKE 1 TABLET BY MOUTH TWICE A DAY     rivaroxaban 15 MG Tabs tablet  Commonly known as:  XARELTO   Take 1 Tab by mouth with dinner.  Dose:  15 mg     simvastatin 20 MG Tabs  Commonly known as:  ZOCOR   TAKE 1 TABLET BY MOUTH IN THE EVENING         * This list has 2 medication(s) that are the same as other medications prescribed for you. Read the directions carefully, and ask your doctor or other care provider to review them with you.                Allergies  Allergies   Allergen Reactions   • Asa [Aspirin]    • Latex    • Penicillins    • Tape Hives, Itching and Swelling   • Wasp Venom Swelling       DIET  Orders Placed This Encounter   Procedures   • Diet Order Regular, Cardiac     Standing Status:   Standing     Number of Occurrences:   1     Order Specific Question:   Diet:     Answer:   Regular [1]     Order Specific Question:   Diet:     Answer:   Cardiac [6]       ACTIVITY  As tolerated.  Weight bearing as tolerated    CONSULTATIONS      PROCEDURES      LABORATORY  Lab Results   Component Value Date    SODIUM 140 08/12/2019    POTASSIUM 3.6 08/12/2019    CHLORIDE 105 08/12/2019    CO2 26 08/12/2019    GLUCOSE 120 (H) 08/12/2019    BUN 23 (H) 08/12/2019    CREATININE 0.90 08/12/2019    CREATININE 0.76 03/21/2011    GLOMRATE >59 03/21/2011        Lab Results   Component Value Date    WBC 4.5 (L) 08/12/2019    WBC 4.9 03/21/2011    HEMOGLOBIN 13.6 08/12/2019    HEMATOCRIT 41.5 08/12/2019    PLATELETCT 212 08/12/2019        Total time of the  discharge process exceeds 56 minutes.

## 2019-08-12 NOTE — H&P
Hospital Medicine History & Physical Note    Date of Service  8/12/2019    Primary Care Physician  Libertad Estrella M.D.    Consultants  None    Code Status  Full code    Chief Complaint  Palpitations and chest pain    History of Presenting Illness  83 y.o. female with a past medical history of paroxysmal atrial fibrillation on Xarelto, hypothyroidism, hypertension who presented 8/12/2019 with palpitations and chest pain that started last night.  The patient was in her usual state of health when she suddenly developed palpitations.  She placed a pulse ox on her finger and saw that her heart rate was in the 130s.  She then developed some substernal chest pain without chest pain.  She denied any shortness of breath, diaphoresis, nausea, lightheadedness, fevers, chills or cough.  She has been compliant with her medications.    In the ER she was noted to be in atrial fibrillation with RVR in the 130s.  She was given IV metoprolol and her heart rate improved to 100-120.    Troponin T 27  EKG interpreted by me reveals atrial fibrillation with RVR, rate of 130.  No ST elevation or ST depression noted  Chest x-ray interpreted by me reveals no acute cardiopulmonary process    Review of Systems  Review of Systems   Constitutional: Negative for chills, diaphoresis and fever.   HENT: Negative for hearing loss and sore throat.    Eyes: Negative for blurred vision.   Respiratory: Negative for cough, sputum production, shortness of breath and wheezing.    Cardiovascular: Positive for chest pain and palpitations. Negative for leg swelling.   Gastrointestinal: Negative for abdominal pain, blood in stool, diarrhea, nausea and vomiting.   Genitourinary: Negative for dysuria, flank pain and urgency.   Musculoskeletal: Negative for back pain, joint pain, myalgias and neck pain.   Skin: Negative for rash.   Neurological: Negative for dizziness, focal weakness, seizures and headaches.   Endo/Heme/Allergies: Does not bruise/bleed easily.    Psychiatric/Behavioral: Negative for suicidal ideas.   All other systems reviewed and are negative.      Past Medical History   has a past medical history of Anxiety, Arrhythmia, Arthritis, Backpain, Bronchitis, CATARACT, Chronic lower back pain (3/11/2014), COPD, EMPHYSEMA, GERD (gastroesophageal reflux disease), Heart burn, HTN (hypertension), Hypertension, Hypertriglyceridemia (11/7/2013), Hypothyroid, IGT (impaired glucose tolerance), [facility] HOME, Osteopenia, Pain, Paroxysmal atrial fibrillation (HCC), Personal history of venous thrombosis and embolism, Pneumonia, Renal disorder, Rheumatic fever, Rheumatic fever, and Unspecified disorder of thyroid. She also has no past medical history of Breast cancer (HCC).    Surgical History   has a past surgical history that includes hysterectomy, total abdominal (26 years old); pr excis/unroof renal cyst (1977); hysterectomy radical (1963); other (1963); other (1977); and hemorrhoidectomy (1963).     Family History  family history includes Heart Disease in an other family member; Stroke in her brother.     Social History   reports that she has quit smoking. Her smoking use included cigarettes. She has a 7.50 pack-year smoking history. She has never used smokeless tobacco. She reports that she drinks alcohol. She reports that she does not use drugs.    Allergies  Allergies   Allergen Reactions   • Asa [Aspirin]    • Latex    • Penicillins    • Tape Hives, Itching and Swelling   • Wasp Venom Swelling       Medications  Prior to Admission Medications   Prescriptions Last Dose Informant Patient Reported? Taking?   ALPRAZolam (XANAX) 1 MG Tab   No No   Sig: Take 1 Tab by mouth 2 times a day as needed for Sleep or Anxiety for up to 90 days.   Cyanocobalamin (B-12) 1000 MCG CAPS   Yes No   Sig: Take 1,000 mg by mouth every day at 6 PM.   FIBER SELECT GUMMIES PO   Yes No   Sig: Take  by mouth.   Linaclotide (LINZESS) 145 MCG Cap   No No   Sig: Take 1 Cap by mouth every day for  30 days.   Mometasone Furo-Formoterol Fum (DULERA) 200-5 MCG/ACT Aerosol   No No   Sig: Inhale 1 Puff by mouth 2 Times a Day.   NEOMYCIN-POLYMYXIN-HC, OTIC, 1 % Solution   No No   Sig: Place 4 Drops in ear 2 Times a Day.   Probiotic Product (PROBIOTIC PO)   Yes No   Sig: Take  by mouth.   albuterol (PROVENTIL) 2.5mg/3ml Nebu Soln solution for nebulization   No No   Sig: 3 mL by Nebulization route every four hours as needed for Shortness of Breath.   albuterol 108 (90 Base) MCG/ACT Aero Soln inhalation aerosol   Yes No   clobetasol (TEMOVATE) 0.05 % Cream   Yes No   Sig: Apply 1 Each to affected area(s) as needed.   fenofibrate micronized (LOFIBRA) 134 MG capsule   No No   Sig: TAKE 1 CAPSULE BY MOUTH EVERY DAY   fluticasone (FLONASE) 50 MCG/ACT nasal spray   No No   Sig: SPRAY 1 SPRAY IN NOSE 2 TIMES A DAY. EACH NOSTRIL   hydrOXYzine HCl (ATARAX) 50 MG Tab   No No   Sig: TAKE 1 TABLET BY MOUTH 3 TIMES A DAY AS NEEDED FOR ITCHING.   levothyroxine (SYNTHROID) 88 MCG Tab   No No   Sig: Take 1 Tab by mouth Every morning on an empty stomach.   metoprolol (LOPRESSOR) 25 MG Tab   No No   Sig: Take 0.5 Tabs by mouth 2 times a day. May take 1-2 tabs daily PRN palpitations   omeprazole (PRILOSEC) 40 MG delayed-release capsule   No No   Sig: TAKE 1 CAPSULE BY MOUTH EVERY DAY   propafenone (RYTHMOL) 150 MG Tab   No No   Sig: TAKE 1 TABLET BY MOUTH TWICE A DAY   rivaroxaban (XARELTO) 15 MG Tab tablet   No No   Sig: Take 1 Tab by mouth with dinner.   simvastatin (ZOCOR) 20 MG Tab   No No   Sig: TAKE 1 TABLET BY MOUTH IN THE EVENING      Facility-Administered Medications: None       Physical Exam  Temp:  [36.7 °C (98 °F)] 36.7 °C (98 °F)  Pulse:  [] 99  Resp:  [18-26] 26  BP: (121-124)/(65-75) 124/65  SpO2:  [95 %-97 %] 95 %    Physical Exam   Constitutional: She is oriented to person, place, and time. She appears well-developed and well-nourished. No distress.   HENT:   Head: Normocephalic and atraumatic.   Mouth/Throat:  Oropharynx is clear and moist.   Eyes: Pupils are equal, round, and reactive to light. Conjunctivae are normal. Right eye exhibits no discharge. Left eye exhibits no discharge. No scleral icterus.   Neck: Normal range of motion. Neck supple. No tracheal deviation present.   Cardiovascular: Normal heart sounds.   Tachycardic and irregular   Pulmonary/Chest: Effort normal and breath sounds normal. No stridor. No respiratory distress. She has no wheezes. She has no rales.   Abdominal: Soft. Bowel sounds are normal. She exhibits no distension. There is no tenderness. There is no rebound and no guarding.   Musculoskeletal: Normal range of motion. She exhibits no edema, tenderness or deformity.   Lymphadenopathy:     She has no cervical adenopathy.   Neurological: She is alert and oriented to person, place, and time. No cranial nerve deficit. Coordination normal.   Skin: Skin is warm and dry. No rash noted. She is not diaphoretic. No erythema.   Psychiatric: She has a normal mood and affect. Her behavior is normal.   Nursing note and vitals reviewed.      Laboratory:  Recent Labs     08/12/19 0114   WBC 4.5*   RBC 4.38   HEMOGLOBIN 13.6   HEMATOCRIT 41.5   MCV 94.7   MCH 31.1   MCHC 32.8*   RDW 44.0   PLATELETCT 212   MPV 10.0     Recent Labs     08/12/19 0114   SODIUM 140   POTASSIUM 3.6   CHLORIDE 105   CO2 26   GLUCOSE 120*   BUN 23*   CREATININE 0.90   CALCIUM 10.1     Recent Labs     08/12/19 0114   ALTSGPT 9   ASTSGOT 11*   ALKPHOSPHAT 41   TBILIRUBIN 0.4   GLUCOSE 120*         No results for input(s): NTPROBNP in the last 72 hours.      Recent Labs     08/12/19 0114   TROPONINT 27*       Urinalysis:    No results found     Imaging:  DX-CHEST-PORTABLE (1 VIEW)   Final Result         1.  No acute cardiopulmonary disease.   2.  Atherosclerosis            Assessment/Plan:  I anticipate this patient is appropriate for observation status at this time.    Paroxysmal atrial fibrillation with RVR (HCC)- (present on  admission)  Assessment & Plan  IV metoprolol for heart rate sustained greater than 130  Increased oral metoprolol to 25 mg twice daily  Continue Rythmol  Continue Xarelto  Check TSH  Continuous cardiac monitoring    GERD (gastroesophageal reflux disease)  Assessment & Plan  Continue omeprazole    Hypomagnesemia- (present on admission)  Assessment & Plan  Replete with IV mag sulfate 2 g    Elevated troponin- (present on admission)  Assessment & Plan  Likely type II NSTEMI in the setting of A. fib with RVR  Continuous cardiac monitoring with serial EKG and troponin      VTE prophylaxis: Xarelto

## 2019-08-13 ENCOUNTER — TELEPHONE (OUTPATIENT)
Dept: MEDICAL GROUP | Facility: PHYSICIAN GROUP | Age: 84
End: 2019-08-13

## 2019-08-13 ENCOUNTER — PATIENT OUTREACH (OUTPATIENT)
Dept: HEALTH INFORMATION MANAGEMENT | Facility: OTHER | Age: 84
End: 2019-08-13

## 2019-08-13 NOTE — TELEPHONE ENCOUNTER
1. Caller Name: Eun                                          Call Back Number: [phone] (home)        Patient approves a detailed voicemail message: N\A    Pt was just in the the hospial and her metoprolol script changed by the see physican. Pt would like to know if she should continue with new sig or switch back to the sig PCP had her on prior to ER visit. Please advise.     New sig:    metoprolol (LOPRESSOR) 25 MG Tab 60 Tab 0/0 8/12/2019     Sig - Route: Take 1 Tab by mouth 2 Times a Day. - Oral

## 2019-08-19 ENCOUNTER — TELEPHONE (OUTPATIENT)
Dept: MEDICAL GROUP | Facility: PHYSICIAN GROUP | Age: 84
End: 2019-08-19

## 2019-08-19 NOTE — TELEPHONE ENCOUNTER
ESTABLISHED PATIENT PRE-VISIT PLANNING     Patient was NOT contacted to complete PVP.    1.  Reviewed notes from the last few office visits within the medical group: Yes    2.  If any orders were placed at last visit or intended to be done for this visit (i.e. 6 mos follow-up), do we have Results/Consult Notes?        •  Labs - Labs ordered, completed on 07/18/2019 and results are in chart.       •  Imaging - Imaging was not ordered at last office visit.       •  Referrals - No referrals were ordered at last office visit.    3. Is this appointment scheduled as a Hospital Follow-Up? Yes, visit was at [facility].     4.  Immunizations were updated in [facility] using WebIZ?: Yes       •  Web Iz Recommendations: FLU, TDAP, VARICELLA (Chicken Pox)  and SHINGRIX (Shingles)    5.  Patient is due for the following Health Maintenance Topics:   Health Maintenance Due   Topic Date Due   • Annual Wellness Visit  06/03/2016   • IMM PNEUMOCOCCAL VACCINE: 65+ Years (2 of 2 - PPSV23) 10/20/2017   • IMM INFLUENZA (1) 09/01/2019     6. Orders for overdue Health Maintenance topics pended in Pre-Charting? NO    7.  AHA (MDX) form printed for Provider? No, already completed    8.  Patient was NOT informed to arrive 15 min prior to their scheduled appointment and bring in their medication bottles.

## 2019-08-20 ENCOUNTER — OFFICE VISIT (OUTPATIENT)
Dept: MEDICAL GROUP | Facility: PHYSICIAN GROUP | Age: 84
End: 2019-08-20
Payer: MEDICARE

## 2019-08-20 VITALS
TEMPERATURE: 97.1 F | SYSTOLIC BLOOD PRESSURE: 112 MMHG | WEIGHT: 145.6 LBS | BODY MASS INDEX: 20.38 KG/M2 | HEIGHT: 71 IN | DIASTOLIC BLOOD PRESSURE: 62 MMHG | HEART RATE: 56 BPM | RESPIRATION RATE: 18 BRPM | OXYGEN SATURATION: 91 %

## 2019-08-20 DIAGNOSIS — K59.09 CHRONIC CONSTIPATION: ICD-10-CM

## 2019-08-20 DIAGNOSIS — I48.0 PAROXYSMAL ATRIAL FIBRILLATION WITH RVR (HCC): ICD-10-CM

## 2019-08-20 DIAGNOSIS — E83.42 HYPOMAGNESEMIA: ICD-10-CM

## 2019-08-20 DIAGNOSIS — F41.1 GENERALIZED ANXIETY DISORDER: ICD-10-CM

## 2019-08-20 PROBLEM — F33.41 RECURRENT MAJOR DEPRESSIVE DISORDER, IN PARTIAL REMISSION (HCC): Status: RESOLVED | Noted: 2019-01-08 | Resolved: 2019-08-20

## 2019-08-20 PROCEDURE — 99214 OFFICE O/P EST MOD 30 MIN: CPT | Performed by: FAMILY MEDICINE

## 2019-08-20 NOTE — PROGRESS NOTES
Chief Complaint   Patient presents with   • Atrial Fibrillation   • Hospital Follow-up       HISTORY OF PRESENT ILLNESS: Patient is a 83 y.o. female established patient here today for the following concerns:    1. Paroxysmal atrial fibrillation with RVR (HCC)  Here for hospital follow up.  Had CP found to have afib with RVR and hypomagnesium.  Her rate was controlled and felt much better, release home on metoprolol BID with her rhythmol.  She reports that she has not had any further episodes.  We had reduced her metoprolol to 12.5 mg and 25 mg to try to help with some symptomatic bradycardia.      2. Generalized anxiety disorder  In addition, continues on alprazolam up to twice daily for anxiety.  Denies depression, but is going through some grief and loneliness since her neighbor and family friend passed, who used to do a lot of work around the house and keep her company.      3. Chronic constipation  Continues to struggle with constipation.  She reports the linzess worked too well.  Back on fiber and hydration.      4. Hypomagnesemia  Found to have low mag in the hospital and given IV replacement.  She does not typically take it daily.       Past Medical, Social, and Family history reviewed and updated in EPIC    Allergies:Asa [aspirin]; Latex; Penicillins; Tape; and Wasp venom    Current Outpatient Medications   Medication Sig Dispense Refill   • magnesium oxide (MAG-OX) 400 (241.3 Mg) MG Tab tablet Take 1 Tab by mouth every day for 360 days. 90 Tab 3   • metoprolol (LOPRESSOR) 25 MG Tab Take 1 Tab by mouth 2 Times a Day. 60 Tab 0   • fenofibrate micronized (LOFIBRA) 134 MG capsule TAKE 1 CAPSULE BY MOUTH EVERY  Cap 3   • rivaroxaban (XARELTO) 15 MG Tab tablet Take 1 Tab by mouth with dinner. 90 Tab 3   • ALPRAZolam (XANAX) 1 MG Tab Take 1 Tab by mouth 2 times a day as needed for Sleep or Anxiety for up to 90 days. 180 Tab 1   • propafenone (RYTHMOL) 150 MG Tab TAKE 1 TABLET BY MOUTH TWICE A  Tab 3    • omeprazole (PRILOSEC) 40 MG delayed-release capsule TAKE 1 CAPSULE BY MOUTH EVERY DAY 90 Cap 1   • albuterol 108 (90 Base) MCG/ACT Aero Soln inhalation aerosol      • levothyroxine (SYNTHROID) 88 MCG Tab Take 1 Tab by mouth Every morning on an empty stomach. 90 Tab 3   • simvastatin (ZOCOR) 20 MG Tab TAKE 1 TABLET BY MOUTH IN THE EVENING 90 Tab 3   • hydrOXYzine HCl (ATARAX) 50 MG Tab TAKE 1 TABLET BY MOUTH 3 TIMES A DAY AS NEEDED FOR ITCHING. 90 Tab 2   • NEOMYCIN-POLYMYXIN-HC, OTIC, 1 % Solution Place 4 Drops in ear 2 Times a Day. 1 Bottle 0   • fluticasone (FLONASE) 50 MCG/ACT nasal spray SPRAY 1 SPRAY IN NOSE 2 TIMES A DAY. EACH NOSTRIL 3 Bottle 0   • Mometasone Furo-Formoterol Fum (DULERA) 200-5 MCG/ACT Aerosol Inhale 1 Puff by mouth 2 Times a Day. 1 Inhaler 11   • albuterol (PROVENTIL) 2.5mg/3ml Nebu Soln solution for nebulization 3 mL by Nebulization route every four hours as needed for Shortness of Breath. 75 mL 1   • FIBER SELECT GUMMIES PO Take  by mouth.     • Probiotic Product (PROBIOTIC PO) Take  by mouth.     • clobetasol (TEMOVATE) 0.05 % Cream Apply 1 Each to affected area(s) as needed.  5   • Cyanocobalamin (B-12) 1000 MCG CAPS Take 1,000 mg by mouth every day at 6 PM.       No current facility-administered medications for this visit.          ROS:  Review of Systems   Constitutional: Negative for fever, chills, weight loss and malaise/fatigue.   HENT: Negative for ear pain, nosebleeds, congestion, sore throat and neck pain.    Eyes: Negative for blurred vision.   Respiratory: Negative for cough, sputum production, shortness of breath and wheezing.    Cardiovascular: Negative for chest pain, palpitations,  and leg swelling.   Gastrointestinal: Negative for heartburn, nausea, vomiting, diarrhea and abdominal pain.   Genitourinary: Negative for dysuria, urgency and frequency.   Musculoskeletal: Negative for myalgias, back pain and joint pain.   Skin: Negative for rash and itching.   Neurological:  "Negative for dizziness, tingling, tremors, sensory change, focal weakness and headaches.   Endo/Heme/Allergies: Does not bruise/bleed easily.   Psychiatric/Behavioral: Negative for depression, anxiety, suicidal ideas, insomnia and memory loss.      Exam:  /62 (BP Location: Right arm, Patient Position: Sitting, BP Cuff Size: Adult)   Pulse (!) 56   Temp 36.2 °C (97.1 °F)   Resp 18   Ht 1.803 m (5' 11\")   Wt 66 kg (145 lb 9.6 oz)   SpO2 91%     General:  Well nourished, well developed in NAD  Head is grossly normal.  Neck: Supple without JVD   Pulmonary:  Normal effort.   Cardiovascular: Regular rate  Extremities: no clubbing, cyanosis, or edema.  Psych: affect appropriate      Please note that this dictation was created using voice recognition software. I have made every reasonable attempt to correct obvious errors, but I expect that there are errors of grammar and possibly content that I did not discover before finalizing the note.    Assessment/Plan:  1. Paroxysmal atrial fibrillation with RVR (HCC)  Continue metoprolol at 25 mg BID and rhythmol   Follow up with cardiology     2. Generalized anxiety disorder  Continue alprazolam as needed.     3. Chronic constipation  Will add daily magnesium to see if this will help.  Continue fiber and water.     4. Hypomagnesemia  - magnesium oxide (MAG-OX) 400 (241.3 Mg) MG Tab tablet; Take 1 Tab by mouth every day for 360 days.  Dispense: 90 Tab; Refill: 3    2 month follow up, sooner prn.         "

## 2019-08-21 ENCOUNTER — OFFICE VISIT (OUTPATIENT)
Dept: CARDIOLOGY | Facility: MEDICAL CENTER | Age: 84
End: 2019-08-21
Payer: MEDICARE

## 2019-08-21 VITALS
HEIGHT: 71 IN | HEART RATE: 60 BPM | BODY MASS INDEX: 20.44 KG/M2 | WEIGHT: 146 LBS | SYSTOLIC BLOOD PRESSURE: 144 MMHG | DIASTOLIC BLOOD PRESSURE: 62 MMHG | OXYGEN SATURATION: 95 %

## 2019-08-21 DIAGNOSIS — Z79.01 ANTICOAGULATED: ICD-10-CM

## 2019-08-21 DIAGNOSIS — I10 ESSENTIAL HYPERTENSION: ICD-10-CM

## 2019-08-21 DIAGNOSIS — E78.2 MIXED HYPERLIPIDEMIA: ICD-10-CM

## 2019-08-21 DIAGNOSIS — I48.21 PERMANENT ATRIAL FIBRILLATION (HCC): ICD-10-CM

## 2019-08-21 DIAGNOSIS — I65.23 ATHEROSCLEROSIS OF BOTH CAROTID ARTERIES: ICD-10-CM

## 2019-08-21 PROBLEM — R79.89 ELEVATED TROPONIN: Status: RESOLVED | Noted: 2019-08-12 | Resolved: 2019-08-21

## 2019-08-21 PROBLEM — E83.42 HYPOMAGNESEMIA: Status: RESOLVED | Noted: 2019-08-12 | Resolved: 2019-08-21

## 2019-08-21 PROBLEM — I48.0 PAROXYSMAL ATRIAL FIBRILLATION WITH RVR (HCC): Status: RESOLVED | Noted: 2019-08-12 | Resolved: 2019-08-21

## 2019-08-21 PROCEDURE — 99214 OFFICE O/P EST MOD 30 MIN: CPT | Performed by: INTERNAL MEDICINE

## 2019-08-21 ASSESSMENT — ENCOUNTER SYMPTOMS
FALLS: 0
NERVOUS/ANXIOUS: 1
LOSS OF CONSCIOUSNESS: 0
HEMOPTYSIS: 0
INSOMNIA: 1
WHEEZING: 0
COUGH: 0
MYALGIAS: 0
ABDOMINAL PAIN: 0
BRUISES/BLEEDS EASILY: 0
FEVER: 0
WEIGHT LOSS: 0
DIZZINESS: 0
MEMORY LOSS: 0
CLAUDICATION: 0
SHORTNESS OF BREATH: 1
PALPITATIONS: 1
DEPRESSION: 0
EYE PAIN: 0
VOMITING: 0
EYE DISCHARGE: 0
NAUSEA: 0
BLURRED VISION: 0
CHILLS: 0

## 2019-08-26 ENCOUNTER — TELEPHONE (OUTPATIENT)
Dept: CARDIOLOGY | Facility: MEDICAL CENTER | Age: 84
End: 2019-08-26

## 2019-08-26 NOTE — TELEPHONE ENCOUNTER
Notes from [place] last week:    Atrial fibrillation  My concern in the past has been her use of benzo diazepam discussed with her  I did increase her evening propafenone for 1 week.  We will call her on Monday and see if she feels better.  EKG in the emergency room showed normal QRS.

## 2019-08-26 NOTE — TELEPHONE ENCOUNTER
I called Eun and she could not recall taking an extra propafenone, and then I called daughter Faviola-- and Faviola said that she thought her Mom knew about the directions and did so.  At any rate, Eun feels ok this week without complaints.  Faviola will let us know if she finds out anything different.

## 2019-08-26 NOTE — TELEPHONE ENCOUNTER
TY.    She keeps calling 911 everytime she is in AF, then the ER makes an urgent visit.    As long as they understand the plan of care - we are good.    Tx again

## 2019-09-03 ENCOUNTER — TELEPHONE (OUTPATIENT)
Dept: CARDIOLOGY | Facility: MEDICAL CENTER | Age: 84
End: 2019-09-03

## 2019-09-03 DIAGNOSIS — I48.91 ATRIAL FIBRILLATION, UNSPECIFIED TYPE (HCC): ICD-10-CM

## 2019-09-04 NOTE — TELEPHONE ENCOUNTER
Prescription Question   [facility] [place], Med Ass't routed conversation to You 10 hours ago (7:54 AM)      Eun Be Letitia L, M.D. 2 days ago     Please let me know if there are samples of Xarelto available at the clinic pharmacy. Mom has enough until Wednesday. Thanks again! Faviola Morales      Received [employer] message from pt daughter, Faviola.    Xarelto Samples available through [facility] pharmacy.     Xarelto request sent to [facility] pharmacy.    Replied to Faviola regarding findings.

## 2019-09-11 ENCOUNTER — PATIENT OUTREACH (OUTPATIENT)
Dept: HEALTH INFORMATION MANAGEMENT | Facility: OTHER | Age: 84
End: 2019-09-11

## 2019-09-15 ENCOUNTER — APPOINTMENT (OUTPATIENT)
Dept: RADIOLOGY | Facility: MEDICAL CENTER | Age: 84
DRG: 887 | End: 2019-09-15
Attending: EMERGENCY MEDICINE
Payer: MEDICARE

## 2019-09-15 ENCOUNTER — APPOINTMENT (OUTPATIENT)
Dept: RADIOLOGY | Facility: MEDICAL CENTER | Age: 84
DRG: 887 | End: 2019-09-15
Attending: HOSPITALIST
Payer: MEDICARE

## 2019-09-15 ENCOUNTER — HOSPITAL ENCOUNTER (INPATIENT)
Facility: MEDICAL CENTER | Age: 84
LOS: 1 days | DRG: 887 | End: 2019-09-19
Attending: EMERGENCY MEDICINE | Admitting: HOSPITALIST
Payer: MEDICARE

## 2019-09-15 DIAGNOSIS — I10 ESSENTIAL HYPERTENSION: ICD-10-CM

## 2019-09-15 DIAGNOSIS — Z79.01 ANTICOAGULATED: ICD-10-CM

## 2019-09-15 DIAGNOSIS — I44.7 LBBB (LEFT BUNDLE BRANCH BLOCK): ICD-10-CM

## 2019-09-15 DIAGNOSIS — F41.9 ANXIETY: ICD-10-CM

## 2019-09-15 DIAGNOSIS — F13.20 SEDATIVE, HYPNOTIC OR ANXIOLYTIC DEPENDENCE (HCC): ICD-10-CM

## 2019-09-15 DIAGNOSIS — R55 SYNCOPE AND COLLAPSE: ICD-10-CM
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
## 2019-09-15 LAB
ALBUMIN SERPL BCP-MCNC: 4.2 G/DL (ref 3.2–4.9)
ALBUMIN/GLOB SERPL: 1.7 G/DL
ALP SERPL-CCNC: 31 U/L (ref 30–99)
ALT SERPL-CCNC: 8 U/L (ref 2–50)
ANION GAP SERPL CALC-SCNC: 7 MMOL/L (ref 0–11.9)
APPEARANCE UR: CLEAR
APTT PPP: 31.5 SEC (ref 24.7–36)
AST SERPL-CCNC: 10 U/L (ref 12–45)
BACTERIA #/AREA URNS HPF: NEGATIVE /HPF
BASOPHILS # BLD AUTO: 0.6 % (ref 0–1.8)
BASOPHILS # BLD: 0.03 K/UL (ref 0–0.12)
BILIRUB SERPL-MCNC: 0.7 MG/DL (ref 0.1–1.5)
BILIRUB UR QL STRIP.AUTO: NEGATIVE
BUN SERPL-MCNC: 23 MG/DL (ref 8–22)
CALCIUM SERPL-MCNC: 10.2 MG/DL (ref 8.5–10.5)
CHLORIDE SERPL-SCNC: 98 MMOL/L (ref 96–112)
CK SERPL-CCNC: 95 U/L (ref 0–154)
CO2 SERPL-SCNC: 28 MMOL/L (ref 20–33)
COLOR UR: YELLOW
CREAT SERPL-MCNC: 1.02 MG/DL (ref 0.5–1.4)
EKG IMPRESSION: NORMAL
EOSINOPHIL # BLD AUTO: 0.09 K/UL (ref 0–0.51)
EOSINOPHIL NFR BLD: 1.7 % (ref 0–6.9)
EPI CELLS #/AREA URNS HPF: ABNORMAL /HPF
ERYTHROCYTE [DISTWIDTH] IN BLOOD BY AUTOMATED COUNT: 43 FL (ref 35.9–50)
GLOBULIN SER CALC-MCNC: 2.5 G/DL (ref 1.9–3.5)
GLUCOSE SERPL-MCNC: 92 MG/DL (ref 65–99)
GLUCOSE UR STRIP.AUTO-MCNC: NEGATIVE MG/DL
HCT VFR BLD AUTO: 43.1 % (ref 37–47)
HGB BLD-MCNC: 14.2 G/DL (ref 12–16)
HYALINE CASTS #/AREA URNS LPF: ABNORMAL /LPF
IMM GRANULOCYTES # BLD AUTO: 0.01 K/UL (ref 0–0.11)
IMM GRANULOCYTES NFR BLD AUTO: 0.2 % (ref 0–0.9)
INR PPP: 1 (ref 0.87–1.13)
KETONES UR STRIP.AUTO-MCNC: NEGATIVE MG/DL
LEUKOCYTE ESTERASE UR QL STRIP.AUTO: ABNORMAL
LYMPHOCYTES # BLD AUTO: 0.7 K/UL (ref 1–4.8)
LYMPHOCYTES NFR BLD: 13 % (ref 22–41)
MCH RBC QN AUTO: 31.3 PG (ref 27–33)
MCHC RBC AUTO-ENTMCNC: 32.9 G/DL (ref 33.6–35)
MCV RBC AUTO: 95.1 FL (ref 81.4–97.8)
MICRO URNS: ABNORMAL
MONOCYTES # BLD AUTO: 0.28 K/UL (ref 0–0.85)
MONOCYTES NFR BLD AUTO: 5.2 % (ref 0–13.4)
NEUTROPHILS # BLD AUTO: 4.26 K/UL (ref 2–7.15)
NEUTROPHILS NFR BLD: 79.3 % (ref 44–72)
NITRITE UR QL STRIP.AUTO: NEGATIVE
NRBC # BLD AUTO: 0 K/UL
NRBC BLD-RTO: 0 /100 WBC
PH UR STRIP.AUTO: 6.5 [PH] (ref 5–8)
PLATELET # BLD AUTO: 171 K/UL (ref 164–446)
PMV BLD AUTO: 9.6 FL (ref 9–12.9)
POTASSIUM SERPL-SCNC: 4.3 MMOL/L (ref 3.6–5.5)
PROT SERPL-MCNC: 6.7 G/DL (ref 6–8.2)
PROT UR QL STRIP: NEGATIVE MG/DL
PROTHROMBIN TIME: 13.3 SEC (ref 12–14.6)
RBC # BLD AUTO: 4.53 M/UL (ref 4.2–5.4)
RBC # URNS HPF: ABNORMAL /HPF
RBC UR QL AUTO: NEGATIVE
SODIUM SERPL-SCNC: 133 MMOL/L (ref 135–145)
SP GR UR STRIP.AUTO: 1.01
TROPONIN T SERPL-MCNC: 16 NG/L (ref 6–19)
UROBILINOGEN UR STRIP.AUTO-MCNC: 0.2 MG/DL
WBC # BLD AUTO: 5.4 K/UL (ref 4.8–10.8)
WBC #/AREA URNS HPF: ABNORMAL /HPF

## 2019-09-15 PROCEDURE — 99285 EMERGENCY DEPT VISIT HI MDM: CPT

## 2019-09-15 PROCEDURE — 85025 COMPLETE CBC W/AUTO DIFF WBC: CPT

## 2019-09-15 PROCEDURE — 71045 X-RAY EXAM CHEST 1 VIEW: CPT

## 2019-09-15 PROCEDURE — G0378 HOSPITAL OBSERVATION PER HR: HCPCS

## 2019-09-15 PROCEDURE — 80053 COMPREHEN METABOLIC PANEL: CPT

## 2019-09-15 PROCEDURE — 72100 X-RAY EXAM L-S SPINE 2/3 VWS: CPT

## 2019-09-15 PROCEDURE — 81001 URINALYSIS AUTO W/SCOPE: CPT

## 2019-09-15 PROCEDURE — 72070 X-RAY EXAM THORAC SPINE 2VWS: CPT

## 2019-09-15 PROCEDURE — 70450 CT HEAD/BRAIN W/O DYE: CPT

## 2019-09-15 PROCEDURE — 87086 URINE CULTURE/COLONY COUNT: CPT

## 2019-09-15 PROCEDURE — 82550 ASSAY OF CK (CPK): CPT

## 2019-09-15 PROCEDURE — 93005 ELECTROCARDIOGRAM TRACING: CPT | Performed by: EMERGENCY MEDICINE

## 2019-09-15 PROCEDURE — 85610 PROTHROMBIN TIME: CPT

## 2019-09-15 PROCEDURE — 99220 PR INITIAL OBSERVATION CARE,LEVL III: CPT | Performed by: HOSPITALIST

## 2019-09-15 PROCEDURE — 72170 X-RAY EXAM OF PELVIS: CPT

## 2019-09-15 PROCEDURE — A9270 NON-COVERED ITEM OR SERVICE: HCPCS | Performed by: HOSPITALIST

## 2019-09-15 PROCEDURE — 84484 ASSAY OF TROPONIN QUANT: CPT

## 2019-09-15 PROCEDURE — 85730 THROMBOPLASTIN TIME PARTIAL: CPT

## 2019-09-15 PROCEDURE — 700102 HCHG RX REV CODE 250 W/ 637 OVERRIDE(OP): Performed by: HOSPITALIST

## 2019-09-15 RX ORDER — ALBUTEROL SULFATE 90 UG/1
2 AEROSOL, METERED RESPIRATORY (INHALATION)
Status: DISCONTINUED | OUTPATIENT
Start: 2019-09-15 | End: 2019-09-19 | Stop reason: HOSPADM

## 2019-09-15 RX ORDER — LEVOTHYROXINE SODIUM 88 UG/1
88 TABLET ORAL
Status: DISCONTINUED | OUTPATIENT
Start: 2019-09-15 | End: 2019-09-19 | Stop reason: HOSPADM

## 2019-09-15 RX ORDER — BUDESONIDE AND FORMOTEROL FUMARATE DIHYDRATE 160; 4.5 UG/1; UG/1
2 AEROSOL RESPIRATORY (INHALATION) 2 TIMES DAILY
Status: DISCONTINUED | OUTPATIENT
Start: 2019-09-15 | End: 2019-09-19 | Stop reason: HOSPADM

## 2019-09-15 RX ORDER — AMOXICILLIN 250 MG
2 CAPSULE ORAL 2 TIMES DAILY
Status: DISCONTINUED | OUTPATIENT
Start: 2019-09-15 | End: 2019-09-19 | Stop reason: HOSPADM

## 2019-09-15 RX ORDER — PROPAFENONE HYDROCHLORIDE 150 MG/1
150 TABLET, COATED ORAL 2 TIMES DAILY
Status: DISCONTINUED | OUTPATIENT
Start: 2019-09-15 | End: 2019-09-19 | Stop reason: HOSPADM

## 2019-09-15 RX ORDER — FENOFIBRATE 134 MG/1
134 CAPSULE ORAL DAILY
Status: DISCONTINUED | OUTPATIENT
Start: 2019-09-15 | End: 2019-09-19 | Stop reason: HOSPADM

## 2019-09-15 RX ORDER — ENALAPRILAT 1.25 MG/ML
1.25 INJECTION INTRAVENOUS EVERY 6 HOURS PRN
Status: DISCONTINUED | OUTPATIENT
Start: 2019-09-15 | End: 2019-09-19 | Stop reason: HOSPADM

## 2019-09-15 RX ORDER — ACETAMINOPHEN 325 MG/1
650 TABLET ORAL EVERY 6 HOURS PRN
Status: DISCONTINUED | OUTPATIENT
Start: 2019-09-15 | End: 2019-09-19 | Stop reason: HOSPADM

## 2019-09-15 RX ORDER — SIMVASTATIN 20 MG
20 TABLET ORAL EVERY EVENING
Status: DISCONTINUED | OUTPATIENT
Start: 2019-09-15 | End: 2019-09-19 | Stop reason: HOSPADM

## 2019-09-15 RX ORDER — POLYETHYLENE GLYCOL 3350 17 G/17G
1 POWDER, FOR SOLUTION ORAL
Status: DISCONTINUED | OUTPATIENT
Start: 2019-09-15 | End: 2019-09-19 | Stop reason: HOSPADM

## 2019-09-15 RX ORDER — OMEPRAZOLE 20 MG/1
40 CAPSULE, DELAYED RELEASE ORAL DAILY
Status: DISCONTINUED | OUTPATIENT
Start: 2019-09-15 | End: 2019-09-19 | Stop reason: HOSPADM

## 2019-09-15 RX ORDER — ALPRAZOLAM 0.25 MG/1
0.25 TABLET ORAL 3 TIMES DAILY PRN
Status: DISCONTINUED | OUTPATIENT
Start: 2019-09-15 | End: 2019-09-19 | Stop reason: HOSPADM

## 2019-09-15 RX ORDER — BISACODYL 10 MG
10 SUPPOSITORY, RECTAL RECTAL
Status: DISCONTINUED | OUTPATIENT
Start: 2019-09-15 | End: 2019-09-19 | Stop reason: HOSPADM

## 2019-09-15 RX ORDER — FLUTICASONE PROPIONATE 50 MCG
1 SPRAY, SUSPENSION (ML) NASAL 2 TIMES DAILY
Status: DISCONTINUED | OUTPATIENT
Start: 2019-09-15 | End: 2019-09-19 | Stop reason: HOSPADM

## 2019-09-15 RX ADMIN — SIMVASTATIN 20 MG: 20 TABLET, FILM COATED ORAL at 17:46

## 2019-09-15 RX ADMIN — METOPROLOL TARTRATE 25 MG: 25 TABLET, FILM COATED ORAL at 14:58

## 2019-09-15 RX ADMIN — MAGNESIUM GLUCONATE 500 MG ORAL TABLET 400 MG: 500 TABLET ORAL at 14:58

## 2019-09-15 RX ADMIN — RIVAROXABAN 15 MG: 15 TABLET, FILM COATED ORAL at 17:46

## 2019-09-15 RX ADMIN — BUDESONIDE AND FORMOTEROL FUMARATE DIHYDRATE 2 PUFF: 160; 4.5 AEROSOL RESPIRATORY (INHALATION) at 17:45

## 2019-09-15 RX ADMIN — OMEPRAZOLE 40 MG: 20 CAPSULE, DELAYED RELEASE ORAL at 14:58

## 2019-09-15 RX ADMIN — FENOFIBRATE 134 MG: 134 CAPSULE ORAL at 17:45

## 2019-09-15 RX ADMIN — ACETAMINOPHEN 650 MG: 325 TABLET, FILM COATED ORAL at 17:46

## 2019-09-15 RX ADMIN — PROPAFENONE HYDROCHLORIDE 150 MG: 150 TABLET, FILM COATED ORAL at 17:45

## 2019-09-15 ASSESSMENT — ENCOUNTER SYMPTOMS
DIZZINESS: 1
BLURRED VISION: 0
SINUS PAIN: 0
VOMITING: 0
NERVOUS/ANXIOUS: 0
NAUSEA: 0
SORE THROAT: 0
SENSORY CHANGE: 0
FOCAL WEAKNESS: 0
SPEECH CHANGE: 0
FEVER: 0
INSOMNIA: 1
HEADACHES: 0
SHORTNESS OF BREATH: 0
ABDOMINAL PAIN: 0
PALPITATIONS: 0
BACK PAIN: 1
CHILLS: 0

## 2019-09-15 ASSESSMENT — LIFESTYLE VARIABLES
DOES PATIENT WANT TO STOP DRINKING: YES
HAVE PEOPLE ANNOYED YOU BY CRITICIZING YOUR DRINKING: NO
DOES PATIENT WANT TO TALK TO SOMEONE ABOUT QUITTING: NO
AVERAGE NUMBER OF DAYS PER WEEK YOU HAVE A DRINK CONTAINING ALCOHOL: 5
EVER_SMOKED: YES
CONSUMPTION TOTAL: NEGATIVE
PACK_YEARS: 5
HAVE YOU EVER FELT YOU SHOULD CUT DOWN ON YOUR DRINKING: NO
EVER FELT BAD OR GUILTY ABOUT YOUR DRINKING: NO
ALCOHOL_USE: YES
ON A TYPICAL DAY WHEN YOU DRINK ALCOHOL HOW MANY DRINKS DO YOU HAVE: 1
TOTAL SCORE: 0
TOTAL SCORE: 0
EVER HAD A DRINK FIRST THING IN THE MORNING TO STEADY YOUR NERVES TO GET RID OF A HANGOVER: NO
HOW MANY TIMES IN THE PAST YEAR HAVE YOU HAD 5 OR MORE DRINKS IN A DAY: 0
TOTAL SCORE: 0

## 2019-09-15 ASSESSMENT — COGNITIVE AND FUNCTIONAL STATUS - GENERAL
TOILETING: A LITTLE
CLIMB 3 TO 5 STEPS WITH RAILING: A LITTLE
WALKING IN HOSPITAL ROOM: A LITTLE
MOVING FROM LYING ON BACK TO SITTING ON SIDE OF FLAT BED: A LITTLE
MOVING TO AND FROM BED TO CHAIR: A LITTLE
DAILY ACTIVITIY SCORE: 22
STANDING UP FROM CHAIR USING ARMS: A LITTLE
SUGGESTED CMS G CODE MODIFIER MOBILITY: CK
DRESSING REGULAR LOWER BODY CLOTHING: A LITTLE
SUGGESTED CMS G CODE MODIFIER DAILY ACTIVITY: CJ
MOBILITY SCORE: 19

## 2019-09-15 ASSESSMENT — CHA2DS2 SCORE
CHA2DS2 VASC SCORE: 4
VASCULAR DISEASE: NO
CHF OR LEFT VENTRICULAR DYSFUNCTION: NO
PRIOR STROKE OR TIA OR THROMBOEMBOLISM: NO
DIABETES: NO
SEX: FEMALE
HYPERTENSION: YES
AGE 75 OR GREATER: YES
AGE 65 TO 74: NO

## 2019-09-15 ASSESSMENT — COPD QUESTIONNAIRES
HAVE YOU SMOKED AT LEAST 100 CIGARETTES IN YOUR ENTIRE LIFE: YES
DURING THE PAST 4 WEEKS HOW MUCH DID YOU FEEL SHORT OF BREATH: SOME OF THE TIME
DO YOU EVER COUGH UP ANY MUCUS OR PHLEGM?: YES, A FEW DAYS A WEEK OR MONTH
IN THE PAST 12 MONTHS DO YOU DO LESS THAN YOU USED TO BECAUSE OF YOUR BREATHING PROBLEMS: AGREE
COPD SCREENING SCORE: 7

## 2019-09-15 ASSESSMENT — PATIENT HEALTH QUESTIONNAIRE - PHQ9
SUM OF ALL RESPONSES TO PHQ9 QUESTIONS 1 AND 2: 1
4. FEELING TIRED OR HAVING LITTLE ENERGY: MORE THAN HALF THE DAYS
8. MOVING OR SPEAKING SO SLOWLY THAT OTHER PEOPLE COULD HAVE NOTICED. OR THE OPPOSITE, BEING SO FIGETY OR RESTLESS THAT YOU HAVE BEEN MOVING AROUND A LOT MORE THAN USUAL: NOT AT ALL
SUM OF ALL RESPONSES TO PHQ QUESTIONS 1-9: 7
6. FEELING BAD ABOUT YOURSELF - OR THAT YOU ARE A FAILURE OR HAVE LET YOURSELF OR YOUR FAMILY DOWN: NOT AL ALL
2. FEELING DOWN, DEPRESSED, IRRITABLE, OR HOPELESS: SEVERAL DAYS
1. LITTLE INTEREST OR PLEASURE IN DOING THINGS: NOT AT ALL
5. POOR APPETITE OR OVEREATING: SEVERAL DAYS
7. TROUBLE CONCENTRATING ON THINGS, SUCH AS READING THE NEWSPAPER OR WATCHING TELEVISION: MORE THAN HALF THE DAYS
3. TROUBLE FALLING OR STAYING ASLEEP OR SLEEPING TOO MUCH: SEVERAL DAYS
9. THOUGHTS THAT YOU WOULD BE BETTER OFF DEAD, OR OF HURTING YOURSELF: NOT AT ALL

## 2019-09-15 NOTE — ED NOTES
Assisted with bedpan. Verbalizes understanding of plan for admission. Given phone to talk to son. Awaiting bed assignment.

## 2019-09-15 NOTE — ED NOTES
Verbalizes understanding of POC. PIV established. Blood and urine samples sent to lab. To CT scan by debbie.

## 2019-09-15 NOTE — PROGRESS NOTES
Patient transferred to floor, placed on tele box, and monitor room notified. Patient has complaints of leg/back pain, will medicate per MAR, and no indications of distress. Bed in the lowest position and call light and personal belongings within reach.

## 2019-09-15 NOTE — ED TRIAGE NOTES
Chief Complaint   Patient presents with   • T-5000 GLF     this morning while going to restroom. Reports not being able to get up for 1 hour and crawled to phone to call her son. Denies injury     BIB [facility] for above. Hypertensive on arrival. Chart up for ERP.

## 2019-09-15 NOTE — ED PROVIDER NOTES
ED Provider Note    Scribed for Kacey Brcue M.D. by Sánchez Ag. 9/15/2019, 10:04 AM.    Primary care provider: Libertad Estrella M.D.  Means of arrival: EMS  History obtained from: Patient  History limited by: None    CHIEF COMPLAINT  Chief Complaint   Patient presents with   • T-5000 GLF     this morning while going to restroom. Reports not being able to get up for 1 hour and crawled to phone to call her son. Denies injury       HPI  Eun Be is a 83 y.o. female who presents to the Emergency Department for evaluation after she experienced a ground level fall overnight. The patient woke up at 1:30 AM this morning secondary to anxiety, which is not uncommon for her. She took two Xanax at 5:00 AM. When she tried to get up out of bed to go the bathroom she could not lift herself out of bed secondary to generalized weakness and pain in her legs, feet, and back. Patient eventually got out of bed, but fell to the ground. She was able to crawl to her phone and call her son. Since the fall, she complains of worsened back, ankle, and hip pain. She denies blood in stool. She denies head injury, loss of consciousness, or headache. She denies prodromal symptoms included palpitations, shortness of breath, dizziness, or chest pain. Patient lives at alone home. She has history of insomnia and anxiety and is prescribed 1-2 Xanax as needed for sleep. She has a history atrial fibrillation and is anticoagulated with Xarelto.     REVIEW OF SYSTEMS  Pertinent positives include fall, weakness, leg pain, back pain, ankle pain, foot pain. Pertinent negatives include no blood in stool, head injury, loss of consciousness, headache, palpitations, shortness of breath, dizziness, or chest pain. All other systems reviewed and negative.     PAST MEDICAL HISTORY   has a past medical history of Anxiety, Arrhythmia, Arthritis, Backpain, Bronchitis, CATARACT, Chronic lower back pain (3/11/2014), COPD, EMPHYSEMA, GERD  (gastroesophageal reflux disease), Heart burn, HTN (hypertension), Hypertension, Hypertriglyceridemia (11/7/2013), Hypothyroid, IGT (impaired glucose tolerance), MEDICAL HOME, Osteopenia, Pain, Paroxysmal atrial fibrillation (HCC), Personal history of venous thrombosis and embolism, Pneumonia, Renal disorder, Rheumatic fever, Rheumatic fever, and Unspecified disorder of thyroid.    SURGICAL HISTORY   has a past surgical history that includes hysterectomy, total abdominal (26 years old); excis/unroof renal cyst (1977); hysterectomy radical (1963); other (1963); other (1977); and hemorrhoidectomy (1963).    SOCIAL HISTORY  Social History     Tobacco Use   • Smoking status: Former Smoker     Packs/day: 0.25     Years: 30.00     Pack years: 7.50     Types: Cigarettes   • Smokeless tobacco: Never Used   • Tobacco comment: quit 1990 1/2 ppd x ?yrs (on and off for years since age 15)   Substance Use Topics   • Alcohol use: Yes     Alcohol/week: 0.0 oz     Comment: occasional/rare alcohol use   • Drug use: No      Social History     Substance and Sexual Activity   Drug Use No       FAMILY HISTORY  Family History   Problem Relation Age of Onset   • Stroke Brother    • Heart Disease Other        CURRENT MEDICATIONS  No current facility-administered medications on file prior to encounter.      Current Outpatient Medications on File Prior to Encounter   Medication Sig Dispense Refill   • rivaroxaban (XARELTO) 15 MG Tab tablet Take 1 Tab by mouth with dinner. SAMPLE. 30 Tab 0   • magnesium oxide (MAG-OX) 400 (241.3 Mg) MG Tab tablet Take 1 Tab by mouth every day for 360 days. 90 Tab 3   • metoprolol (LOPRESSOR) 25 MG Tab Take 1 Tab by mouth 2 Times a Day. 60 Tab 0   • fenofibrate micronized (LOFIBRA) 134 MG capsule TAKE 1 CAPSULE BY MOUTH EVERY  Cap 3   • ALPRAZolam (XANAX) 1 MG Tab Take 1 Tab by mouth 2 times a day as needed for Sleep or Anxiety for up to 90 days. 180 Tab 1   • propafenone (RYTHMOL) 150 MG Tab TAKE 1  "TABLET BY MOUTH TWICE A  Tab 3   • omeprazole (PRILOSEC) 40 MG delayed-release capsule TAKE 1 CAPSULE BY MOUTH EVERY DAY 90 Cap 1   • albuterol 108 (90 Base) MCG/ACT Aero Soln inhalation aerosol      • levothyroxine (SYNTHROID) 88 MCG Tab Take 1 Tab by mouth Every morning on an empty stomach. 90 Tab 3   • simvastatin (ZOCOR) 20 MG Tab TAKE 1 TABLET BY MOUTH IN THE EVENING 90 Tab 3   • hydrOXYzine HCl (ATARAX) 50 MG Tab TAKE 1 TABLET BY MOUTH 3 TIMES A DAY AS NEEDED FOR ITCHING. 90 Tab 2   • fluticasone (FLONASE) 50 MCG/ACT nasal spray SPRAY 1 SPRAY IN NOSE 2 TIMES A DAY. EACH NOSTRIL 3 Bottle 0   • Mometasone Furo-Formoterol Fum (DULERA) 200-5 MCG/ACT Aerosol Inhale 1 Puff by mouth 2 Times a Day. 1 Inhaler 11   • albuterol (PROVENTIL) 2.5mg/3ml Nebu Soln solution for nebulization 3 mL by Nebulization route every four hours as needed for Shortness of Breath. 75 mL 1   • FIBER SELECT GUMMIES PO Take  by mouth.     • Probiotic Product (PROBIOTIC PO) Take  by mouth.     • clobetasol (TEMOVATE) 0.05 % Cream Apply 1 Each to affected area(s) as needed.  5   • Cyanocobalamin (B-12) 1000 MCG CAPS Take 1,000 mg by mouth every day at 6 PM.         ALLERGIES  Allergies   Allergen Reactions   • Asa [Aspirin]    • Latex    • Penicillins    • Tape Hives, Itching and Swelling   • Wasp Venom Swelling       PHYSICAL EXAM  VITAL SIGNS: BP (!) 198/80   Pulse 66   Temp 36.4 °C (97.6 °F) (Temporal)   Resp 18   Ht 1.803 m (5' 11\")   Wt 65.8 kg (145 lb)   LMP 01/01/1963   SpO2 92%   BMI 20.22 kg/m²     Constitutional:  Laying in bed, able to answer questions  HENT: Nose is normal in appearance without rhinorrhea, external ears are normal,  moist mucous membranes, NC in place  Eyes: Anicteric,  pupils are equal round and reactive, there is no conjunctival drainage or pallor   Neck: The trachea is midline, there is no obvious mass or meningeal signs  Cardiovascular: Equal radial pulsation, regular rate and rhythm without " murmurs gallops or rubs  Thorax & Lungs: Respiratory rate and effort are normal. There is normal chest excursion with respiration.  No wheezes rhonchi or rales noted.  Abdomen: Abdomen is normal in appearance, normal bowel sounds, no pain with cough, nonpulsatile  :  No CVA tenderness to palpation  Musculoskeletal: No deformities noted in all 4 extremities. Actively moves all 4 extremities. Diffuse pain with any touch.  Skin: Visualized skin is warm, no erythema, no rash.  Neurologic:  Cranial nerves II through XII are intact there is no focal abnormality noted. Able to lift extremities x4 against gravity with pain. No focal deficit noticed. Does appear globally weakness.  Psychiatric: Normal mood and mentation    DIAGNOSTIC STUDIES / PROCEDURES    LABS  Results for orders placed or performed during the hospital encounter of 09/15/19   CBC WITH DIFFERENTIAL   Result Value Ref Range    WBC 5.4 4.8 - 10.8 K/uL    RBC 4.53 4.20 - 5.40 M/uL    Hemoglobin 14.2 12.0 - 16.0 g/dL    Hematocrit 43.1 37.0 - 47.0 %    MCV 95.1 81.4 - 97.8 fL    MCH 31.3 27.0 - 33.0 pg    MCHC 32.9 (L) 33.6 - 35.0 g/dL    RDW 43.0 35.9 - 50.0 fL    Platelet Count 171 164 - 446 K/uL    MPV 9.6 9.0 - 12.9 fL    Neutrophils-Polys 79.30 (H) 44.00 - 72.00 %    Lymphocytes 13.00 (L) 22.00 - 41.00 %    Monocytes 5.20 0.00 - 13.40 %    Eosinophils 1.70 0.00 - 6.90 %    Basophils 0.60 0.00 - 1.80 %    Immature Granulocytes 0.20 0.00 - 0.90 %    Nucleated RBC 0.00 /100 WBC    Neutrophils (Absolute) 4.26 2.00 - 7.15 K/uL    Lymphs (Absolute) 0.70 (L) 1.00 - 4.80 K/uL    Monos (Absolute) 0.28 0.00 - 0.85 K/uL    Eos (Absolute) 0.09 0.00 - 0.51 K/uL    Baso (Absolute) 0.03 0.00 - 0.12 K/uL    Immature Granulocytes (abs) 0.01 0.00 - 0.11 K/uL    NRBC (Absolute) 0.00 K/uL   COMP METABOLIC PANEL   Result Value Ref Range    Sodium 133 (L) 135 - 145 mmol/L    Potassium 4.3 3.6 - 5.5 mmol/L    Chloride 98 96 - 112 mmol/L    Co2 28 20 - 33 mmol/L    Anion Gap  7.0 0.0 - 11.9    Glucose 92 65 - 99 mg/dL    Bun 23 (H) 8 - 22 mg/dL    Creatinine 1.02 0.50 - 1.40 mg/dL    Calcium 10.2 8.5 - 10.5 mg/dL    AST(SGOT) 10 (L) 12 - 45 U/L    ALT(SGPT) 8 2 - 50 U/L    Alkaline Phosphatase 31 30 - 99 U/L    Total Bilirubin 0.7 0.1 - 1.5 mg/dL    Albumin 4.2 3.2 - 4.9 g/dL    Total Protein 6.7 6.0 - 8.2 g/dL    Globulin 2.5 1.9 - 3.5 g/dL    A-G Ratio 1.7 g/dL   TROPONIN   Result Value Ref Range    Troponin T 16 6 - 19 ng/L   PRTOTHROMBIN TIME (INR)   Result Value Ref Range    PT 13.3 12.0 - 14.6 sec    INR 1.00 0.87 - 1.13   APTT   Result Value Ref Range    APTT 31.5 24.7 - 36.0 sec   URINALYSIS,CULTURE IF INDICATED   Result Value Ref Range    Color Yellow     Character Clear     Specific Gravity 1.011 <1.035    Ph 6.5 5.0 - 8.0    Glucose Negative Negative mg/dL    Ketones Negative Negative mg/dL    Protein Negative Negative mg/dL    Bilirubin Negative Negative    Urobilinogen, Urine 0.2 Negative    Nitrite Negative Negative    Leukocyte Esterase Large (A) Negative    Occult Blood Negative Negative    Micro Urine Req Microscopic    CREATINE KINASE   Result Value Ref Range    CPK Total 95 0 - 154 U/L   URINE MICROSCOPIC (W/UA)   Result Value Ref Range    WBC 5-10 (A) /hpf    RBC 0-2 /hpf    Bacteria Negative None /hpf    Epithelial Cells Few /hpf    Hyaline Cast 0-2 /lpf   ESTIMATED GFR   Result Value Ref Range    GFR If African American >60 >60 mL/min/1.73 m 2    GFR If Non African American 52 (A) >60 mL/min/1.73 m 2   EKG   Result Value Ref Range    Report       [facility] Emergency Dept.    Test Date:  2019-09-15  Pt Name:    JULES DUNCAN               Department: ER  MRN:        [id number]                      Room:       [facility]  Gender:     Female                       Technician: [id number]  :        [id number]                   Requested By:CRUZITO FIELDS  Order #:    [id number]                    Reading MD:    Measurements  Intervals                                 Axis  Rate:       65                           P:          259  MT:         160                          QRS:        -44  QRSD:       164                          T:          79  QT:         460  QTc:        479    Interpretive Statements  ECTOPIC ATRIAL RHYTHM  LEFT BUNDLE BRANCH BLOCK  Compared to ECG 08/12/2019 01:13:46  Ectopic atrial rhythm now present  Left bundle-branch block now present  Sinus rhythm no longer present  ST (T wave) deviation no longer present         All labs reviewed by me.    EKG  I interpreted this EKG myself.  This is a 12-lead study.  Left bundle branch block with a rate of 65.  There are no ST segment nor T wave abnormalities.  Interpretation: No ST segment elevation myocardial infarction. Left bundle branch block.  Prior EKG demonstrates interventricular conduction delay with QRS of 108. Today's QRS is 164    RADIOLOGY  DX-PELVIS-1 OR 2 VIEWS   Final Result      No pelvic fracture.      DX-CHEST-PORTABLE (1 VIEW)   Final Result      No acute cardiopulmonary disease.      CT-HEAD W/O   Final Result      1.  Diffuse atrophy and white matter changes.   2.  No acute intracranial hemorrhage or territorial infarct.           The radiologist's interpretation of all radiological studies have been reviewed by me.    COURSE & MEDICAL DECISION MAKING  Nursing notes and vital signs were reviewed. (See chart for details)  The patient's  records were reviewed. History was obtained from the patien;    The patient presents with a fall from bed secondary generalized weakness, and joint pain, and the differential diagnosis includes but is not limited to fall vs syncope secondary to oversedation. CT for anticoagulation, possible intracranial hemorrhage. X-ray to rule out obvious fracture, although, unlikely secondary to exam. We'll check electrolytes. Rule out pneumonia or UTI.      10:04 AM Initial orders in the Emergency Department included DX pelvis 1 or 2 views, DX chest portable 1 view, CT  head without contrast, U/A culture if indicated, creatine kinase, CBC with differential, CMP, Troponin, PT/INR, APTT, and EKG.    11:37 AM I discussed the patient's case and the above findings with Dr. Xiao (Hospitalist) who agrees to admit and will transfer care of the patient at this time.    ED tests reveal LBBB, new, no ICH, will be admitted for concern for syncope in setting of LBBB, unable to rule out cardiogenic source.    Additional work-up planned includes the need for admittance.  This was discussed with with the patient who understands and agrees.      DISPOSITION:  Patient will be admitted to Dr. Xiao (Hospitalist) in guarded condition.     FINAL IMPRESSION  1. Fall, initial encounter    2. LBBB (left bundle branch block)          I, Sánchez Ag (Scribe), am scribing for, and in the presence of, Kacey Bruce M.D..    Electronically signed by: Sánchez Ag (Scribe), 9/15/2019    IKacey M.D. personally performed the services described in this documentation, as scribed by Sánchez Ag in my presence, and it is both accurate and complete.    C    The note accurately reflects work and decisions made by me.  Kacey Bruce  9/15/2019  5:07 PM

## 2019-09-16 ENCOUNTER — APPOINTMENT (OUTPATIENT)
Dept: RADIOLOGY | Facility: MEDICAL CENTER | Age: 84
DRG: 887 | End: 2019-09-16
Attending: HOSPITALIST
Payer: MEDICARE

## 2019-09-16 LAB
ANION GAP SERPL CALC-SCNC: 7 MMOL/L (ref 0–11.9)
BASOPHILS # BLD AUTO: 0.7 % (ref 0–1.8)
BASOPHILS # BLD: 0.03 K/UL (ref 0–0.12)
BUN SERPL-MCNC: 23 MG/DL (ref 8–22)
CALCIUM SERPL-MCNC: 9.5 MG/DL (ref 8.5–10.5)
CHLORIDE SERPL-SCNC: 100 MMOL/L (ref 96–112)
CO2 SERPL-SCNC: 27 MMOL/L (ref 20–33)
CREAT SERPL-MCNC: 0.83 MG/DL (ref 0.5–1.4)
EOSINOPHIL # BLD AUTO: 0.12 K/UL (ref 0–0.51)
EOSINOPHIL NFR BLD: 2.8 % (ref 0–6.9)
ERYTHROCYTE [DISTWIDTH] IN BLOOD BY AUTOMATED COUNT: 42.1 FL (ref 35.9–50)
GLUCOSE SERPL-MCNC: 100 MG/DL (ref 65–99)
HCT VFR BLD AUTO: 39.8 % (ref 37–47)
HGB BLD-MCNC: 13.1 G/DL (ref 12–16)
IMM GRANULOCYTES # BLD AUTO: 0.02 K/UL (ref 0–0.11)
IMM GRANULOCYTES NFR BLD AUTO: 0.5 % (ref 0–0.9)
LYMPHOCYTES # BLD AUTO: 1.03 K/UL (ref 1–4.8)
LYMPHOCYTES NFR BLD: 24 % (ref 22–41)
MCH RBC QN AUTO: 31.1 PG (ref 27–33)
MCHC RBC AUTO-ENTMCNC: 32.9 G/DL (ref 33.6–35)
MCV RBC AUTO: 94.5 FL (ref 81.4–97.8)
MONOCYTES # BLD AUTO: 0.36 K/UL (ref 0–0.85)
MONOCYTES NFR BLD AUTO: 8.4 % (ref 0–13.4)
NEUTROPHILS # BLD AUTO: 2.74 K/UL (ref 2–7.15)
NEUTROPHILS NFR BLD: 63.6 % (ref 44–72)
NRBC # BLD AUTO: 0 K/UL
NRBC BLD-RTO: 0 /100 WBC
PLATELET # BLD AUTO: 157 K/UL (ref 164–446)
PMV BLD AUTO: 9.8 FL (ref 9–12.9)
POTASSIUM SERPL-SCNC: 4.1 MMOL/L (ref 3.6–5.5)
RBC # BLD AUTO: 4.21 M/UL (ref 4.2–5.4)
SODIUM SERPL-SCNC: 134 MMOL/L (ref 135–145)
WBC # BLD AUTO: 4.3 K/UL (ref 4.8–10.8)

## 2019-09-16 PROCEDURE — 93880 EXTRACRANIAL BILAT STUDY: CPT

## 2019-09-16 PROCEDURE — 700102 HCHG RX REV CODE 250 W/ 637 OVERRIDE(OP): Performed by: HOSPITALIST

## 2019-09-16 PROCEDURE — G0378 HOSPITAL OBSERVATION PER HR: HCPCS

## 2019-09-16 PROCEDURE — 80048 BASIC METABOLIC PNL TOTAL CA: CPT

## 2019-09-16 PROCEDURE — 85025 COMPLETE CBC W/AUTO DIFF WBC: CPT

## 2019-09-16 PROCEDURE — A9270 NON-COVERED ITEM OR SERVICE: HCPCS | Performed by: HOSPITALIST

## 2019-09-16 PROCEDURE — 93880 EXTRACRANIAL BILAT STUDY: CPT | Mod: 26 | Performed by: INTERNAL MEDICINE

## 2019-09-16 PROCEDURE — 96375 TX/PRO/DX INJ NEW DRUG ADDON: CPT

## 2019-09-16 PROCEDURE — 99225 PR SUBSEQUENT OBSERVATION CARE,LEVEL II: CPT | Performed by: HOSPITALIST

## 2019-09-16 PROCEDURE — 36415 COLL VENOUS BLD VENIPUNCTURE: CPT

## 2019-09-16 PROCEDURE — 700111 HCHG RX REV CODE 636 W/ 250 OVERRIDE (IP): Performed by: HOSPITALIST

## 2019-09-16 RX ORDER — LISINOPRIL 10 MG/1
10 TABLET ORAL
Status: DISCONTINUED | OUTPATIENT
Start: 2019-09-16 | End: 2019-09-18

## 2019-09-16 RX ADMIN — LEVOTHYROXINE SODIUM 88 MCG: 88 TABLET ORAL at 05:58

## 2019-09-16 RX ADMIN — ALBUTEROL SULFATE 2 PUFF: 90 AEROSOL, METERED RESPIRATORY (INHALATION) at 22:20

## 2019-09-16 RX ADMIN — METOPROLOL TARTRATE 25 MG: 25 TABLET, FILM COATED ORAL at 17:14

## 2019-09-16 RX ADMIN — PROPAFENONE HYDROCHLORIDE 150 MG: 150 TABLET, FILM COATED ORAL at 05:57

## 2019-09-16 RX ADMIN — PROPAFENONE HYDROCHLORIDE 150 MG: 150 TABLET, FILM COATED ORAL at 17:14

## 2019-09-16 RX ADMIN — OMEPRAZOLE 40 MG: 20 CAPSULE, DELAYED RELEASE ORAL at 05:58

## 2019-09-16 RX ADMIN — SIMVASTATIN 20 MG: 20 TABLET, FILM COATED ORAL at 17:14

## 2019-09-16 RX ADMIN — RIVAROXABAN 15 MG: 15 TABLET, FILM COATED ORAL at 17:14

## 2019-09-16 RX ADMIN — MAGNESIUM GLUCONATE 500 MG ORAL TABLET 400 MG: 500 TABLET ORAL at 05:57

## 2019-09-16 RX ADMIN — FENOFIBRATE 134 MG: 134 CAPSULE ORAL at 05:58

## 2019-09-16 RX ADMIN — ACETAMINOPHEN 650 MG: 325 TABLET, FILM COATED ORAL at 16:09

## 2019-09-16 RX ADMIN — LISINOPRIL 10 MG: 10 TABLET ORAL at 11:16

## 2019-09-16 RX ADMIN — ALPRAZOLAM 0.25 MG: 0.25 TABLET ORAL at 22:20

## 2019-09-16 RX ADMIN — ALBUTEROL SULFATE 2 PUFF: 90 AEROSOL, METERED RESPIRATORY (INHALATION) at 16:09

## 2019-09-16 RX ADMIN — ACETAMINOPHEN 650 MG: 325 TABLET, FILM COATED ORAL at 05:58

## 2019-09-16 RX ADMIN — BUDESONIDE AND FORMOTEROL FUMARATE DIHYDRATE 2 PUFF: 160; 4.5 AEROSOL RESPIRATORY (INHALATION) at 05:58

## 2019-09-16 RX ADMIN — FLUTICASONE PROPIONATE 50 MCG: 50 SPRAY, METERED NASAL at 05:58

## 2019-09-16 RX ADMIN — ENALAPRILAT 1.25 MG: 1.25 INJECTION INTRAVENOUS at 08:47

## 2019-09-16 RX ADMIN — BUDESONIDE AND FORMOTEROL FUMARATE DIHYDRATE 2 PUFF: 160; 4.5 AEROSOL RESPIRATORY (INHALATION) at 17:15

## 2019-09-16 ASSESSMENT — ENCOUNTER SYMPTOMS
FALLS: 0
CONSTIPATION: 1
CHILLS: 0
PALPITATIONS: 0
SPUTUM PRODUCTION: 0
DIZZINESS: 1
LOSS OF CONSCIOUSNESS: 0
VOMITING: 0
HEADACHES: 0
SORE THROAT: 0
NERVOUS/ANXIOUS: 1
BACK PAIN: 1
BLOOD IN STOOL: 0
ABDOMINAL PAIN: 0
NAUSEA: 0
WEAKNESS: 1
FLANK PAIN: 0
SHORTNESS OF BREATH: 0
FEVER: 0

## 2019-09-16 NOTE — RESPIRATORY CARE
COPD EDUCATION by COPD CLINICAL EDUCATOR  9/16/2019 at 6:15 AM by Marco Pérez     Patient reviewed by COPD education team. Patient does not have a history or diagnosis of COPD and is a former smoker quit in 1999. therefore does not qualify for the COPD program.

## 2019-09-16 NOTE — CARE PLAN
Problem: Communication  Goal: The ability to communicate needs accurately and effectively will improve  Outcome: PROGRESSING AS EXPECTED  Intervention: Educate patient and significant other/support system about the plan of care, procedures, treatments, medications and allow for questions  Note:   Patient addresses any questions or concerns that she has with her treatment, care, and medications.     Problem: Venous Thromboembolism (VTW)/Deep Vein Thrombosis (DVT) Prevention:  Goal: Patient will participate in Venous Thrombosis (VTE)/Deep Vein Thrombosis (DVT)Prevention Measures  Outcome: PROGRESSING AS EXPECTED  Intervention: Encourage patient to perform ankle flex, foot rotation, and knee flex exercises in addition to other prophylatic measures every hour while awake  Note:   Patient is able to ambulate and is educated on the importance of moving her extremities.

## 2019-09-16 NOTE — CARE PLAN
Problem: Safety  Goal: Will remain free from injury  Outcome: PROGRESSING AS EXPECTED  Note:   Pt mobility assessed at beginning of shift. Pt is x1 assist w/ FWW. Fall precautions in place. Non-slip socks on. Bed in lowest locked position. Bed alarm on. Call light within reach. Pt educated to call for assistance and verbalizes understanding.       Problem: Infection  Goal: Will remain free from infection  Outcome: PROGRESSING AS EXPECTED  Note:   Pt educated on importance of hand hygiene and oral care. Standard precautions in place.

## 2019-09-16 NOTE — ASSESSMENT & PLAN NOTE
Secondary to weakness/over sedation from xanax? Likely causes use of Xanax and her insomnia. Head CT negative for any acute bleed.  - no falls or lightheadedness since being admitted and getting decreased xanax dose  - Continue anticoagulation  - carotid US normal  - s/p gentle IVF  - orthostatics positive but she was asymptomatic, stressed importance of good hydration and regular po intake  - PT/OT : recommending home health  - continue tele monitoring

## 2019-09-16 NOTE — PROGRESS NOTES
2 RN skin check complete w/ Estrella.   Devices in place Oxygen.  Skin assessed under devices Ears red but blanching.  Confirmed pressure ulcers found on NA.  New potential pressure ulcers noted on NA. Wound consult placed NA.  The following interventions in place Silicon oxygen tubing and education about turning Q2 hours    Patient's bilateral ears are red but blanching, Sacrum is red but blanching, rash on Lt anterior knee, bilateral heels are red/boggy and blanching, and calluses on bilateral medial foot.

## 2019-09-16 NOTE — ASSESSMENT & PLAN NOTE
Hypertensive urgency on admission, continues to have hypertension  - continue home metop, propafenone   - started on lisinopril, will increase tomorrow 10mg-->20mg, will need f/u BMP in 1 week from [place]  - vasotec PRN

## 2019-09-16 NOTE — ASSESSMENT & PLAN NOTE
Attempt non-narcotic and non-benzodiazepine meds. Takes xanax at home  - melatonin not on formulary but encourage her to try on discharge  - sleep hygiene

## 2019-09-16 NOTE — H&P
Hospital Medicine History & Physical Note    Date of Service  9/15/2019    Primary Care Physician  Libertad Estrella M.D.    Consultants  none    Code Status  FULL    Chief Complaint  Syncope/Fall    History of Presenting Illness  83 y.o. female with a history of chronic insomnia that took 2 Xanax last night to help her sleep and upon waking up got out of bed stood up and states she slumped to the ground.  She denied doing any traumas to her head neck or back although she has some ongoing back pain.  Patient denied any traumas to her knee.  Patient is on chronic anticoagulation.  There was concern of a new left bundle branch block on EKG.  She denies any chest pain nor any some significant shortness of breath.  She states she is been too weak to get around her house was brought into the hospital.      Review of Systems  Review of Systems   Constitutional: Positive for malaise/fatigue. Negative for chills and fever.   HENT: Negative for sinus pain and sore throat.    Eyes: Negative for blurred vision.   Respiratory: Negative for shortness of breath.    Cardiovascular: Negative for chest pain, palpitations and leg swelling.   Gastrointestinal: Negative for abdominal pain, nausea and vomiting.   Genitourinary: Negative for dysuria and frequency.   Musculoskeletal: Positive for back pain. Negative for joint pain.   Neurological: Positive for dizziness. Negative for sensory change, speech change, focal weakness and headaches.   Psychiatric/Behavioral: The patient has insomnia. The patient is not nervous/anxious.        Past Medical History   has a past medical history of Anxiety, Arrhythmia, Arthritis, Backpain, Bronchitis, CATARACT, Chronic lower back pain (3/11/2014), COPD, EMPHYSEMA, GERD (gastroesophageal reflux disease), Heart burn, HTN (hypertension), Hypertension, Hypertriglyceridemia (11/7/2013), Hypothyroid, IGT (impaired glucose tolerance), MEDICAL HOME, Osteopenia, Pain, Paroxysmal atrial fibrillation (HCC),  Personal history of venous thrombosis and embolism, Pneumonia, Renal disorder, Rheumatic fever, Rheumatic fever, and Unspecified disorder of thyroid. She also has no past medical history of Breast cancer (HCC).    Surgical History   has a past surgical history that includes hysterectomy, total abdominal (26 years old); pr excis/unroof renal cyst (1977); hysterectomy radical (1963); other (1963); other (1977); and hemorrhoidectomy (1963).     Family History  family history includes Heart Disease in an other family member; Stroke in her brother.     Social History   reports that she quit smoking about 20 years ago. Her smoking use included cigarettes. She has a 7.50 pack-year smoking history. She has never used smokeless tobacco. She reports that she drinks alcohol. She reports that she does not use drugs.    Allergies  Allergies   Allergen Reactions   • Asa [Aspirin]    • Latex    • Penicillins    • Tape Hives, Itching and Swelling   • Wasp Venom Swelling       Medications  Prior to Admission Medications   Prescriptions Last Dose Informant Patient Reported? Taking?   ALPRAZolam (XANAX) 1 MG Tab 9/14/2019 at Unknown time  No No   Sig: Take 1 Tab by mouth 2 times a day as needed for Sleep or Anxiety for up to 90 days.   FIBER SELECT GUMMIES PO unknown at Unknown time  Yes No   Sig: Take 1 Tab by mouth 2 times a day as needed (for constipation).   Mometasone Furo-Formoterol Fum (DULERA) 200-5 MCG/ACT Aerosol 9/14/2019 at Unknown time  No No   Sig: Inhale 1 Puff by mouth 2 Times a Day.   albuterol 108 (90 Base) MCG/ACT Aero Soln inhalation aerosol unknown at Unknown time  Yes No   clobetasol (TEMOVATE) 0.05 % Cream unknown at Unknown time  Yes No   Sig: Apply 1 Each to affected area(s) 2 times a day as needed.   fenofibrate micronized (LOFIBRA) 134 MG capsule 9/14/2019 at Unknown time  No No   Sig: TAKE 1 CAPSULE BY MOUTH EVERY DAY   fluticasone (FLONASE) 50 MCG/ACT nasal spray 9/14/2019 at Unknown time  No No   Sig:  SPRAY 1 SPRAY IN NOSE 2 TIMES A DAY. EACH NOSTRIL   hydrOXYzine HCl (ATARAX) 50 MG Tab unknown at Unknown time  No No   Sig: TAKE 1 TABLET BY MOUTH 3 TIMES A DAY AS NEEDED FOR ITCHING.   levothyroxine (SYNTHROID) 88 MCG Tab 9/14/2019 at Unknown time  No No   Sig: Take 1 Tab by mouth Every morning on an empty stomach.   magnesium oxide (MAG-OX) 400 (241.3 Mg) MG Tab tablet 9/14/2019 at Unknown time  No No   Sig: Take 1 Tab by mouth every day for 360 days.   metoprolol (LOPRESSOR) 25 MG Tab 9/14/2019 at Unknown time  No No   Sig: Take 1 Tab by mouth 2 Times a Day.   omeprazole (PRILOSEC) 40 MG delayed-release capsule 9/14/2019 at Unknown time  No No   Sig: TAKE 1 CAPSULE BY MOUTH EVERY DAY   propafenone (RYTHMOL) 150 MG Tab 9/14/2019 at Unknown time  No No   Sig: TAKE 1 TABLET BY MOUTH TWICE A DAY   rivaroxaban (XARELTO) 15 MG Tab tablet 9/14/2019 at Unknown time  No No   Sig: Take 1 Tab by mouth with dinner. SAMPLE.   simvastatin (ZOCOR) 20 MG Tab 9/14/2019 at Unknown time  No No   Sig: TAKE 1 TABLET BY MOUTH IN THE EVENING      Facility-Administered Medications: None       Physical Exam  Temp:  [36.3 °C (97.4 °F)-36.6 °C (97.8 °F)] 36.3 °C (97.4 °F)  Pulse:  [56-66] 56  Resp:  [14-18] 17  BP: (148-198)/(66-83) 164/74  SpO2:  [92 %-99 %] 98 %    Physical Exam   Constitutional: She is oriented to person, place, and time. She appears well-developed. No distress.   HENT:   Head: Normocephalic and atraumatic.   Nose: Nose normal.   Mouth/Throat: No oropharyngeal exudate.   Eyes: Conjunctivae and EOM are normal. Right eye exhibits no discharge. Left eye exhibits no discharge. No scleral icterus.   Neck: No tracheal deviation present.   Cardiovascular: Normal rate, regular rhythm and intact distal pulses.   Murmur heard.  Pulmonary/Chest: Effort normal and breath sounds normal. No stridor. No respiratory distress. She has no wheezes. She has no rales.   Abdominal: Soft. Bowel sounds are normal. She exhibits no distension.  There is no tenderness.   Musculoskeletal: She exhibits no edema.   Lymphadenopathy:     She has no cervical adenopathy.   Neurological: She is alert and oriented to person, place, and time. No cranial nerve deficit.   Skin: Skin is warm. She is not diaphoretic.   Psychiatric: She has a normal mood and affect. Her behavior is normal.   Vitals reviewed.      Laboratory:  Recent Labs     09/15/19  1025   WBC 5.4   RBC 4.53   HEMOGLOBIN 14.2   HEMATOCRIT 43.1   MCV 95.1   MCH 31.3   MCHC 32.9*   RDW 43.0   PLATELETCT 171   MPV 9.6     Recent Labs     09/15/19  1025   SODIUM 133*   POTASSIUM 4.3   CHLORIDE 98   CO2 28   GLUCOSE 92   BUN 23*   CREATININE 1.02   CALCIUM 10.2     Recent Labs     09/15/19  1025   ALTSGPT 8   ASTSGOT 10*   ALKPHOSPHAT 31   TBILIRUBIN 0.7   GLUCOSE 92     Recent Labs     09/15/19  1025   APTT 31.5   INR 1.00     No results for input(s): NTPROBNP in the last 72 hours.      Recent Labs     09/15/19  1025   TROPONINT 16       Urinalysis:    Recent Labs     09/15/19  1015   SPECGRAVITY 1.011   GLUCOSEUR Negative   KETONES Negative   NITRITE Negative   LEUKESTERAS Large*   WBCURINE 5-10*   RBCURINE 0-2   BACTERIA Negative   EPITHELCELL Few        Imaging:  DX-PELVIS-1 OR 2 VIEWS   Final Result      No pelvic fracture.      DX-CHEST-PORTABLE (1 VIEW)   Final Result      No acute cardiopulmonary disease.      CT-HEAD W/O   Final Result      1.  Diffuse atrophy and white matter changes.   2.  No acute intracranial hemorrhage or territorial infarct.         DX-LUMBAR SPINE-2 OR 3 VIEWS    (Results Pending)   DX-THORACIC SPINE-2 VIEWS    (Results Pending)         Assessment/Plan:  I anticipate this patient is appropriate for observation status at this time.    * Syncope and collapse  Assessment & Plan  Head CT negative for any acute bleed  Continue anticoagulation  Questionable history of carotid atherosclerosis we will check an ultrasound to rule out any stenosis is an etiology  Likely causes use of  Xanax and her insomnia  Check orthostatics and monitor vitals  Check TSH  PT/OT for home safety      Hypothyroid- (present on admission)  Assessment & Plan  On Synthroid 88 mcg daily  TSH 1.21 in past month    Nocturnal hypoxemia --- COPD- (present on admission)  Assessment & Plan  Home oxygen at night    Essential hypertension- (present on admission)  Assessment & Plan  Monitor vitals  Continue metoprolol 25 mg twice a day with parameters      Anticoagulated- (present on admission)  Assessment & Plan  Continue Xarelto    Sedative, 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  Patient with chronic use of Xanax watch for any signs of withdrawal    Chronic lower back pain- (present on admission)  Assessment & Plan  Patient denied any new injuries to her back upon her fall  PT/OT to get her mobilizing    Carotid atherosclerosis- (present on admission)  Assessment & Plan  Check ultrasound carotids    Atrial fibrillation (HCC)- (present on admission)  Assessment & Plan  Chronic anticoagulation of Xarelto  Rate control on Rythmol 150 mg twice daily, metoprolol 25 mg daily  Monitor on telemetry    Chronic insomnia- (present on admission)  Assessment & Plan  Attempt non-narcotic and non-benzodiazepine meds  Try melatonin    Mixed hyperlipidemia- (present on admission)  Assessment & Plan  Zocor      VTE prophylaxis: Xarelto

## 2019-09-16 NOTE — PROGRESS NOTES
Assumed care of PT A&O 4. Pt resting in bed with no signs of labored breathing. On 2L NC. Tele monitor in place, cardiac rhythm being monitored. Call light within reach, bed in lowest position, upper bed rails up. Pt was updated on plan of care for the day. Will continue to monitor.

## 2019-09-16 NOTE — DISCHARGE PLANNING
CM met with pt at bedside to complete assessment. Pt states that she lives alone in a mobile home with 2 steps to the door. She has a walker which she reports she does not use, and she has home 02 through [employer] and is on 2.5L. Pt states that she relies on her children for transportation.   CM will follow for any needs at d/c.   Care Transition Team Assessment    Information Source  Orientation : Oriented x 4  Information Given By: Patient  Informant's Name: Eun  Who is responsible for making decisions for patient? : Patient    Readmission Evaluation  Is this a readmission?: No    Elopement Risk  Legal Hold: No  Ambulatory or Self Mobile in Wheelchair: Yes  Disoriented: No  Psychiatric Symptoms: None  History of Wandering: No  Elopement this Admit: No  Vocalizing Wanting to Leave: No  Displays Behaviors, Body Language Wanting to Leave: No-Not at Risk for Elopement  Elopement Risk: Not at Risk for Elopement    Interdisciplinary Discharge Planning  Does Admitting Nurse Feel This Could be a Complex Discharge?: No  Primary Care Physician: Dr. Estrella  Lives with - Patient's Self Care Capacity: Alone and Able to Care For Self  Patient or legal guardian wants to designate a caregiver (see row info): No  Support Systems: Children  Housing / Facility: Mobile Home  Do You Take your Prescribed Medications Regularly: Yes  Able to Return to Previous ADL's: Yes  Mobility Issues: No  Prior Services: None  Patient Expects to be Discharged to:: Home  Assistance Needed: Unknown at this Time  Durable Medical Equipment: Home Oxygen, Walker  DME Provider / Phone: [employer]    Discharge Preparedness  What is your plan after discharge?: Uncertain - pending medical team collaboration  What are your discharge supports?: Child  Prior Functional Level: Ambulatory, Independent with Activities of Daily Living, Independent with Medication Management  Difficulity with ADLs: None  Difficulity with IADLs: None    Functional Assesment  Prior  Functional Level: Ambulatory, Independent with Activities of Daily Living, Independent with Medication Management    Finances  Financial Barriers to Discharge: No  Prescription Coverage: Yes    Vision / Hearing Impairment  Vision Impairment : No  Right Eye Vision: Wears Glasses  Left Eye Vision: Wears Glasses  Hearing Impairment : No    Values / Beliefs / Concerns  Values / Beliefs Concerns : No         Domestic Abuse  Have you ever been the victim of abuse or violence?: No  Physical Abuse or Sexual Abuse: No  Verbal Abuse or Emotional Abuse: No  Possible Abuse Reported to:: Not Applicable              Anticipated Discharge Information  Anticipated discharge disposition: [facility], Home

## 2019-09-16 NOTE — PROGRESS NOTES
[facility] Medicine Daily Progress Note    Date of Service  9/16/2019    Chief Complaint  83 y.o. female admitted 9/15/2019 with syncope.    Interval Problem Update  Denies any questions or concerns at this time. Endorses constipation, no chest pain. No overnight events.     Consultants/Specialty  None    Code Status  Full    Disposition  Anticipate home pending medical clearance. PT/OT eval pending    Review of Systems  Review of Systems   Constitutional: Positive for malaise/fatigue. Negative for chills and fever.   HENT: Negative for congestion and sore throat.    Respiratory: Negative for sputum production and shortness of breath.    Cardiovascular: Negative for chest pain, palpitations and leg swelling.   Gastrointestinal: Positive for constipation. Negative for abdominal pain, blood in stool, nausea and vomiting.   Genitourinary: Negative for dysuria, flank pain and hematuria.   Musculoskeletal: Positive for back pain (chronic). Negative for falls.   Neurological: Positive for dizziness and weakness. Negative for loss of consciousness and headaches.   Psychiatric/Behavioral: Negative for suicidal ideas. The patient is nervous/anxious.    All other systems reviewed and are negative.       Physical Exam  Temp:  [36.3 °C (97.4 °F)-37.1 °C (98.7 °F)] 36.6 °C (97.8 °F)  Pulse:  [45-66] 45  Resp:  [] 16  BP: (137-198)/(57-83) 163/70  SpO2:  [92 %-99 %] 98 %    Physical Exam   Constitutional: She is oriented to person, place, and time. She appears well-developed. No distress.   HENT:   Head: Normocephalic.   Dry mucous membranes   Eyes: EOM are normal. No scleral icterus.   Neck: Normal range of motion. No tracheal deviation present.   Cardiovascular: Regular rhythm and intact distal pulses. Bradycardia present.   Pulmonary/Chest: Effort normal and breath sounds normal. No stridor. No respiratory distress. She has no wheezes. She has no rales.   Diminished at the bases   Abdominal: Soft. She exhibits no  distension. There is no tenderness.   Musculoskeletal: She exhibits no edema.   Neurological: She is alert and oriented to person, place, and time.   Skin: Skin is warm and dry. She is not diaphoretic. There is pallor.   Psychiatric: Her speech is normal and behavior is normal. Her mood appears anxious.   Vitals reviewed.      Fluids    Intake/Output Summary (Last 24 hours) at 9/16/2019 0740  Last data filed at 9/15/2019 1800  Gross per 24 hour   Intake 240 ml   Output --   Net 240 ml       Laboratory  Recent Labs     09/15/19  1025 09/16/19  0312   WBC 5.4 4.3*   RBC 4.53 4.21   HEMOGLOBIN 14.2 13.1   HEMATOCRIT 43.1 39.8   MCV 95.1 94.5   MCH 31.3 31.1   MCHC 32.9* 32.9*   RDW 43.0 42.1   PLATELETCT 171 157*   MPV 9.6 9.8     Recent Labs     09/15/19  1025 09/16/19  0312   SODIUM 133* 134*   POTASSIUM 4.3 4.1   CHLORIDE 98 100   CO2 28 27   GLUCOSE 92 100*   BUN 23* 23*   CREATININE 1.02 0.83   CALCIUM 10.2 9.5     Recent Labs     09/15/19  1025   APTT 31.5   INR 1.00               Imaging  US-CAROTID DOPPLER BILAT   Final Result      DX-THORACIC SPINE-2 VIEWS   Final Result      1.  No compression deformity or acute fracture.      2.  Degenerative disc disease is most prominent in the mid thoracic spine area.      DX-LUMBAR SPINE-2 OR 3 VIEWS   Final Result      1.  No compression deformity or acute fracture is identified.      2.  Mild anterolisthesis at L3-L4.      3.  Degenerative disc disease and facet arthropathy is most prominent in the lower lumbar spine.      DX-PELVIS-1 OR 2 VIEWS   Final Result      No pelvic fracture.      DX-CHEST-PORTABLE (1 VIEW)   Final Result      No acute cardiopulmonary disease.      CT-HEAD W/O   Final Result      1.  Diffuse atrophy and white matter changes.   2.  No acute intracranial hemorrhage or territorial infarct.              Assessment/Plan  * Syncope and collapse- (present on admission)  Assessment & Plan  Secondary to weakness/over sedation from xanax? LOC not clear?  Likely causes use of Xanax and her insomnia. Head CT negative for any acute bleed.  - Continue anticoagulation  - carotid US pending  - s/p gentle IVF  - orthostatics pending  - PT/OT eval pending  - continue tele monitoring    Sedative, hypnotic or anxiolytic dependence (HCC)- (present on admission)  Assessment & Plan  Should be on non sedating medications as possible  - chronically on xanax, monitor for withdrawal     Nocturnal hypoxemia --- COPD- (present on admission)  Assessment & Plan  Per documentation, uses nocturnal O2  - wean O2 as able during the day    Chronic insomnia- (present on admission)  Assessment & Plan  Attempt non-narcotic and non-benzodiazepine meds. Takes xanax at home  - melatonin not on formulary but encourage her to try on discharge  - sleep hygiene     Essential hypertension- (present on admission)  Assessment & Plan  Hypertensive urgency on admission, now improving.  - continue home metop, propafenone   - start on lisinopril  - vasotec PRN    Chronic lower back pain- (present on admission)  Assessment & Plan  Patient denied any new injuries to her back upon her fall  PT/OT to get her mobilizing    Carotid atherosclerosis- (present on admission)  Assessment & Plan  Check ultrasound carotids    Atrial fibrillation (HCC)- (present on admission)  Assessment & Plan  - Chronic anticoagulation of Xarelto  - Rate control with propafenone    Hypothyroid- (present on admission)  Assessment & Plan  TSH normal at 1.2  - continue home Synthroid 88 mcg daily    Mixed hyperlipidemia- (present on admission)  Assessment & Plan  Zocor and fenofibrate       VTE prophylaxis: xarelto

## 2019-09-17 LAB
ANION GAP SERPL CALC-SCNC: 7 MMOL/L (ref 0–11.9)
BACTERIA UR CULT: NORMAL
BUN SERPL-MCNC: 23 MG/DL (ref 8–22)
CALCIUM SERPL-MCNC: 9.7 MG/DL (ref 8.5–10.5)
CHLORIDE SERPL-SCNC: 99 MMOL/L (ref 96–112)
CO2 SERPL-SCNC: 27 MMOL/L (ref 20–33)
CREAT SERPL-MCNC: 0.91 MG/DL (ref 0.5–1.4)
ERYTHROCYTE [DISTWIDTH] IN BLOOD BY AUTOMATED COUNT: 42.3 FL (ref 35.9–50)
GLUCOSE SERPL-MCNC: 116 MG/DL (ref 65–99)
HCT VFR BLD AUTO: 39.5 % (ref 37–47)
HGB BLD-MCNC: 13.4 G/DL (ref 12–16)
MCH RBC QN AUTO: 32.1 PG (ref 27–33)
MCHC RBC AUTO-ENTMCNC: 33.9 G/DL (ref 33.6–35)
MCV RBC AUTO: 94.5 FL (ref 81.4–97.8)
PLATELET # BLD AUTO: 162 K/UL (ref 164–446)
PMV BLD AUTO: 10.1 FL (ref 9–12.9)
POTASSIUM SERPL-SCNC: 4 MMOL/L (ref 3.6–5.5)
RBC # BLD AUTO: 4.18 M/UL (ref 4.2–5.4)
SIGNIFICANT IND 70042: NORMAL
SITE SITE: NORMAL
SODIUM SERPL-SCNC: 133 MMOL/L (ref 135–145)
SOURCE SOURCE: NORMAL
WBC # BLD AUTO: 4.4 K/UL (ref 4.8–10.8)

## 2019-09-17 PROCEDURE — 80048 BASIC METABOLIC PNL TOTAL CA: CPT

## 2019-09-17 PROCEDURE — G0378 HOSPITAL OBSERVATION PER HR: HCPCS

## 2019-09-17 PROCEDURE — 700102 HCHG RX REV CODE 250 W/ 637 OVERRIDE(OP): Performed by: HOSPITALIST

## 2019-09-17 PROCEDURE — 85027 COMPLETE CBC AUTOMATED: CPT

## 2019-09-17 PROCEDURE — A9270 NON-COVERED ITEM OR SERVICE: HCPCS | Performed by: HOSPITALIST

## 2019-09-17 PROCEDURE — 700111 HCHG RX REV CODE 636 W/ 250 OVERRIDE (IP): Performed by: HOSPITALIST

## 2019-09-17 PROCEDURE — 99225 PR SUBSEQUENT OBSERVATION CARE,LEVEL II: CPT | Performed by: INTERNAL MEDICINE

## 2019-09-17 PROCEDURE — 96376 TX/PRO/DX INJ SAME DRUG ADON: CPT

## 2019-09-17 PROCEDURE — 36415 COLL VENOUS BLD VENIPUNCTURE: CPT

## 2019-09-17 RX ADMIN — BUDESONIDE AND FORMOTEROL FUMARATE DIHYDRATE 2 PUFF: 160; 4.5 AEROSOL RESPIRATORY (INHALATION) at 05:18

## 2019-09-17 RX ADMIN — ACETAMINOPHEN 650 MG: 325 TABLET, FILM COATED ORAL at 20:33

## 2019-09-17 RX ADMIN — LEVOTHYROXINE SODIUM 88 MCG: 88 TABLET ORAL at 05:18

## 2019-09-17 RX ADMIN — PROPAFENONE HYDROCHLORIDE 150 MG: 150 TABLET, FILM COATED ORAL at 05:18

## 2019-09-17 RX ADMIN — RIVAROXABAN 15 MG: 15 TABLET, FILM COATED ORAL at 17:30

## 2019-09-17 RX ADMIN — OMEPRAZOLE 40 MG: 20 CAPSULE, DELAYED RELEASE ORAL at 05:18

## 2019-09-17 RX ADMIN — ACETAMINOPHEN 650 MG: 325 TABLET, FILM COATED ORAL at 05:26

## 2019-09-17 RX ADMIN — ENALAPRILAT 1.25 MG: 1.25 INJECTION INTRAVENOUS at 13:19

## 2019-09-17 RX ADMIN — MAGNESIUM GLUCONATE 500 MG ORAL TABLET 400 MG: 500 TABLET ORAL at 05:18

## 2019-09-17 RX ADMIN — BUDESONIDE AND FORMOTEROL FUMARATE DIHYDRATE 2 PUFF: 160; 4.5 AEROSOL RESPIRATORY (INHALATION) at 17:30

## 2019-09-17 RX ADMIN — SIMVASTATIN 20 MG: 20 TABLET, FILM COATED ORAL at 17:29

## 2019-09-17 RX ADMIN — METOPROLOL TARTRATE 25 MG: 25 TABLET, FILM COATED ORAL at 17:29

## 2019-09-17 RX ADMIN — PROPAFENONE HYDROCHLORIDE 150 MG: 150 TABLET, FILM COATED ORAL at 17:30

## 2019-09-17 RX ADMIN — FENOFIBRATE 134 MG: 134 CAPSULE ORAL at 05:18

## 2019-09-17 RX ADMIN — FLUTICASONE PROPIONATE 50 MCG: 50 SPRAY, METERED NASAL at 05:18

## 2019-09-17 RX ADMIN — ALPRAZOLAM 0.25 MG: 0.25 TABLET ORAL at 20:33

## 2019-09-17 RX ADMIN — ENALAPRILAT 1.25 MG: 1.25 INJECTION INTRAVENOUS at 20:33

## 2019-09-17 RX ADMIN — LISINOPRIL 10 MG: 10 TABLET ORAL at 05:18

## 2019-09-17 ASSESSMENT — ENCOUNTER SYMPTOMS
FALLS: 1
DEPRESSION: 1
NAUSEA: 0
SORE THROAT: 0
VOMITING: 0
SHORTNESS OF BREATH: 0
BLOOD IN STOOL: 0
ABDOMINAL PAIN: 0
WEAKNESS: 1
LOSS OF CONSCIOUSNESS: 0
FEVER: 0
NERVOUS/ANXIOUS: 1
SPUTUM PRODUCTION: 0
BACK PAIN: 0
FLANK PAIN: 0
CHILLS: 0
HEADACHES: 0
PALPITATIONS: 0
CONSTIPATION: 1
DIZZINESS: 1

## 2019-09-17 NOTE — CARE PLAN
Problem: Knowledge Deficit  Goal: Knowledge of disease process/condition, treatment plan, diagnostic tests, and medications will improve  Outcome: PROGRESSING AS EXPECTED  Note:   Pt educated about disease process. Reason why medications are taken. And informed about treatment plan.     Problem: Pain Management  Goal: Pain level will decrease to patient's comfort goal  Outcome: PROGRESSING AS EXPECTED  Note:   Medicated per MAR. Educated on pain scale. implemented non pharmacological methods: distraction, reposition, rest.

## 2019-09-17 NOTE — CARE PLAN
Problem: Knowledge Deficit  Goal: Knowledge of disease process/condition, treatment plan, diagnostic tests, and medications will improve  Outcome: PROGRESSING AS EXPECTED  Note:   Educated on POC and barriers for discharge. Verbalized understanding. Will continue to educate as needed.      Problem: Pain Management  Goal: Pain level will decrease to patient's comfort goal  Outcome: PROGRESSING SLOWER THAN EXPECTED  Note:   Patient complaining of lower back pain. Says it is chronic and does not take any medications for it. Will bring it up with MD and continue to monitor.

## 2019-09-17 NOTE — DISCHARGE PLANNING
Anticipated Discharge Disposition: Home with     Action: CM received notification that pt's dtr is upset she has not spoken with anyone about pt's plan.  CM called pt's dtr Faviola to clarify. Faviola states that she has not received any medical updates and would like to speak with the MD. She states that she has been at bedside but has not asked the nurses about the plan, stating that she only spoke with them about the medications.   CM let Faviola know she would ask Dr. Jon to give her a call and encouraged Faviola to call this CM if she has any other questions or concerns.     Barriers to Discharge:     Plan: CM sent tiger text to Dr. Jon with pts dtrs request for a call.

## 2019-09-17 NOTE — PROGRESS NOTES
Morning report received at bedside. Care assumed. Pt alert and awake sitting up in bed. No complaints of pain or discomfort. AOx4 and on 2.5 L O2 NC. Tele monitor on. Bed in lowest position and locked. Call light and all belongings within reach. No further needs at this time.

## 2019-09-18 PROBLEM — E87.1 HYPONATREMIA: Status: ACTIVE | Noted: 2019-09-18

## 2019-09-18 PROBLEM — R55 SYNCOPE AND COLLAPSE: Status: RESOLVED | Noted: 2019-09-15 | Resolved: 2019-09-18

## 2019-09-18 LAB
ANION GAP SERPL CALC-SCNC: 7 MMOL/L (ref 0–11.9)
BUN SERPL-MCNC: 19 MG/DL (ref 8–22)
CALCIUM SERPL-MCNC: 9.9 MG/DL (ref 8.5–10.5)
CHLORIDE SERPL-SCNC: 98 MMOL/L (ref 96–112)
CO2 SERPL-SCNC: 27 MMOL/L (ref 20–33)
CREAT SERPL-MCNC: 0.79 MG/DL (ref 0.5–1.4)
GLUCOSE SERPL-MCNC: 100 MG/DL (ref 65–99)
MAGNESIUM SERPL-MCNC: 1.4 MG/DL (ref 1.5–2.5)
POTASSIUM SERPL-SCNC: 4.1 MMOL/L (ref 3.6–5.5)
SODIUM SERPL-SCNC: 132 MMOL/L (ref 135–145)

## 2019-09-18 PROCEDURE — 700101 HCHG RX REV CODE 250: Performed by: HOSPITALIST

## 2019-09-18 PROCEDURE — 770020 HCHG ROOM/CARE - TELE (206)

## 2019-09-18 PROCEDURE — 96366 THER/PROPH/DIAG IV INF ADDON: CPT

## 2019-09-18 PROCEDURE — 96365 THER/PROPH/DIAG IV INF INIT: CPT

## 2019-09-18 PROCEDURE — 96376 TX/PRO/DX INJ SAME DRUG ADON: CPT

## 2019-09-18 PROCEDURE — 83735 ASSAY OF MAGNESIUM: CPT

## 2019-09-18 PROCEDURE — 94760 N-INVAS EAR/PLS OXIMETRY 1: CPT

## 2019-09-18 PROCEDURE — 700111 HCHG RX REV CODE 636 W/ 250 OVERRIDE (IP): Performed by: INTERNAL MEDICINE

## 2019-09-18 PROCEDURE — A9270 NON-COVERED ITEM OR SERVICE: HCPCS | Performed by: HOSPITALIST

## 2019-09-18 PROCEDURE — 700102 HCHG RX REV CODE 250 W/ 637 OVERRIDE(OP): Performed by: HOSPITALIST

## 2019-09-18 PROCEDURE — 36415 COLL VENOUS BLD VENIPUNCTURE: CPT

## 2019-09-18 PROCEDURE — 97162 PT EVAL MOD COMPLEX 30 MIN: CPT

## 2019-09-18 PROCEDURE — 80048 BASIC METABOLIC PNL TOTAL CA: CPT

## 2019-09-18 PROCEDURE — 99232 SBSQ HOSP IP/OBS MODERATE 35: CPT | Performed by: INTERNAL MEDICINE

## 2019-09-18 PROCEDURE — 97165 OT EVAL LOW COMPLEX 30 MIN: CPT

## 2019-09-18 PROCEDURE — 94640 AIRWAY INHALATION TREATMENT: CPT

## 2019-09-18 PROCEDURE — 700111 HCHG RX REV CODE 636 W/ 250 OVERRIDE (IP): Performed by: HOSPITALIST

## 2019-09-18 RX ORDER — LISINOPRIL 20 MG/1
20 TABLET ORAL
Status: DISCONTINUED | OUTPATIENT
Start: 2019-09-19 | End: 2019-09-19 | Stop reason: HOSPADM

## 2019-09-18 RX ORDER — ALPRAZOLAM 0.25 MG/1
0.25 TABLET ORAL 2 TIMES DAILY PRN
Qty: 10 TAB | Refills: 0 | Status: SHIPPED | OUTPATIENT
Start: 2019-09-18 | End: 2019-09-19 | Stop reason: SDUPTHER

## 2019-09-18 RX ORDER — LISINOPRIL 20 MG/1
20 TABLET ORAL DAILY
Qty: 30 TAB | Refills: 0 | Status: SHIPPED | OUTPATIENT
Start: 2019-09-19 | End: 2019-10-02

## 2019-09-18 RX ORDER — MAGNESIUM SULFATE HEPTAHYDRATE 40 MG/ML
2 INJECTION, SOLUTION INTRAVENOUS ONCE
Status: COMPLETED | OUTPATIENT
Start: 2019-09-18 | End: 2019-09-18

## 2019-09-18 RX ORDER — SODIUM CHLORIDE 9 MG/ML
500 INJECTION, SOLUTION INTRAVENOUS ONCE
Status: DISCONTINUED | OUTPATIENT
Start: 2019-09-18 | End: 2019-09-18

## 2019-09-18 RX ADMIN — ACETAMINOPHEN 650 MG: 325 TABLET, FILM COATED ORAL at 12:17

## 2019-09-18 RX ADMIN — FLUTICASONE PROPIONATE 50 MCG: 50 SPRAY, METERED NASAL at 05:24

## 2019-09-18 RX ADMIN — ALBUTEROL SULFATE 2.5 MG: 2.5 SOLUTION RESPIRATORY (INHALATION) at 11:05

## 2019-09-18 RX ADMIN — PROPAFENONE HYDROCHLORIDE 150 MG: 150 TABLET, FILM COATED ORAL at 05:24

## 2019-09-18 RX ADMIN — BUDESONIDE AND FORMOTEROL FUMARATE DIHYDRATE 2 PUFF: 160; 4.5 AEROSOL RESPIRATORY (INHALATION) at 05:24

## 2019-09-18 RX ADMIN — BUDESONIDE AND FORMOTEROL FUMARATE DIHYDRATE 2 PUFF: 160; 4.5 AEROSOL RESPIRATORY (INHALATION) at 17:41

## 2019-09-18 RX ADMIN — ACETAMINOPHEN 650 MG: 325 TABLET, FILM COATED ORAL at 05:29

## 2019-09-18 RX ADMIN — ALPRAZOLAM 0.25 MG: 0.25 TABLET ORAL at 12:17

## 2019-09-18 RX ADMIN — MAGNESIUM SULFATE IN WATER 2 G: 40 INJECTION, SOLUTION INTRAVENOUS at 08:49

## 2019-09-18 RX ADMIN — ALPRAZOLAM 0.25 MG: 0.25 TABLET ORAL at 22:54

## 2019-09-18 RX ADMIN — ENALAPRILAT 1.25 MG: 1.25 INJECTION INTRAVENOUS at 23:17

## 2019-09-18 RX ADMIN — FENOFIBRATE 134 MG: 134 CAPSULE ORAL at 05:24

## 2019-09-18 RX ADMIN — LEVOTHYROXINE SODIUM 88 MCG: 88 TABLET ORAL at 05:24

## 2019-09-18 RX ADMIN — OMEPRAZOLE 40 MG: 20 CAPSULE, DELAYED RELEASE ORAL at 05:24

## 2019-09-18 RX ADMIN — RIVAROXABAN 15 MG: 15 TABLET, FILM COATED ORAL at 17:41

## 2019-09-18 RX ADMIN — LISINOPRIL 10 MG: 10 TABLET ORAL at 05:24

## 2019-09-18 RX ADMIN — ENALAPRILAT 1.25 MG: 1.25 INJECTION INTRAVENOUS at 20:05

## 2019-09-18 RX ADMIN — ENALAPRILAT 1.25 MG: 1.25 INJECTION INTRAVENOUS at 08:09

## 2019-09-18 RX ADMIN — SIMVASTATIN 20 MG: 20 TABLET, FILM COATED ORAL at 17:41

## 2019-09-18 RX ADMIN — METOPROLOL TARTRATE 25 MG: 25 TABLET, FILM COATED ORAL at 17:41

## 2019-09-18 RX ADMIN — PROPAFENONE HYDROCHLORIDE 150 MG: 150 TABLET, FILM COATED ORAL at 17:41

## 2019-09-18 RX ADMIN — MAGNESIUM GLUCONATE 500 MG ORAL TABLET 400 MG: 500 TABLET ORAL at 05:24

## 2019-09-18 ASSESSMENT — GAIT ASSESSMENTS
GAIT LEVEL OF ASSIST: MINIMAL ASSIST
DISTANCE (FEET): 15
DEVIATION: BRADYKINETIC;SHUFFLED GAIT
ASSISTIVE DEVICE: FRONT WHEEL WALKER

## 2019-09-18 ASSESSMENT — ENCOUNTER SYMPTOMS
CONSTIPATION: 1
FALLS: 1
HEADACHES: 1
SHORTNESS OF BREATH: 0
DIZZINESS: 1
VOMITING: 0
BACK PAIN: 0
NAUSEA: 0
BLURRED VISION: 1
SORE THROAT: 0
CHILLS: 0
SPUTUM PRODUCTION: 0
PALPITATIONS: 0
DEPRESSION: 1
ABDOMINAL PAIN: 0
NERVOUS/ANXIOUS: 1
WEAKNESS: 1
FEVER: 0
BLOOD IN STOOL: 0
FLANK PAIN: 0
LOSS OF CONSCIOUSNESS: 0

## 2019-09-18 ASSESSMENT — COGNITIVE AND FUNCTIONAL STATUS - GENERAL
CLIMB 3 TO 5 STEPS WITH RAILING: A LITTLE
DRESSING REGULAR LOWER BODY CLOTHING: A LITTLE
SUGGESTED CMS G CODE MODIFIER DAILY ACTIVITY: CJ
WALKING IN HOSPITAL ROOM: A LITTLE
MOVING FROM LYING ON BACK TO SITTING ON SIDE OF FLAT BED: A LITTLE
MOBILITY SCORE: 19
DAILY ACTIVITIY SCORE: 21
HELP NEEDED FOR BATHING: A LITTLE
TOILETING: A LITTLE
STANDING UP FROM CHAIR USING ARMS: A LITTLE
SUGGESTED CMS G CODE MODIFIER MOBILITY: CK
MOVING TO AND FROM BED TO CHAIR: A LITTLE

## 2019-09-18 ASSESSMENT — ACTIVITIES OF DAILY LIVING (ADL): TOILETING: INDEPENDENT

## 2019-09-18 NOTE — FACE TO FACE
Face to Face Note  -  Durable Medical Equipment    Eli Jon M.D. - NPI: [id number]  I certify that this patient is under my care and that they had a durable medical equipment(DME)face to face encounter by myself that meets the physician DME face-to-face encounter requirements with this patient on:    Date of encounter:   Patient:                    MRN:                       YOB: 2019  Eun Be  [id number]  1935     The encounter with the patient was in whole, or in part, for the following medical condition, which is the primary reason for durable medical equipment:  Cardiac Disease and Other - anxiety    I certify that, based on my findings, the following durable medical equipment is medically necessary:  Other DME Equipment - Tub transfer bench.        My Clinical findings support the need for the above equipment due to:  Frequent Falls

## 2019-09-18 NOTE — ASSESSMENT & PLAN NOTE
Patient with severe anxiety and daily panic attacks as well as depression given multiple deaths in last few months.  Discussed importance of outpatient psych referral and possible SSRI to try and minimize benzo use

## 2019-09-18 NOTE — PROGRESS NOTES
An 83-year-old female was an emergent admission to [facility] from 8/12/2019 to 8/12/2019 to treat Paroxysmal Atrial fibrillation. The patient was discharged home.    The patient was ordered to start/continue to take the following medication: Metoprolol Tartrate (Lopressor). The patient successfully filled all medications.     Per discharge orders, patient was instructed to see her PCP and cardiologist for hospital follow-up's. Patient saw her PCP on 8/20/19. [facility] patient advocate was able to schedule patient a follow-up with her cardiologist, and patient was seen on 8/21/19.    [facility] communicated with the patient's daughter post-discharge and throughout the case. [facility] assisted patient by getting patient's PCP involved when patient had questions regarding her discharge medications. Advocate got patient a sooner cardiology appointment when patient was originally booked out further. Lastly, advocate referred patient to [facility] so that patient could have additional support in the community after becoming socially isolated over the past few years. Patient's daughter and son do live in [place], but are not able to see patient as much due to work and busy schedules.     PPS screening was conducted at 80%.

## 2019-09-18 NOTE — PROGRESS NOTES
[facility] Medicine Daily Progress Note    Date of Service  9/18/2019    Chief Complaint  83 y.o. female admitted 9/15/2019 with syncope.    Interval Problem Update  - no dizziness or lightheadedness  - no falls  - did require one dose of xanax overnight (lower dose than home dose and helped)  - afebrile  -This morning reported feeling much better and was back to her baseline, she worked with occupational therapy and was able to walk from the bed to the bathroom using a walker without difficulty, however she then worked with physical therapy later in the afternoon and experienced a panic attack.  After checking on patient she no longer thought she was well enough to go home was having shakiness, headache, blurry vision  -Still intermittently hypertensive to the 160s-170s  - orthostatics technically positive however patient asymptomatic 180s-->150s SBP, HR stable    Consultants/Specialty  None    Code Status  Full    Disposition  Anticipate home pending medical clearance. PT/OT eval pending    Review of Systems  Review of Systems   Constitutional: Positive for malaise/fatigue. Negative for chills and fever.   HENT: Negative for congestion and sore throat.    Eyes: Positive for blurred vision.   Respiratory: Negative for sputum production and shortness of breath.    Cardiovascular: Negative for chest pain, palpitations and leg swelling.   Gastrointestinal: Positive for constipation. Negative for abdominal pain, blood in stool, nausea and vomiting.   Genitourinary: Negative for dysuria, flank pain and hematuria.   Musculoskeletal: Positive for falls. Negative for back pain (chronic).   Neurological: Positive for dizziness, weakness and headaches. Negative for loss of consciousness.   Psychiatric/Behavioral: Positive for depression. Negative for suicidal ideas. The patient is nervous/anxious.    All other systems reviewed and are negative.       Physical Exam  Temp:  [36.4 °C (97.6 °F)-36.8 °C (98.2 °F)] 36.6 °C (97.9  °F)  Pulse:  [46-65] 55  Resp:  [16-18] 18  BP: (142-189)/(59-76) 162/63  SpO2:  [97 %-99 %] 98 %    Physical Exam   Constitutional: She is oriented to person, place, and time. She appears well-developed and well-nourished. No distress.   HENT:   Head: Normocephalic and atraumatic.   Mouth/Throat: Oropharynx is clear and moist.   Eyes: Conjunctivae are normal. No scleral icterus.   Neck: Normal range of motion. Neck supple.   Cardiovascular: Regular rhythm, normal heart sounds and intact distal pulses. Bradycardia present. Exam reveals no gallop and no friction rub.   No murmur heard.  Pulmonary/Chest: Effort normal and breath sounds normal. No respiratory distress. She has no wheezes. She has no rales.   On 2L NC    Abdominal: Soft. Bowel sounds are normal. She exhibits no distension. There is no tenderness. There is no rebound and no guarding.   Musculoskeletal: She exhibits no edema.   Lymphadenopathy:     She has no cervical adenopathy.   Neurological: She is alert and oriented to person, place, and time. No cranial nerve deficit.   Skin: Skin is warm and dry.   Psychiatric:   Anxious appearing        Fluids  No intake or output data in the 24 hours ending 09/18/19 1648    Laboratory  Recent Labs     09/16/19 0312 09/17/19  0153   WBC 4.3* 4.4*   RBC 4.21 4.18*   HEMOGLOBIN 13.1 13.4   HEMATOCRIT 39.8 39.5   MCV 94.5 94.5   MCH 31.1 32.1   MCHC 32.9* 33.9   RDW 42.1 42.3   PLATELETCT 157* 162*   MPV 9.8 10.1     Recent Labs     09/16/19  0312 09/17/19  0153 09/18/19  0252   SODIUM 134* 133* 132*   POTASSIUM 4.1 4.0 4.1   CHLORIDE 100 99 98   CO2 27 27 27   GLUCOSE 100* 116* 100*   BUN 23* 23* 19   CREATININE 0.83 0.91 0.79   CALCIUM 9.5 9.7 9.9                   Imaging  US-CAROTID DOPPLER BILAT   Final Result      DX-THORACIC SPINE-2 VIEWS   Final Result      1.  No compression deformity or acute fracture.      2.  Degenerative disc disease is most prominent in the mid thoracic spine area.      DX-LUMBAR  SPINE-2 OR 3 VIEWS   Final Result      1.  No compression deformity or acute fracture is identified.      2.  Mild anterolisthesis at L3-L4.      3.  Degenerative disc disease and facet arthropathy is most prominent in the lower lumbar spine.      DX-PELVIS-1 OR 2 VIEWS   Final Result      No pelvic fracture.      DX-CHEST-PORTABLE (1 VIEW)   Final Result      No acute cardiopulmonary disease.      CT-HEAD W/O   Final Result      1.  Diffuse atrophy and white matter changes.   2.  No acute intracranial hemorrhage or territorial infarct.              Assessment/Plan  * Syncope and collapse- (present on admission)  Assessment & Plan  Secondary to weakness/over sedation from xanax? Likely causes use of Xanax and her insomnia. Head CT negative for any acute bleed.  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 Takes xanax at home  - melatonin not on formulary but encourage her to try on discharge  - sleep hygiene     Essential hypertension- (present on admission)  Assessment & Plan  Hypertensive urgency on admission, continues to have hypertension  - continue home metop, propafenone   - started on lisinopril, will increase tomorrow 10mg-->20mg, will need f/u BMP in 1 week from dc  - vasotec PRN    Hyponatremia- (present on  admission)  Assessment & Plan  Euvolemic on exam today (was a little dry yesterday), poor po intake so could be hyponatremia dehydration vs siadh from lung disease.  Will continue to monitor since staying.     Hypomagnesemia- (present on admission)  Assessment & Plan  Likely in setting of poor po intake. Replaced and recheck in am.    Generalized anxiety disorder- (present on admission)  Assessment & Plan  Patient with severe anxiety and daily panic attacks as well as depression given multiple deaths in last few months.  Discussed importance of outpatient psych referral and possible SSRI to try and minimize benzo use    Chronic lower back pain- (present on admission)  Assessment & Plan  Patient denied any new injuries to her back upon her fall  PT/OT to get her mobilizing    Carotid atherosclerosis- (present on admission)  Assessment & Plan  US showed < 50% stenosis b/l, unlikely cause of sx's    Atrial fibrillation (HCC)- (present on admission)  Assessment & Plan  - Chronic anticoagulation of Xarelto  - Rate control with propafenone    Hypothyroid- (present on admission)  Assessment & Plan  TSH normal at 1.2  - continue home Synthroid 88 mcg daily    Mixed hyperlipidemia- (present on admission)  Assessment & Plan  Zocor and fenofibrate       VTE prophylaxis: xarelto

## 2019-09-18 NOTE — CARE PLAN
Problem: Discharge Barriers/Planning  Goal: Patient's continuum of care needs will be met  Outcome: PROGRESSING AS EXPECTED  Note:   Discussed discharges plans with MD and patient. Patient verbalized understanding of discharge barriers. Will continue to work on discharging and keeping the patient updated.      Problem: Respiratory:  Goal: Respiratory status will improve  Outcome: PROGRESSING AS EXPECTED  Note:   Patient states she is having some difficulty breathing after working with occupational therapy. Oxygen in use and RT called for nebulizer treatment. Will continue to monitor.

## 2019-09-18 NOTE — THERAPY
"Pt is an 82 y/o female admitted following syncope and collapse. Pt presents to acute PT with impairments in functional mobility, balance, gait, strength and activity tolerance. PT evaluation was significantly limited by pt's anxiety and concerns regarding potential DC home. Pt was agreeable to ambulate short distance in room while on 2L O2, pt very tremulous x15 ft and easily distrated putting pt at high fall risk. Pt reports never using AD at home and only using supplemental O2 as needed (usually once fatigued and out of breath). Pt stated she is only able to do activities in the morning as she is too fatigued by the afternoon and needs \"to lie down with the oxygen.\" Educated pt on consistent use of 4WW for energy conservation, as pt also endorsed \"liking to grocery shop as the shopping cart gives good support.\" Updated RN regarding current function, pt reports, and potential need for walking SpO2 assessment and continued mobility when less stressed/anxious. PT will continue to follow while in house.     Physical Therapy Evaluation completed.   Bed Mobility:  Supine to Sit: Supervised  Transfers: Sit to Stand: Supervised(with FWW)  Gait: Level Of Assist: Minimal Assist with Front-Wheel Walker       Plan of Care: Will benefit from Physical Therapy 4 times per week  Discharge Recommendations: Equipment: Will Continue to Assess for Equipment Needs.   Post-acute therapy: As it appears pt's anxiety potentially negatively impacted pt's current level of function, would anticipate pt should be able to DC home with home health therapy follow up and continued support from family. Will continue to assess.     See \"Rehab Therapy-Acute\" Patient Summary Report for complete documentation.         "

## 2019-09-18 NOTE — FACE TO FACE
Face to Face Supporting Documentation - Home Health    The encounter with this patient was in whole or in part the primary reason for home health admission.    Date of encounter:   Patient:                    MRN:                       YOB: 2019  Eun Be  [id number]  1935     Home health to see patient for:  Skilled Nursing care for assessment, interventions & education and Physical Therapy evaluation and treatment    Skilled need for:  Comment: deconditioning and generalized weakness, medication management      Homebound status evidenced by:  Needs the assistance of another person in order to leave the home. Leaving home requires a considerable and taxing effort. There is a normal inability to leave the home.    Community Physician to provide follow up care: Libertad Estrella M.D.     Optional Interventions? No      I certify the face to face encounter for this home health care referral meets the CMS requirements and the encounter/clinical assessment with the patient was, in whole, or in part, for the medical condition(s) listed above, which is the primary reason for home health care. Based on my clinical findings: the service(s) are medically necessary, support the need for home health care, and the homebound criteria are met.  I certify that this patient has had a face to face encounter by myself.  Eli Jon M.D. - NPI: [id number]

## 2019-09-18 NOTE — THERAPY
"Occupational Therapy Evaluation completed.   Functional Status: OT eval completed on 84 YO F admitted after syncope. Pt SPV for supine>sit>stand, LB dressing, grooming standing, toilet transfer, and chair transfer. Pt reports her neighbors used to provide assist with home management, transportation and groceries however he has passed away. Pt reports dtr and son live nearby however doesn't want to inconvenience them. Pt very anxious about living by self. Pt appears to be functioning close to or at baseline and does not require acute OT services. Anticipate pt will d/c home with HH and assist from family as needed.  Plan of Care: Patient with no further skilled OT needs in the acute care setting at this time  Discharge Recommendations:  Equipment: Tub Transfer Bench. Recommend home health transitional care for continued occupational therapy services.     See \"Rehab Therapy-Acute\" Patient Summary Report for complete documentation.    "

## 2019-09-18 NOTE — PROGRESS NOTES
[facility] Medicine Daily Progress Note    Date of Service  9/17/2019    Chief Complaint  83 y.o. female admitted 9/15/2019 with syncope.    Interval Problem Update  - no dizziness or lightheadedness  - no falls  - did require one dose of xanax overnight (lower dose than home dose and helped)  - afebrile  - still constipated  - nauseous but improved after eating  - PT/OT hasn't seen patient yet, although yesterday patient refused to work with them  - talked with daughter by phone to give updates     Consultants/Specialty  None    Code Status  Full    Disposition  Anticipate home pending medical clearance. PT/OT eval pending    Review of Systems  Review of Systems   Constitutional: Positive for malaise/fatigue. Negative for chills and fever.   HENT: Negative for congestion and sore throat.    Respiratory: Negative for sputum production and shortness of breath.    Cardiovascular: Negative for chest pain, palpitations and leg swelling.   Gastrointestinal: Positive for constipation. Negative for abdominal pain, blood in stool, nausea and vomiting.   Genitourinary: Negative for dysuria, flank pain and hematuria.   Musculoskeletal: Positive for falls. Negative for back pain (chronic).   Neurological: Positive for dizziness and weakness. Negative for loss of consciousness and headaches.   Psychiatric/Behavioral: Positive for depression. Negative for suicidal ideas. The patient is nervous/anxious.    All other systems reviewed and are negative.       Physical Exam  Temp:  [36.1 °C (96.9 °F)-36.6 °C (97.9 °F)] 36.1 °C (96.9 °F)  Pulse:  [46-64] 64  Resp:  [16-18] 18  BP: (129-173)/(54-69) 144/63  SpO2:  [97 %-99 %] 97 %    Physical Exam   Constitutional: She is oriented to person, place, and time. She appears well-developed and well-nourished. No distress.   HENT:   Head: Normocephalic and atraumatic.   Mouth/Throat: Oropharynx is clear and moist.   Eyes: Pupils are equal, round, and reactive to light. Conjunctivae are normal.  No scleral icterus.   Neck: Normal range of motion. Neck supple.   Cardiovascular: Regular rhythm, normal heart sounds and intact distal pulses. Bradycardia present. Exam reveals no gallop and no friction rub.   No murmur heard.  Pulmonary/Chest: Effort normal and breath sounds normal. No respiratory distress. She has no wheezes. She has no rales.   On 2L NC    Abdominal: Soft. Bowel sounds are normal. She exhibits no distension. There is no tenderness. There is no rebound and no guarding.   Musculoskeletal: She exhibits no edema.   Lymphadenopathy:     She has no cervical adenopathy.   Neurological: She is alert and oriented to person, place, and time. No cranial nerve deficit.   Skin: Skin is warm and dry.   Psychiatric: She has a normal mood and affect.        Fluids  No intake or output data in the 24 hours ending 09/17/19 1750    Laboratory  Recent Labs     09/15/19  1025 09/16/19  0312 09/17/19  0153   WBC 5.4 4.3* 4.4*   RBC 4.53 4.21 4.18*   HEMOGLOBIN 14.2 13.1 13.4   HEMATOCRIT 43.1 39.8 39.5   MCV 95.1 94.5 94.5   MCH 31.3 31.1 32.1   MCHC 32.9* 32.9* 33.9   RDW 43.0 42.1 42.3   PLATELETCT 171 157* 162*   MPV 9.6 9.8 10.1     Recent Labs     09/15/19  1025 09/16/19  0312 09/17/19  0153   SODIUM 133* 134* 133*   POTASSIUM 4.3 4.1 4.0   CHLORIDE 98 100 99   CO2 28 27 27   GLUCOSE 92 100* 116*   BUN 23* 23* 23*   CREATININE 1.02 0.83 0.91   CALCIUM 10.2 9.5 9.7     Recent Labs     09/15/19  1025   APTT 31.5   INR 1.00               Imaging  US-CAROTID DOPPLER BILAT   Final Result      DX-THORACIC SPINE-2 VIEWS   Final Result      1.  No compression deformity or acute fracture.      2.  Degenerative disc disease is most prominent in the mid thoracic spine area.      DX-LUMBAR SPINE-2 OR 3 VIEWS   Final Result      1.  No compression deformity or acute fracture is identified.      2.  Mild anterolisthesis at L3-L4.      3.  Degenerative disc disease and facet arthropathy is most prominent in the lower lumbar  spine.      DX-PELVIS-1 OR 2 VIEWS   Final Result      No pelvic fracture.      DX-CHEST-PORTABLE (1 VIEW)   Final Result      No acute cardiopulmonary disease.      CT-HEAD W/O   Final Result      1.  Diffuse atrophy and white matter changes.   2.  No acute intracranial hemorrhage or territorial infarct.              Assessment/Plan  * Syncope and collapse- (present on admission)  Assessment & Plan  Secondary to weakness/over sedation from xanax? LOC not clear? Likely causes use of Xanax and her insomnia. Head CT negative for any acute bleed.  - Continue anticoagulation  - carotid US normal  - s/p gentle IVF  - orthostatics normal but done without HR, will repeat this afternoon  - PT/OT eval pending  - continue tele monitoring    Sedative, hypnotic or anxiolytic dependence (HCC)- (present on admission)  Assessment & Plan  Should be on non sedating medications as possible  - chronically on xanax, monitor for withdrawal     Nocturnal hypoxemia --- COPD- (present on admission)  Assessment & Plan  Per documentation, uses nocturnal O2  - wean O2 as able during the day    Chronic insomnia- (present on admission)  Assessment & Plan  Attempt non-narcotic and non-benzodiazepine meds. Takes xanax at home  - melatonin not on formulary but encourage her to try on discharge  - sleep hygiene     Essential hypertension- (present on admission)  Assessment & Plan  Hypertensive urgency on admission, now improving.  - continue home metop, propafenone   - started on lisinopril  - vasotec PRN    Chronic lower back pain- (present on admission)  Assessment & Plan  Patient denied any new injuries to her back upon her fall  PT/OT to get her mobilizing    Carotid atherosclerosis- (present on admission)  Assessment & Plan  Check ultrasound carotids    Atrial fibrillation (HCC)- (present on admission)  Assessment & Plan  - Chronic anticoagulation of Xarelto  - Rate control with propafenone    Hypothyroid- (present on admission)  Assessment &  Plan  TSH normal at 1.2  - continue home Synthroid 88 mcg daily    Mixed hyperlipidemia- (present on admission)  Assessment & Plan  Zocor and fenofibrate       VTE prophylaxis: xarelto

## 2019-09-19 ENCOUNTER — PATIENT OUTREACH (OUTPATIENT)
Dept: HEALTH INFORMATION MANAGEMENT | Facility: OTHER | Age: 84
End: 2019-09-19

## 2019-09-19 VITALS
SYSTOLIC BLOOD PRESSURE: 136 MMHG | OXYGEN SATURATION: 98 % | TEMPERATURE: 97.6 F | HEART RATE: 62 BPM | HEIGHT: 71 IN | DIASTOLIC BLOOD PRESSURE: 50 MMHG | RESPIRATION RATE: 15 BRPM | WEIGHT: 145.5 LBS | BODY MASS INDEX: 20.37 KG/M2

## 2019-09-19 LAB
ANION GAP SERPL CALC-SCNC: 6 MMOL/L (ref 0–11.9)
BUN SERPL-MCNC: 21 MG/DL (ref 8–22)
CALCIUM SERPL-MCNC: 9.8 MG/DL (ref 8.5–10.5)
CHLORIDE SERPL-SCNC: 97 MMOL/L (ref 96–112)
CO2 SERPL-SCNC: 27 MMOL/L (ref 20–33)
CREAT SERPL-MCNC: 0.83 MG/DL (ref 0.5–1.4)
GLUCOSE SERPL-MCNC: 114 MG/DL (ref 65–99)
MAGNESIUM SERPL-MCNC: 1.5 MG/DL (ref 1.5–2.5)
POTASSIUM SERPL-SCNC: 4 MMOL/L (ref 3.6–5.5)
SODIUM SERPL-SCNC: 130 MMOL/L (ref 135–145)

## 2019-09-19 PROCEDURE — 700102 HCHG RX REV CODE 250 W/ 637 OVERRIDE(OP): Performed by: HOSPITALIST

## 2019-09-19 PROCEDURE — 700111 HCHG RX REV CODE 636 W/ 250 OVERRIDE (IP): Performed by: INTERNAL MEDICINE

## 2019-09-19 PROCEDURE — A9270 NON-COVERED ITEM OR SERVICE: HCPCS | Performed by: HOSPITALIST

## 2019-09-19 PROCEDURE — 36415 COLL VENOUS BLD VENIPUNCTURE: CPT

## 2019-09-19 PROCEDURE — A9270 NON-COVERED ITEM OR SERVICE: HCPCS | Performed by: INTERNAL MEDICINE

## 2019-09-19 PROCEDURE — 99239 HOSP IP/OBS DSCHRG MGMT >30: CPT | Performed by: INTERNAL MEDICINE

## 2019-09-19 PROCEDURE — 83735 ASSAY OF MAGNESIUM: CPT

## 2019-09-19 PROCEDURE — 80048 BASIC METABOLIC PNL TOTAL CA: CPT

## 2019-09-19 PROCEDURE — 700102 HCHG RX REV CODE 250 W/ 637 OVERRIDE(OP): Performed by: INTERNAL MEDICINE

## 2019-09-19 RX ORDER — MAGNESIUM SULFATE HEPTAHYDRATE 40 MG/ML
4 INJECTION, SOLUTION INTRAVENOUS ONCE
Status: COMPLETED | OUTPATIENT
Start: 2019-09-19 | End: 2019-09-19

## 2019-09-19 RX ORDER — ALPRAZOLAM 0.25 MG/1
0.25 TABLET ORAL 2 TIMES DAILY PRN
Qty: 10 TAB | Refills: 0 | Status: SHIPPED | OUTPATIENT
Start: 2019-09-19 | End: 2019-10-10 | Stop reason: SDUPTHER

## 2019-09-19 RX ADMIN — METOPROLOL TARTRATE 25 MG: 25 TABLET, FILM COATED ORAL at 05:16

## 2019-09-19 RX ADMIN — LISINOPRIL 20 MG: 20 TABLET ORAL at 05:15

## 2019-09-19 RX ADMIN — BUDESONIDE AND FORMOTEROL FUMARATE DIHYDRATE 2 PUFF: 160; 4.5 AEROSOL RESPIRATORY (INHALATION) at 05:16

## 2019-09-19 RX ADMIN — MAGNESIUM SULFATE IN WATER 4 G: 40 INJECTION, SOLUTION INTRAVENOUS at 10:07

## 2019-09-19 RX ADMIN — MAGNESIUM GLUCONATE 500 MG ORAL TABLET 400 MG: 500 TABLET ORAL at 05:15

## 2019-09-19 RX ADMIN — LEVOTHYROXINE SODIUM 88 MCG: 88 TABLET ORAL at 05:15

## 2019-09-19 RX ADMIN — OMEPRAZOLE 40 MG: 20 CAPSULE, DELAYED RELEASE ORAL at 05:15

## 2019-09-19 RX ADMIN — ALPRAZOLAM 0.25 MG: 0.25 TABLET ORAL at 05:29

## 2019-09-19 RX ADMIN — FENOFIBRATE 134 MG: 134 CAPSULE ORAL at 05:15

## 2019-09-19 RX ADMIN — PROPAFENONE HYDROCHLORIDE 150 MG: 150 TABLET, FILM COATED ORAL at 05:16

## 2019-09-19 RX ADMIN — SENNOSIDES, DOCUSATE SODIUM 2 TABLET: 50; 8.6 TABLET, FILM COATED ORAL at 05:16

## 2019-09-19 RX ADMIN — FLUTICASONE PROPIONATE 50 MCG: 50 SPRAY, METERED NASAL at 05:16

## 2019-09-19 NOTE — DISCHARGE INSTRUCTIONS
Discharge Instructions      Discharge Instructions per Eli Jon M.D.    Ms. Be,    You were hospitalized after you fell in your home.  Your CT scan of your head and xrays were normal.  You were placed on telemetry and did not have any arrythmias.  We think it may have been your medications, likely the xanax.  We decreased your xanax dose 1mg-->0.25mg and you did not have any falling events (and still had symptom relief from the xanax).  Your sodium and magnesium were a little low and required supplementation of your magnesium.  You will need to have your primary care physician continue to monitor these electrolytes.  Your blood pressure was a little high during your hospitalization so we increased your blood pressure medication: lisinopril 10mg-->20mg daily.  You will need to have your primary care physician check your electrolytes for this increase as well at your next appointment.      Return to ER if you develop lightheadedness/dizziness, falls, syncope, chest pain, shortness of breath (not relieved by oxygen and xanax), or other concerning symptoms.        Discharged to home by car with relative. Discharged via wheelchair, hospital escort: Yes.  Special equipment needed: Not Applicable    Be sure to schedule a follow-up appointment with your primary care doctor or any specialists as instructed.     Discharge Plan:   Smoking Cessation Offered: Patient Refused  Influenza Vaccine Indication: Patient Refuses    I understand that a diet low in cholesterol, fat, and sodium is recommended for good health. Unless I have been given specific instructions below for another diet, I accept this instruction as my diet prescription.   Other diet: cardiac    Special Instructions: None    · Is patient discharged on Warfarin / Coumadin?   No     Depression / Suicide Risk    As [facility] are [facility], it is important to learn how to keep safe from harming yourself.    Recognize the  warning signs:  · Abrupt changes in personality, positive or negative- including increase in energy   · Giving away possessions  · Change in eating patterns- significant weight changes-  positive or negative  · Change in sleeping patterns- unable to sleep or sleeping all the time   · Unwillingness or inability to communicate  · Depression  · Unusual sadness, discouragement and loneliness  · Talk of wanting to die  · Neglect of personal appearance   · Rebelliousness- reckless behavior  · Withdrawal from people/activities they love  · Confusion- inability to concentrate     If you or a loved one observes any of these behaviors or has concerns about self-harm, here's what you can do:  · Talk about it- your feelings and reasons for harming yourself  · Remove any means that you might use to hurt yourself (examples: pills, rope, extension cords, firearm)  · Get professional help from the community (Mental Health, Substance Abuse, psychological counseling)  · Do not be alone:Call your Safe Contact- someone whom you trust who will be there for you.  · Call your local CRISIS HOTLINE 133-7077 or 835-953-2846  · Call your local Children's Mobile Crisis Response Team [place] (447) 828-5393 or www.OneTouch  · Call the toll free National Suicide Prevention Hotlines   · National Suicide Prevention Lifeline 848-106-NOUR (5426)  · National Hope Line Network 800-SUICIDE (411-9661)      Syncope  Introduction  Syncope is when you lose temporarily pass out (faint). Signs that you may be about to pass out include:  · Feeling dizzy or light-headed.  · Feeling sick to your stomach (nauseous).  · Seeing all white or all black.  · Having cold, clammy skin.  If you passed out, get help right away. Call your local emergency services (921 in the [place] Do not drive yourself to the hospital.  Follow these instructions at home:  Pay attention to any changes in your symptoms. Take these actions to help with your condition:  · Have  someone stay with you until you feel stable.  · Do not drive, use machinery, or play sports until your doctor says it is okay.  · Keep all follow-up visits as told by your doctor. This is important.  · If you start to feel like you might pass out, lie down right away and raise (elevate) your feet above the level of your heart. Breathe deeply and steadily. Wait until all of the symptoms are gone.  · Drink enough fluid to keep your pee (urine) clear or pale yellow.  · If you are taking blood pressure or heart medicine, get up slowly and spend many minutes getting ready to sit and then stand. This can help with dizziness.  · Take over-the-counter and prescription medicines only as told by your doctor.  Get help right away if:  · You have a very bad headache.  · You have unusual pain in your chest, tummy, or back.  · You are bleeding from your mouth or rectum.  · You have black or tarry poop (stool).  · You have a very fast or uneven heartbeat (palpitations).  · It hurts to breathe.  · You pass out once or more than once.  · You have jerky movements that you cannot control (seizure).  · You are confused.  · You have trouble walking.  · You are very weak.  · You have vision problems.  These symptoms may be an emergency. Do not wait to see if the symptoms will go away. Get medical help right away. Call your local emergency services (911 in the [place] Do not drive yourself to the hospital.   This information is not intended to replace advice given to you by your health care provider. Make sure you discuss any questions you have with your health care provider.  Document Released: 06/05/2009 Document Revised: 05/25/2017 Document Reviewed: 08/31/2016  © 2017 Elsevier      Hypertension  Hypertension is another name for high blood pressure. High blood pressure forces your heart to work harder to pump blood. A blood pressure reading has two numbers, which includes a higher number over a lower number (example: 110/72).  Follow  these instructions at home:  · Have your blood pressure rechecked by your doctor.  · Only take medicine as told by your doctor. Follow the directions carefully. The medicine does not work as well if you skip doses. Skipping doses also puts you at risk for problems.  · Do not smoke.  · Monitor your blood pressure at home as told by your doctor.  Contact a doctor if:  · You think you are having a reaction to the medicine you are taking.  · You have repeat headaches or feel dizzy.  · You have puffiness (swelling) in your ankles.  · You have trouble with your vision.  Get help right away if:  · You get a very bad headache and are confused.  · You feel weak, numb, or faint.  · You get chest or belly (abdominal) pain.  · You throw up (vomit).  · You cannot breathe very well.  This information is not intended to replace advice given to you by your health care provider. Make sure you discuss any questions you have with your health care provider.  Document Released: 06/05/2009 Document Revised: 05/25/2017 Document Reviewed: 10/10/2014  [employer] Interactive Patient Education © 2017 Elsevier Inc.  Follow these instructions at home:  Lifestyle  · Eat a heart-healthy diet. Talk to your health care provider or a diet specialist (dietitian) if you need help. A heart-healthy diet includes:  ¨ Limiting unhealthy fats and increasing healthy fats. Some examples of healthy fats are olive oil and canola oil.  ¨ Eating plant-based foods, such as fruits, vegetables, nuts, legumes, and whole grains.  · Follow an exercise program as told by your health care provider.  · Maintain a healthy weight. Lose weight if directed by your health care provider.  · Rest when you are tired.  · Learn to manage your stress.  · Do not use any tobacco products, such as cigarettes, chewing tobacco, and e-cigarettes. If you need help quitting, ask your health care provider.  · Limit alcohol intake to no more than 1 drink a day for nonpregnant women and 2  drinks a day for men. One drink equals 12 oz of beer, 5 oz of wine, or 1½ oz of hard liquor.  · Do not abuse drugs.  General instructions  · Take over-the-counter and prescription medicines only as told by your health care provider.  · Manage other health conditions as told by your health care provider.  · Keep all follow-up visits as told by your health care provider. This is important.  Contact a health care provider if:  · You have chest pain or discomfort. This includes squeezing chest pain that may feel like indigestion (angina).  · You have shortness of breath.  · You have an irregular heartbeat.  · You have unexplained fatigue.  · You have unexplained pain or numbness in an arm, leg, or hip.  · You have nausea, swelling of your hands or feet, and itchy skin.  Get help right away if:  · You have symptoms of a heart attack, such as:  ¨ Chest pain.  ¨ Shortness of breath.  ¨ Pain in your neck, jaw, arms, back, or stomach.  ¨ Cold sweat.  ¨ Nausea.  ¨ Light-headedness.  · You have symptoms of a stroke, such as sudden:  ¨ Weakness on one side of your body.  ¨ Confusion.  ¨ Changes in vision.  ¨ Inability to speak or understand speech.  ¨ Loss of balance, coordination, or ability to walk.  ¨ Severe headache.  ¨ Loss of consciousness.  These symptoms may represent a serious problem that is an emergency. Do not wait to see if the symptoms will go away. Get medical help right away. Call your local emergency services (911 in the [place] Do not drive yourself to the hospital.   This information is not intended to replace advice given to you by your health care provider. Make sure you discuss any questions you have with your health care provider.  Document Released: 03/09/2005 Document Revised: 05/25/2017 Document Reviewed: 11/07/2016  © 2017 Elsevier

## 2019-09-19 NOTE — DISCHARGE PLANNING
Agency/Facility Name: [facility]  Spoke To: Zabrina   Outcome: Patient accepted. Informed Zabrina about patient needing a portable oxygen tank to go home. Per Zabrina, both will be delivered at 1130.

## 2019-09-19 NOTE — DISCHARGE PLANNING
Received Choice form at 102  Agency/Facility Name: [facility]  Referral sent per Choice form @ 630

## 2019-09-19 NOTE — PROGRESS NOTES
Monitor Summary:  Sinus Bradycardia-Sinus Rhythm with a First Degree: 55-69  Rare PVCs  .24/.10/.40

## 2019-09-19 NOTE — PROGRESS NOTES
Discharge instructions reviewed with patient and family member at beside. Educated on prescriptions, diet, exercise, and syncope. Verbalized understanding. IV removed, monitor removed, monitor room notified, and patient AOx4. All belongings and equipment with patient. Will discharge patient once ready. No further needs at this time.

## 2019-09-19 NOTE — DISCHARGE SUMMARY
Discharge Summary    CHIEF COMPLAINT ON ADMISSION  Chief Complaint   Patient presents with   • T-5000 GLF     this morning while going to restroom. Reports not being able to get up for 1 hour and crawled to phone to call her son. Denies injury       Reason for Admission  EMS     Admission Date  9/15/2019    CODE STATUS  Full Code    HPI & HOSPITAL COURSE  This is a 83 y.o. female with a history of hypertension, COPD, atrial fibrillation, hypothyroidism, insomnia and generalized anxiety disorder/panic disorder who presented after she fell out of bed and was unable to stand back up.  She denied any trauma to her head, neck or back.  She is on chronic anticoagulation for her atrial fibrillation.    #Fall from bed, possible syncope  #Generalized weakness  #Sedative/anxiolytic dependence  EKG showed ectopic atrial rhythm with a left bundle block, troponins x3 were negative.  CT head was negative for acute process.  No evidence of infections.  She was on baseline oxygen requirement.  Last echocardiogram was 8/9/2018 which showed an EF of 55%, patient without heart failure symptoms or signs.  She was watched on telemetry without any significant arrhythmias.  No significant murmurs, carotid ultrasound showed carotid atherosclerosis however less than 50% bilaterally.  No significant electrolyte abnormalities that were likely the cause of fall.  Of note prior to the fall that night patient had taken 2 Xanax for her insomnia and anxiety which is likely the cause of her fall out of bed that night and generalized weakness.  We decreased the Xanax dose from 1 mg to 0.25 mg which helped with patient's anxiety/panic however did not cause any lightheadedness/dizziness or severe weakness/falls.  Patient worked with physical therapy and occupational therapy who thought patient would benefit from home health physical therapy as well as nursing for med mgmt, PT/OT also recommended a transfer shower bench.       #HTN: She was noted to be  hypertensive this hospitalization even when not having a panic attack into the systolics of 160s to 170s.  She was continued on metoprolol and propafenone however she was started on lisinopril 10 mg and then increase to 20 mg daily.  She will need BMP 1 week from discharge.    #Hyponatremia  She was found to be hyponatremic this hospitalization 133 on admission (so unlikely cause of fall/syncope) rest of the values-->134-->133-->132-->130.  She initially appeared slightly dry however by HD#2 she was euvolemic.  May be SIADH from lung disease vs hospitalization.  TSH normal.  Will need to continue to monitor.     #Hypomagnesiumia   She was found to have Mg 1.4 on 9/18, 1.5 on 9/19 received 4g MgSulfate on day of discharge.  Will need to have this rechecked at hospital follow up within 1 week.    #Thrombocytopenia  Plt on day of admission 171 however on HD#2 decreased to 157 -->162.  WBC 4.4 but not neutropenic or lymphopenic.  No anemia.  Will need to continue to monitor.     #Anxiety/panic disorder  #Depression  Patient had a few panic attacks while in the hospital and told providers that she's been going through a difficult time the last few months because of several close family friends who have passed away as well as her  dying last year.  Looking back at provider notes multiple providers have recommended her seeing psychiatry previously and we stressed during his hospitalization that she pursue those referrals due to her severe anxiety/panic as well as depression.  She would likely benefit from SSRI as well as counseling to hopefully decrease her Xanax usage.    #COPD: was on baseline oxygen without evidence of exacerbation.  #Hypothyroidism: continued on home synthroid 88mcg daily. TSH 1.2  #afib: continued xarelto and propafenone       Therefore, she is discharged in fair and stable condition to home with close outpatient follow-up.    The patient met 2-midnight criteria for an inpatient stay at the  time of discharge.    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 *      FOLLOW UP  Future Appointments   Date Time Provider Department Center   9/23/2019 11:40 AM [facility]   10/23/2019 11:20 AM [facility]     Libertad Estrella M.D.  [address] [facility]  [address]  [facility] [id number] [phone]    Schedule an appointment as soon as possible for a visit in 1 week      [facility]  [address] [place] [address]  [place] [phone]          MEDICATIONS ON DISCHARGE     Medication List      START taking these medications      Instructions   lisinopril 20 MG Tabs  Commonly known as:  PRINIVIL   Take 1 Tab by mouth every day.  Dose:  20 mg        CHANGE how you take these medications      Instructions   ALPRAZolam 0.25 MG Tabs  What changed:    · medication strength  · how much to take  Commonly known as:  XANAX   Take 1 Tab by mouth 2 times a day as needed for Sleep or Anxiety for up to 90 days.  Dose:  0.25 mg        CONTINUE taking these medications      Instructions   albuterol 108  (90 Base) MCG/ACT Aers inhalation aerosol      clobetasol 0.05 % Crea  Commonly known as:  TEMOVATE   Apply 1 Each to affected area(s) 2 times a day as needed.  Dose:  1 Each     fenofibrate micronized 134 MG capsule  Commonly known as:  LOFIBRA   Doctor's comments:  Insurance will pay for 100 day supply, please cancel previous orders that have 30 or 90 day supply  TAKE 1 CAPSULE BY MOUTH EVERY DAY     FIBER SELECT GUMMIES PO   Take 1 Tab by mouth 2 times a day as needed (for constipation).  Dose:  1 Tab     fluticasone 50 MCG/ACT nasal spray  Commonly known as:  FLONASE   SPRAY 1 SPRAY IN NOSE 2 TIMES A DAY. EACH NOSTRIL  Dose:  1 Spray     hydrOXYzine HCl 50 MG Tabs  Commonly known as:  ATARAX   TAKE 1 TABLET BY MOUTH 3 TIMES A DAY AS NEEDED FOR ITCHING.     levothyroxine 88 MCG Tabs  Commonly known as:  SYNTHROID   Take 1 Tab by mouth Every morning on an empty stomach.  Dose:  88 mcg     magnesium oxide 400 (241.3 Mg) MG Tabs tablet  Commonly known as:  MAG-OX   Take 1 Tab by mouth every day for 360 days.  Dose:  400 mg     metoprolol 25 MG Tabs  Commonly known as:  LOPRESSOR   Take 1 Tab by mouth 2 Times a Day.  Dose:  25 mg     Mometasone Furo-Formoterol Fum 200-5 MCG/ACT Aero   Inhale 1 Puff by mouth 2 Times a Day.  Dose:  1 Puff     omeprazole 40 MG delayed-release capsule  Commonly known as:  PRILOSEC   TAKE 1 CAPSULE BY MOUTH EVERY DAY     propafenone 150 MG Tabs  Commonly known as:  RYTHMOL   Doctor's comments:  Patient's plan is requesting a 100 day supply. Thank you  TAKE 1 TABLET BY MOUTH TWICE A DAY     rivaroxaban 15 MG Tabs tablet  Commonly known as:  XARELTO   Doctor's comments:  Please dispense sample, thank you!  Take 1 Tab by mouth with dinner. SAMPLE.  Dose:  15 mg     simvastatin 20 MG Tabs  Commonly known as:  ZOCOR   TAKE 1 TABLET BY MOUTH IN THE EVENING            Allergies  Allergies   Allergen Reactions   • Asa [Aspirin]    • Latex    • Penicillins    • Tape Hives, Itching and Swelling   •  Wasp Venom Swelling       DIET  Orders Placed This Encounter   Procedures   • Diet Order Cardiac     Standing Status:   Standing     Number of Occurrences:   1     Order Specific Question:   Diet:     Answer:   Cardiac [6]   • Discontinue Diet Tray     Standing Status:   Standing     Number of Occurrences:   1       ACTIVITY  As tolerated.  Weight bearing as tolerated    CONSULTATIONS  PT/OT    PROCEDURES  None    Discharge Exam:  Physical Exam   Constitutional: She is oriented to person, place, and time and well-developed, well-nourished, and in no distress.   HENT:   Head: Normocephalic and atraumatic.   Eyes: Conjunctivae are normal. No scleral icterus.   Neck: Normal range of motion. Neck supple.   Cardiovascular: Normal rate, regular rhythm, normal heart sounds and intact distal pulses. Exam reveals no friction rub.   No murmur heard.  Pulmonary/Chest: Effort normal and breath sounds normal. No respiratory distress. She has no wheezes. She has no rales.   Abdominal: Soft. Bowel sounds are normal. She exhibits no distension. There is no tenderness. There is no rebound and no guarding.   Musculoskeletal: She exhibits no edema.   Neurological: She is alert and oriented to person, place, and time.   Moves upper and lower extremities equally.     Skin: Skin is warm and dry.   Psychiatric: Affect normal.       LABORATORY  Lab Results   Component Value Date    SODIUM 130 (L) 09/19/2019    POTASSIUM 4.0 09/19/2019    CHLORIDE 97 09/19/2019    CO2 27 09/19/2019    GLUCOSE 114 (H) 09/19/2019    BUN 21 09/19/2019    CREATININE 0.83 09/19/2019    CREATININE 0.76 03/21/2011    GLOMRATE >59 03/21/2011        Lab Results   Component Value Date    WBC 4.4 (L) 09/17/2019    WBC 4.9 03/21/2011    HEMOGLOBIN 13.4 09/17/2019    HEMATOCRIT 39.5 09/17/2019    PLATELETCT 162 (L) 09/17/2019        Total time of the discharge process exceeds >30 minutes.

## 2019-09-19 NOTE — DISCHARGE PLANNING
Received Choice form at 336  Agency/Facility Name: [facility]  Referral sent per Choice form @ 704

## 2019-09-19 NOTE — ASSESSMENT & PLAN NOTE
Euvolemic on exam today (was a little dry yesterday), poor po intake so could be hyponatremia dehydration vs siadh from lung disease.  Will continue to monitor since staying.

## 2019-09-19 NOTE — PROGRESS NOTES
Morning report received at bedside. Care assumed. Pt alert and awake sitting up in bed. No complaints of pain or discomfort. AOx4 and on 2L  O2. Tele monitor on. Bed in lowest position and locked. Call light and all belongings within reach. No further needs at this time.

## 2019-09-19 NOTE — CARE PLAN
Problem: Bowel/Gastric:  Goal: Normal bowel function is maintained or improved  Outcome: PROGRESSING AS EXPECTED     Problem: Discharge Barriers/Planning  Goal: Patient's continuum of care needs will be met  Outcome: PROGRESSING AS EXPECTED  Note:   Patient awaiting acceptance for home health. Case management making calls to figure out acceptance.       Problem: Respiratory:  Goal: Respiratory status will improve  Outcome: PROGRESSING AS EXPECTED  Note:   No complaints of SOB or difficulty breathing. Will continue to monitor. O2 and RT as needed.

## 2019-09-19 NOTE — CARE PLAN
Problem: Communication  Goal: The ability to communicate needs accurately and effectively will improve  Outcome: PROGRESSING AS EXPECTED  Intervention: [place] patient and significant other/support system to call light to alert staff of needs  Flowsheets (Taken 9/18/2019 8266)  Oriented to:: All of the Following : Location of Bathroom, Visiting Policy, Unit Routine, Call Light and Bedside Controls, Bedside Rail Policy, Smoking Policy, Rights and Responsibilities, Bedside Report, and Patient Education Notebook; Visiting Policy; Call Light & Bedside Controls; Smoking Policy; Patient Education Notebook; Location of bathroom; Unit Routine; Bedside Rail Policy; Patient Rights and Responsibilities; Bedside Report     Problem: Safety  Goal: Will remain free from falls  Outcome: PROGRESSING AS EXPECTED     Problem: Infection  Goal: Will remain free from infection  Outcome: PROGRESSING AS EXPECTED

## 2019-09-19 NOTE — DISCHARGE PLANNING
Anticipated Discharge Disposition: HH and DME    Action: CM received orders for HH and a tub transfer bench and met with pt at bedside. Pt states that she would like to choose [facility] and [facility].   Pt will also need a portable 02 tank to get home. CM asked NAN Aponte to please ask [facility] to bring a portable tank too.   Pts son will drive her home at d/c.    Barriers to Discharge:     Plan: Monitor for acceptance to HH and delivery of tub transfer bench and portable 02 tank.

## 2019-09-19 NOTE — DISCHARGE PLANNING
[facility]   Spoke To: Jennifer  Outcome: Patient accepted.    Notified SHANELLE Fan.

## 2019-09-20 ENCOUNTER — TELEPHONE (OUTPATIENT)
Dept: MEDICAL GROUP | Facility: PHYSICIAN GROUP | Age: 84
End: 2019-09-20

## 2019-09-20 ENCOUNTER — PATIENT OUTREACH (OUTPATIENT)
Dept: HEALTH INFORMATION MANAGEMENT | Facility: OTHER | Age: 84
End: 2019-09-20

## 2019-09-20 NOTE — TELEPHONE ENCOUNTER
Future Appointments       Provider Department Center    9/23/2019 11:40 AM Libertad Estrella M.D. [facility]    10/23/2019 11:20 AM Libertad Estrella M.D. [facility]        ESTABLISHED PATIENT PRE-VISIT PLANNING     Patient was NOT contacted to complete PVP.       1.  Reviewed notes from the last few office visits within the medical group: Yes    2.  If any orders were placed at last visit or intended to be done for this visit (i.e. 6 mos follow-up), do we have Results/Consult Notes?        •  Labs - Labs ordered, completed on 09/19/2019 and results are in chart.       •  Imaging - Imaging ordered, completed and results are in chart.       •  Referrals - No referrals were ordered at last office visit.    3. Is this appointment scheduled as a Hospital Follow-Up? Yes, visit was at [facility].     4.  Immunizations were updated in [employer] using WebIZ?: Yes       •  Web Iz Recommendations: FLU, TDAP, VARICELLA (Chicken Pox)  and SHINGRIX (Shingles)    5.  Patient is due for the following Health Maintenance Topics:   Health Maintenance Due   Topic Date Due   • IMM INFLUENZA (1) 09/01/2019     6. Orders for overdue Health Maintenance topics pended in Pre-Charting? NO    7.  AHA (MDX) form printed for Provider? No, already completed    8.  Patient was NOT informed to arrive 15 min prior to their scheduled appointment and bring in their medication bottles.

## 2019-09-20 NOTE — TELEPHONE ENCOUNTER
1. Caller Name: Christine-Care Coordinator                                          Call Back Number: [phone]. [phone]      Patient approves a detailed voicemail message: N\A    Care coordinator would like to ask PCP to discuss with pt an OCD Oxygen tank through D[employer]. during appt on 09/23/19

## 2019-09-23 ENCOUNTER — OFFICE VISIT (OUTPATIENT)
Dept: MEDICAL GROUP | Facility: PHYSICIAN GROUP | Age: 84
End: 2019-09-23
Payer: MEDICARE

## 2019-09-23 VITALS
SYSTOLIC BLOOD PRESSURE: 116 MMHG | HEART RATE: 72 BPM | OXYGEN SATURATION: 94 % | DIASTOLIC BLOOD PRESSURE: 68 MMHG | TEMPERATURE: 97.6 F | RESPIRATION RATE: 18 BRPM
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
## 2019-09-23 DIAGNOSIS — J96.11 CHRONIC RESPIRATORY FAILURE WITH HYPOXIA (HCC): ICD-10-CM

## 2019-09-23 DIAGNOSIS — F41.9 ANXIETY: ICD-10-CM

## 2019-09-23 DIAGNOSIS — J44.9 CHRONIC OBSTRUCTIVE PULMONARY DISEASE, UNSPECIFIED COPD TYPE (HCC): ICD-10-CM
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
## 2019-09-23 DIAGNOSIS — Z23 NEED FOR INFLUENZA VACCINATION: ICD-10-CM

## 2019-09-23 PROCEDURE — G0008 ADMIN INFLUENZA VIRUS VAC: HCPCS | Performed by: FAMILY MEDICINE

## 2019-09-23 PROCEDURE — 90662 IIV NO PRSV INCREASED AG IM: CPT | Performed by: FAMILY MEDICINE

## 2019-09-23 PROCEDURE — 99214 OFFICE O/P EST MOD 30 MIN: CPT | Mod: 25 | Performed by: FAMILY MEDICINE

## 2019-09-28 NOTE — DOCUMENTATION QUERY
[facility]                                                                       Query Response Note      PATIENT:               JULES DUNCAN  ACCT #:                  [id number]  MRN:                     [id number]  :                      1935  ADMIT DATE:       9/15/2019 9:40 AM  DISCH DATE:        2019 4:27 PM  RESPONDING  PROVIDER #:        [id number]           QUERY TEXT:    Depression is documented in the Medical Record.  Please specify the type.    NOTE:  If an appropriate response is not listed below, please respond with a new note.              The patient's Clinical Indicators include:  Depression mentioned throughout medical record, please specify type.  Options provided:   -- MDD, recurrent, in full remission   -- MDD, single episode, in full remission   -- MDD, recurrent, in partial remission   -- MDD, single episode, in partial remission   -- MDD, mild, single episode   -- MDD, mild, recurrent, current episode   -- MDD, moderate, single episode   -- MDD, moderate, recurrent, current episode   -- MDD, severe, single episode, without psychotic features   -- MDD, severe, single episode, with psychotic features   -- MDD, severe, recurrent, current episode, without psychotic features   -- MDD, severe, recurrent, current episode, with psychotic features   -- Situational/Grief Reaction depression   -- Unable to determine      Query created by: Ora Díaz on 2019 5:54 AM    RESPONSE TEXT:    MDD, moderate, single episode          Electronically signed by:  ACE REYES 2019 7:31 AM

## 2019-10-02 ENCOUNTER — TELEPHONE (OUTPATIENT)
Dept: MEDICAL GROUP | Facility: PHYSICIAN GROUP | Age: 84
End: 2019-10-02

## 2019-10-02 DIAGNOSIS — I48.0 PAROXYSMAL ATRIAL FIBRILLATION (HCC): ICD-10-CM

## 2019-10-02 DIAGNOSIS — I48.91 ATRIAL FIBRILLATION, UNSPECIFIED TYPE (HCC): ICD-10-CM

## 2019-10-02 DIAGNOSIS — E03.9 HYPOTHYROIDISM (ACQUIRED): ICD-10-CM

## 2019-10-02 RX ORDER — LOSARTAN POTASSIUM 100 MG/1
100 TABLET ORAL DAILY
Qty: 90 TAB | Refills: 3 | Status: SHIPPED | OUTPATIENT
Start: 2019-10-02 | End: 2019-10-03 | Stop reason: SDUPTHER

## 2019-10-02 NOTE — TELEPHONE ENCOUNTER
----- Message from Christine Osorio sent at 10/2/2019 12:17 PM PDT -----  Good afternoon,    I work in case management for this mutual patient of ours. I have concerns regarding this patient's medications that I wanted to address with you.    I spoke with Faviola the patient's daughter yesterday and she mentioned that since the patient has started taking Lisinopril she has developed a terrible cough. The patient is also having pretty bad diarrhea, and Faviola feels this could be related to the patient's magnesium. I was hoping some one could reach out to Faviola to address these concerns.    In addition I was working on getting patient set up with bubble packaging due to her having so many medications, and not being able to manage them independently. We are trying to go through [facility] for this, and a gentlemen named Ronit stated he has been trying to reach out to your office for some of the scripts to be sent over. I was hoping some one could return his calls and we can move forward with getting this set up. Ronit can be reached at [phone] option #2.     I appreciate your collaboration on this, and please feel welcome to reach out to me here via [employer] or at my # [phone] with any questions.    Best,    Christine

## 2019-10-02 NOTE — TELEPHONE ENCOUNTER
May discontinue the magnesium due to diarrhea.    I will change the lisinopril to losartan.      Please call Faviola (daughter) and let her know of these changes and then please call [facility] as below and see all of what they need.  Thank you!

## 2019-10-03 RX ORDER — LEVOTHYROXINE SODIUM 88 UG/1
88 TABLET ORAL
Qty: 90 TAB | Refills: 3 | Status: SHIPPED | OUTPATIENT
Start: 2019-10-03 | End: 2019-12-31 | Stop reason: SDUPTHER

## 2019-10-03 RX ORDER — FENOFIBRATE 134 MG/1
134 CAPSULE ORAL
Qty: 100 CAP | Refills: 3 | Status: SHIPPED | OUTPATIENT
Start: 2019-10-03 | End: 2020-04-01 | Stop reason: SDUPTHER

## 2019-10-03 RX ORDER — PROPAFENONE HYDROCHLORIDE 150 MG/1
TABLET, COATED ORAL
Qty: 200 TAB | Refills: 3 | Status: SHIPPED | OUTPATIENT
Start: 2019-10-03 | End: 2020-01-08 | Stop reason: SDUPTHER

## 2019-10-03 RX ORDER — SIMVASTATIN 20 MG
TABLET ORAL
Qty: 90 TAB | Refills: 3 | Status: SHIPPED | OUTPATIENT
Start: 2019-10-03 | End: 2020-04-01 | Stop reason: SDUPTHER

## 2019-10-03 RX ORDER — OMEPRAZOLE 40 MG/1
40 CAPSULE, DELAYED RELEASE ORAL
Qty: 90 CAP | Refills: 1 | Status: SHIPPED | OUTPATIENT
Start: 2019-10-03 | End: 2020-05-05 | Stop reason: SDUPTHER

## 2019-10-03 RX ORDER — LOSARTAN POTASSIUM 100 MG/1
100 TABLET ORAL DAILY
Qty: 90 TAB | Refills: 3 | Status: SHIPPED | OUTPATIENT
Start: 2019-10-03 | End: 2020-01-06 | Stop reason: SDUPTHER

## 2019-10-03 RX ORDER — ALBUTEROL SULFATE 90 UG/1
2 AEROSOL, METERED RESPIRATORY (INHALATION) EVERY 6 HOURS PRN
Qty: 8.5 G | Refills: 11 | Status: SHIPPED | OUTPATIENT
Start: 2019-10-03 | End: 2019-10-30

## 2019-10-03 NOTE — TELEPHONE ENCOUNTER
Left a message on the daughter's voicemail with information.  I called [facility]. They are asking if you can electronically send over all of her medications.  The pharmacy has been changed over to [facility].  Thank you.

## 2019-10-09 ENCOUNTER — HOSPITAL ENCOUNTER (OUTPATIENT)
Dept: RADIOLOGY | Facility: MEDICAL CENTER | Age: 84
End: 2019-10-09
Attending: FAMILY MEDICINE
Payer: MEDICARE

## 2019-10-09 ENCOUNTER — TELEPHONE (OUTPATIENT)
Dept: MEDICAL GROUP | Facility: PHYSICIAN GROUP | Age: 84
End: 2019-10-09

## 2019-10-09 ENCOUNTER — OFFICE VISIT (OUTPATIENT)
Dept: URGENT CARE | Facility: PHYSICIAN GROUP | Age: 84
End: 2019-10-09
Payer: MEDICARE

## 2019-10-09 VITALS
SYSTOLIC BLOOD PRESSURE: 122 MMHG | TEMPERATURE: 98.9 F | RESPIRATION RATE: 20 BRPM | DIASTOLIC BLOOD PRESSURE: 62 MMHG | OXYGEN SATURATION: 94 % | HEART RATE: 64 BPM

## 2019-10-09 DIAGNOSIS — R06.02 SHORTNESS OF BREATH: ICD-10-CM

## 2019-10-09 DIAGNOSIS — R05.2 SUBACUTE COUGH: ICD-10-CM

## 2019-10-09 PROCEDURE — 99214 OFFICE O/P EST MOD 30 MIN: CPT | Performed by: FAMILY MEDICINE

## 2019-10-09 PROCEDURE — 71046 X-RAY EXAM CHEST 2 VIEWS: CPT

## 2019-10-09 ASSESSMENT — ENCOUNTER SYMPTOMS
WEIGHT LOSS: 0
MYALGIAS: 0
PALPITATIONS: 0
VOMITING: 0
HEADACHES: 1
EYE REDNESS: 0
NAUSEA: 0
EYE DISCHARGE: 0

## 2019-10-09 NOTE — PROGRESS NOTES
Subjective:      Eun Be is a 83 y.o. female who presents with Breathing Problem (releasded from hospital 3 weeks ago, home nurse said sounds like fluid in lungs,dizzy,hard to sleep,headache)            3 weeks productive cough without blood in sputum. +chills. +SOB/wheeze. +PMH COPD as well as pneumonia. She is on nighttime O2 2L by nasal canula. No nasal congestion. Recently changed from ACEI to ARB due to cough. No significant change yet.  Symptoms are moderate severity and constant.  No other aggravating or alleviating factors.        Review of Systems   Constitutional: Negative for malaise/fatigue and weight loss.   Eyes: Negative for discharge and redness.   Cardiovascular: Negative for chest pain, palpitations and leg swelling.   Gastrointestinal: Negative for nausea and vomiting.   Musculoskeletal: Negative for joint pain and myalgias.   Skin: Negative for itching and rash.   Neurological: Positive for headaches.     .  Medications, Allergies, and current problem list reviewed today in Epic       Objective:     /62 (BP Location: Left arm, Patient Position: Sitting, BP Cuff Size: Adult)   Pulse 64   Temp 37.2 °C (98.9 °F) (Temporal)   Resp 20   LMP 01/01/1963   SpO2 94%      Physical Exam   Constitutional: She is oriented to person, place, and time. She appears well-developed and well-nourished. No distress.   HENT:   Head: Normocephalic and atraumatic.   Mouth/Throat: Oropharynx is clear and moist.   Eyes: Conjunctivae are normal.   Neck: Neck supple. No JVD present.   Cardiovascular: Normal rate, regular rhythm and normal heart sounds.   No murmur heard.  Pulmonary/Chest: Effort normal and breath sounds normal. She has no wheezes.   Neurological: She is alert and oriented to person, place, and time.   Skin: Skin is warm and dry. No rash noted.               Assessment/Plan:   CXR: no active disease per radiology    1. Subacute cough  DX-CHEST-2 VIEWS   2. Shortness of breath   DX-CHEST-2 VIEWS     Differential diagnosis, natural history, supportive care, and indications for immediate follow-up discussed at length.

## 2019-10-09 NOTE — TELEPHONE ENCOUNTER
Future Appointments       Provider Department Center    10/23/2019 11:20 AM Libertad Estrella M.D. [facility]        ESTABLISHED PATIENT PRE-VISIT PLANNING     Patient was NOT contacted to complete PVP.       1.  Reviewed notes from the last few office visits within the medical group: Yes    2.  If any orders were placed at last visit or intended to be done for this visit (i.e. 6 mos follow-up), do we have Results/Consult Notes?        •  Labs - Labs ordered, completed on 09/18/2019 and results are in chart.       •  Imaging - Imaging was not ordered at last office visit.       •  Referrals - Referral ordered, patient has NOT been seen. -Not scheduled yet.    3. Is this appointment scheduled as a Hospital Follow-Up? No    4.  Immunizations were updated in [employer] using WebIZ?: Yes       •  Web Iz Recommendations: TDAP, VARICELLA (Chicken Pox)  and SHINGRIX (Shingles)    5.  Patient is due for the following Health Maintenance Topics:   There are no preventive care reminders to display for this patient.    6. Orders for overdue Health Maintenance topics pended in Pre-Charting? NO    7.  AHA (MDX) form printed for Provider? No, already completed    8.  Patient was NOT informed to arrive 15 min prior to their scheduled appointment and bring in their medication bottles.

## 2019-10-10 DIAGNOSIS — F41.9 ANXIETY: ICD-10-CM

## 2019-10-15 RX ORDER — ALPRAZOLAM 0.25 MG/1
0.25 TABLET ORAL 2 TIMES DAILY
Qty: 60 TAB | Refills: 0 | Status: SHIPPED
Start: 2019-10-15 | End: 2019-11-10 | Stop reason: SDUPTHER

## 2019-10-21 ENCOUNTER — PATIENT OUTREACH (OUTPATIENT)
Dept: HEALTH INFORMATION MANAGEMENT | Facility: OTHER | Age: 84
End: 2019-10-21

## 2019-10-23 ENCOUNTER — OFFICE VISIT (OUTPATIENT)
Dept: MEDICAL GROUP | Facility: PHYSICIAN GROUP | Age: 84
End: 2019-10-23
Payer: MEDICARE

## 2019-10-23 VITALS
HEIGHT: 71 IN | OXYGEN SATURATION: 93 % | SYSTOLIC BLOOD PRESSURE: 104 MMHG | WEIGHT: 139 LBS | HEART RATE: 68 BPM | RESPIRATION RATE: 14 BRPM | TEMPERATURE: 98.3 F | DIASTOLIC BLOOD PRESSURE: 62 MMHG | BODY MASS INDEX: 19.46 KG/M2

## 2019-10-23 DIAGNOSIS — G31.84 MILD COGNITIVE IMPAIRMENT: ICD-10-CM

## 2019-10-23 DIAGNOSIS — N30.00 ACUTE CYSTITIS WITHOUT HEMATURIA: ICD-10-CM

## 2019-10-23 DIAGNOSIS — E87.8 ELECTROLYTE ABNORMALITY: ICD-10-CM

## 2019-10-23 DIAGNOSIS — F13.20 SEDATIVE, HYPNOTIC OR ANXIOLYTIC DEPENDENCE (HCC): ICD-10-CM

## 2019-10-23 DIAGNOSIS — E03.9 HYPOTHYROIDISM (ACQUIRED): ICD-10-CM

## 2019-10-23 DIAGNOSIS — F41.9 ANXIETY: ICD-10-CM

## 2019-10-23 PROCEDURE — 99214 OFFICE O/P EST MOD 30 MIN: CPT | Performed by: FAMILY MEDICINE

## 2019-10-23 RX ORDER — BUSPIRONE HYDROCHLORIDE 5 MG/1
5 TABLET ORAL 3 TIMES DAILY PRN
Qty: 270 TAB | Refills: 3 | Status: SHIPPED | OUTPATIENT
Start: 2019-10-23 | End: 2020-04-01

## 2019-10-23 ASSESSMENT — PATIENT HEALTH QUESTIONNAIRE - PHQ9
SUM OF ALL RESPONSES TO PHQ QUESTIONS 1-9: 11
CLINICAL INTERPRETATION OF PHQ2 SCORE: 3
5. POOR APPETITE OR OVEREATING: 1 - SEVERAL DAYS

## 2019-10-23 NOTE — PROGRESS NOTES
Chief Complaint   Patient presents with   • Difficulty Sleeping   • Anxiety     fv xanax   • Fall     fv        HISTORY OF PRESENT ILLNESS: Patient is a 83 y.o. female established patient here today for the following concerns:      This is a pleasant 83 year old female with hx of Persistent Afib who is on rhythm control, rate control and anticoagulated who has had some episodes of RVR.  She was recently hospitalized for a fall, unclear if syncopal in nature, but was found to have possible UTI (treated with IV abx) and concern for her use of xanax.  She was tapered down immediately and asked to follow up.  We continued to taper down, now only using 0.25 mg.  She reports she has not felt well since tapering down that she is feeling foggy headed, having headaches at times and feeling more anxious.  No further falls.  She reports that she feels easily fatigued with any walking.  They have not yet rescheduled with cardiology for follow up.  Here today to discuss medications going forward.        Past Medical, Social, and Family history reviewed and updated in EPIC    Allergies:Asa [aspirin]; Latex; Penicillins; Tape; and Wasp venom    Current Outpatient Medications   Medication Sig Dispense Refill   • busPIRone (BUSPAR) 5 MG tablet Take 1 Tab by mouth 3 times a day as needed. 270 Tab 3   • ALPRAZolam (XANAX) 0.25 MG Tab Take 1 Tab by mouth 2 Times a Day for 30 days. To be tapering off this medication.  Please keep follow up appointment to discuss taper 60 Tab 0   • rivaroxaban (XARELTO) 15 MG Tab tablet Take 1 Tab by mouth with dinner. SAMPLE. 30 Tab 0   • metoprolol (LOPRESSOR) 25 MG Tab Take 1 Tab by mouth 2 Times a Day. 60 Tab 0   • omeprazole (PRILOSEC) 40 MG delayed-release capsule Take 1 Cap by mouth every day. 90 Cap 1   • fenofibrate micronized (LOFIBRA) 134 MG capsule Take 1 Cap by mouth every day. 100 Cap 3   • levothyroxine (SYNTHROID) 88 MCG Tab Take 1 Tab by mouth Every morning on an empty stomach. 90 Tab 3    • losartan (COZAAR) 100 MG Tab Take 1 Tab by mouth every day. 90 Tab 3   • propafenone (RYTHMOL) 150 MG Tab TAKE 1 TABLET BY MOUTH TWICE A  Tab 3   • simvastatin (ZOCOR) 20 MG Tab TAKE 1 TABLET BY MOUTH IN THE EVENING 90 Tab 3   • Mometasone Furo-Formoterol Fum (DULERA) 200-5 MCG/ACT Aerosol Inhale 1 Puff by mouth 2 Times a Day. 1 Inhaler 11   • albuterol 108 (90 Base) MCG/ACT Aero Soln inhalation aerosol Inhale 2 Puffs by mouth every 6 hours as needed for Shortness of Breath. Indications: Proair 8.5 g 11   • hydrOXYzine HCl (ATARAX) 50 MG Tab TAKE 1 TABLET BY MOUTH 3 TIMES A DAY AS NEEDED FOR ITCHING. 90 Tab 2   • fluticasone (FLONASE) 50 MCG/ACT nasal spray SPRAY 1 SPRAY IN NOSE 2 TIMES A DAY. EACH NOSTRIL 3 Bottle 0   • FIBER SELECT GUMMIES PO Take 1 Tab by mouth 2 times a day as needed (for constipation).     • clobetasol (TEMOVATE) 0.05 % Cream Apply 1 Each to affected area(s) 2 times a day as needed.  5     No current facility-administered medications for this visit.          ROS:  Review of Systems   Constitutional: Negative for fever, chills, weight loss and malaise/fatigue.   HENT: Negative for ear pain, nosebleeds, congestion, sore throat and neck pain.    Eyes: Negative for blurred vision.   Respiratory: Negative for cough, sputum production, +shortness of breath and wheezing.    Cardiovascular: Negative for chest pain, +palpitations,  and leg swelling.   Gastrointestinal: Negative for heartburn, nausea, vomiting, diarrhea and abdominal pain.   Genitourinary: Negative for dysuria, urgency and frequency.   Musculoskeletal: Negative for myalgias, +back pain and joint pain.   Skin: Negative for rash and itching.   Neurological: Negative for dizziness, tingling, tremors, sensory change, focal weakness and headaches.   Endo/Heme/Allergies: Does not bruise/bleed easily.   Psychiatric/Behavioral: + depression, +anxiety, no suicidal ideas, insomnia and memory loss.      Exam:  /62   Pulse 68    "Temp 36.8 °C (98.3 °F)   Resp 14   Ht 1.803 m (5' 11\")   Wt 63 kg (139 lb)   SpO2 93%     General:  Well nourished, well developed in NAD  Head is grossly normal.  Neck: Supple without JVD   Pulmonary:  Normal effort.   Cardiovascular: Regular rate  Extremities: no clubbing, cyanosis, or edema.  Psych: affect appropriate      Please note that this dictation was created using voice recognition software. I have made every reasonable attempt to correct obvious errors, but I expect that there are errors of grammar and possibly content that I did not discover before finalizing the note.    Assessment/Plan:  1. Anxiety  Will d/c the xanax.  She will use the remaining pills to taper off.  Start low dose buspar.  Check thyroid.    - busPIRone (BUSPAR) 5 MG tablet; Take 1 Tab by mouth 3 times a day as needed.  Dispense: 270 Tab; Refill: 3    2. Sedative, hypnotic or anxiolytic dependence (HCC)  - busPIRone (BUSPAR) 5 MG tablet; Take 1 Tab by mouth 3 times a day as needed.  Dispense: 270 Tab; Refill: 3    3. Hypothyroidism (acquired)  Check appropriate thyroid dosing.   - TSH; Future  - FREE THYROXINE; Future    4. Electrolyte abnormality  Check levels.   - Basic Metabolic Panel; Future    5. Acute cystitis without hematuria  R/o persistent infection causing fatigue and hypotension.    - URINALYSIS,CULTURE IF INDICATED; Future    6. Mild cognitive impairment  Will try to eliminate medications that may be causing additional cognitive impairment such as the xanax.      1 month follow up.          "

## 2019-10-24 NOTE — PROGRESS NOTES
An 83-year-old female was an emergent admission to [facility] from 9/15/2019 to 9/19/2019 to treat syncope and collapse. The patient was discharged home with home health. The patient was not under clinical case management.     The patient was ordered to start/continue to take the following medications: Alprazolam/Xr (Xanax/Xr) and Lisinopril (Prinivil). The patient successfully filled all medications.    Per discharge orders, patient was instructed to see her PCP within a week of discharge. Patient saw her PCP on 9/23/19 and will return on 10/23/19. Patient started [place] [facility] home health services on 9/20/19. Patient utilizes 02 through [employer].    [facility] patient advocate was able to successfully engage with patient's daughter, Faviola, post-discharge and throughout the case. Advocate set patient up with [facility] for bubble packaging, after Faviola expressed patient was having trouble managing her various medications. Advocate also reached out to patient's PCP when patient started to experience some adverse reactions to her medications.     PPS score 80%.

## 2019-10-29 DIAGNOSIS — I48.91 ATRIAL FIBRILLATION, UNSPECIFIED TYPE (HCC): ICD-10-CM

## 2019-10-30 RX ORDER — ALBUTEROL SULFATE 90 UG/1
2 AEROSOL, METERED RESPIRATORY (INHALATION) EVERY 6 HOURS PRN
Qty: 8.5 INHALER | Refills: 11 | Status: SHIPPED | OUTPATIENT
Start: 2019-10-30 | End: 2020-11-20

## 2019-10-30 NOTE — TELEPHONE ENCOUNTER
Was the patient seen in the last year in this department? Yes    Does patient have an active prescription for medications requested? Yes    Received Request Via: Pharmacy      Pt met protocol?: Yes    LAST OV 10/23/2019  **CHANGE IN PHARMACY**

## 2019-10-31 ENCOUNTER — PATIENT MESSAGE (OUTPATIENT)
Dept: CARDIOLOGY | Facility: MEDICAL CENTER | Age: 84
End: 2019-10-31

## 2019-10-31 DIAGNOSIS — I48.91 ATRIAL FIBRILLATION, UNSPECIFIED TYPE (HCC): ICD-10-CM

## 2019-10-31 RX ORDER — RIVAROXABAN 15 MG/1
TABLET, FILM COATED ORAL
Qty: 30 TAB | Refills: 11 | Status: SHIPPED | OUTPATIENT
Start: 2019-10-31 | End: 2019-11-01 | Stop reason: SDUPTHER

## 2019-11-01 RX ORDER — OMEPRAZOLE 40 MG/1
CAPSULE, DELAYED RELEASE ORAL
Qty: 90 CAP | Refills: 1 | Status: SHIPPED | OUTPATIENT
Start: 2019-11-01 | End: 2020-04-01

## 2019-11-05 NOTE — TELEPHONE ENCOUNTER
Refill X 6 months, sent to pharmacy.Pt. Seen in the last 6 months per protocol.   Lab Results   Component Value Date/Time    SODIUM 130 (L) 09/19/2019 03:46 AM    POTASSIUM 4.0 09/19/2019 03:46 AM    CHLORIDE 97 09/19/2019 03:46 AM    CO2 27 09/19/2019 03:46 AM    GLUCOSE 114 (H) 09/19/2019 03:46 AM    BUN 21 09/19/2019 03:46 AM    CREATININE 0.83 09/19/2019 03:46 AM    CREATININE 0.76 03/21/2011 12:00 AM    BUNCREATRAT 25 03/21/2011 12:00 AM    GLOMRATE >59 03/21/2011 12:00 AM

## 2019-11-05 NOTE — TELEPHONE ENCOUNTER
Was the patient seen in the last year in this department? Yes    Does patient have an active prescription for medications requested? No     Received Request Via: Pharmacy      Pt met protocol?: Yes    OV 10/19

## 2019-11-08 RX ORDER — FENOFIBRATE 134 MG/1
CAPSULE ORAL
Qty: 100 CAP | Refills: 1 | Status: SHIPPED | OUTPATIENT
Start: 2019-11-08 | End: 2020-04-01

## 2019-11-09 NOTE — TELEPHONE ENCOUNTER
Was the patient seen in the last year in this department? Yes    Does patient have an active prescription for medications requested? No     Received Request Via: Pharmacy      Pt met protocol?: Yes, OV 10/19   Lab Results   Component Value Date/Time    CHOLSTRLTOT 171 03/28/2019 10:11 AM    LDL 83 03/28/2019 10:11 AM    HDL 75 03/28/2019 10:11 AM    TRIGLYCERIDE 65 03/28/2019 10:11 AM       Lab Results   Component Value Date/Time    SODIUM 130 (L) 09/19/2019 03:46 AM    POTASSIUM 4.0 09/19/2019 03:46 AM    CHLORIDE 97 09/19/2019 03:46 AM    CO2 27 09/19/2019 03:46 AM    GLUCOSE 114 (H) 09/19/2019 03:46 AM    BUN 21 09/19/2019 03:46 AM    CREATININE 0.83 09/19/2019 03:46 AM    CREATININE 0.76 03/21/2011 12:00 AM    BUNCREATRAT 25 03/21/2011 12:00 AM    GLOMRATE >59 03/21/2011 12:00 AM     Lab Results   Component Value Date/Time    ALKPHOSPHAT 31 09/15/2019 10:25 AM    ASTSGOT 10 (L) 09/15/2019 10:25 AM    ALTSGPT 8 09/15/2019 10:25 AM    TBILIRUBIN 0.7 09/15/2019 10:25 AM

## 2019-11-10 DIAGNOSIS — F41.9 ANXIETY: ICD-10-CM

## 2019-11-12 RX ORDER — ALPRAZOLAM 0.25 MG/1
TABLET ORAL
Qty: 60 TAB | Refills: 0 | Status: SHIPPED | OUTPATIENT
Start: 2019-11-12 | End: 2019-11-14 | Stop reason: SDUPTHER

## 2019-11-13 ENCOUNTER — HOSPITAL ENCOUNTER (OUTPATIENT)
Dept: LAB | Facility: MEDICAL CENTER | Age: 84
End: 2019-11-13
Attending: FAMILY MEDICINE
Payer: MEDICARE

## 2019-11-13 DIAGNOSIS — N30.00 ACUTE CYSTITIS WITHOUT HEMATURIA: ICD-10-CM

## 2019-11-13 DIAGNOSIS — E03.9 HYPOTHYROIDISM (ACQUIRED): ICD-10-CM

## 2019-11-13 DIAGNOSIS — E87.8 ELECTROLYTE ABNORMALITY: ICD-10-CM

## 2019-11-13 LAB
ANION GAP SERPL CALC-SCNC: 9 MMOL/L (ref 0–11.9)
APPEARANCE UR: CLEAR
BILIRUB UR QL STRIP.AUTO: NEGATIVE
BUN SERPL-MCNC: 28 MG/DL (ref 8–22)
CALCIUM SERPL-MCNC: 10.4 MG/DL (ref 8.5–10.5)
CHLORIDE SERPL-SCNC: 104 MMOL/L (ref 96–112)
CO2 SERPL-SCNC: 27 MMOL/L (ref 20–33)
COLOR UR: YELLOW
CREAT SERPL-MCNC: 1.02 MG/DL (ref 0.5–1.4)
GLUCOSE SERPL-MCNC: 88 MG/DL (ref 65–99)
GLUCOSE UR STRIP.AUTO-MCNC: NEGATIVE MG/DL
KETONES UR STRIP.AUTO-MCNC: NEGATIVE MG/DL
LEUKOCYTE ESTERASE UR QL STRIP.AUTO: ABNORMAL
MICRO URNS: ABNORMAL
NITRITE UR QL STRIP.AUTO: NEGATIVE
PH UR STRIP.AUTO: 6 [PH] (ref 5–8)
POTASSIUM SERPL-SCNC: 4.2 MMOL/L (ref 3.6–5.5)
PROT UR QL STRIP: 30 MG/DL
RBC UR QL AUTO: NEGATIVE
SODIUM SERPL-SCNC: 140 MMOL/L (ref 135–145)
SP GR UR STRIP.AUTO: 1.02
UROBILINOGEN UR STRIP.AUTO-MCNC: 0.2 MG/DL

## 2019-11-13 PROCEDURE — 84439 ASSAY OF FREE THYROXINE: CPT

## 2019-11-13 PROCEDURE — 81001 URINALYSIS AUTO W/SCOPE: CPT

## 2019-11-13 PROCEDURE — 80048 BASIC METABOLIC PNL TOTAL CA: CPT

## 2019-11-13 PROCEDURE — 36415 COLL VENOUS BLD VENIPUNCTURE: CPT

## 2019-11-13 PROCEDURE — 84443 ASSAY THYROID STIM HORMONE: CPT

## 2019-11-13 NOTE — TELEPHONE ENCOUNTER
----- Message from Nancy Meza, Med Ass't sent at 11/13/2019 10:05 AM PST -----  Regarding: medicine refill  Contact: [phone]  Georgi drake to [facility].

## 2019-11-14 ENCOUNTER — PATIENT MESSAGE (OUTPATIENT)
Dept: MEDICAL GROUP | Facility: PHYSICIAN GROUP | Age: 84
End: 2019-11-14

## 2019-11-14 DIAGNOSIS — F41.9 ANXIETY: ICD-10-CM

## 2019-11-14 LAB
BACTERIA #/AREA URNS HPF: NEGATIVE /HPF
EPI CELLS #/AREA URNS HPF: NORMAL /HPF
RBC # URNS HPF: NORMAL /HPF
T4 FREE SERPL-MCNC: 1.09 NG/DL (ref 0.53–1.43)
TSH SERPL DL<=0.005 MIU/L-ACNC: 2.55 UIU/ML (ref 0.38–5.33)
WBC #/AREA URNS HPF: NORMAL /HPF

## 2019-11-14 RX ORDER — ALPRAZOLAM 0.25 MG/1
TABLET ORAL
Qty: 60 TAB | Refills: 0 | Status: SHIPPED
Start: 2019-11-14 | End: 2019-11-26 | Stop reason: SDUPTHER

## 2019-11-21 ENCOUNTER — TELEPHONE (OUTPATIENT)
Dept: MEDICAL GROUP | Facility: PHYSICIAN GROUP | Age: 84
End: 2019-11-21

## 2019-11-21 NOTE — TELEPHONE ENCOUNTER
Future Appointments       Provider Department Center    11/26/2019 11:00 AM Libertad Estrella M.D. [facility]    3/3/2020 1:50 PM Leonila Ng, Ph.D. [facility]        ESTABLISHED PATIENT PRE-VISIT PLANNING     Patient was NOT contacted to complete PVP.      1.  Reviewed notes from the last few office visits within the medical group: Yes    2.  If any orders were placed at last visit or intended to be done for this visit (i.e. 6 mos follow-up), do we have Results/Consult Notes?        •  Labs - Labs ordered, completed on 11/13/2019 and results are in chart.       •  Imaging - Imaging was not ordered at last office visit.       •  Referrals - Referral ordered, patient has NOT been seen.- Scheduled    3. Is this appointment scheduled as a Hospital Follow-Up? No    4.  Immunizations were updated in [employer] using WebIZ?: Yes       •  Web Iz Recommendations: TDAP, VARICELLA (Chicken Pox)  and SHINGRIX (Shingles)    5.  Patient is due for the following Health Maintenance Topics:   Health Maintenance Due   Topic Date Due   • IMM ZOSTER VACCINES (1 of 2) 11/29/1985   • ANNUAL PFT SCREENING-FEV1 AND FEV/FVC RATIO / SPIROMETRY  09/28/2011     6. Orders for overdue Health Maintenance topics pended in Pre-Charting? NO    7.  AHA (MDX) form printed for Provider? No, already completed    8.  Patient was NOT informed to arrive 15 min prior to their scheduled appointment and bring in their medication bottles.

## 2019-11-26 ENCOUNTER — OFFICE VISIT (OUTPATIENT)
Dept: MEDICAL GROUP | Facility: PHYSICIAN GROUP | Age: 84
End: 2019-11-26
Payer: MEDICARE

## 2019-11-26 VITALS
RESPIRATION RATE: 18 BRPM | TEMPERATURE: 97.7 F | HEIGHT: 71 IN | DIASTOLIC BLOOD PRESSURE: 68 MMHG | OXYGEN SATURATION: 93 % | SYSTOLIC BLOOD PRESSURE: 104 MMHG | BODY MASS INDEX: 19.74 KG/M2 | HEART RATE: 52 BPM | WEIGHT: 141 LBS

## 2019-11-26 DIAGNOSIS — F13.20 SEDATIVE, HYPNOTIC OR ANXIOLYTIC DEPENDENCE (HCC): ICD-10-CM

## 2019-11-26 DIAGNOSIS — F41.9 ANXIETY: ICD-10-CM

## 2019-11-26 DIAGNOSIS — J43.8 OTHER EMPHYSEMA (HCC): ICD-10-CM

## 2019-11-26 PROCEDURE — 99214 OFFICE O/P EST MOD 30 MIN: CPT | Performed by: FAMILY MEDICINE

## 2019-11-26 RX ORDER — ALPRAZOLAM 0.25 MG/1
0.25 TABLET ORAL 2 TIMES DAILY PRN
Qty: 135 TAB | Refills: 0 | Status: SHIPPED
Start: 2019-12-14 | End: 2019-12-27 | Stop reason: SDUPTHER

## 2019-11-26 NOTE — PROGRESS NOTES
Chief Complaint   Patient presents with   • Anxiety   • Dehydration     fv labs       HISTORY OF PRESENT ILLNESS: Patient is a 83 y.o. female established patient here today for the following concerns:    1. Anxiety  2. Sedative, hypnotic or anxiolytic dependence (HCC)  Here for follow up on Anxiety and insomnia.  Previously on high doses of Xanax for many years as well as opioid therapy.  She had been successfully weaned off opioid therapy.  After a fall at home she went into acute withdrawal after discharge when we attempted to keep her off alprazolam.  We did add buspar to try to help with anxiety and referred to psychiatry, but unfortunately her appointment will not be until next year.  She reports that after we restarted very low dose 0.25 mg xanax she is feeling back to herself.  No further falls or altered mentation.  Taking 0-2 tabs per day depending on sleep and anxiety levels.      3. COPD  Continues on Dulera, but needs new Rx for 3 month supply.  No recent exacerbations.  Continues on O2 at night.      Past Medical, Social, and Family history reviewed and updated in EPIC    Allergies:Asa [aspirin]; Latex; Penicillins; Tape; and Wasp venom    Current Outpatient Medications   Medication Sig Dispense Refill   • [START ON 12/14/2019] ALPRAZolam (XANAX) 0.25 MG Tab Take 1 Tab by mouth 2 times a day as needed for Sleep or Anxiety for up to 90 days. TAKE ONE TABLET BY MOUTH TWICE A  Tab 0   • Mometasone Furo-Formoterol Fum (DULERA) 200-5 MCG/ACT Aerosol Inhale 1 Puff by mouth 2 Times a Day. 3 Inhaler 3   • fenofibrate micronized (LOFIBRA) 134 MG capsule TAKE 1 CAPSULE BY MOUTH EVERY  Cap 1   • metoprolol (LOPRESSOR) 25 MG Tab TAKE 1 TAB BY MOUTH TWO TIMES A DAY. 180 Tab 1   • omeprazole (PRILOSEC) 40 MG delayed-release capsule TAKE 1 CAPSULE BY MOUTH EVERY DAY 90 Cap 1   • rivaroxaban (XARELTO) 15 MG Tab tablet Take 1 Tab by mouth with dinner. 30 Tab 0   • albuterol 108 (90 Base) MCG/ACT Aero Soln  inhalation aerosol INHALE 2 PUFFS BY MOUTH EVERY 6 HOURS AS NEEDED FOR SHORTNESS OF BREATH 8.5 Inhaler 11   • omeprazole (PRILOSEC) 40 MG delayed-release capsule Take 1 Cap by mouth every day. 90 Cap 1   • fenofibrate micronized (LOFIBRA) 134 MG capsule Take 1 Cap by mouth every day. 100 Cap 3   • levothyroxine (SYNTHROID) 88 MCG Tab Take 1 Tab by mouth Every morning on an empty stomach. 90 Tab 3   • losartan (COZAAR) 100 MG Tab Take 1 Tab by mouth every day. 90 Tab 3   • propafenone (RYTHMOL) 150 MG Tab TAKE 1 TABLET BY MOUTH TWICE A  Tab 3   • simvastatin (ZOCOR) 20 MG Tab TAKE 1 TABLET BY MOUTH IN THE EVENING 90 Tab 3   • hydrOXYzine HCl (ATARAX) 50 MG Tab TAKE 1 TABLET BY MOUTH 3 TIMES A DAY AS NEEDED FOR ITCHING. 90 Tab 2   • fluticasone (FLONASE) 50 MCG/ACT nasal spray SPRAY 1 SPRAY IN NOSE 2 TIMES A DAY. EACH NOSTRIL 3 Bottle 0   • FIBER SELECT GUMMIES PO Take 1 Tab by mouth 2 times a day as needed (for constipation).     • clobetasol (TEMOVATE) 0.05 % Cream Apply 1 Each to affected area(s) 2 times a day as needed.  5   • busPIRone (BUSPAR) 5 MG tablet Take 1 Tab by mouth 3 times a day as needed. (Patient not taking: Reported on 11/26/2019) 270 Tab 3     No current facility-administered medications for this visit.          ROS:  Review of Systems   Constitutional: Negative for fever, chills, weight loss and malaise/fatigue.   HENT: Negative for ear pain, nosebleeds, congestion, sore throat and neck pain.    Eyes: Negative for blurred vision.   Respiratory: Negative for cough, sputum production, shortness of breath and wheezing.    Cardiovascular: Negative for chest pain, palpitations,  and leg swelling.   Gastrointestinal: Negative for heartburn, nausea, vomiting, diarrhea and abdominal pain.   Genitourinary: Negative for dysuria, urgency and frequency.   Musculoskeletal: Negative for myalgias, back pain and joint pain.   Skin: Negative for rash and itching.   Neurological: Negative for dizziness,  "tingling, tremors, sensory change, focal weakness and headaches.   Endo/Heme/Allergies: Does not bruise/bleed easily.   Psychiatric/Behavioral: Negative for depression, anxiety, suicidal ideas, insomnia and memory loss.      Exam:  /68   Pulse (!) 52   Temp 36.5 °C (97.7 °F)   Resp 18   Ht 1.803 m (5' 11\")   Wt 64 kg (141 lb)   SpO2 93%     General:  Well nourished, well developed in NAD  Head is grossly normal.  Neck: Supple without JVD   Pulmonary:  Normal effort.   Cardiovascular: Regular rate  Extremities: no clubbing, cyanosis, or edema.  Psych: affect appropriate      Please note that this dictation was created using voice recognition software. I have made every reasonable attempt to correct obvious errors, but I expect that there are errors of grammar and possibly content that I did not discover before finalizing the note.    Assessment/Plan:  1. Anxiety  - ALPRAZolam (XANAX) 0.25 MG Tab; Take 1 Tab by mouth 2 times a day as needed for Sleep or Anxiety for up to 90 days. TAKE ONE TABLET BY MOUTH TWICE A DAY  Dispense: 135 Tab; Refill: 0    2. Sedative, hypnotic or anxiolytic dependence (HCC)  Will continue to work on weaning down to minimal doses.  Again counseled daughter and Eun on risks and benefits of the medication given her age.     3. COPD  Continues on Dulera.  Refilled today   1 month follow up.         "

## 2019-12-04 ENCOUNTER — TELEPHONE (OUTPATIENT)
Dept: CARDIOLOGY | Facility: MEDICAL CENTER | Age: 84
End: 2019-12-04

## 2019-12-04 DIAGNOSIS — I48.91 ATRIAL FIBRILLATION, UNSPECIFIED TYPE (HCC): ICD-10-CM

## 2019-12-04 NOTE — TELEPHONE ENCOUNTER
LA/fortino    Pt's daughter Faviola calling to follow up on the [employer] message earlier this week, pt needs xarelto samples, 1 remaining for tomorrow.  Please call Eun to advise in the event [facility] is out of med, short script to local pharmacy, etc.     Please call Faviola

## 2019-12-04 NOTE — TELEPHONE ENCOUNTER
Rx Xarelto samples sent     Called pt's daughter (Faviola) and notified, daughter appreciative and verbalizes understanding

## 2019-12-16 ENCOUNTER — TELEPHONE (OUTPATIENT)
Dept: MEDICAL GROUP | Facility: PHYSICIAN GROUP | Age: 84
End: 2019-12-16

## 2019-12-16 NOTE — TELEPHONE ENCOUNTER
Future Appointments       Provider Department Center    12/27/2019 11:40 AM Libertad Estrella M.D. [facility]    3/3/2020 1:50 PM Leonila Ng, Ph.D. [facility] Out[facility]    4/1/2020 11:20 AM Sonali Sandoval M.D. [facility]         ESTABLISHED PATIENT PRE-VISIT PLANNING     Patient was NOT contacted to complete PVP.       1.  Reviewed notes from the last few office visits within the medical group: Yes    2.  If any orders were placed at last visit or intended to be done for this visit (i.e. 6 mos follow-up), do we have Results/Consult Notes?        •  Labs - Labs ordered, completed on 11/13/2019 and results are in chart.       •  Imaging - Imaging was not ordered at last office visit.       •  Referrals - Referral ordered, patient has NOT been seen. - Scheduled    3. Is this appointment scheduled as a Hospital Follow-Up? No    4.  Immunizations were updated in [employer] using WebIZ?: Yes       •  Web Iz Recommendations: TDAP, VARICELLA (Chicken Pox)  and SHINGRIX (Shingles)    5.  Patient is due for the following Health Maintenance Topics:   Health Maintenance Due   Topic Date Due   • IMM ZOSTER VACCINES (1 of 2) 11/29/1985   • ANNUAL PFT SCREENING-FEV1 AND FEV/FVC RATIO / SPIROMETRY  09/28/2011     6. Orders for overdue Health Maintenance topics pended in Pre-Charting? NO    7.  AHA (MDX) form printed for Provider? No, already completed    8.  Patient was NOT informed to arrive 15 min prior to their scheduled appointment and bring in their medication bottles.

## 2019-12-27 ENCOUNTER — OFFICE VISIT (OUTPATIENT)
Dept: MEDICAL GROUP | Facility: PHYSICIAN GROUP | Age: 84
End: 2019-12-27
Payer: MEDICARE

## 2019-12-27 VITALS
HEART RATE: 56 BPM | TEMPERATURE: 99.1 F | DIASTOLIC BLOOD PRESSURE: 78 MMHG | HEIGHT: 71 IN | WEIGHT: 143 LBS | OXYGEN SATURATION: 94 % | SYSTOLIC BLOOD PRESSURE: 122 MMHG | BODY MASS INDEX: 20.02 KG/M2 | RESPIRATION RATE: 16 BRPM

## 2019-12-27 DIAGNOSIS — J43.8 OTHER EMPHYSEMA (HCC): ICD-10-CM

## 2019-12-27 DIAGNOSIS — F51.04 CHRONIC INSOMNIA: ICD-10-CM

## 2019-12-27 DIAGNOSIS — J96.11 CHRONIC RESPIRATORY FAILURE WITH HYPOXIA (HCC): ICD-10-CM

## 2019-12-27 DIAGNOSIS — F41.9 ANXIETY: ICD-10-CM

## 2019-12-27 PROCEDURE — 99214 OFFICE O/P EST MOD 30 MIN: CPT | Performed by: FAMILY MEDICINE

## 2019-12-27 RX ORDER — ALPRAZOLAM 0.25 MG/1
0.25 TABLET ORAL 2 TIMES DAILY PRN
Qty: 180 TAB | Refills: 0 | Status: SHIPPED | OUTPATIENT
Start: 2020-02-14 | End: 2020-02-18 | Stop reason: SDUPTHER

## 2019-12-27 NOTE — PROGRESS NOTES
Chief Complaint   Patient presents with   • Anxiety     fv    • COPD       HISTORY OF PRESENT ILLNESS: Patient is a 84 y.o. female established patient here today for the following concerns:    1. Anxiety  3. Chronic insomnia      Here for follow up on anxiety.  Reports that she is still quite fearful about being alone, which increases her insomnia and anxiety.  She mistakenly started taking 2 tablets of alprazolam in the evening which has helped quite a bit, but will lead her to run short in February.     2. Other emphysema (HCC)  4. Chronic respiratory failure with hypoxia (HCC)  Continues on oxygen at night.  Continues with the dulera, but the cost is quite difficult.  She is hoping to get a sample.  No recent flares.  Using the albuterol for rescue when needed for shortness of breath, wheezing or low oxygen levels.         Past Medical, Social, and Family history reviewed and updated in EPIC    Allergies:Asa [aspirin]; Latex; Penicillins; Tape; and Wasp venom    Current Outpatient Medications   Medication Sig Dispense Refill   • [START ON 2/14/2020] ALPRAZolam (XANAX) 0.25 MG Tab Take 1 Tab by mouth 2 times a day as needed for Sleep or Anxiety for up to 90 days. TAKE ONE TABLET BY MOUTH TWICE A  Tab 0   • Mometasone Furo-Formoterol Fum (DULERA) 200-5 MCG/ACT Aerosol Inhale 1 Puff by mouth 2 Times a Day. Dispense samples 1 Inhaler 3   • rivaroxaban (XARELTO) 15 MG Tab tablet Take 1 Tab by mouth with dinner. 30 Tab 0   • fenofibrate micronized (LOFIBRA) 134 MG capsule TAKE 1 CAPSULE BY MOUTH EVERY  Cap 1   • metoprolol (LOPRESSOR) 25 MG Tab TAKE 1 TAB BY MOUTH TWO TIMES A DAY. 180 Tab 1   • omeprazole (PRILOSEC) 40 MG delayed-release capsule TAKE 1 CAPSULE BY MOUTH EVERY DAY 90 Cap 1   • albuterol 108 (90 Base) MCG/ACT Aero Soln inhalation aerosol INHALE 2 PUFFS BY MOUTH EVERY 6 HOURS AS NEEDED FOR SHORTNESS OF BREATH 8.5 Inhaler 11   • omeprazole (PRILOSEC) 40 MG delayed-release capsule Take 1 Cap by  mouth every day. 90 Cap 1   • fenofibrate micronized (LOFIBRA) 134 MG capsule Take 1 Cap by mouth every day. 100 Cap 3   • levothyroxine (SYNTHROID) 88 MCG Tab Take 1 Tab by mouth Every morning on an empty stomach. 90 Tab 3   • losartan (COZAAR) 100 MG Tab Take 1 Tab by mouth every day. 90 Tab 3   • propafenone (RYTHMOL) 150 MG Tab TAKE 1 TABLET BY MOUTH TWICE A  Tab 3   • simvastatin (ZOCOR) 20 MG Tab TAKE 1 TABLET BY MOUTH IN THE EVENING 90 Tab 3   • hydrOXYzine HCl (ATARAX) 50 MG Tab TAKE 1 TABLET BY MOUTH 3 TIMES A DAY AS NEEDED FOR ITCHING. 90 Tab 2   • fluticasone (FLONASE) 50 MCG/ACT nasal spray SPRAY 1 SPRAY IN NOSE 2 TIMES A DAY. EACH NOSTRIL 3 Bottle 0   • FIBER SELECT GUMMIES PO Take 1 Tab by mouth 2 times a day as needed (for constipation).     • clobetasol (TEMOVATE) 0.05 % Cream Apply 1 Each to affected area(s) 2 times a day as needed.  5   • busPIRone (BUSPAR) 5 MG tablet Take 1 Tab by mouth 3 times a day as needed. (Patient not taking: Reported on 11/26/2019) 270 Tab 3     No current facility-administered medications for this visit.          ROS:  Review of Systems   Constitutional: Negative for fever, chills, weight loss and malaise/fatigue.   HENT: Negative for ear pain, nosebleeds, congestion, sore throat and neck pain.    Eyes: Negative for blurred vision.   Respiratory: Negative for cough, sputum production, shortness of breath and wheezing.    Cardiovascular: Negative for chest pain, palpitations,  and leg swelling.   Gastrointestinal: Negative for heartburn, nausea, vomiting, diarrhea and abdominal pain.   Genitourinary: Negative for dysuria, urgency and frequency.   Musculoskeletal: Negative for myalgias, back pain and joint pain.   Skin: Negative for rash and itching.   Neurological: Negative for dizziness, tingling, tremors, sensory change, focal weakness and headaches.   Endo/Heme/Allergies: Does not bruise/bleed easily.   Psychiatric/Behavioral: Negative for depression, anxiety,  "suicidal ideas, insomnia and memory loss.      Exam:  /78   Pulse (!) 56   Temp 37.3 °C (99.1 °F)   Resp 16   Ht 1.803 m (5' 11\")   Wt 64.9 kg (143 lb)   SpO2 94%     General:  Well nourished, well developed in NAD  Head is grossly normal.  Neck: Supple without JVD   Pulmonary:  Normal effort.   Cardiovascular: Regular rate  Extremities: no clubbing, cyanosis, or edema.  Psych: affect appropriate      Please note that this dictation was created using voice recognition software. I have made every reasonable attempt to correct obvious errors, but I expect that there are errors of grammar and possibly content that I did not discover before finalizing the note.    Assessment/Plan:  1. Anxiety  - ALPRAZolam (XANAX) 0.25 MG Tab; Take 1 Tab by mouth 2 times a day as needed for Sleep or Anxiety for up to 90 days. TAKE ONE TABLET BY MOUTH TWICE A DAY  Dispense: 180 Tab; Refill: 0    2. Other emphysema (HCC)  - Mometasone Furo-Formoterol Fum (DULERA) 200-5 MCG/ACT Aerosol; Inhale 1 Puff by mouth 2 Times a Day. Dispense samples  Dispense: 1 Inhaler; Refill: 3    3. Chronic insomnia  Continue alprazolam.    4. Chronic respiratory failure with hypoxia (HCC)  Continue home o2            "

## 2019-12-27 NOTE — LETTER
December 27, 2019        RE: Eun Kacey Be      Dear [employer],       Eun has several health conditions that make her unable to bring her trash cans to the designated area.  Please if possible help her to get her cans out for her.                            Libertad Estrella M.D.

## 2019-12-28 DIAGNOSIS — E03.9 HYPOTHYROIDISM (ACQUIRED): ICD-10-CM

## 2019-12-28 RX ORDER — LEVOTHYROXINE SODIUM 88 UG/1
88 TABLET ORAL
Qty: 90 TAB | Refills: 3 | Status: CANCELLED | OUTPATIENT
Start: 2019-12-28

## 2019-12-31 DIAGNOSIS — I48.91 ATRIAL FIBRILLATION, UNSPECIFIED TYPE (HCC): ICD-10-CM

## 2019-12-31 RX ORDER — LEVOTHYROXINE SODIUM 88 UG/1
88 TABLET ORAL
Qty: 90 TAB | Refills: 3 | Status: SHIPPED | OUTPATIENT
Start: 2019-12-31 | End: 2020-03-29 | Stop reason: SDUPTHER

## 2020-01-02 DIAGNOSIS — I48.91 ATRIAL FIBRILLATION, UNSPECIFIED TYPE (HCC): ICD-10-CM

## 2020-01-08 DIAGNOSIS — I48.0 PAROXYSMAL ATRIAL FIBRILLATION (HCC): ICD-10-CM

## 2020-01-08 RX ORDER — LOSARTAN POTASSIUM 100 MG/1
100 TABLET ORAL DAILY
Qty: 100 TAB | Refills: 1 | Status: SHIPPED | OUTPATIENT
Start: 2020-01-08 | End: 2020-04-01 | Stop reason: SDUPTHER

## 2020-01-08 NOTE — TELEPHONE ENCOUNTER
Refill X 6 months, sent to pharmacy.Pt. Seen in the last 6 months per protocol.   Lab Results   Component Value Date/Time    SODIUM 140 11/13/2019 10:25 AM    POTASSIUM 4.2 11/13/2019 10:25 AM    CHLORIDE 104 11/13/2019 10:25 AM    CO2 27 11/13/2019 10:25 AM    GLUCOSE 88 11/13/2019 10:25 AM    BUN 28 (H) 11/13/2019 10:25 AM    CREATININE 1.02 11/13/2019 10:25 AM    CREATININE 0.76 03/21/2011 12:00 AM    BUNCREATRAT 25 03/21/2011 12:00 AM    GLOMRATE >59 03/21/2011 12:00 AM

## 2020-01-08 NOTE — TELEPHONE ENCOUNTER
Was the patient seen in the last year in this department? Yes    Does patient have an active prescription for medications requested? No     Received Request Via: Pharmacy      Pt met protocol?: Yes, OV last month   BP Readings from Last 1 Encounters:   12/27/19 122/78

## 2020-01-13 DIAGNOSIS — I48.0 PAROXYSMAL ATRIAL FIBRILLATION (HCC): ICD-10-CM

## 2020-01-14 RX ORDER — PROPAFENONE HYDROCHLORIDE 150 MG/1
TABLET, COATED ORAL
Qty: 200 TAB | Refills: 2 | Status: SHIPPED | OUTPATIENT
Start: 2020-01-14 | End: 2020-04-01 | Stop reason: SDUPTHER

## 2020-01-15 RX ORDER — PROPAFENONE HYDROCHLORIDE 150 MG/1
TABLET, COATED ORAL
Qty: 200 TAB | Refills: 3 | OUTPATIENT
Start: 2020-01-15

## 2020-01-31 ENCOUNTER — TELEPHONE (OUTPATIENT)
Dept: CARDIOLOGY | Facility: MEDICAL CENTER | Age: 85
End: 2020-01-31

## 2020-01-31 DIAGNOSIS — I48.91 ATRIAL FIBRILLATION, UNSPECIFIED TYPE (HCC): ICD-10-CM

## 2020-01-31 NOTE — TELEPHONE ENCOUNTER
LA      Patient's daughter Faviola is calling for Xarelto samples. She said she's sent 2 [employer] messages this week. She can be reached at [phone].

## 2020-01-31 NOTE — TELEPHONE ENCOUNTER
Coleen Shea, Med Ass't  You 1 minute ago (1:22 PM)      Pending approval with ADD   STEPHANIA there are no recent  MyChart messages     Routing comment        Noted Dr Washington already approved Rx.     Attempted to call pt's daughter (Faviola) to notify, no answer, detailed vm left that Rx is sent and we were not able to received the MYChart messages sent

## 2020-02-06 RX ORDER — OMEPRAZOLE 40 MG/1
40 CAPSULE, DELAYED RELEASE ORAL
Qty: 90 CAP | Refills: 1 | OUTPATIENT
Start: 2020-02-06

## 2020-02-10 NOTE — TELEPHONE ENCOUNTER
Pharmacy says they did not received new rx in November, please resend. Thanks   BP Readings from Last 1 Encounters:   12/27/19 122/78

## 2020-02-12 NOTE — TELEPHONE ENCOUNTER
Was the patient seen in the last year in this department? Yes    Does patient have an active prescription for medications requested? No     Received Request Via: Pharmacy      Pt met protocol?: Yes, ov 12/19 bp 122/78

## 2020-02-18 ENCOUNTER — PATIENT MESSAGE (OUTPATIENT)
Dept: MEDICAL GROUP | Facility: PHYSICIAN GROUP | Age: 85
End: 2020-02-18

## 2020-02-18 DIAGNOSIS — F41.9 ANXIETY: ICD-10-CM

## 2020-02-18 RX ORDER — ALPRAZOLAM 0.25 MG/1
0.25 TABLET ORAL 2 TIMES DAILY PRN
Qty: 180 TAB | Refills: 0 | Status: SHIPPED
Start: 2020-02-18 | End: 2020-05-28 | Stop reason: SDUPTHER

## 2020-02-18 NOTE — TELEPHONE ENCOUNTER
----- Message from Eun Be sent at 2/17/2020  9:02 AM PST -----  Regarding: Prescription Question  Contact: [phone]  Hi, Dr. Estrella gave Mom a refill for the Xanax to be filled on the 14th. [facility] says they don't show anything to be filled until March. I'm wondering if the order was mistakenly sent to [facility]. Mom no longer uses them. All prescriptions should be sent to [facility] on [address]. I tried to call [facility] today, but only got voicemail. Thanks, Faviola

## 2020-02-18 NOTE — TELEPHONE ENCOUNTER
Prescription is pending.  It says it was approved on 02/14/2020, but I have not seen the rx..    Thank you.

## 2020-02-25 ENCOUNTER — TELEPHONE (OUTPATIENT)
Dept: CARDIOLOGY | Facility: MEDICAL CENTER | Age: 85
End: 2020-02-25

## 2020-02-25 DIAGNOSIS — I48.91 ATRIAL FIBRILLATION, UNSPECIFIED TYPE (HCC): ICD-10-CM

## 2020-02-25 NOTE — TELEPHONE ENCOUNTER
LA     Patient's daughter Faviola is calling for Xarelto samples. She can be reached at [phone].

## 2020-02-27 NOTE — TELEPHONE ENCOUNTER
Noted samples Rx sent to [facility] by Dr Mascorro-ADD    Called pt's daughter (Faviola) and notified, daughter appreciative and verbalizes understanding

## 2020-03-03 ENCOUNTER — OFFICE VISIT (OUTPATIENT)
Dept: BEHAVIORAL HEALTH | Facility: CLINIC | Age: 85
End: 2020-03-03
Payer: MEDICARE

## 2020-03-03 DIAGNOSIS — F43.21 GRIEF: ICD-10-CM

## 2020-03-03 DIAGNOSIS — F51.04 CHRONIC INSOMNIA: ICD-10-CM

## 2020-03-03 DIAGNOSIS — F41.1 GENERALIZED ANXIETY DISORDER: ICD-10-CM

## 2020-03-03 PROCEDURE — 90791 PSYCH DIAGNOSTIC EVALUATION: CPT | Performed by: PSYCHOLOGIST
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
## 2020-03-10 DIAGNOSIS — E03.9 HYPOTHYROIDISM (ACQUIRED): ICD-10-CM

## 2020-03-13 RX ORDER — LEVOTHYROXINE SODIUM 88 UG/1
88 TABLET ORAL
Qty: 90 TAB | Refills: 3 | OUTPATIENT
Start: 2020-03-13

## 2020-03-17 ENCOUNTER — TELEPHONE (OUTPATIENT)
Dept: MEDICAL GROUP | Facility: PHYSICIAN GROUP | Age: 85
End: 2020-03-17

## 2020-03-17 NOTE — TELEPHONE ENCOUNTER
Future Appointments       Provider Department Center    3/23/2020 11:00 AM Libertad Estrella M.D. [facility]    4/1/2020 11:15 AM Sonali Sandoval M.D. [facility]     5/21/2020 11:10 AM Leonila Ng, Ph.D. Behavioral Health Outpatient [facility]    6/4/2020 11:10 AM Leonila Ng, Ph.D. Behavioral Health Outpatient [facility]    6/18/2020 11:10 AM Leonila Ng, Ph.D. Behavioral Health Outpatient [facility]    7/2/2020 11:10 AM Leonila Ng, Ph.D. Behavioral Health Outpatient [facility]        ESTABLISHED PATIENT PRE-VISIT PLANNING     Patient was NOT contacted to complete PVP.      1.  Reviewed notes from the last few office visits within the medical group: Yes    2.  If any orders were placed at last visit or intended to be done for this visit (i.e. 6 mos follow-up), do we have Results/Consult Notes?        •  Labs - Labs ordered, completed on 11/13/2019 and results are in chart.       •  Imaging - Imaging was not ordered at last office visit.       •  Referrals - No referrals were ordered at last office visit.    3. Is this appointment scheduled as a Hospital Follow-Up? No    4.  Immunizations were updated in Epic using WebIZ?: Epic matches WebIZ       •  Web Iz Recommendations: TDAP, VARICELLA (Chicken Pox)  and SHINGRIX (Shingles)    5.  Patient is due for the following Health Maintenance Topics:   Health Maintenance Due   Topic Date Due   • Annual Pulmonary Function Test / Spirometry  09/28/2011     6. Orders for overdue Health Maintenance topics pended in Pre-Charting? NO    7.  AHA (MDX) form printed for Provider? YES    8.  Patient was NOT informed to arrive 15 min prior to their scheduled appointment and bring in their medication bottles.

## 2020-03-23 DIAGNOSIS — J43.8 OTHER EMPHYSEMA (HCC): ICD-10-CM

## 2020-03-24 RX ORDER — MOMETASONE FUROATE AND FORMOTEROL FUMARATE DIHYDRATE 200; 5 UG/1; UG/1
1 AEROSOL RESPIRATORY (INHALATION) 2 TIMES DAILY
Qty: 1 INHALER | Refills: 3 | Status: SHIPPED | OUTPATIENT
Start: 2020-03-24 | End: 2020-03-27 | Stop reason: SDUPTHER

## 2020-03-25 ENCOUNTER — TELEPHONE (OUTPATIENT)
Dept: CARDIOLOGY | Facility: MEDICAL CENTER | Age: 85
End: 2020-03-25

## 2020-03-25 DIAGNOSIS — I48.91 ATRIAL FIBRILLATION, UNSPECIFIED TYPE (HCC): ICD-10-CM

## 2020-03-25 NOTE — TELEPHONE ENCOUNTER
LA - ADD     Patient's daughter Faviola is calling for Xarelto samples. She can be reached at [phone].

## 2020-03-27 DIAGNOSIS — J43.8 OTHER EMPHYSEMA (HCC): ICD-10-CM

## 2020-03-27 NOTE — TELEPHONE ENCOUNTER
Mometasone Furo-Formoterol Fum (DULERA) 200-5 MCG/ACT Aerosol    Received request via: Patient    Was the patient seen in the last year in this department? Yes    Does the patient have an active prescription (recently filled or refills available) for medication(s) requested? Yes

## 2020-03-27 NOTE — TELEPHONE ENCOUNTER
----- Message from Eun Be sent at 3/27/2020 12:09 PM PDT -----  Regarding: Prescription Question  Contact: [phone]  The [facility] pharmacy does not have any Dulera samples this month, so please send the refill request to [facility] on [address]. Thank you!  Faviola Morales

## 2020-03-29 DIAGNOSIS — E03.9 HYPOTHYROIDISM (ACQUIRED): ICD-10-CM

## 2020-03-29 DIAGNOSIS — L30.9 DERMATITIS: ICD-10-CM

## 2020-03-30 RX ORDER — MOMETASONE FUROATE AND FORMOTEROL FUMARATE DIHYDRATE 200; 5 UG/1; UG/1
1 AEROSOL RESPIRATORY (INHALATION) 2 TIMES DAILY
Qty: 1 INHALER | Refills: 2 | Status: SHIPPED | OUTPATIENT
Start: 2020-03-30 | End: 2020-07-01 | Stop reason: SDUPTHER

## 2020-03-31 RX ORDER — LEVOTHYROXINE SODIUM 88 UG/1
88 TABLET ORAL
Qty: 100 TAB | Refills: 2 | Status: SHIPPED | OUTPATIENT
Start: 2020-03-31 | End: 2020-05-27

## 2020-03-31 RX ORDER — HYDROXYZINE 50 MG/1
50 TABLET, FILM COATED ORAL 3 TIMES DAILY PRN
Qty: 90 TAB | Refills: 2 | Status: SHIPPED | OUTPATIENT
Start: 2020-03-31 | End: 2020-04-27

## 2020-03-31 NOTE — TELEPHONE ENCOUNTER
Was the patient seen in the last year in this department? Yes    Does patient have an active prescription for medications requested? No     Received Request Via: Pharmacy      Pt met protocol?: Yes    LAST OV 12/27/2019

## 2020-03-31 NOTE — TELEPHONE ENCOUNTER
SENT RX TO SAME FREQUENTING PHARMACY 12/31/2019 FOR QTY:90 PLUS 3 ADDITIONAL REFILLS    INSURANCE IS [facility] PLUS  *PER NEW INS PROTOCOL, NEEDS TO BE DONE FOR 100DAYS SUPPLY*

## 2020-04-01 ENCOUNTER — TELEPHONE (OUTPATIENT)
Dept: CARDIOLOGY | Facility: MEDICAL CENTER | Age: 85
End: 2020-04-01

## 2020-04-01 ENCOUNTER — OFFICE VISIT (OUTPATIENT)
Dept: CARDIOLOGY | Facility: MEDICAL CENTER | Age: 85
End: 2020-04-01
Payer: MEDICARE

## 2020-04-01 ENCOUNTER — TELEPHONE (OUTPATIENT)
Dept: MEDICAL GROUP | Facility: PHYSICIAN GROUP | Age: 85
End: 2020-04-01

## 2020-04-01 VITALS
HEIGHT: 71 IN | BODY MASS INDEX: 19.6 KG/M2 | WEIGHT: 140 LBS | HEART RATE: 53 BPM | OXYGEN SATURATION: 93 % | SYSTOLIC BLOOD PRESSURE: 114 MMHG | TEMPERATURE: 97.6 F | DIASTOLIC BLOOD PRESSURE: 62 MMHG

## 2020-04-01 DIAGNOSIS — E78.2 MIXED HYPERLIPIDEMIA: ICD-10-CM

## 2020-04-01 DIAGNOSIS — I48.91 ATRIAL FIBRILLATION, UNSPECIFIED TYPE (HCC): ICD-10-CM

## 2020-04-01 DIAGNOSIS — I10 ESSENTIAL HYPERTENSION: ICD-10-CM

## 2020-04-01 DIAGNOSIS — R06.02 SHORTNESS OF BREATH: ICD-10-CM

## 2020-04-01 DIAGNOSIS — Z79.899 ENCOUNTER FOR LONG-TERM (CURRENT) USE OF HIGH-RISK MEDICATION: ICD-10-CM

## 2020-04-01 PROCEDURE — 99215 OFFICE O/P EST HI 40 MIN: CPT | Mod: 95,CR | Performed by: INTERNAL MEDICINE

## 2020-04-01 RX ORDER — LOSARTAN POTASSIUM 100 MG/1
100 TABLET ORAL DAILY
Qty: 100 TAB | Refills: 3 | Status: SHIPPED | OUTPATIENT
Start: 2020-04-01 | End: 2020-04-07

## 2020-04-01 RX ORDER — LEVOTHYROXINE SODIUM 88 UG/1
88 TABLET ORAL
Qty: 90 TAB | Refills: 3 | OUTPATIENT
Start: 2020-04-01

## 2020-04-01 RX ORDER — FENOFIBRATE 134 MG/1
134 CAPSULE ORAL
Qty: 100 CAP | Refills: 3 | Status: SHIPPED | OUTPATIENT
Start: 2020-04-01 | End: 2021-05-07

## 2020-04-01 RX ORDER — HYDROXYZINE 50 MG/1
50 TABLET, FILM COATED ORAL 3 TIMES DAILY PRN
Qty: 90 TAB | Refills: 2 | OUTPATIENT
Start: 2020-04-01

## 2020-04-01 RX ORDER — SIMVASTATIN 20 MG
TABLET ORAL
Qty: 100 TAB | Refills: 3 | Status: SHIPPED | OUTPATIENT
Start: 2020-04-01

## 2020-04-01 RX ORDER — PROPAFENONE HYDROCHLORIDE 150 MG/1
TABLET, COATED ORAL
Qty: 200 TAB | Refills: 2 | Status: SHIPPED | OUTPATIENT
Start: 2020-04-01 | End: 2020-04-07 | Stop reason: SDUPTHER

## 2020-04-01 ASSESSMENT — ENCOUNTER SYMPTOMS
PND: 0
DEPRESSION: 0
LOSS OF CONSCIOUSNESS: 0
SHORTNESS OF BREATH: 1
PALPITATIONS: 0
ABDOMINAL PAIN: 0
DIZZINESS: 0
ORTHOPNEA: 0
FALLS: 0

## 2020-04-01 ASSESSMENT — FIBROSIS 4 INDEX: FIB4 SCORE: 1.833239803076234322

## 2020-04-01 NOTE — PROGRESS NOTES
Chief Complaint   Patient presents with   • Atrial Fibrillation   • Syncope       Subjective:   Eun Be is a 84 y.o. female who presents today via ZOOM video to follow-up on atrial fibrillation.    Patient was previously seen by Dr. KATHIA Gruber.  She has been feeling well overall.  Denies any chest discomfort.  Occasional dyspnea on exertion which usually improves with the use of inhalers for her COPD.  She usually has an A. fib episode about twice a year.  She is currently on Xarelto and denies any bleeding issues.    Her daughter who comes to the visit with her wonders if the patient can take an extra Copaxone if she has symptoms.  Her blood pressures usually have been in the 110s to 120s systolic.  For hyperlipidemia she is currently on simvastatin which she is tolerating well.    This encounter was conducted via Zoom .   Verbal consent was obtained. Patient's identity was verified.    Past Medical History:   Diagnosis Date   • Anxiety    • Arrhythmia    • Arthritis    • Backpain    • Bronchitis    • CATARACT    • Chronic lower back pain 3/11/2014   • Chronic pain    • COPD    • Depression    • EMPHYSEMA    • GERD (gastroesophageal reflux disease)    • Heart burn     hiatal hernia   • HTN (hypertension)    • Hypertension    • Hypertriglyceridemia 11/7/2013   • Hypothyroid    • IGT (impaired glucose tolerance)    • MEDICAL HOME    • Osteopenia    • Pain     left side of back   • Panic disorder    • Paroxysmal atrial fibrillation (HCC)    • Personal history of venous thrombosis and embolism     right leg   • Pneumonia     1974   • Renal disorder     cysts   • Rheumatic fever     4 or 5 years old   • Rheumatic fever     as a kid per patient.   • Thyroid disease    • Unspecified disorder of thyroid      Past Surgical History:   Procedure Laterality Date   • PB EXCIS/UNROOF RENAL CYST  1977   • OTHER  1977    cysts removed from left kidney   • HYSTERECTOMY RADICAL  1963   • OTHER  1963    hemorrhoidectomy    • HEMORRHOIDECTOMY     • HYSTERECTOMY, TOTAL ABDOMINAL  26 years old     Family History   Problem Relation Age of Onset   • Stroke Brother    • Heart Disease Other      Social History     Socioeconomic History   • Marital status:      Spouse name: Not on file   • Number of children: 2   • Years of education: Not on file   • Highest education level: Not on file   Occupational History   • Not on file   Social Needs   • Financial resource strain: Not on file   • Food insecurity     Worry: Not on file     Inability: Not on file   • Transportation needs     Medical: Not on file     Non-medical: Not on file   Tobacco Use   • Smoking status: Former Smoker     Packs/day: 0.25     Years: 30.00     Pack years: 7.50     Types: Cigarettes     Last attempt to quit:      Years since quittin.2   • Smokeless tobacco: Never Used   • Tobacco comment: quit  ppd x ?yrs (on and off for years since age 15)   Substance and Sexual Activity   • Alcohol use: Yes     Alcohol/week: 0.0 oz     Comment: wine, 1 glass every other night   • Drug use: No   • Sexual activity: Never   Lifestyle   • Physical activity     Days per week: Not on file     Minutes per session: Not on file   • Stress: Not on file   Relationships   • Social connections     Talks on phone: Not on file     Gets together: Not on file     Attends [employer] service: Not on file     Active member of club or organization: Not on file     Attends meetings of clubs or organizations: Not on file     Relationship status: Not on file   • Intimate partner violence     Fear of current or ex partner: Not on file     Emotionally abused: Not on file     Physically abused: Not on file     Forced sexual activity: Not on file   Other Topics Concern   • Not on file   Social History Narrative   • Not on file     Allergies   Allergen Reactions   • Asa [Aspirin]    • Latex    • Penicillins    • Tape Hives, Itching and Swelling   • Wasp Venom Swelling     Outpatient  Encounter Medications as of 4/1/2020   Medication Sig Dispense Refill   • simvastatin (ZOCOR) 20 MG Tab TAKE 1 TABLET BY MOUTH IN THE EVENING 100 Tab 3   • rivaroxaban (XARELTO) 15 MG Tab tablet Take 1 Tab by mouth with dinner. Must apply: Patient Assistance Program [phone] 100 Tab 3   • propafenone (RYTHMOL) 150 MG Tab TAKE 1 TABLET BY MOUTH TWICE A  Tab 2   • metoprolol (LOPRESSOR) 25 MG Tab TAKE 1 TAB BY MOUTH TWO TIMES A DAY. 200 Tab 3   • losartan (COZAAR) 100 MG Tab Take 1 Tab by mouth every day. 100 Tab 3   • fenofibrate micronized (LOFIBRA) 134 MG capsule Take 1 Cap by mouth every day. 100 Cap 3   • levothyroxine (SYNTHROID) 88 MCG Tab Take 1 Tab by mouth Every morning on an empty stomach. 100 Tab 2   • hydrOXYzine HCl (ATARAX) 50 MG Tab Take 1 Tab by mouth 3 times a day as needed. ITCHING 90 Tab 2   • Mometasone Furo-Formoterol Fum (DULERA) 200-5 MCG/ACT Aerosol Inhale 1 Puff by mouth 2 Times a Day. Dispense samples 1 Inhaler 2   • ALPRAZolam (XANAX) 0.25 MG Tab Take 1 Tab by mouth 2 times a day as needed for Sleep or Anxiety for up to 90 days. TAKE ONE TABLET BY MOUTH TWICE A  Tab 0   • albuterol 108 (90 Base) MCG/ACT Aero Soln inhalation aerosol INHALE 2 PUFFS BY MOUTH EVERY 6 HOURS AS NEEDED FOR SHORTNESS OF BREATH 8.5 Inhaler 11   • omeprazole (PRILOSEC) 40 MG delayed-release capsule Take 1 Cap by mouth every day. 90 Cap 1   • fluticasone (FLONASE) 50 MCG/ACT nasal spray SPRAY 1 SPRAY IN NOSE 2 TIMES A DAY. EACH NOSTRIL 3 Bottle 0   • FIBER SELECT GUMMIES PO Take 1 Tab by mouth 2 times a day as needed (for constipation).     • clobetasol (TEMOVATE) 0.05 % Cream Apply 1 Each to affected area(s) 2 times a day as needed.  5   • [DISCONTINUED] rivaroxaban (XARELTO) 15 MG Tab tablet Take 1 Tab by mouth with dinner. Must apply: Patient Assistance Program [phone] 30 Tab 0   • [DISCONTINUED] metoprolol (LOPRESSOR) 25 MG Tab TAKE 1 TAB BY MOUTH TWO TIMES A DAY. 180 Tab 1   •  "[DISCONTINUED] propafenone (RYTHMOL) 150 MG Tab TAKE 1 TABLET BY MOUTH TWICE A  Tab 2   • [DISCONTINUED] losartan (COZAAR) 100 MG Tab Take 1 Tab by mouth every day. 100 Tab 1   • [DISCONTINUED] fenofibrate micronized (LOFIBRA) 134 MG capsule TAKE 1 CAPSULE BY MOUTH EVERY DAY (Patient not taking: Reported on 4/1/2020) 100 Cap 1   • [DISCONTINUED] omeprazole (PRILOSEC) 40 MG delayed-release capsule TAKE 1 CAPSULE BY MOUTH EVERY DAY (Patient not taking: Reported on 4/1/2020) 90 Cap 1   • [DISCONTINUED] busPIRone (BUSPAR) 5 MG tablet Take 1 Tab by mouth 3 times a day as needed. (Patient not taking: Reported on 3/3/2020) 270 Tab 3   • [DISCONTINUED] fenofibrate micronized (LOFIBRA) 134 MG capsule Take 1 Cap by mouth every day. 100 Cap 3   • [DISCONTINUED] simvastatin (ZOCOR) 20 MG Tab TAKE 1 TABLET BY MOUTH IN THE EVENING 90 Tab 3     No facility-administered encounter medications on file as of 4/1/2020.      Review of Systems   Constitutional: Negative for malaise/fatigue.   Respiratory: Positive for shortness of breath.    Cardiovascular: Negative for chest pain, palpitations, orthopnea, leg swelling and PND.   Gastrointestinal: Negative for abdominal pain.   Musculoskeletal: Negative for falls.   Neurological: Negative for dizziness and loss of consciousness.   Psychiatric/Behavioral: Negative for depression.   All other systems reviewed and are negative.       Objective:   /62   Pulse (!) 53   Temp 36.4 °C (97.6 °F) (Temporal)   Ht 1.803 m (5' 11\")   Wt 63.5 kg (140 lb)   LMP 01/01/1963   SpO2 93%   BMI 19.53 kg/m²     Physical Exam  Encounter via ZOOM video.  Exam could not be performed.    Labs performed November 2019 were reviewed and showed normal creatinine.  LDL 83 in March 2019    Assessment:     1. Atrial fibrillation, unspecified type (HCC)  rivaroxaban (XARELTO) 15 MG Tab tablet   2. Essential hypertension     3. Mixed hyperlipidemia     4. Encounter for long-term (current) use of " high-risk medication     5. Shortness of breath         Medical Decision Making:  Today's Assessment / Status / Plan:     Atrial fibrillation: Patient has been asymptomatic.  Okay to take extra tablet of propafenone for symptoms of A.fib.  Continue metoprolol and propafenone at current dose.  Continue Xarelto.  She is on the lower dose of Xarelto because of her history of renal insufficiency.    Hypertension: Blood pressure is at goal.  Continue losartan at current dose.    Hyperlipidemia: Lipids at goal.  Continue simvastatin at current dose.    Dyspnea: Likely secondary to underlying COPD.  Continue management for COPD.  If she starts having worsening symptoms or associated chest discomfort, she should let us know.    This consultation was conducted utilizing secure and encrypted videoconferencing equipment with the assistance of a trained tele-presenter at the originating site.    Return to clinic in 6 months or earlier if needed.    Thank you for allowing me to participate in the care of this patient. Please do not hesitate to contact me with any questions.    Sonali Sandoval MD, [facility]  Cardiologist  [facility]    PLEASE NOTE: This dictation was created using voice recognition software.

## 2020-04-01 NOTE — LETTER
[facility]   [address]  [place] [address]  Phone: [phone]  Fax: [phone]              Eun Be  1935    Encounter Date: 4/1/2020    Sonali Sandoval M.D.          PROGRESS NOTE:  Chief Complaint   Patient presents with   • Atrial Fibrillation   • Syncope       Subjective:   Eun Be is a 84 y.o. female who presents today via ZOOM video to follow-up on atrial fibrillation.    Patient was previously seen by Dr. KATHIA Gruber.  She has been feeling well overall.  Denies any chest discomfort.  Occasional dyspnea on exertion which usually improves with the use of inhalers for her COPD.  She usually has an A. fib episode about twice a year.  She is currently on Xarelto and denies any bleeding issues.    Her daughter who comes to the visit with her wonders if the patient can take an extra Copaxone if she has symptoms.  Her blood pressures usually have been in the 110s to 120s systolic.  For hyperlipidemia she is currently on simvastatin which she is tolerating well.    Past Medical History:   Diagnosis Date   • Anxiety    • Arrhythmia    • Arthritis    • Backpain    • Bronchitis    • CATARACT    • Chronic lower back pain 3/11/2014   • Chronic pain    • COPD    • Depression    • EMPHYSEMA    • GERD (gastroesophageal reflux disease)    • Heart burn     hiatal hernia   • HTN (hypertension)    • Hypertension    • Hypertriglyceridemia 11/7/2013   • Hypothyroid    • IGT (impaired glucose tolerance)    • MEDICAL HOME    • Osteopenia    • Pain     left side of back   • Panic disorder    • Paroxysmal atrial fibrillation (HCC)    • Personal history of venous thrombosis and embolism     right leg   • Pneumonia     1974   • Renal disorder     cysts   • Rheumatic fever     4 or 5 years old   • Rheumatic fever     as a kid per patient.   • Thyroid disease    • Unspecified disorder of thyroid      Past Surgical History:   Procedure Laterality  Date   • PB EXCIS/UNROOF RENAL CYST     • OTHER      cysts removed from left kidney   • HYSTERECTOMY RADICAL     • OTHER      hemorrhoidectomy   • HEMORRHOIDECTOMY     • HYSTERECTOMY, TOTAL ABDOMINAL  26 years old     Family History   Problem Relation Age of Onset   • Stroke Brother    • Heart Disease Other      Social History     Socioeconomic History   • Marital status:      Spouse name: Not on file   • Number of children: 2   • Years of education: Not on file   • Highest education level: Not on file   Occupational History   • Not on file   Social Needs   • Financial resource strain: Not on file   • Food insecurity     Worry: Not on file     Inability: Not on file   • Transportation needs     Medical: Not on file     Non-medical: Not on file   Tobacco Use   • Smoking status: Former Smoker     Packs/day: 0.25     Years: 30.00     Pack years: 7.50     Types: Cigarettes     Last attempt to quit:      Years since quittin.2   • Smokeless tobacco: Never Used   • Tobacco comment: quit  ppd x ?yrs (on and off for years since age 15)   Substance and Sexual Activity   • Alcohol use: Yes     Alcohol/week: 0.0 oz     Comment: wine, 1 glass every other night   • Drug use: No   • Sexual activity: Never   Lifestyle   • Physical activity     Days per week: Not on file     Minutes per session: Not on file   • Stress: Not on file   Relationships   • Social connections     Talks on phone: Not on file     Gets together: Not on file     Attends Islam service: Not on file     Active member of club or organization: Not on file     Attends meetings of clubs or organizations: Not on file     Relationship status: Not on file   • Intimate partner violence     Fear of current or ex partner: Not on file     Emotionally abused: Not on file     Physically abused: Not on file     Forced sexual activity: Not on file   Other Topics Concern   • Not on file   Social History Narrative   • Not on file        Allergies   Allergen Reactions   • Asa [Aspirin]    • Latex    • Penicillins    • Tape Hives, Itching and Swelling   • Wasp Venom Swelling     Outpatient Encounter Medications as of 4/1/2020   Medication Sig Dispense Refill   • simvastatin (ZOCOR) 20 MG Tab TAKE 1 TABLET BY MOUTH IN THE EVENING 100 Tab 3   • rivaroxaban (XARELTO) 15 MG Tab tablet Take 1 Tab by mouth with dinner. Must apply: Patient Assistance Program [phone] 100 Tab 3   • propafenone (RYTHMOL) 150 MG Tab TAKE 1 TABLET BY MOUTH TWICE A  Tab 2   • metoprolol (LOPRESSOR) 25 MG Tab TAKE 1 TAB BY MOUTH TWO TIMES A DAY. 200 Tab 3   • losartan (COZAAR) 100 MG Tab Take 1 Tab by mouth every day. 100 Tab 3   • fenofibrate micronized (LOFIBRA) 134 MG capsule Take 1 Cap by mouth every day. 100 Cap 3   • levothyroxine (SYNTHROID) 88 MCG Tab Take 1 Tab by mouth Every morning on an empty stomach. 100 Tab 2   • hydrOXYzine HCl (ATARAX) 50 MG Tab Take 1 Tab by mouth 3 times a day as needed. ITCHING 90 Tab 2   • Mometasone Furo-Formoterol Fum (DULERA) 200-5 MCG/ACT Aerosol Inhale 1 Puff by mouth 2 Times a Day. Dispense samples 1 Inhaler 2   • ALPRAZolam (XANAX) 0.25 MG Tab Take 1 Tab by mouth 2 times a day as needed for Sleep or Anxiety for up to 90 days. TAKE ONE TABLET BY MOUTH TWICE A  Tab 0   • albuterol 108 (90 Base) MCG/ACT Aero Soln inhalation aerosol INHALE 2 PUFFS BY MOUTH EVERY 6 HOURS AS NEEDED FOR SHORTNESS OF BREATH 8.5 Inhaler 11   • omeprazole (PRILOSEC) 40 MG delayed-release capsule Take 1 Cap by mouth every day. 90 Cap 1   • fluticasone (FLONASE) 50 MCG/ACT nasal spray SPRAY 1 SPRAY IN NOSE 2 TIMES A DAY. EACH NOSTRIL 3 Bottle 0   • FIBER SELECT GUMMIES PO Take 1 Tab by mouth 2 times a day as needed (for constipation).     • clobetasol (TEMOVATE) 0.05 % Cream Apply 1 Each to affected area(s) 2 times a day as needed.  5   • [DISCONTINUED] rivaroxaban (XARELTO) 15 MG Tab tablet Take 1 Tab by mouth with dinner. Must apply:  "Patient Assistance Program [phone] 30 Tab 0   • [DISCONTINUED] metoprolol (LOPRESSOR) 25 MG Tab TAKE 1 TAB BY MOUTH TWO TIMES A DAY. 180 Tab 1   • [DISCONTINUED] propafenone (RYTHMOL) 150 MG Tab TAKE 1 TABLET BY MOUTH TWICE A  Tab 2   • [DISCONTINUED] losartan (COZAAR) 100 MG Tab Take 1 Tab by mouth every day. 100 Tab 1   • [DISCONTINUED] fenofibrate micronized (LOFIBRA) 134 MG capsule TAKE 1 CAPSULE BY MOUTH EVERY DAY (Patient not taking: Reported on 4/1/2020) 100 Cap 1   • [DISCONTINUED] omeprazole (PRILOSEC) 40 MG delayed-release capsule TAKE 1 CAPSULE BY MOUTH EVERY DAY (Patient not taking: Reported on 4/1/2020) 90 Cap 1   • [DISCONTINUED] busPIRone (BUSPAR) 5 MG tablet Take 1 Tab by mouth 3 times a day as needed. (Patient not taking: Reported on 3/3/2020) 270 Tab 3   • [DISCONTINUED] fenofibrate micronized (LOFIBRA) 134 MG capsule Take 1 Cap by mouth every day. 100 Cap 3   • [DISCONTINUED] simvastatin (ZOCOR) 20 MG Tab TAKE 1 TABLET BY MOUTH IN THE EVENING 90 Tab 3     No facility-administered encounter medications on file as of 4/1/2020.      Review of Systems   Constitutional: Negative for malaise/fatigue.   Respiratory: Positive for shortness of breath.    Cardiovascular: Negative for chest pain, palpitations, orthopnea, leg swelling and PND.   Gastrointestinal: Negative for abdominal pain.   Musculoskeletal: Negative for falls.   Neurological: Negative for dizziness and loss of consciousness.   Psychiatric/Behavioral: Negative for depression.   All other systems reviewed and are negative.       Objective:   /62   Pulse (!) 53   Temp 36.4 °C (97.6 °F) (Temporal)   Ht 1.803 m (5' 11\")   Wt 63.5 kg (140 lb)   LMP 01/01/1963   SpO2 93%   BMI 19.53 kg/m²      Physical Exam  Encounter via ZOOM video.  Exam could not be performed.    Labs performed November 2019 were reviewed and showed normal creatinine.  LDL 83 in March 2019    Assessment:     1. Atrial fibrillation, unspecified type " (HCC)  rivaroxaban (XARELTO) 15 MG Tab tablet   2. Essential hypertension     3. Mixed hyperlipidemia     4. Encounter for long-term (current) use of high-risk medication     5. Shortness of breath         Medical Decision Making:  Today's Assessment / Status / Plan:     Atrial fibrillation: Patient has been asymptomatic.  Okay to take extra tablet of propafenone for symptoms of A.fib.  Continue metoprolol and propafenone at current dose.  Continue Xarelto.  She is on the lower dose of Xarelto because of her history of renal insufficiency.    Hypertension: Blood pressure is at goal.  Continue losartan at current dose.    Hyperlipidemia: Lipids at goal.  Continue simvastatin at current dose.    Dyspnea: Likely secondary to underlying COPD.  Continue management for COPD.  If she starts having worsening symptoms or associated chest discomfort, she should let us know.    This consultation was conducted utilizing secure and encrypted videoconferencing equipment with the assistance of a trained tele-presenter at the originating site.    Return to clinic in 6 months or earlier if needed.    Thank you for allowing me to participate in the care of this patient. Please do not hesitate to contact me with any questions.    Sonali Sandoval MD, [facility]  Cardiologist  [facility]    PLEASE NOTE: This dictation was created using voice recognition software.         Libertad Estrella M.D.  [address] [place] [address]  [place] [address]  VIA In Basket

## 2020-04-01 NOTE — TELEPHONE ENCOUNTER
AA/ben Arreaga    Pt's daughter Faviola Morales calling, waiting for virtual visit with AA to commence.    She is ready and waiting for your call at , lap top set up & ready.

## 2020-04-01 NOTE — TELEPHONE ENCOUNTER
ESTABLISHED PATIENT PRE-VISIT PLANNING     Patient was NOT contacted to complete PVP.    1.  Reviewed notes from the last few office visits within the medical group: Yes    2.  If any orders were placed at last visit or intended to be done for this visit (i.e. 6 mos follow-up), do we have Results/Consult Notes?        •  Labs - Labs were not ordered at last office visit.       •  Imaging - Imaging was not ordered at last office visit.       •  Referrals - No referrals were ordered at last office visit.    3. Is this appointment scheduled as a Hospital Follow-Up? No    4.  Immunizations were updated in [facility] using WebIZ?: Yes       •  Web Iz Recommendations: TDAP, VARICELLA (Chicken Pox)  and SHINGRIX (Shingles)    5.  Patient is due for the following Health Maintenance Topics:   Health Maintenance Due   Topic Date Due   • Annual Pulmonary Function Test / Spirometry  09/28/2011     6. Orders for overdue Health Maintenance topics pended in Pre-Charting? NO    7.  AHA (MDX) form printed for Provider? YES    8.  Patient was NOT informed to arrive 15 min prior to their scheduled appointment and bring in their medication bottles.

## 2020-04-07 ENCOUNTER — HOSPITAL ENCOUNTER (OUTPATIENT)
Facility: MEDICAL CENTER | Age: 85
End: 2020-04-07
Attending: FAMILY MEDICINE
Payer: MEDICARE

## 2020-04-07 ENCOUNTER — OFFICE VISIT (OUTPATIENT)
Dept: MEDICAL GROUP | Facility: PHYSICIAN GROUP | Age: 85
End: 2020-04-07
Payer: MEDICARE

## 2020-04-07 VITALS
DIASTOLIC BLOOD PRESSURE: 55 MMHG | TEMPERATURE: 96.9 F | OXYGEN SATURATION: 93 % | SYSTOLIC BLOOD PRESSURE: 100 MMHG | HEIGHT: 71 IN | HEART RATE: 56 BPM | WEIGHT: 140 LBS | BODY MASS INDEX: 19.6 KG/M2 | RESPIRATION RATE: 18 BRPM

## 2020-04-07 DIAGNOSIS — I95.9 HYPOTENSION, UNSPECIFIED HYPOTENSION TYPE: ICD-10-CM

## 2020-04-07 DIAGNOSIS — R30.0 DYSURIA: ICD-10-CM

## 2020-04-07 DIAGNOSIS — I10 ESSENTIAL HYPERTENSION: Primary | ICD-10-CM

## 2020-04-07 LAB
APPEARANCE UR: ABNORMAL
BACTERIA #/AREA URNS HPF: NEGATIVE /HPF
BILIRUB UR QL STRIP.AUTO: NEGATIVE
CAOX CRY #/AREA URNS HPF: ABNORMAL /HPF
COLOR UR: ABNORMAL
EPI CELLS #/AREA URNS HPF: NEGATIVE /HPF
GLUCOSE UR STRIP.AUTO-MCNC: NEGATIVE MG/DL
HYALINE CASTS #/AREA URNS LPF: ABNORMAL /LPF
KETONES UR STRIP.AUTO-MCNC: ABNORMAL MG/DL
LEUKOCYTE ESTERASE UR QL STRIP.AUTO: ABNORMAL
MICRO URNS: ABNORMAL
NITRITE UR QL STRIP.AUTO: NEGATIVE
PH UR STRIP.AUTO: 5 [PH] (ref 5–8)
PROT UR QL STRIP: NEGATIVE MG/DL
RBC # URNS HPF: ABNORMAL /HPF
RBC UR QL AUTO: NEGATIVE
SP GR UR STRIP.AUTO: 1.02
UROBILINOGEN UR STRIP.AUTO-MCNC: 1 MG/DL
WBC #/AREA URNS HPF: ABNORMAL /HPF

## 2020-04-07 PROCEDURE — 99214 OFFICE O/P EST MOD 30 MIN: CPT | Mod: 95,CR | Performed by: FAMILY MEDICINE

## 2020-04-07 PROCEDURE — 81001 URINALYSIS AUTO W/SCOPE: CPT

## 2020-04-07 PROCEDURE — 87086 URINE CULTURE/COLONY COUNT: CPT

## 2020-04-07 RX ORDER — SULFAMETHOXAZOLE AND TRIMETHOPRIM 400; 80 MG/1; MG/1
1 TABLET ORAL 2 TIMES DAILY
Qty: 14 TAB | Refills: 0 | Status: SHIPPED | OUTPATIENT
Start: 2020-04-07 | End: 2020-04-14

## 2020-04-07 RX ORDER — LOSARTAN POTASSIUM 100 MG/1
100 TABLET ORAL DAILY
Qty: 100 TAB | Refills: 3
Start: 2020-04-07 | End: 2020-05-27

## 2020-04-07 ASSESSMENT — PATIENT HEALTH QUESTIONNAIRE - PHQ9: CLINICAL INTERPRETATION OF PHQ2 SCORE: 0

## 2020-04-07 ASSESSMENT — FIBROSIS 4 INDEX: FIB4 SCORE: 1.833239803076234322

## 2020-04-07 NOTE — PROGRESS NOTES
Chief Complaint   Patient presents with   • Insomnia   • Pain     bilateral feet     This encounter was conducted via Zoom.   Verbal consent was obtained. Patient's identity was verified.    HISTORY OF PRESENT ILLNESS: Patient is a 84 y.o. female established patient here today for the following concerns:    Patient reports pain in her bilateral feet for the last week. She states she has been staying at home, so she is not driving, and struggles to walk around the house. She sits often throughout the day, which makes her back pain worse as well. She has tried ibuprofen with only mild temporary improvement. She does have gabapentin 300mg for night time use, but has not tried it. Staying home has made her feel weak, and she is using her SpO2 more often than usual due to feeling short of breath. She denies fevers or chest pain. She notes her blood pressure was low today at 100/55. She denies dysuria but states she is urinating frequently, which is unusual for her. She has had UTI's before, but not frequently. Patient also notes waking up in the middle of the night shaking.       Annual Health Assessment Questions:     1.  Are you currently engaging in any exercise or physical activity? No     2.  How would you describe your mood or emotional well-being today? fair     3.  Have you had any falls in the last year? No     4.  Have you noticed any problems with your balance or had difficulty walking? Yes     5.  In the last six months have you experienced any leakage of urine? Yes     6. DPA/Advanced Directive: Patient does not have an Advanced Directive.  A packet and workshop information was given on Advanced Directives.      Past Medical, Social, and Family history reviewed and updated in EPIC    Allergies:Asa [aspirin]; Latex; Penicillins; Tape; and Wasp venom    Current Outpatient Medications   Medication Sig Dispense Refill   • simvastatin (ZOCOR) 20 MG Tab TAKE 1 TABLET BY MOUTH IN THE EVENING 100 Tab 3   •  rivaroxaban (XARELTO) 15 MG Tab tablet Take 1 Tab by mouth with dinner. Must apply: Patient Assistance Program [phone] 100 Tab 3   • propafenone (RYTHMOL) 150 MG Tab TAKE 1 TABLET BY MOUTH TWICE A  Tab 2   • metoprolol (LOPRESSOR) 25 MG Tab TAKE 1 TAB BY MOUTH TWO TIMES A DAY. 200 Tab 3   • losartan (COZAAR) 100 MG Tab Take 1 Tab by mouth every day. 100 Tab 3   • fenofibrate micronized (LOFIBRA) 134 MG capsule Take 1 Cap by mouth every day. 100 Cap 3   • levothyroxine (SYNTHROID) 88 MCG Tab Take 1 Tab by mouth Every morning on an empty stomach. 100 Tab 2   • hydrOXYzine HCl (ATARAX) 50 MG Tab Take 1 Tab by mouth 3 times a day as needed. ITCHING 90 Tab 2   • Mometasone Furo-Formoterol Fum (DULERA) 200-5 MCG/ACT Aerosol Inhale 1 Puff by mouth 2 Times a Day. Dispense samples 1 Inhaler 2   • ALPRAZolam (XANAX) 0.25 MG Tab Take 1 Tab by mouth 2 times a day as needed for Sleep or Anxiety for up to 90 days. TAKE ONE TABLET BY MOUTH TWICE A  Tab 0   • albuterol 108 (90 Base) MCG/ACT Aero Soln inhalation aerosol INHALE 2 PUFFS BY MOUTH EVERY 6 HOURS AS NEEDED FOR SHORTNESS OF BREATH 8.5 Inhaler 11   • omeprazole (PRILOSEC) 40 MG delayed-release capsule Take 1 Cap by mouth every day. 90 Cap 1   • fluticasone (FLONASE) 50 MCG/ACT nasal spray SPRAY 1 SPRAY IN NOSE 2 TIMES A DAY. EACH NOSTRIL 3 Bottle 0   • FIBER SELECT GUMMIES PO Take 1 Tab by mouth 2 times a day as needed (for constipation).     • clobetasol (TEMOVATE) 0.05 % Cream Apply 1 Each to affected area(s) 2 times a day as needed.  5     No current facility-administered medications for this visit.        ROS:  Review of Systems   Constitutional: Negative for fever, chills, weight loss and malaise/fatigue.   HENT: Negative for ear pain, nosebleeds, congestion, sore throat and neck pain.    Eyes: Negative for blurred vision.   Respiratory: Negative for cough, sputum production, shortness of breath and wheezing.    Cardiovascular: Negative for chest  "pain, palpitations,  and leg swelling.   Gastrointestinal: Negative for heartburn, nausea, vomiting, diarrhea and abdominal pain.   Genitourinary: Negative for dysuria, urgency and frequency.   Musculoskeletal: Negative for myalgias, back pain and joint pain.   Skin: Negative for rash and itching.   Neurological: Negative for dizziness, tingling, tremors, sensory change, focal weakness and headaches.   Endo/Heme/Allergies: Does not bruise/bleed easily.   Psychiatric/Behavioral: Negative for depression, anxiety, suicidal ideas, insomnia and memory loss.      Exam:  /55 (BP Location: Left arm, Patient Position: Sitting, BP Cuff Size: Adult)   Pulse (!) 56   Temp 36.1 °C (96.9 °F) (Temporal)   Resp 18   Ht 1.803 m (5' 11\")   Wt 63.5 kg (140 lb)   SpO2 93%   Patient is afebrile.   General:  Well nourished, well developed in NAD  Head is grossly normal.  Neck: Supple without JVD   Pulmonary:  Normal effort.     Please note that this dictation was created using voice recognition software. I have made every reasonable attempt to correct obvious errors, but I expect that there are errors of grammar and possibly content that I did not discover before finalizing the note.    Assessment/Plan:  1. Dysuria  Given her story and symptoms, I believe she has a UTI. I have ordered a urinalysis with culture to evaluate further, and Bactrim for treatment. She should start the antibiotics as soon as possible. If she does not feel improved after a few days on medication she should let me know for reevaluation.   - URINALYSIS,CULTURE IF INDICATED; Future  - sulfamethoxazole-trimethoprim (BACTRIM) 400-80 MG Tab; Take 1 Tab by mouth 2 times a day for 7 days.  Dispense: 14 Tab; Refill: 0    2. Hypotension, unspecified hypotension type  Patient's blood pressure is low today at 100/55. I would like her to hold off on her Losartan for now, until we figure out why her pressure is so low. I suspect she may have a UTI, which could be " the cause.   - sulfamethoxazole-trimethoprim (BACTRIM) 400-80 MG Tab; Take 1 Tab by mouth 2 times a day for 7 days.  Dispense: 14 Tab; Refill: 0    Other orders  - losartan (COZAAR) 100 MG Tab; Take 1 Tab by mouth every day. Hold for systolic blood pressure <100  Dispense: 100 Tab; Refill: 3       Follow up if symptoms are not relieving in the next few days of antibiotics or if she is worsening.        IYajaira (Scribe), am scribing for, and in the presence of, Libertad Estrella M.D.    Electronically signed by: Yajaira Subramanian (Scribbenjamin), 4/7/2020     Lbiertad COELLO M.D. personally performed the services described in this documentation, as scribed by Yajaira Subramanian in my presence, and it is both accurate and complete.

## 2020-04-07 NOTE — PROGRESS NOTES
Annual Health Assessment Questions:    1.  Are you currently engaging in any exercise or physical activity? No    2.  How would you describe your mood or emotional well-being today? fair    3.  Have you had any falls in the last year? No    4.  Have you noticed any problems with your balance or had difficulty walking? Yes    5.  In the last six months have you experienced any leakage of urine? Yes    6. DPA/Advanced Directive: Patient does not have an Advanced Directive.  A packet and workshop information was given on Advanced Directives.

## 2020-04-08 DIAGNOSIS — I48.91 ATRIAL FIBRILLATION, UNSPECIFIED TYPE (HCC): ICD-10-CM

## 2020-04-09 LAB
BACTERIA UR CULT: NORMAL
SIGNIFICANT IND 70042: NORMAL
SITE SITE: NORMAL
SOURCE SOURCE: NORMAL

## 2020-04-09 RX ORDER — PROPAFENONE HYDROCHLORIDE 150 MG/1
TABLET, COATED ORAL
Qty: 200 TAB | Refills: 3 | Status: SHIPPED | OUTPATIENT
Start: 2020-04-09 | End: 2020-11-03 | Stop reason: SDUPTHER

## 2020-04-25 DIAGNOSIS — L30.9 DERMATITIS: ICD-10-CM

## 2020-04-27 RX ORDER — HYDROXYZINE 50 MG/1
50 TABLET, FILM COATED ORAL 3 TIMES DAILY PRN
Qty: 90 TAB | Refills: 2 | Status: SHIPPED | OUTPATIENT
Start: 2020-04-27 | End: 2022-12-27

## 2020-04-30 ENCOUNTER — TELEPHONE (OUTPATIENT)
Dept: MEDICAL GROUP | Facility: PHYSICIAN GROUP | Age: 85
End: 2020-04-30

## 2020-04-30 ENCOUNTER — PATIENT MESSAGE (OUTPATIENT)
Dept: MEDICAL GROUP | Facility: PHYSICIAN GROUP | Age: 85
End: 2020-04-30

## 2020-04-30 ENCOUNTER — TELEPHONE (OUTPATIENT)
Dept: CARDIOLOGY | Facility: MEDICAL CENTER | Age: 85
End: 2020-04-30

## 2020-04-30 DIAGNOSIS — R30.0 DYSURIA: ICD-10-CM

## 2020-04-30 DIAGNOSIS — I48.91 ATRIAL FIBRILLATION, UNSPECIFIED TYPE (HCC): ICD-10-CM

## 2020-04-30 NOTE — TELEPHONE ENCOUNTER
----- Message from Eun Be sent at 4/30/2020  9:30 AM PDT -----  Regarding: Prescription Question  Contact: [phone]  Mom thinks that her UTI has not cleared up and wonders if she should have a urine re-check or can you just order a refill of the anitbiotics.

## 2020-04-30 NOTE — TELEPHONE ENCOUNTER
AA    Patient daughter, Faviola, called to see if the Pt would be able to get samples sent to the [facility] for rivaroxaban (XARELTO) 15 MG Tab tablet. Pt applied for Patient assistance program, they were denied because they state they make to much money. Please call Faviola back at [phone].    Thank you,  Julianne BILLINGS

## 2020-04-30 NOTE — TELEPHONE ENCOUNTER
Order placed for samples.    Called pt's daughter Faviola. Advised per inventory, 36 tablets left of Xarelto 15mg tablets. She should call [facility] pharmacy to confirm and advised first-come, first-serve basis. Verbalized understanding, appreciative of call.

## 2020-04-30 NOTE — TELEPHONE ENCOUNTER
Order is pending if you would like for the patient to give a urine sample.  Please advise.  Thank you.

## 2020-05-01 ENCOUNTER — HOSPITAL ENCOUNTER (OUTPATIENT)
Dept: LAB | Facility: MEDICAL CENTER | Age: 85
End: 2020-05-01
Attending: FAMILY MEDICINE
Payer: MEDICARE

## 2020-05-01 DIAGNOSIS — R30.0 DYSURIA: ICD-10-CM

## 2020-05-01 LAB
APPEARANCE UR: ABNORMAL
BACTERIA #/AREA URNS HPF: NEGATIVE /HPF
BILIRUB UR QL STRIP.AUTO: NEGATIVE
COLOR UR: YELLOW
EPI CELLS #/AREA URNS HPF: NEGATIVE /HPF
GLUCOSE UR STRIP.AUTO-MCNC: NEGATIVE MG/DL
HYALINE CASTS #/AREA URNS LPF: NORMAL /LPF
KETONES UR STRIP.AUTO-MCNC: NEGATIVE MG/DL
LEUKOCYTE ESTERASE UR QL STRIP.AUTO: ABNORMAL
MICRO URNS: ABNORMAL
NITRITE UR QL STRIP.AUTO: NEGATIVE
PH UR STRIP.AUTO: 6.5 [PH] (ref 5–8)
PROT UR QL STRIP: NEGATIVE MG/DL
RBC # URNS HPF: NORMAL /HPF
RBC UR QL AUTO: NEGATIVE
SP GR UR STRIP.AUTO: 1.01
UROBILINOGEN UR STRIP.AUTO-MCNC: 1 MG/DL
WBC #/AREA URNS HPF: NORMAL /HPF

## 2020-05-01 PROCEDURE — 81001 URINALYSIS AUTO W/SCOPE: CPT

## 2020-05-05 ENCOUNTER — TELEMEDICINE (OUTPATIENT)
Dept: NEPHROLOGY | Facility: MEDICAL CENTER | Age: 85
End: 2020-05-05
Payer: MEDICARE

## 2020-05-05 DIAGNOSIS — I10 ESSENTIAL HYPERTENSION: ICD-10-CM

## 2020-05-05 DIAGNOSIS — N18.30 STAGE 3 CHRONIC KIDNEY DISEASE: ICD-10-CM

## 2020-05-05 PROCEDURE — 99213 OFFICE O/P EST LOW 20 MIN: CPT | Mod: 95,CR | Performed by: INTERNAL MEDICINE

## 2020-05-05 ASSESSMENT — ENCOUNTER SYMPTOMS
CHILLS: 0
NAUSEA: 0
FEVER: 0
COUGH: 0
SHORTNESS OF BREATH: 0
INSOMNIA: 1
VOMITING: 0
HYPERTENSION: 1
TREMORS: 1

## 2020-05-05 NOTE — PROGRESS NOTES
Subjective:      Telemedicine Visit: Established Patient     This encounter was conducted via Zoom .   Verbal consent was obtained. Patient's identity was verified.           Eun Be is a 84 y.o. female who presents with Hypertension and Chronic Kidney Disease            Patient is doing well overall, complains of increased anxiety and insomnia.  No recent kidney function test.  She was diagnosed recently with UTI, improved with a course of antibiotic.    Hypertension   This is a chronic problem. The current episode started more than 1 year ago. The problem is unchanged. The problem is controlled. Pertinent negatives include no chest pain, malaise/fatigue, peripheral edema or shortness of breath. Risk factors for coronary artery disease include post-menopausal state. Past treatments include angiotensin blockers. The current treatment provides significant improvement. There are no compliance problems.  Hypertensive end-organ damage includes kidney disease. Identifiable causes of hypertension include chronic renal disease.   Chronic Kidney Disease   This is a chronic problem. The current episode started more than 1 year ago. The problem occurs constantly. The problem has been unchanged. Pertinent negatives include no chest pain, chills, coughing, fever, nausea, urinary symptoms or vomiting.       Review of Systems   Constitutional: Negative for chills, fever and malaise/fatigue.   Respiratory: Negative for cough and shortness of breath.    Cardiovascular: Negative for chest pain and leg swelling.   Gastrointestinal: Negative for nausea and vomiting.   Genitourinary: Negative for dysuria, frequency and urgency.   Neurological: Positive for tremors.   Psychiatric/Behavioral: The patient has insomnia.           Objective:     [facility] 01/01/1963      Physical Exam  Vitals signs and nursing note reviewed.   Constitutional:       General: She is not in acute distress.     Appearance: Normal appearance. She is  well-developed. She is not diaphoretic.   HENT:      Head: Normocephalic and atraumatic.      Right Ear: External ear normal.      Left Ear: External ear normal.      Nose: Nose normal.   Eyes:      General: No scleral icterus.        Right eye: No discharge.         Left eye: No discharge.      Conjunctiva/sclera: Conjunctivae normal.   Cardiovascular:      Heart sounds: No murmur.   Pulmonary:      Effort: Pulmonary effort is normal. No respiratory distress.   Musculoskeletal:         General: No tenderness.      Right lower leg: No edema.      Left lower leg: No edema.   Skin:     General: Skin is warm.      Findings: No erythema.   Neurological:      General: No focal deficit present.      Mental Status: She is alert and oriented to person, place, and time.      Cranial Nerves: No cranial nerve deficit.   Psychiatric:         Mood and Affect: Mood is anxious.         Behavior: Behavior normal.       Past Medical History:   Diagnosis Date   • Anxiety    • Arrhythmia    • Arthritis    • Backpain    • Bronchitis    • CATARACT    • Chronic lower back pain 3/11/2014   • Chronic pain    • COPD    • Depression    • EMPHYSEMA    • GERD (gastroesophageal reflux disease)    • Heart burn     hiatal hernia   • HTN (hypertension)    • Hypertension    • Hypertriglyceridemia 11/7/2013   • Hypothyroid    • IGT (impaired glucose tolerance)    • MEDICAL HOME    • Osteopenia    • Pain     left side of back   • Panic disorder    • Paroxysmal atrial fibrillation (HCC)    • Personal history of venous thrombosis and embolism     right leg   • Pneumonia     1974   • Renal disorder     cysts   • Rheumatic fever     4 or 5 years old   • Rheumatic fever     as a kid per patient.   • Thyroid disease    • Unspecified disorder of thyroid      Social History     Socioeconomic History   • Marital status:      Spouse name: Not on file   • Number of children: 2   • Years of education: Not on file   • Highest education level: Not on file    Occupational History   • Not on file   Social Needs   • Financial resource strain: Not on file   • Food insecurity     Worry: Not on file     Inability: Not on file   • Transportation needs     Medical: Not on file     Non-medical: Not on file   Tobacco Use   • Smoking status: Former Smoker     Packs/day: 0.25     Years: 30.00     Pack years: 7.50     Types: Cigarettes     Last attempt to quit:      Years since quittin.3   • Smokeless tobacco: Never Used   • Tobacco comment: quit  ppd x ?yrs (on and off for years since age 15)   Substance and Sexual Activity   • Alcohol use: Yes     Alcohol/week: 0.0 oz     Comment: wine, 1 glass every other night   • Drug use: No   • Sexual activity: Never   Lifestyle   • Physical activity     Days per week: Not on file     Minutes per session: Not on file   • Stress: Not on file   Relationships   • Social connections     Talks on phone: Not on file     Gets together: Not on file     Attends Advent service: Not on file     Active member of club or organization: Not on file     Attends meetings of clubs or organizations: Not on file     Relationship status: Not on file   • Intimate partner violence     Fear of current or ex partner: Not on file     Emotionally abused: Not on file     Physically abused: Not on file     Forced sexual activity: Not on file   Other Topics Concern   • Not on file   Social History Narrative   • Not on file     Family History   Problem Relation Age of Onset   • Stroke Brother    • Heart Disease Other      Recent Labs     19  0114 09/15/19  1025  19  0252 19  0346 19  1025   ALBUMIN 4.2 4.2  --   --   --   --    SODIUM 140 133*   < > 132* 130* 140   POTASSIUM 3.6 4.3   < > 4.1 4.0 4.2   CHLORIDE 105 98   < > 98 97 104   CO2 26 28   < > 27 27 27   BUN 23* 23*   < > 19 21 28*   CREATININE 0.90 1.02   < > 0.79 0.83 1.02    < > = values in this interval not displayed.               Assessment/Plan:       1 CKD  III  No uremic symptoms  Renal dose of medication  Avoid nephrotoxins  Continue same medication regimen  Check labs    2 HTN  Controlled  Continue same medication regimen  Continue low-sodium diet    F/u in 1 year

## 2020-05-07 RX ORDER — OMEPRAZOLE 40 MG/1
40 CAPSULE, DELAYED RELEASE ORAL
Qty: 90 CAP | Refills: 1 | Status: SHIPPED | OUTPATIENT
Start: 2020-05-07 | End: 2020-10-27

## 2020-05-18 ENCOUNTER — PATIENT MESSAGE (OUTPATIENT)
Dept: MEDICAL GROUP | Facility: PHYSICIAN GROUP | Age: 85
End: 2020-05-18

## 2020-05-18 NOTE — TELEPHONE ENCOUNTER
----- Message from Eun Be sent at 5/18/2020  3:35 PM PDT -----  Regarding: RE: Non-Urgent Medical Question  Contact: [phone]  OK. Call me at [phone] so we can set it up. By the way, she just had a virtual visit with Dr. Estrella last week and the Zoom connection was not the greatest. But I will  bring a different laptop to Mom's house next time which should work better.

## 2020-05-19 ENCOUNTER — APPOINTMENT (OUTPATIENT)
Dept: RADIOLOGY | Facility: MEDICAL CENTER | Age: 85
End: 2020-05-19
Attending: FAMILY MEDICINE
Payer: MEDICARE

## 2020-05-21 ENCOUNTER — APPOINTMENT (OUTPATIENT)
Dept: BEHAVIORAL HEALTH | Facility: CLINIC | Age: 85
End: 2020-05-21
Payer: MEDICARE

## 2020-05-22 ENCOUNTER — HOSPITAL ENCOUNTER (OUTPATIENT)
Dept: LAB | Facility: MEDICAL CENTER | Age: 85
End: 2020-05-22
Attending: FAMILY MEDICINE
Payer: MEDICARE

## 2020-05-22 ENCOUNTER — HOSPITAL ENCOUNTER (OUTPATIENT)
Dept: RADIOLOGY | Facility: MEDICAL CENTER | Age: 85
End: 2020-05-22
Attending: FAMILY MEDICINE
Payer: MEDICARE

## 2020-05-22 ENCOUNTER — HOSPITAL ENCOUNTER (OUTPATIENT)
Dept: LAB | Facility: MEDICAL CENTER | Age: 85
End: 2020-05-22
Attending: INTERNAL MEDICINE
Payer: MEDICARE

## 2020-05-22 DIAGNOSIS — N28.1 RENAL CYST: ICD-10-CM

## 2020-05-22 DIAGNOSIS — N18.30 STAGE 3 CHRONIC KIDNEY DISEASE: ICD-10-CM

## 2020-05-22 DIAGNOSIS — R53.83 FATIGUE, UNSPECIFIED TYPE: ICD-10-CM

## 2020-05-22 DIAGNOSIS — N18.30 CKD (CHRONIC KIDNEY DISEASE) STAGE 3, GFR 30-59 ML/MIN: ICD-10-CM

## 2020-05-22 DIAGNOSIS — I10 ESSENTIAL HYPERTENSION: ICD-10-CM

## 2020-05-22 DIAGNOSIS — R53.1 GENERALIZED WEAKNESS: ICD-10-CM

## 2020-05-22 DIAGNOSIS — R10.9 FLANK PAIN: ICD-10-CM

## 2020-05-22 LAB
ALBUMIN SERPL BCP-MCNC: 4.5 G/DL (ref 3.2–4.9)
ALBUMIN/GLOB SERPL: 1.9 G/DL
ALP SERPL-CCNC: 40 U/L (ref 30–99)
ALT SERPL-CCNC: 9 U/L (ref 2–50)
ANION GAP SERPL CALC-SCNC: 11 MMOL/L (ref 7–16)
ANION GAP SERPL CALC-SCNC: 12 MMOL/L (ref 7–16)
APPEARANCE UR: CLEAR
AST SERPL-CCNC: 13 U/L (ref 12–45)
BACTERIA #/AREA URNS HPF: ABNORMAL /HPF
BASOPHILS # BLD AUTO: 0.6 % (ref 0–1.8)
BASOPHILS # BLD: 0.03 K/UL (ref 0–0.12)
BILIRUB SERPL-MCNC: 0.5 MG/DL (ref 0.1–1.5)
BILIRUB UR QL STRIP.AUTO: NEGATIVE
BUN SERPL-MCNC: 24 MG/DL (ref 8–22)
BUN SERPL-MCNC: 24 MG/DL (ref 8–22)
CALCIUM SERPL-MCNC: 10.3 MG/DL (ref 8.5–10.5)
CALCIUM SERPL-MCNC: 10.4 MG/DL (ref 8.5–10.5)
CAOX CRY #/AREA URNS HPF: ABNORMAL /HPF
CHLORIDE SERPL-SCNC: 105 MMOL/L (ref 96–112)
CHLORIDE SERPL-SCNC: 105 MMOL/L (ref 96–112)
CO2 SERPL-SCNC: 26 MMOL/L (ref 20–33)
CO2 SERPL-SCNC: 27 MMOL/L (ref 20–33)
COLOR UR: YELLOW
CREAT SERPL-MCNC: 1.1 MG/DL (ref 0.5–1.4)
CREAT SERPL-MCNC: 1.11 MG/DL (ref 0.5–1.4)
CREAT UR-MCNC: 201.91 MG/DL
EOSINOPHIL # BLD AUTO: 0.09 K/UL (ref 0–0.51)
EOSINOPHIL NFR BLD: 1.9 % (ref 0–6.9)
EPI CELLS #/AREA URNS HPF: ABNORMAL /HPF
ERYTHROCYTE [DISTWIDTH] IN BLOOD BY AUTOMATED COUNT: 45 FL (ref 35.9–50)
FASTING STATUS PATIENT QL REPORTED: NORMAL
GLOBULIN SER CALC-MCNC: 2.4 G/DL (ref 1.9–3.5)
GLUCOSE SERPL-MCNC: 87 MG/DL (ref 65–99)
GLUCOSE SERPL-MCNC: 89 MG/DL (ref 65–99)
GLUCOSE UR STRIP.AUTO-MCNC: NEGATIVE MG/DL
HCT VFR BLD AUTO: 42.9 % (ref 37–47)
HGB BLD-MCNC: 13.9 G/DL (ref 12–16)
HYALINE CASTS #/AREA URNS LPF: ABNORMAL /LPF
IMM GRANULOCYTES # BLD AUTO: 0.01 K/UL (ref 0–0.11)
IMM GRANULOCYTES NFR BLD AUTO: 0.2 % (ref 0–0.9)
KETONES UR STRIP.AUTO-MCNC: ABNORMAL MG/DL
LEUKOCYTE ESTERASE UR QL STRIP.AUTO: ABNORMAL
LYMPHOCYTES # BLD AUTO: 0.87 K/UL (ref 1–4.8)
LYMPHOCYTES NFR BLD: 18.1 % (ref 22–41)
MCH RBC QN AUTO: 31.7 PG (ref 27–33)
MCHC RBC AUTO-ENTMCNC: 32.4 G/DL (ref 33.6–35)
MCV RBC AUTO: 97.7 FL (ref 81.4–97.8)
MICRO URNS: ABNORMAL
MICROALBUMIN UR-MCNC: 10.8 MG/DL
MICROALBUMIN/CREAT UR: 53 MG/G (ref 0–30)
MONOCYTES # BLD AUTO: 0.33 K/UL (ref 0–0.85)
MONOCYTES NFR BLD AUTO: 6.9 % (ref 0–13.4)
NEUTROPHILS # BLD AUTO: 3.48 K/UL (ref 2–7.15)
NEUTROPHILS NFR BLD: 72.3 % (ref 44–72)
NITRITE UR QL STRIP.AUTO: NEGATIVE
NRBC # BLD AUTO: 0 K/UL
NRBC BLD-RTO: 0 /100 WBC
PH UR STRIP.AUTO: 6 [PH] (ref 5–8)
PLATELET # BLD AUTO: 220 K/UL (ref 164–446)
PMV BLD AUTO: 10.9 FL (ref 9–12.9)
POTASSIUM SERPL-SCNC: 4.5 MMOL/L (ref 3.6–5.5)
POTASSIUM SERPL-SCNC: 4.5 MMOL/L (ref 3.6–5.5)
PROT SERPL-MCNC: 6.9 G/DL (ref 6–8.2)
PROT UR QL STRIP: 100 MG/DL
RBC # BLD AUTO: 4.39 M/UL (ref 4.2–5.4)
RBC # URNS HPF: ABNORMAL /HPF
RBC UR QL AUTO: NEGATIVE
SODIUM SERPL-SCNC: 142 MMOL/L (ref 135–145)
SODIUM SERPL-SCNC: 144 MMOL/L (ref 135–145)
SP GR UR REFRACTOMETRY: 1.02
T4 FREE SERPL-MCNC: 1.4 NG/DL (ref 0.93–1.7)
TSH SERPL DL<=0.005 MIU/L-ACNC: 5.45 UIU/ML (ref 0.38–5.33)
UROBILINOGEN UR STRIP.AUTO-MCNC: 0.2 MG/DL
WBC # BLD AUTO: 4.8 K/UL (ref 4.8–10.8)
WBC #/AREA URNS HPF: ABNORMAL /HPF

## 2020-05-22 PROCEDURE — 85025 COMPLETE CBC W/AUTO DIFF WBC: CPT

## 2020-05-22 PROCEDURE — 76775 US EXAM ABDO BACK WALL LIM: CPT

## 2020-05-22 PROCEDURE — 84443 ASSAY THYROID STIM HORMONE: CPT

## 2020-05-22 PROCEDURE — 80053 COMPREHEN METABOLIC PANEL: CPT

## 2020-05-22 PROCEDURE — 84439 ASSAY OF FREE THYROXINE: CPT

## 2020-05-22 PROCEDURE — 82570 ASSAY OF URINE CREATININE: CPT

## 2020-05-22 PROCEDURE — 36415 COLL VENOUS BLD VENIPUNCTURE: CPT

## 2020-05-22 PROCEDURE — 80048 BASIC METABOLIC PNL TOTAL CA: CPT

## 2020-05-22 PROCEDURE — 82043 UR ALBUMIN QUANTITATIVE: CPT

## 2020-05-22 PROCEDURE — 81001 URINALYSIS AUTO W/SCOPE: CPT

## 2020-05-26 ENCOUNTER — TELEPHONE (OUTPATIENT)
Dept: MEDICAL GROUP | Facility: PHYSICIAN GROUP | Age: 85
End: 2020-05-26

## 2020-05-26 NOTE — TELEPHONE ENCOUNTER
Future Appointments       Provider Department Center    5/27/2020 11:20 AM Libertad Estrella M.D. [facility]    6/4/2020 11:10 AM Leonila Ng, Ph.D. Behavior[facility] Out[facility]    6/18/2020 11:10 AM Leonila Ng, Ph.D. Behavioral Health Outpatient [facility]    7/2/2020 11:10 AM Leonila Ng, Ph.D. Behavioral Health Outpatient [address] [place] [address]    7/15/2020 1:00 PM Libertad Estrella M.D. [facility]        ESTABLISHED PATIENT PRE-VISIT PLANNING     Patient was NOT contacted to complete PVP.       1.  Reviewed notes from the last few office visits within the medical group: Yes    2.  If any orders were placed at last visit or intended to be done for this visit (i.e. 6 mos follow-up), do we have Results/Consult Notes?        •  Labs - Labs ordered, completed on 05/22/2020 and results are in chart.       •  Imaging - Imaging ordered, completed and results are in chart.       •  Referrals - No referrals were ordered at last office visit.    3. Is this appointment scheduled as a Hospital Follow-Up? No    4.  Immunizations were updated in Epic using WebIZ?: Yes       •  Web Iz Recommendations: TDAP, VARICELLA (Chicken Pox)  and SHINGRIX (Shingles)    5.  Patient is due for the following Health Maintenance Topics:   There are no preventive care reminders to display for this patient.    6. Orders for overdue Health Maintenance topics pended in Pre-Charting? NO    7.  AHA (MDX) form printed for Provider? YES    8.  Patient was NOT informed to arrive 15 min prior to their scheduled appointment and bring in their medication bottles.

## 2020-05-27 ENCOUNTER — TELEMEDICINE (OUTPATIENT)
Dept: MEDICAL GROUP | Facility: PHYSICIAN GROUP | Age: 85
End: 2020-05-27
Payer: MEDICARE

## 2020-05-27 VITALS
OXYGEN SATURATION: 95 % | SYSTOLIC BLOOD PRESSURE: 122 MMHG | HEIGHT: 71 IN | HEART RATE: 52 BPM | BODY MASS INDEX: 18.9 KG/M2 | WEIGHT: 135 LBS | DIASTOLIC BLOOD PRESSURE: 57 MMHG | TEMPERATURE: 97.6 F

## 2020-05-27 DIAGNOSIS — M48.062 SPINAL STENOSIS OF LUMBAR REGION WITH NEUROGENIC CLAUDICATION: ICD-10-CM

## 2020-05-27 DIAGNOSIS — E03.9 HYPOTHYROIDISM (ACQUIRED): ICD-10-CM

## 2020-05-27 DIAGNOSIS — N18.30 STAGE 3 CHRONIC KIDNEY DISEASE: ICD-10-CM

## 2020-05-27 PROCEDURE — 99442 PR PHYSICIAN TELEPHONE EVALUATION 11-20 MIN: CPT | Mod: CR | Performed by: FAMILY MEDICINE

## 2020-05-27 RX ORDER — LEVOTHYROXINE SODIUM 0.1 MG/1
100 TABLET ORAL
Qty: 90 TAB | Refills: 3 | Status: SHIPPED | OUTPATIENT
Start: 2020-05-27 | End: 2020-08-14

## 2020-05-27 RX ORDER — LOSARTAN POTASSIUM 100 MG/1
50 TABLET ORAL DAILY
Qty: 100 TAB | Refills: 3
Start: 2020-05-27 | End: 2020-07-15

## 2020-05-27 ASSESSMENT — FIBROSIS 4 INDEX: FIB4 SCORE: 1.65

## 2020-05-27 NOTE — PROGRESS NOTES
Telephone Appointment Visit   As a means of avoiding spread of COVID-19, this visit is being conducted by telephone. This telephone visit was initiated by the patient and they verbally consented.    Time at start of call: 11:15    Reason for Call:  Lab Follow-up    Patient Comments / History:   On call with daughter and Eun today.  Reports that her back has been most bothersome lately with some difficulty radiating down into the legs.  Her daughter reports that she is moving around better however.  She uses the walls to help support herself and uses the walker if she gets out of the house at all.  She has hx of lumbar central spinal stenosis on MRI in 2014, but just prior to scheduled surgery she developed Afib.  She was told she was not a surgical candidate after this time but has struggled severely with pain.  She was previously on Norco for pain relief, but coadministration with xanax for anxiety caused concern for accidental respiratory suppression.  She typically uses tylenol for pain as she is on anticoagulation for afib and cannot take NSAIDs.  She has struggled to reduce use of xanax and does seem to easily get confused with medications that alter sensorium.  She would like to see a specialist on options for help for pain reduction in back pain.      In addition, last time her BP has been shooting up and we restarted losartan and increased the dose, since her renal function has declined some despite best efforts to stay hydrated and treatment of symptomatic UTI's.      Lastly the thyroid appears to not be well controlled on the 88 mcg.  She finds that she feels fatigued much of the time.  TSH is >5, T4 appears to be mid range.      Labs / Images Reviewed   Reviewed in Epic     Assessment and Plan:     1. Hypothyroidism (acquired)  - levothyroxine (SYNTHROID) 100 MCG Tab; Take 1 Tab by mouth Every morning on an empty stomach.  Dispense: 90 Tab; Refill: 3  - FREE THYROXINE; Standing  - TSH; Standing  -  TRIIDOTHYRONINE; Standing    2. Spinal stenosis of lumbar region with neurogenic claudication  - REFERRAL TO PHYSIATRY (PMR)    3. Stage 3 chronic kidney disease (HCC)  - losartan (COZAAR) 100 MG Tab; Take 0.5 Tabs by mouth every day. Hold for systolic blood pressure <100  Dispense: 100 Tab; Refill: 3  - Basic Metabolic Panel; Future      Follow-up: Return in about 3 months (around 8/27/2020).    Time at end of call: 11:41  Total Time Spent: 11-20 minutes    Libertad Estrella M.D.

## 2020-05-28 ENCOUNTER — TELEPHONE (OUTPATIENT)
Dept: CARDIOLOGY | Facility: MEDICAL CENTER | Age: 85
End: 2020-05-28

## 2020-05-28 DIAGNOSIS — F41.9 ANXIETY: ICD-10-CM

## 2020-05-28 NOTE — TELEPHONE ENCOUNTER
Attempted to call pt to discuss Xarelto refill. [facility] pharmacy currently out of stock. LM for her to call back.

## 2020-05-29 RX ORDER — ALPRAZOLAM 0.25 MG/1
0.25 TABLET ORAL 2 TIMES DAILY PRN
Qty: 180 TAB | Refills: 0 | Status: SHIPPED | OUTPATIENT
Start: 2020-05-29 | End: 2020-09-01 | Stop reason: SDUPTHER

## 2020-06-01 DIAGNOSIS — I48.91 ATRIAL FIBRILLATION, UNSPECIFIED TYPE (HCC): ICD-10-CM

## 2020-06-09 ENCOUNTER — OFFICE VISIT (OUTPATIENT)
Dept: PHYSICAL MEDICINE AND REHAB | Facility: MEDICAL CENTER | Age: 85
End: 2020-06-09
Payer: MEDICARE

## 2020-06-09 VITALS
DIASTOLIC BLOOD PRESSURE: 64 MMHG | SYSTOLIC BLOOD PRESSURE: 122 MMHG | HEART RATE: 60 BPM | HEIGHT: 71 IN | TEMPERATURE: 98.4 F | OXYGEN SATURATION: 93 % | BODY MASS INDEX: 20.03 KG/M2 | WEIGHT: 143.08 LBS

## 2020-06-09 DIAGNOSIS — M54.16 LUMBAR RADICULOPATHY: ICD-10-CM

## 2020-06-09 DIAGNOSIS — M25.559 HIP PAIN: ICD-10-CM

## 2020-06-09 DIAGNOSIS — M48.062 SPINAL STENOSIS OF LUMBAR REGION WITH NEUROGENIC CLAUDICATION: ICD-10-CM

## 2020-06-09 DIAGNOSIS — M54.41 CHRONIC BILATERAL LOW BACK PAIN WITH BILATERAL SCIATICA: ICD-10-CM

## 2020-06-09 DIAGNOSIS — G89.29 CHRONIC BILATERAL LOW BACK PAIN WITH BILATERAL SCIATICA: ICD-10-CM

## 2020-06-09 DIAGNOSIS — M54.42 CHRONIC BILATERAL LOW BACK PAIN WITH BILATERAL SCIATICA: ICD-10-CM

## 2020-06-09 DIAGNOSIS — M47.816 LUMBAR SPONDYLOSIS: ICD-10-CM

## 2020-06-09 DIAGNOSIS — Z91.81 RISK FOR FALLS: ICD-10-CM

## 2020-06-09 DIAGNOSIS — M51.36 LUMBAR DEGENERATIVE DISC DISEASE: ICD-10-CM

## 2020-06-09 DIAGNOSIS — M79.10 MYALGIA: ICD-10-CM

## 2020-06-09 PROCEDURE — 99205 OFFICE O/P NEW HI 60 MIN: CPT | Performed by: PHYSICAL MEDICINE & REHABILITATION

## 2020-06-09 ASSESSMENT — ENCOUNTER SYMPTOMS
PERIANAL NUMBNESS: 0
BACK PAIN: 1
NUMBNESS: 1
LEG PAIN: 1

## 2020-06-09 ASSESSMENT — PATIENT HEALTH QUESTIONNAIRE - PHQ9: CLINICAL INTERPRETATION OF PHQ2 SCORE: 0

## 2020-06-09 ASSESSMENT — FIBROSIS 4 INDEX: FIB4 SCORE: 1.65

## 2020-06-09 ASSESSMENT — PAIN SCALES - GENERAL: PAINLEVEL: 8=MODERATE-SEVERE PAIN

## 2020-06-09 NOTE — PROGRESS NOTES
New patient note    Physiatry (physical medicine and  Rehabilitation), interventional spine and sports medicine    Date of service: See epic    Chief complaint:   Chief Complaint   Patient presents with   • New Patient     Back pain        Referring provider: Libertad Estrella M.D.     HISTORY    HPI: Eun Be 84 y.o. female who presents today with Diagnoses of Risk for falls, Lumbar degenerative disc disease, Spinal stenosis of lumbar region with neurogenic claudication, Lumbar radiculopathy, Lumbar spondylosis, Chronic bilateral low back pain with bilateral sciatica, Myalgia, and Hip pain were pertinent to this visit.    Back Pain   This is a chronic problem. Episode onset: 10 years ago. The problem occurs constantly. The problem has been gradually worsening since onset. The pain is present in the gluteal, lumbar spine and sacro-iliac. The quality of the pain is described as aching, burning, cramping, shooting and stabbing. The pain radiates to the left knee, left thigh, left foot, right foot, right knee and right thigh (anterior and posterior). The symptoms are aggravated by twisting, standing, bending and position (walking). Associated symptoms include leg pain and numbness. Pertinent negatives include no pelvic pain or perianal numbness. She has tried analgesics, heat, home exercises, ice, muscle relaxant and walking (physical therapy. ) for the symptoms. The treatment provided no relief.   she cannot take NSAIDs because of anticoagulation.      difficulty with ADLs because of this pain. She has had frequent falls with the last one in September 2019.        Medical records review:  I reviewed the note from the referring provider Libertad Estrella M.D. including the note dated 5/27/2020.  Hypothyroidism on Synthroid with labs ordered.  Stage III chronic kidney disease on Cozaar with labs ordered.  Spinal stenosis with neurogenic claudication referred to physiatry..          ROS:   Red Flags ROS:   Fever,  Chills, Sweats: Denies  Involuntary Weight Loss: Denies  Bladder Incontinence: Denies  Bowel Incontinence: denies  Saddle Anesthesia: Denies    All other systems reviewed and negative.       PMHx:   Past Medical History:   Diagnosis Date   • Anxiety    • Arrhythmia    • Arthritis    • Backpain    • Bronchitis    • CATARACT    • Chronic lower back pain 3/11/2014   • Chronic pain    • COPD    • Depression    • EMPHYSEMA    • GERD (gastroesophageal reflux disease)    • Heart burn     hiatal hernia   • HTN (hypertension)    • Hypertension    • Hypertriglyceridemia 11/7/2013   • Hypothyroid    • IGT (impaired glucose tolerance)    • MEDICAL HOME    • Osteopenia    • Pain     left side of back   • Panic disorder    • Paroxysmal atrial fibrillation (HCC)    • Personal history of venous thrombosis and embolism     right leg   • Pneumonia     1974   • Renal disorder     cysts   • Rheumatic fever     4 or 5 years old   • Rheumatic fever     as a kid per patient.   • Thyroid disease    • Unspecified disorder of thyroid          Current Outpatient Medications on File Prior to Visit   Medication Sig Dispense Refill   • rivaroxaban (XARELTO) 15 MG Tab tablet Take 1 Tab by mouth with dinner. 30 Tab 0   • ALPRAZolam (XANAX) 0.25 MG Tab Take 1 Tab by mouth 2 times a day as needed for Sleep or Anxiety for up to 90 days. TAKE ONE TABLET BY MOUTH TWICE A  Tab 0   • levothyroxine (SYNTHROID) 100 MCG Tab Take 1 Tab by mouth Every morning on an empty stomach. 90 Tab 3   • losartan (COZAAR) 100 MG Tab Take 0.5 Tabs by mouth every day. Hold for systolic blood pressure <100 100 Tab 3   • omeprazole (PRILOSEC) 40 MG delayed-release capsule Take 1 Cap by mouth every day. 90 Cap 1   • hydrOXYzine HCl (ATARAX) 50 MG Tab TAKE 1 TAB BY MOUTH 3 TIMES A DAY AS NEEDED. ITCHING 90 Tab 2   • propafenone (RYTHMOL) 150 MG Tab TAKE 1 TABLET BY MOUTH TWICE A  Tab 3   • simvastatin (ZOCOR) 20 MG Tab TAKE 1 TABLET BY MOUTH IN THE EVENING 100  Tab 3   • metoprolol (LOPRESSOR) 25 MG Tab TAKE 1 TAB BY MOUTH TWO TIMES A DAY. 200 Tab 3   • fenofibrate micronized (LOFIBRA) 134 MG capsule Take 1 Cap by mouth every day. 100 Cap 3   • Mometasone Furo-Formoterol Fum (DULERA) 200-5 MCG/ACT Aerosol Inhale 1 Puff by mouth 2 Times a Day. Dispense samples 1 Inhaler 2   • albuterol 108 (90 Base) MCG/ACT Aero Soln inhalation aerosol INHALE 2 PUFFS BY MOUTH EVERY 6 HOURS AS NEEDED FOR SHORTNESS OF BREATH 8.5 Inhaler 11   • fluticasone (FLONASE) 50 MCG/ACT nasal spray SPRAY 1 SPRAY IN NOSE 2 TIMES A DAY. EACH NOSTRIL 3 Bottle 0   • FIBER SELECT GUMMIES PO Take 1 Tab by mouth 2 times a day as needed (for constipation).     • clobetasol (TEMOVATE) 0.05 % Cream Apply 1 Each to affected area(s) 2 times a day as needed.  5     No current facility-administered medications on file prior to visit.         PSHx:   Past Surgical History:   Procedure Laterality Date   • PB EXCIS/UNROOF RENAL CYST  1977   • OTHER  1977    cysts removed from left kidney   • HYSTERECTOMY RADICAL  1963   • OTHER  1963    hemorrhoidectomy   • HEMORRHOIDECTOMY  1963   • HYSTERECTOMY, TOTAL ABDOMINAL  26 years old       Family history   Family History   Problem Relation Age of Onset   • Stroke Brother    • Heart Disease Other          Medications: reviewed on epic.   Outpatient Medications Marked as Taking for the 6/9/20 encounter (Office Visit) with Doc Brady M.D.   Medication Sig Dispense Refill   • rivaroxaban (XARELTO) 15 MG Tab tablet Take 1 Tab by mouth with dinner. 30 Tab 0   • ALPRAZolam (XANAX) 0.25 MG Tab Take 1 Tab by mouth 2 times a day as needed for Sleep or Anxiety for up to 90 days. TAKE ONE TABLET BY MOUTH TWICE A  Tab 0   • levothyroxine (SYNTHROID) 100 MCG Tab Take 1 Tab by mouth Every morning on an empty stomach. 90 Tab 3   • losartan (COZAAR) 100 MG Tab Take 0.5 Tabs by mouth every day. Hold for systolic blood pressure <100 100 Tab 3   • omeprazole (PRILOSEC) 40 MG  delayed-release capsule Take 1 Cap by mouth every day. 90 Cap 1   • hydrOXYzine HCl (ATARAX) 50 MG Tab TAKE 1 TAB BY MOUTH 3 TIMES A DAY AS NEEDED. ITCHING 90 Tab 2   • propafenone (RYTHMOL) 150 MG Tab TAKE 1 TABLET BY MOUTH TWICE A  Tab 3   • simvastatin (ZOCOR) 20 MG Tab TAKE 1 TABLET BY MOUTH IN THE EVENING 100 Tab 3   • metoprolol (LOPRESSOR) 25 MG Tab TAKE 1 TAB BY MOUTH TWO TIMES A DAY. 200 Tab 3   • fenofibrate micronized (LOFIBRA) 134 MG capsule Take 1 Cap by mouth every day. 100 Cap 3   • Mometasone Furo-Formoterol Fum (DULERA) 200-5 MCG/ACT Aerosol Inhale 1 Puff by mouth 2 Times a Day. Dispense samples 1 Inhaler 2   • albuterol 108 (90 Base) MCG/ACT Aero Soln inhalation aerosol INHALE 2 PUFFS BY MOUTH EVERY 6 HOURS AS NEEDED FOR SHORTNESS OF BREATH 8.5 Inhaler 11   • fluticasone (FLONASE) 50 MCG/ACT nasal spray SPRAY 1 SPRAY IN NOSE 2 TIMES A DAY. EACH NOSTRIL 3 Bottle 0   • FIBER SELECT GUMMIES PO Take 1 Tab by mouth 2 times a day as needed (for constipation).          Allergies:   Allergies   Allergen Reactions   • Asa [Aspirin]    • Latex    • Penicillins    • Tape Hives, Itching and Swelling   • Wasp Venom Swelling       Social Hx:   Social History     Socioeconomic History   • Marital status:      Spouse name: Not on file   • Number of children: 2   • Years of education: Not on file   • Highest education level: Not on file   Occupational History   • Not on file   Social Needs   • Financial resource strain: Not on file   • Food insecurity     Worry: Not on file     Inability: Not on file   • Transportation needs     Medical: Not on file     Non-medical: Not on file   Tobacco Use   • Smoking status: Former Smoker     Packs/day: 0.25     Years: 30.00     Pack years: 7.50     Types: Cigarettes     Last attempt to quit:      Years since quittin.4   • Smokeless tobacco: Never Used   • Tobacco comment: quit  ppd x ?yrs (on and off for years since age 15)   Substance and Sexual  "Activity   • Alcohol use: Yes     Alcohol/week: 0.0 oz     Comment: wine, 1 glass every other night   • Drug use: No   • Sexual activity: Never   Lifestyle   • Physical activity     Days per week: Not on file     Minutes per session: Not on file   • Stress: Not on file   Relationships   • Social connections     Talks on phone: Not on file     Gets together: Not on file     Attends Confucianism service: Not on file     Active member of club or organization: Not on file     Attends meetings of clubs or organizations: Not on file     Relationship status: Not on file   • Intimate partner violence     Fear of current or ex partner: Not on file     Emotionally abused: Not on file     Physically abused: Not on file     Forced sexual activity: Not on file   Other Topics Concern   •  Service No   • Blood Transfusions No   • Caffeine Concern Yes   • Occupational Exposure No   • Hobby Hazards No   • Sleep Concern No   • Stress Concern Yes   • Weight Concern No   • Special Diet No   • Back Care No   • Exercise Yes   • Bike Helmet No   • Seat Belt Yes   • Self-Exams No   Social History Narrative   • Not on file         EXAMINATION     Physical Exam:   Vitals: /64 (BP Location: Right arm, Patient Position: Sitting, BP Cuff Size: Adult)   Pulse 60   Temp 36.9 °C (98.4 °F) (Temporal)   Ht 1.803 m (5' 11\")   Wt 64.9 kg (143 lb 1.3 oz)   SpO2 93%     Constitutional:   Body Habitus: Body mass index is 19.96 kg/m².  Cooperation: Fully cooperates with exam  Appearance: Well-groomed, well-nourished, not disheveled     Eyes: No scleral icterus to suggest severe liver disease, no proptosis to suggest severe hyperthyroid    ENT -no obvious auditory deficits, no obvious tongue lesions, tongue midline, no facial droop     Skin -no rashes or lesions noted     Respiratory-  breathing comfortable on room air, no audible wheezing    Cardiovascular- capillary refills less than 2 seconds. No lower extremity edema is noted. "     Gastrointestinal - no obvious abdominal masses, No tenderness to palpation in the abdomen    Psychiatric- alert and oriented ×3. Normal affect.     Gait - variable stride length, poor balance.  Unable to toe walk or heel walk.    Musculoskeletal and Neuro -       Thoracic/Lumbar Spine/Sacral Spine/Hips   Inspection: No evidence of atrophy in bilateral lower extremities throughout     ROM: decreased active range of motion with flexion, lateral flexion, and rotation bilaterally.   There is decreased active range of motion with lumbar extension with pain.    There is pain with facet loading maneuver (extension rotation) with axial low back pain on the BILATERAL side(s)    Palpation:   No tenderness to palpation in midline at T1-T12 levels. No tenderness to palpation in the left and right of the midline T1-L5, POSITIVE for tenderness to palpation to the para-midline region in the lower lumbar levels.  palpation over SI joint: positive bilaterally    palpation over superior cluneal nerve: positive bilaterally    palpation in hip or over the greater trochanters: negative bilaterally      Lumbar spine Special tests  Neuro tension  Straight leg test positive bilaterally    Slump test positive bilaterally      HIP  FAIR test positive bilaterally    Range of motion in the hips is within normal limits in flexion, extension, abduction, internal rotation, external rotation.    SI joint tests  Observation patient sits on one buttocks: Negative  SI joint compression negative bilaterally    SI joint distraction negative bilaterally    Thigh thrust test negative bilaterally    JAVIER test negative bilaterally                 Key points for the international standards for neurological classification of spinal cord injury (ISNCSCI) to light touch.     Dermatome R L                                      L2 2 2   L3 2 2   L4 2 2   L5 2 2   S1 2 2   S2 2 2       Motor Exam Lower Extremities    ? Myotome R L   Hip flexion L2 5 5   Knee  extension L3 5 5   Ankle dorsiflexion L4 5 5   Toe extension L5 5 5   Ankle plantarflexion S1 5 5         Reflexes  ?  R L                Patella  2+ 2+   Achilles   2+ 2+       Babinski sign negative bilaterally   Clonus of the ankle negative bilaterally       MEDICAL DECISION MAKING    Medical records review: see under HPI section.     DATA    Labs:   Lab Results   Component Value Date/Time    SODIUM 142 05/22/2020 02:32 PM    POTASSIUM 4.5 05/22/2020 02:32 PM    CHLORIDE 105 05/22/2020 02:32 PM    CO2 26 05/22/2020 02:32 PM    ANION 11.0 05/22/2020 02:32 PM    GLUCOSE 87 05/22/2020 02:32 PM    BUN 24 (H) 05/22/2020 02:32 PM    CREATININE 1.11 05/22/2020 02:32 PM    CREATININE 0.76 03/21/2011 12:00 AM    CALCIUM 10.3 05/22/2020 02:32 PM    ASTSGOT 13 05/22/2020 02:32 PM    ALTSGPT 9 05/22/2020 02:32 PM    TBILIRUBIN 0.5 05/22/2020 02:32 PM    ALBUMIN 4.5 05/22/2020 02:32 PM    TOTPROTEIN 6.9 05/22/2020 02:32 PM    GLOBULIN 2.4 05/22/2020 02:32 PM    AGRATIO 1.9 05/22/2020 02:32 PM   ]    Lab Results   Component Value Date/Time    PROTHROMBTM 13.3 09/15/2019 10:25 AM    INR 1.00 09/15/2019 10:25 AM        Lab Results   Component Value Date/Time    WBC 4.8 05/22/2020 02:32 PM    WBC 4.9 03/21/2011 12:00 AM    RBC 4.39 05/22/2020 02:32 PM    RBC 4.68 03/21/2011 12:00 AM    HEMOGLOBIN 13.9 05/22/2020 02:32 PM    HEMATOCRIT 42.9 05/22/2020 02:32 PM    MCV 97.7 05/22/2020 02:32 PM    MCV 96 03/21/2011 12:00 AM    MCH 31.7 05/22/2020 02:32 PM    MCH 32.5 03/21/2011 12:00 AM    MCHC 32.4 (L) 05/22/2020 02:32 PM    MPV 10.9 05/22/2020 02:32 PM    NEUTSPOLYS 72.30 (H) 05/22/2020 02:32 PM    LYMPHOCYTES 18.10 (L) 05/22/2020 02:32 PM    MONOCYTES 6.90 05/22/2020 02:32 PM    EOSINOPHILS 1.90 05/22/2020 02:32 PM    BASOPHILS 0.60 05/22/2020 02:32 PM        Lab Results   Component Value Date/Time    HBA1C 5.7 10/09/2012 10:42 AM        Imaging:   I personally reviewed following images, these are my reads  MRI lumbar spine  6/28/2014  Degenerative disc disease worst at the L4-5 level.  Beginning stages of autofusion.  Disc bulge at L3-4 with mild to moderate central canal stenosis.  Mild neuroforaminal stenosis bilaterally at L4-5.  Facet arthropathy bilaterally L3-4, L4-5, L5-S1.    IMAGING radiology reads. I reviewed the following radiology reads             Results for orders placed during the hospital encounter of 06/26/14   MR-BRAIN-W/O    Impression 1.  Age-related cortical atrophy.                          Results for orders placed during the hospital encounter of 06/26/14   MR-CERVICAL SPINE-W/O    Impression 1.  Minimal cervical spondylotic change at the C5-6 level with moderate bilateral neural foraminal narrowing right greater than left.    2.  Mild left-sided neural foraminal narrowing at the C4-5 level.           Results for orders placed during the hospital encounter of 06/26/14   MR-LUMBAR SPINE-W/O    Impression 1.  Moderate to severe discal and endplate degenerative changes at the L3-4 and L4-5 levels.    2.  Moderate central canal stenosis at the L3-4 level secondary to facet arthropathy.    3.  Mild lumbar spondylotic changes at the L3-4 and L4-5 levels.    4.  Mild broad-based disc protrusion at the L3-4 level causing mild effacement of the ventral surface of thecal sac. Further there is mild left paracentral disc protrusion at the L4-5 level also causing mild effacement of left ventral surface of the thecal sac.    5.  Mild multilevel neural foraminal narrowing.                                        Results for orders placed during the hospital encounter of 11/19/09   MR-PELVIS-WITH & W/O AND SEQUENCES    Impression IMPRESSION:     NO ABNORMALITIES SEEN.    MPW:caf      Preliminary report faxed to [phone] on 11/19/2009 at 4:45 p.m., caf    Read By JOSIE MURILLO MD on Nov 19 2009  4:26PM  : MARIELENA Transcription Date: Nov 19 2009  4:43PM  THIS DOCUMENT HAS BEEN ELECTRONICALLY SIGNED BY: JOSIE MURILLO  MD on Nov 20 2009  8:07AM                                                    Results for orders placed during the hospital encounter of 10/09/19   DX-CHEST-2 VIEWS    Impression No active disease.                                          Results for orders placed during the hospital encounter of 08/18/15   DX-KNEE COMPLETE 4+ LEFT    Impression No evidence of acute fracture or dislocation.        Results for orders placed during the hospital encounter of 09/15/19   DX-LUMBAR SPINE-2 OR 3 VIEWS    Impression 1.  No compression deformity or acute fracture is identified.    2.  Mild anterolisthesis at L3-L4.    3.  Degenerative disc disease and facet arthropathy is most prominent in the lower lumbar spine.        Results for orders placed during the hospital encounter of 09/15/19   DX-PELVIS-1 OR 2 VIEWS    Impression No pelvic fracture.                          Results for orders placed during the hospital encounter of 09/15/19   DX-THORACIC SPINE-2 VIEWS    Impression 1.  No compression deformity or acute fracture.    2.  Degenerative disc disease is most prominent in the mid thoracic spine area.                 Diagnosis   Visit Diagnoses     ICD-10-CM   1. Risk for falls  Z91.81   2. Lumbar degenerative disc disease  M51.36   3. Spinal stenosis of lumbar region with neurogenic claudication  M48.062   4. Lumbar radiculopathy  M54.16   5. Lumbar spondylosis  M47.816   6. Chronic bilateral low back pain with bilateral sciatica  M54.42    M54.41    G89.29   7. Myalgia  M79.10   8. Hip pain  M25.559           ASSESSMENT AND PLAN:  Eun Erazo Haile 84 y.o. female      Eun was seen today for new patient.    Diagnoses and all orders for this visit:    Risk for falls  -     REFERRAL TO PHYSICAL THERAPY Reason for Therapy: Eval/Treat/Report  -     MR-LUMBAR SPINE-W/O; Future    Lumbar degenerative disc disease  -     REFERRAL TO PHYSICAL THERAPY Reason for Therapy: Eval/Treat/Report  -     MR-LUMBAR SPINE-W/O;  Future    Spinal stenosis of lumbar region with neurogenic claudication  -     REFERRAL TO PHYSICAL THERAPY Reason for Therapy: Eval/Treat/Report  -     MR-LUMBAR SPINE-W/O; Future    Lumbar radiculopathy  -     REFERRAL TO PHYSICAL THERAPY Reason for Therapy: Eval/Treat/Report  -     MR-LUMBAR SPINE-W/O; Future    Lumbar spondylosis  -     REFERRAL TO PHYSICAL THERAPY Reason for Therapy: Eval/Treat/Report  -     MR-LUMBAR SPINE-W/O; Future    Chronic bilateral low back pain with bilateral sciatica  -     REFERRAL TO PHYSICAL THERAPY Reason for Therapy: Eval/Treat/Report  -     MR-LUMBAR SPINE-W/O; Future    Myalgia  -     REFERRAL TO PHYSIATRY (PMR)    Hip pain  -     DX-HIP-BILATERAL-WITH PELVIS-3/4 VIEWS; Future        I believe that her pain is likely secondary to a combination of her lumbar radiculopathy, spinal stenosis and her lumbar spondylosis.  An MRI would be helpful in this determination.  She has functional deficits and is a significant fall risk.    PLAN  Physical therapy: I ordered physical therapy to focus on strengthening and stretching.  We also discussed having physical therapy work with the patient for fall prevention strategies, gait training and how to use her assistive devices including a walker.  I advised the patient to use a walker at all times.    home exercise program: I provided the patient with a strengthening and stretching with a home exercise program     Diagnostic workup: as above    Interventional program: To be considered after the diagnostic studies    Follow-up: After the above diagnostic studies           Please note that this dictation was created using voice recognition software. I have made every reasonable attempt to correct obvious errors but there may be errors of grammar and content that I may have overlooked prior to finalization of this note.      Doc Brady MD  Physical Medicine and Rehabilitation  Interventional Spine and Sports Physiatry  [facility]            Libertad Greenwood M.D.

## 2020-06-09 NOTE — Clinical Note
Dear Libertad Estrella M.D. ,     Thank you for the referral of Eun Be.  Please see my note for more details       Should you have any questions or concerns please do not hesitate to contact me.    Doc Brady M.D.

## 2020-06-17 ENCOUNTER — HOSPITAL ENCOUNTER (OUTPATIENT)
Dept: RADIOLOGY | Facility: MEDICAL CENTER | Age: 85
End: 2020-06-17
Attending: PHYSICAL MEDICINE & REHABILITATION
Payer: MEDICARE

## 2020-06-17 DIAGNOSIS — G89.29 CHRONIC BILATERAL LOW BACK PAIN WITH BILATERAL SCIATICA: ICD-10-CM

## 2020-06-17 DIAGNOSIS — M54.41 CHRONIC BILATERAL LOW BACK PAIN WITH BILATERAL SCIATICA: ICD-10-CM

## 2020-06-17 DIAGNOSIS — M48.062 SPINAL STENOSIS OF LUMBAR REGION WITH NEUROGENIC CLAUDICATION: ICD-10-CM

## 2020-06-17 DIAGNOSIS — M54.42 CHRONIC BILATERAL LOW BACK PAIN WITH BILATERAL SCIATICA: ICD-10-CM

## 2020-06-17 DIAGNOSIS — M54.16 LUMBAR RADICULOPATHY: ICD-10-CM

## 2020-06-17 DIAGNOSIS — M51.36 LUMBAR DEGENERATIVE DISC DISEASE: ICD-10-CM

## 2020-06-17 DIAGNOSIS — M47.816 LUMBAR SPONDYLOSIS: ICD-10-CM

## 2020-06-17 DIAGNOSIS — Z91.81 RISK FOR FALLS: ICD-10-CM

## 2020-06-17 PROCEDURE — 72148 MRI LUMBAR SPINE W/O DYE: CPT

## 2020-06-24 ENCOUNTER — PHYSICAL THERAPY (OUTPATIENT)
Dept: PHYSICAL THERAPY | Facility: REHABILITATION | Age: 85
End: 2020-06-24
Attending: PHYSICAL MEDICINE & REHABILITATION
Payer: MEDICARE

## 2020-06-24 DIAGNOSIS — M47.816 LUMBAR SPONDYLOSIS: ICD-10-CM

## 2020-06-24 DIAGNOSIS — M51.36 LUMBAR DEGENERATIVE DISC DISEASE: ICD-10-CM

## 2020-06-24 DIAGNOSIS — Z91.81 RISK FOR FALLS: ICD-10-CM

## 2020-06-24 DIAGNOSIS — M48.062 SPINAL STENOSIS OF LUMBAR REGION WITH NEUROGENIC CLAUDICATION: ICD-10-CM

## 2020-06-24 DIAGNOSIS — M54.16 LUMBAR RADICULOPATHY: ICD-10-CM

## 2020-06-24 DIAGNOSIS — M54.42 CHRONIC BILATERAL LOW BACK PAIN WITH BILATERAL SCIATICA: ICD-10-CM

## 2020-06-24 DIAGNOSIS — G89.29 CHRONIC BILATERAL LOW BACK PAIN WITH BILATERAL SCIATICA: ICD-10-CM

## 2020-06-24 DIAGNOSIS — M54.41 CHRONIC BILATERAL LOW BACK PAIN WITH BILATERAL SCIATICA: ICD-10-CM

## 2020-06-24 PROCEDURE — 97161 PT EVAL LOW COMPLEX 20 MIN: CPT

## 2020-06-25 ASSESSMENT — BALANCE ASSESSMENTS
BALANCE - SITTING STATIC: NORMAL
BALANCE - SITTING DYNAMIC: NORMAL
BALANCE - STANDING DYNAMIC: TRACE
BALANCE - STANDING STATIC: POOR -

## 2020-06-25 NOTE — OP THERAPY EVALUATION
Outpatient Physical Therapy  INITIAL EVALUATION    [facility] [place]  [address]  [facility] [address]  Phone:  [phone]  Fax:  [phone]    Date of Evaluation: 06/24/2020    Patient: Eun Be  YOB: 1935  MRN: [id number]     Referring Provider: Doc Brady M.D.  [address] [facility]  [address]  [place] [address]   Referring Diagnosis Risk for falls [Z91.81];Lumbar degenerative disc disease [M51.36];Spinal stenosis of lumbar region with neurogenic claudication [M48.062];Lumbar radiculopathy [M54.16];Lumbar spondylosis [M47.816];Chronic bilateral low back pain with bilateral sciatica [M54.42, M54.41, G89.29]     Time Calculation    Start time: 1430  Stop time: 1530 Time Calculation (min): 60 minutes             Chief Complaint: Back Problem    Visit Diagnoses     ICD-10-CM   1. Lumbar degenerative disc disease  M51.36   2. Risk for falls  Z91.81   3. Spinal stenosis of lumbar region with neurogenic claudication  M48.062   4. Lumbar radiculopathy  M54.16   5. Lumbar spondylosis  M47.816   6. Chronic bilateral low back pain with bilateral sciatica  M54.42    M54.41    G89.29         Subjective   History of Present Illness:     History of chief complaint:  Eun Be is a 84 y.o. female that presents to therapy with chronic back pain, leg weakness and a history of falling. Her last fall was last September as she was having heart issues. She has a rolling walker at home that she admits that she does not use. She reports ambulating around the home with use of her hands on furniture.     Aggravating factors: chronic pain, hurts all the time. Walking increases pain. Notes that the legs feel weak.   Relieving factors: heat, recliner,     ADL limitations: limited walking ability < quarter mile.         Past Medical History:   Diagnosis Date   • Anxiety    • Arrhythmia    • Arthritis    • Backpain    • Bronchitis    • CATARACT    • Chronic lower  back pain 3/11/2014   • Chronic pain    • COPD    • Depression    • EMPHYSEMA    • GERD (gastroesophageal reflux disease)    • Heart burn     hiatal hernia   • HTN (hypertension)    • Hypertension    • Hypertriglyceridemia 11/7/2013   • Hypothyroid    • IGT (impaired glucose tolerance)    • MEDICAL HOME    • Osteopenia    • Pain     left side of back   • Panic disorder    • Paroxysmal atrial fibrillation (HCC)    • Personal history of venous thrombosis and embolism     right leg   • Pneumonia     1974   • Renal disorder     cysts   • Rheumatic fever     4 or 5 years old   • Rheumatic fever     as a kid per patient.   • Thyroid disease    • Unspecified disorder of thyroid      Past Surgical History:   Procedure Laterality Date   • PB EXCIS/UNROOF RENAL CYST  1977   • OTHER  1977    cysts removed from left kidney   • HYSTERECTOMY RADICAL  1963   • OTHER  1963    hemorrhoidectomy   • HEMORRHOIDECTOMY  1963   • HYSTERECTOMY, TOTAL ABDOMINAL  26 years old       Precautions:       Objective   Range of motion and strength:    Back ROM WFL but ability to move without having UE support limited. LE strength is grossly 3+/5 overall with notable increased weakness with Left LE dorsiflexion of 2+/5    Sensation and reflexes:     reflexes not tested. Sensation reported as normal in LE.     Palpation and joint mobility:     Tenderness along the entire lower spine to light palpation in sitting. Unable to tolerate sidelying or prone positioning.     Balance:     Sitting (static): Normal    Sitting (dynamic): Normal    Standing (static): Poor -    Standing (dynamic): Trace    TUCKER 10/56    Gait:      Unable to assess gait pattern as pt refused to use walker in clinic. Unsafe to ambulate without an AD.     Coordination and tone:     Initiation tremors among LE noted in open chain movement.     Basic self care and IADL's:     Pt receives care from daughter or son in law regularly for meals, house cleaning etc. Self cleaning pt is  independent with use of shower chair.            Exercises/Treatment   No treatment provided as time did not permit for it. Despite COPD and mask use pt was very talkative and experienced difficulty keeping her on task.        Assessment, Response and Plan:   Assessment details:  Eun Be is a 84 y.o. female with signs and symptoms consistent with chronic back pain along with severe balance and strength limitations. She has been previously non-compliant with recommended walker use and she as been encouraged to bring her walker with her to upcoming appts. She requires skilled physical therapy intervention to decrease fall risk, decrease pain, increase functional mobility, improve ADL completion and establish a home exercise program.  Goals:   Short Term Goals:   1. Patient will be Independent with prescribed Home Exercise Program (HEP) and will be able to demonstrate exercises without cues for improved overall symptoms/activity tolerance.   2. Pt will demonstrate appropriate walker use without verbal cues for ambulation and transfers.   Short term goal time span:  2-4 weeks      Long Term Goals:    3. Pt will improve ablity to stand still without needing anthying to hold onto for > 1min.   4. Pt will improve TUCKER score to greater than 25/56 indicative of improved function and decreased fall risk.   Long term goal time span:  4-6 weeks    Plan:   Planned therapy interventions:  Therapeutic Exercise (CPT 48319), Therapeutic Activities (CPT 79982), Manual Therapy (CPT 28360), Neuromuscular Re-education (CPT 27708), E Stim Unattended (CPT 21752) and Gait Training (CPT 42671)  Frequency:  2x week  Duration in weeks:  6  Discussed with:  Patient    Functional Assessment Used  PT Functional Assessment Tool Used: TUCKER  PT Functional Assessment Score: 10/56     Referring provider co-signature:  I have reviewed this plan of care and my co-signature certifies the need for services.    Certification Period: 06/24/2020  to  08/06/20    Physician Signature: ________________________________ Date: ______________

## 2020-06-26 ENCOUNTER — PHYSICAL THERAPY (OUTPATIENT)
Dept: PHYSICAL THERAPY | Facility: REHABILITATION | Age: 85
End: 2020-06-26
Attending: PHYSICAL MEDICINE & REHABILITATION
Payer: MEDICARE

## 2020-06-26 DIAGNOSIS — M51.36 LUMBAR DEGENERATIVE DISC DISEASE: ICD-10-CM

## 2020-06-26 DIAGNOSIS — Z91.81 RISK FOR FALLS: ICD-10-CM

## 2020-06-26 PROCEDURE — 97110 THERAPEUTIC EXERCISES: CPT

## 2020-06-26 NOTE — OP THERAPY DAILY TREATMENT
Outpatient Physical Therapy  DAILY TREATMENT     [facility] [place]  [address], [address]  [facility] [address]  Phone:  [phone]  Fax:  [phone]    Date: 06/26/2020    Patient: Eun Be  YOB: 1935  MRN: [id number]     Time Calculation    Start time: 0205  Stop time: 0230 Time Calculation (min): 25 minutes     Chief Complaint: back pain/ balance    Visit #: 2    SUBJECTIVE:  Pt reports that she fell a few days ago when getting up from her chair. Notes that she does not want to use her walker. States that she doesn't need it.     OBJECTIVE:  Current objective measures: no observable bruising, objectively appears same as last visit/eval/           Therapeutic Exercises (CPT 03794):     1. Nustep, lvl 1 x 5min, SA02 remained over 90%    2. Standing weight shifts , x3min with bar in hands for support    3. Standing leg kicks, with arm support x 40    4. Ambulation with walker, 3min, with cues for placement /stepping/ speed      Time-based treatments/modalities:    Physical Therapy Timed Treatment Charges  Therapeutic exercise minutes (CPT 92806): 25 minutes      Pain rating (1-10) before treatment:  6  Pain rating (1-10) after treatment:  6    ASSESSMENT:   Response to treatment: Symptoms of pain were no brought on during treatment/ during treatment pt did not feel like she needed to stop due to pain. Non-compliant with recommendations for walker.      PLAN/RECOMMENDATIONS:   Plan for treatment: therapy treatment to continue next visit.  Planned interventions for next visit: continue with current treatment.

## 2020-06-30 ENCOUNTER — PATIENT MESSAGE (OUTPATIENT)
Dept: MEDICAL GROUP | Facility: PHYSICIAN GROUP | Age: 85
End: 2020-06-30

## 2020-06-30 DIAGNOSIS — J43.8 OTHER EMPHYSEMA (HCC): ICD-10-CM

## 2020-07-01 RX ORDER — MOMETASONE FUROATE AND FORMOTEROL FUMARATE DIHYDRATE 200; 5 UG/1; UG/1
1 AEROSOL RESPIRATORY (INHALATION) 2 TIMES DAILY
Qty: 1 INHALER | Refills: 2 | Status: SHIPPED | OUTPATIENT
Start: 2020-07-01 | End: 2020-09-01 | Stop reason: SDUPTHER

## 2020-07-08 ENCOUNTER — PHYSICAL THERAPY (OUTPATIENT)
Dept: PHYSICAL THERAPY | Facility: REHABILITATION | Age: 85
End: 2020-07-08
Attending: PHYSICAL MEDICINE & REHABILITATION
Payer: MEDICARE

## 2020-07-08 DIAGNOSIS — Z91.81 RISK FOR FALLS: ICD-10-CM

## 2020-07-08 DIAGNOSIS — M51.36 LUMBAR DEGENERATIVE DISC DISEASE: ICD-10-CM

## 2020-07-08 PROCEDURE — 97110 THERAPEUTIC EXERCISES: CPT

## 2020-07-08 NOTE — OP THERAPY DAILY TREATMENT
Outpatient Physical Therapy  DAILY TREATMENT     [facility] [place]  [address]  [facility] [address]  Phone:  [phone]  Fax:  [phone]    Date: 07/08/2020    Patient: Eun Be  YOB: 1935  MRN: [id number]     Time Calculation    Start time: 0200  Stop time: 0230 Time Calculation (min): 30 minutes     Chief Complaint: back pain/ balance    Visit #: 3    SUBJECTIVE:  Reports that she has not fallen since seen last. Comes to therapy without an assistive device. States that the cane/walker is next to her chair at home.     OBJECTIVE:  Current objective measures: no observable bruising, objectively appears same as last visit/eval/           Therapeutic Exercises (CPT 84804):     1. Nustep, lvl 1 x 5min, SA02 remained over 90%    2. Standing weight shifts , x3min with bar in hands for support    3. Sit to stands with walker, x 15    4. Ambulation with walker, 3min, with cues for placement /stepping/ speed    5. Practice with turns in walker , x 2min      Time-based treatments/modalities:    Physical Therapy Timed Treatment Charges  Therapeutic exercise minutes (CPT 29329): 30 minutes      Pain rating (1-10) before treatment:  NA  Pain rating (1-10) after treatment:  NA    ASSESSMENT:   Response to treatment: no complaints of pain reported prior/during or after treatment. Non-compliance with AD use overall. Non-compliant with recommendations for walker. Use of AD has again been recommended to decrease the risk of falls.       PLAN/RECOMMENDATIONS:   Plan for treatment: therapy treatment to continue next visit.  Planned interventions for next visit: continue with current treatment.

## 2020-07-09 ENCOUNTER — OFFICE VISIT (OUTPATIENT)
Dept: PHYSICAL MEDICINE AND REHAB | Facility: MEDICAL CENTER | Age: 85
End: 2020-07-09
Payer: MEDICARE

## 2020-07-09 VITALS
SYSTOLIC BLOOD PRESSURE: 128 MMHG | HEIGHT: 71 IN | OXYGEN SATURATION: 93 % | HEART RATE: 60 BPM | TEMPERATURE: 98.2 F | WEIGHT: 143 LBS | DIASTOLIC BLOOD PRESSURE: 58 MMHG | BODY MASS INDEX: 20.02 KG/M2

## 2020-07-09 DIAGNOSIS — M43.16 SPONDYLOLISTHESIS OF LUMBAR REGION: ICD-10-CM

## 2020-07-09 DIAGNOSIS — M48.062 SPINAL STENOSIS OF LUMBAR REGION WITH NEUROGENIC CLAUDICATION: ICD-10-CM

## 2020-07-09 DIAGNOSIS — M51.36 LUMBAR DEGENERATIVE DISC DISEASE: ICD-10-CM

## 2020-07-09 DIAGNOSIS — M54.16 LUMBAR RADICULOPATHY: ICD-10-CM

## 2020-07-09 DIAGNOSIS — M47.816 LUMBAR SPONDYLOSIS: ICD-10-CM

## 2020-07-09 PROCEDURE — 99214 OFFICE O/P EST MOD 30 MIN: CPT | Performed by: PHYSICAL MEDICINE & REHABILITATION

## 2020-07-09 ASSESSMENT — PAIN SCALES - GENERAL: PAINLEVEL: 7=MODERATE-SEVERE PAIN

## 2020-07-09 ASSESSMENT — FIBROSIS 4 INDEX: FIB4 SCORE: 1.65

## 2020-07-09 NOTE — PROGRESS NOTES
Follow up patient note  Interventional spine and sports physiatry, Physical medicine rehabilitation      Chief complaint:   Chief Complaint   Patient presents with   • Follow-Up     Back pain          HISTORY    Please see new patient note by Dr Brady,  for more details.     HPI  Patient identification: Eun Be 84 y.o. female  With Diagnoses of Lumbar radiculopathy, Spinal stenosis of lumbar region with neurogenic claudication, Lumbar spondylosis, Lumbar degenerative disc disease, and Spondylolisthesis of lumbar region were pertinent to this visit.       Chronic severe low back pain radiating down the anterior and posterior aspect of the bilateral legs worse with lumbar extension and worse with walking. She is able to walk about 50 feet without stopping.  Pain improves with sitting and with lumbar flexion.        ROS Red Flags :   Fever, Chills, Sweats: Denies  Involuntary Weight Loss: Denies  Bowel/Bladder Incontinence: Denies  Saddle Anesthesia: Denies        PMHx:   Past Medical History:   Diagnosis Date   • Anxiety    • Arrhythmia    • Arthritis    • Backpain    • Bronchitis    • CATARACT    • Chronic lower back pain 3/11/2014   • Chronic pain    • COPD    • Depression    • EMPHYSEMA    • GERD (gastroesophageal reflux disease)    • Heart burn     hiatal hernia   • HTN (hypertension)    • Hypertension    • Hypertriglyceridemia 11/7/2013   • Hypothyroid    • IGT (impaired glucose tolerance)    • MEDICAL HOME    • Osteopenia    • Pain     left side of back   • Panic disorder    • Paroxysmal atrial fibrillation (HCC)    • Personal history of venous thrombosis and embolism     right leg   • Pneumonia     1974   • Renal disorder     cysts   • Rheumatic fever     4 or 5 years old   • Rheumatic fever     as a kid per patient.   • Thyroid disease    • Unspecified disorder of thyroid        PSHx:   Past Surgical History:   Procedure Laterality Date   • PB EXCIS/UNROOF RENAL CYST  1977   • OTHER  1977     cysts removed from left kidney   • HYSTERECTOMY RADICAL  1963   • OTHER  1963    hemorrhoidectomy   • HEMORRHOIDECTOMY  1963   • HYSTERECTOMY, TOTAL ABDOMINAL  26 years old       Family history   Family History   Problem Relation Age of Onset   • Stroke Brother    • Heart Disease Other          Medications:   Outpatient Medications Marked as Taking for the 7/9/20 encounter (Office Visit) with Doc Brady M.D.   Medication Sig Dispense Refill   • Mometasone Furo-Formoterol Fum (DULERA) 200-5 MCG/ACT Aerosol Inhale 1 Puff by mouth 2 Times a Day. Dispense samples 1 Inhaler 2   • rivaroxaban (XARELTO) 15 MG Tab tablet Take 1 Tab by mouth with dinner. 30 Tab 0   • ALPRAZolam (XANAX) 0.25 MG Tab Take 1 Tab by mouth 2 times a day as needed for Sleep or Anxiety for up to 90 days. TAKE ONE TABLET BY MOUTH TWICE A  Tab 0   • levothyroxine (SYNTHROID) 100 MCG Tab Take 1 Tab by mouth Every morning on an empty stomach. 90 Tab 3   • losartan (COZAAR) 100 MG Tab Take 0.5 Tabs by mouth every day. Hold for systolic blood pressure <100 100 Tab 3   • omeprazole (PRILOSEC) 40 MG delayed-release capsule Take 1 Cap by mouth every day. 90 Cap 1   • hydrOXYzine HCl (ATARAX) 50 MG Tab TAKE 1 TAB BY MOUTH 3 TIMES A DAY AS NEEDED. ITCHING 90 Tab 2   • propafenone (RYTHMOL) 150 MG Tab TAKE 1 TABLET BY MOUTH TWICE A  Tab 3   • simvastatin (ZOCOR) 20 MG Tab TAKE 1 TABLET BY MOUTH IN THE EVENING 100 Tab 3   • metoprolol (LOPRESSOR) 25 MG Tab TAKE 1 TAB BY MOUTH TWO TIMES A DAY. 200 Tab 3   • fenofibrate micronized (LOFIBRA) 134 MG capsule Take 1 Cap by mouth every day. 100 Cap 3   • albuterol 108 (90 Base) MCG/ACT Aero Soln inhalation aerosol INHALE 2 PUFFS BY MOUTH EVERY 6 HOURS AS NEEDED FOR SHORTNESS OF BREATH 8.5 Inhaler 11   • fluticasone (FLONASE) 50 MCG/ACT nasal spray SPRAY 1 SPRAY IN NOSE 2 TIMES A DAY. EACH NOSTRIL 3 Bottle 0   • FIBER SELECT GUMMIES PO Take 1 Tab by mouth 2 times a day as needed (for constipation).      • clobetasol (TEMOVATE) 0.05 % Cream Apply 1 Each to affected area(s) 2 times a day as needed.  5        Current Outpatient Medications on File Prior to Visit   Medication Sig Dispense Refill   • Mometasone Furo-Formoterol Fum (DULERA) 200-5 MCG/ACT Aerosol Inhale 1 Puff by mouth 2 Times a Day. Dispense samples 1 Inhaler 2   • rivaroxaban (XARELTO) 15 MG Tab tablet Take 1 Tab by mouth with dinner. 30 Tab 0   • ALPRAZolam (XANAX) 0.25 MG Tab Take 1 Tab by mouth 2 times a day as needed for Sleep or Anxiety for up to 90 days. TAKE ONE TABLET BY MOUTH TWICE A  Tab 0   • levothyroxine (SYNTHROID) 100 MCG Tab Take 1 Tab by mouth Every morning on an empty stomach. 90 Tab 3   • losartan (COZAAR) 100 MG Tab Take 0.5 Tabs by mouth every day. Hold for systolic blood pressure <100 100 Tab 3   • omeprazole (PRILOSEC) 40 MG delayed-release capsule Take 1 Cap by mouth every day. 90 Cap 1   • hydrOXYzine HCl (ATARAX) 50 MG Tab TAKE 1 TAB BY MOUTH 3 TIMES A DAY AS NEEDED. ITCHING 90 Tab 2   • propafenone (RYTHMOL) 150 MG Tab TAKE 1 TABLET BY MOUTH TWICE A  Tab 3   • simvastatin (ZOCOR) 20 MG Tab TAKE 1 TABLET BY MOUTH IN THE EVENING 100 Tab 3   • metoprolol (LOPRESSOR) 25 MG Tab TAKE 1 TAB BY MOUTH TWO TIMES A DAY. 200 Tab 3   • fenofibrate micronized (LOFIBRA) 134 MG capsule Take 1 Cap by mouth every day. 100 Cap 3   • albuterol 108 (90 Base) MCG/ACT Aero Soln inhalation aerosol INHALE 2 PUFFS BY MOUTH EVERY 6 HOURS AS NEEDED FOR SHORTNESS OF BREATH 8.5 Inhaler 11   • fluticasone (FLONASE) 50 MCG/ACT nasal spray SPRAY 1 SPRAY IN NOSE 2 TIMES A DAY. EACH NOSTRIL 3 Bottle 0   • FIBER SELECT GUMMIES PO Take 1 Tab by mouth 2 times a day as needed (for constipation).     • clobetasol (TEMOVATE) 0.05 % Cream Apply 1 Each to affected area(s) 2 times a day as needed.  5     No current facility-administered medications on file prior to visit.          Allergies:   Allergies   Allergen Reactions   • Asa [Aspirin]    • Latex     • Penicillins    • Tape Hives, Itching and Swelling   • Wasp Venom Swelling       Social Hx:   Social History     Socioeconomic History   • Marital status:      Spouse name: Not on file   • Number of children: 2   • Years of education: Not on file   • Highest education level: Not on file   Occupational History   • Not on file   Social Needs   • Financial resource strain: Not on file   • Food insecurity     Worry: Not on file     Inability: Not on file   • Transportation needs     Medical: Not on file     Non-medical: Not on file   Tobacco Use   • Smoking status: Former Smoker     Packs/day: 0.25     Years: 30.00     Pack years: 7.50     Types: Cigarettes     Last attempt to quit:      Years since quittin.5   • Smokeless tobacco: Never Used   • Tobacco comment: quit  ppd x ?yrs (on and off for years since age 15)   Substance and Sexual Activity   • Alcohol use: Yes     Alcohol/week: 0.0 oz     Comment: wine, 1 glass every other night   • Drug use: No   • Sexual activity: Never   Lifestyle   • Physical activity     Days per week: Not on file     Minutes per session: Not on file   • Stress: Not on file   Relationships   • Social connections     Talks on phone: Not on file     Gets together: Not on file     Attends Protestant service: Not on file     Active member of club or organization: Not on file     Attends meetings of clubs or organizations: Not on file     Relationship status: Not on file   • Intimate partner violence     Fear of current or ex partner: Not on file     Emotionally abused: Not on file     Physically abused: Not on file     Forced sexual activity: Not on file   Other Topics Concern   •  Service No   • Blood Transfusions No   • Caffeine Concern Yes   • Occupational Exposure No   • Hobby Hazards No   • Sleep Concern No   • Stress Concern Yes   • Weight Concern No   • Special Diet No   • Back Care No   • Exercise Yes   • Bike Helmet No   • Seat Belt Yes   • Self-Exams  "No   Social History Narrative   • Not on file         EXAMINATION     Physical Exam:   Vitals: /58 (BP Location: Left arm, Patient Position: Sitting, BP Cuff Size: Adult)   Pulse 60   Temp 36.8 °C (98.2 °F) (Temporal)   Ht 1.803 m (5' 11\")   Wt 64.9 kg (143 lb)   SpO2 93%     Constitutional:   Body Habitus: Body mass index is 19.94 kg/m².  Cooperation: Fully cooperates with exam  Appearance: Well-groomed no disheveled    Respiratory-  breathing comfortable on room air, no audible wheezing  Cardiovascular- capillary refills less than 2 seconds. No lower extremity edema is noted.   Psychiatric- alert and oriented ×3. Normal affect.    MSK and Neuro: -    decreased active range of motion with flexion, lateral flexion, and rotation bilaterally.   There is decreased active range of motion with lumbar extension with pain.  Pain improves with lumbar flexion    There is pain with lumbar extension.   There is pain with facet loading maneuver (extension rotation) with axial low back pain on the BILATERAL side(s)    Palpation:   No tenderness to palpation in midline at T1-T12 levels. No tenderness to palpation in the left and right of the midline T1-L5, POSITIVE for tenderness to palpation to the para-midline region in the lower lumbar levels.  palpation over SI joint: negative bilaterally    palpation in hip or over the greater trochanters: negative bilaterally      Lumbar spine Special tests  Neuro tension  Straight leg test negative bilaterally    Slump test negative bilaterally      Key points for the international standards for neurological classification of spinal cord injury (ISNCSCI) to light touch.     Dermatome R L                                      L2 2 2   L3 2 2   L4 2 2   L5 2 2   S1 2 2   S2 2 2         Motor Exam Lower Extremities    ? Myotome R L   Hip flexion L2 5 5   Knee extension L3 5 5   Ankle dorsiflexion L4 5 5   Toe extension L5 5 5   Ankle plantarflexion S1 5 5                 MEDICAL " DECISION MAKING    DATA    Labs:   Lab Results   Component Value Date/Time    SODIUM 142 05/22/2020 02:32 PM    POTASSIUM 4.5 05/22/2020 02:32 PM    CHLORIDE 105 05/22/2020 02:32 PM    CO2 26 05/22/2020 02:32 PM    GLUCOSE 87 05/22/2020 02:32 PM    BUN 24 (H) 05/22/2020 02:32 PM    CREATININE 1.11 05/22/2020 02:32 PM    CREATININE 0.76 03/21/2011 12:00 AM    BUNCREATRAT 25 03/21/2011 12:00 AM    GLOMRATE >59 03/21/2011 12:00 AM        Lab Results   Component Value Date/Time    PROTHROMBTM 13.3 09/15/2019 10:25 AM    INR 1.00 09/15/2019 10:25 AM        Lab Results   Component Value Date/Time    WBC 4.8 05/22/2020 02:32 PM    WBC 4.9 03/21/2011 12:00 AM    RBC 4.39 05/22/2020 02:32 PM    RBC 4.68 03/21/2011 12:00 AM    HEMOGLOBIN 13.9 05/22/2020 02:32 PM    HEMATOCRIT 42.9 05/22/2020 02:32 PM    MCV 97.7 05/22/2020 02:32 PM    MCV 96 03/21/2011 12:00 AM    MCH 31.7 05/22/2020 02:32 PM    MCH 32.5 03/21/2011 12:00 AM    MCHC 32.4 (L) 05/22/2020 02:32 PM    MPV 10.9 05/22/2020 02:32 PM    NEUTSPOLYS 72.30 (H) 05/22/2020 02:32 PM    LYMPHOCYTES 18.10 (L) 05/22/2020 02:32 PM    MONOCYTES 6.90 05/22/2020 02:32 PM    EOSINOPHILS 1.90 05/22/2020 02:32 PM    BASOPHILS 0.60 05/22/2020 02:32 PM        Lab Results   Component Value Date/Time    HBA1C 5.7 10/09/2012 10:42 AM          Imaging:   I personally reviewed following images    MRI lumbar spine 6/17/2020  At L3-4 there is moderate to severe central canal stenosis.  No other areas of high-grade central canal stenosis.  There is mild to moderate bilateral neuroforaminal stenosis worst at the L3-4 level.  Facet arthropathy is present bilaterally worst at L3-4 but also present at L4-5 and L5-S1.  Grade 1 spondylolisthesis L3 on L4.  There is mild neuroforaminal stenosis at L4-5 bilaterally.  No acute fractures are seen.      I reviewed the following radiology reports           Results for orders placed during the hospital encounter of 06/26/14   MR-BRAIN-W/O    Impression 1.   Age-related cortical atrophy.                          Results for orders placed during the hospital encounter of 06/26/14   MR-CERVICAL SPINE-W/O    Impression 1.  Minimal cervical spondylotic change at the C5-6 level with moderate bilateral neural foraminal narrowing right greater than left.    2.  Mild left-sided neural foraminal narrowing at the C4-5 level.           Results for orders placed during the hospital encounter of 06/17/20   MR-LUMBAR SPINE-W/O    Impression 1.  L3-4 moderate spinal stenosis    2.  L4-5 mild spinal canal narrowing without spinal stenosis    3.  Multilevel foraminal stenoses describing the findings section    4.  Degenerative subluxations at L1-2 and L3-4    5.  Increase in spinal stenosis at L3-4 since prior MRI 6/28/2014            No follow up imaging is recommended per consensus guidelines of the 2019 ACR Incidental Findings Committee for probably benign incidental simple appearing renal cystic lesion(s) based on imaging criteria.                  Results for orders placed during the hospital encounter of 11/19/09   MR-PELVIS-WITH & W/O AND SEQUENCES    Impression IMPRESSION:     NO ABNORMALITIES SEEN.    MPW:caf      Preliminary report faxed to [phone] on 11/19/2009 at 4:45 p.m., caf    Read By JOSIE MURILLO MD on Nov 19 2009  4:26PM  : [facility] Transcription Date: Nov 19 2009  4:43PM  THIS DOCUMENT HAS BEEN ELECTRONICALLY SIGNED BY: JOSIE MURILLO MD on Nov 20 2009  8:07AM                                                                                              Results for orders placed during the hospital encounter of 03/12/10   CT-ABDOMEN WITH & W/O    Impression IMPRESSION:     1. SIMPLE-APPEARING CYSTS BILATERALLY.  NO EVIDENCE OF ABNORMAL ENHANCING   RENAL MASS.     2. KIDNEYS ALSO DEMONSTRATE AREAS OF BILATERAL CORTICAL SCARRING AND   PROBABLE VASCULAR CALCIFICATIONS.      3. EXTENSIVE ATHEROSCLEROSIS.      JAL/alexys     Read By MARY BURGER MD on Mar 12  2010  1:06PM  : [facility] Transcription Date: Mar 12 2010  4:13PM  THIS DOCUMENT HAS BEEN ELECTRONICALLY SIGNED BY: MARY BURGER MD on   Mar 12 2010  4:45PM             Results for orders placed in visit on 05/20/14   CT-ABDOMEN-PELVIS W/O    Impression No acute abnormality is noted. The distal colon is decompressed which is the most likely explanation for mild wall thickening. Less likely explanation is a mild colitis    Mild distal colonic diverticulosis    Stable kidneys with areas of cortical scarring, bilateral cysts and small calcifications which are parenchymal and/or urolithiasis related. No obstructive uropathy is detected                       Results for orders placed during the hospital encounter of 08/18/15   CT-PELVIS W/O PLUS RECONS    Impression 1. No fracture or dislocation is identified.    2. Degenerative changes in the visualized lumbar spine.    3. Prominent atherosclerotic plaque.                      Results for orders placed during the hospital encounter of 10/09/19   DX-CHEST-2 VIEWS    Impression No active disease.                                          Results for orders placed during the hospital encounter of 08/18/15   DX-KNEE COMPLETE 4+ LEFT    Impression No evidence of acute fracture or dislocation.        Results for orders placed during the hospital encounter of 09/15/19   DX-LUMBAR SPINE-2 OR 3 VIEWS    Impression 1.  No compression deformity or acute fracture is identified.    2.  Mild anterolisthesis at L3-L4.    3.  Degenerative disc disease and facet arthropathy is most prominent in the lower lumbar spine.        Results for orders placed during the hospital encounter of 09/15/19   DX-PELVIS-1 OR 2 VIEWS    Impression No pelvic fracture.                          Results for orders placed during the hospital encounter of 09/15/19   DX-THORACIC SPINE-2 VIEWS    Impression 1.  No compression deformity or acute fracture.    2.  Degenerative disc disease is most  prominent in the mid thoracic spine area.                 DIAGNOSIS   Visit Diagnoses     ICD-10-CM   1. Lumbar radiculopathy  M54.16   2. Spinal stenosis of lumbar region with neurogenic claudication  M48.062   3. Lumbar spondylosis  M47.816   4. Lumbar degenerative disc disease  M51.36   5. Spondylolisthesis of lumbar region  M43.16         ASSESSMENT and PLAN:     Eun Be 84 y.o. female      Eun was seen today for follow-up.    Diagnoses and all orders for this visit:    Lumbar radiculopathy  -     REFERRAL TO PHYSICAL MEDICINE REHAB    Spinal stenosis of lumbar region with neurogenic claudication    Lumbar spondylosis    Lumbar degenerative disc disease    Spondylolisthesis of lumbar region          The patient's pain is likely secondary to a combination of her spinal stenosis lumbar spondylosis and her radiculopathy.    She also has trigger points and we discussed trigger point injections today but the patient declined.    I offered the patient an urgent epidural given the severity of her pain and she declined.    We discussed a surgical referral and the patient declined.    We discussed emergency precautions.    We decided to proceed with her home exercise program from physical therapy.  If there is no significant improvement with cervical treatments then we would proceed with a bilateral L3-4 versus L4-5 transforaminal epidural steroid injection    The risks benefits and alternatives to this procedure were discussed and the patient wishes to proceed with the procedure. Risks include but are not limited to damage to surrounding structures, infection, bleeding, worsening of pain which can be permanent, weakness which can be permanent. Benefits include pain relief, improved function. Alternatives includes not doing the procedure.      Given that the patient is on Xanax and reportedly getting benefit from this I recommended if possible avoiding opioid medications.  Given her Xarelto I recommend  avoiding NSAIDs.  I placed a message to the patient's cardiologist asking if the patient can be off of Xarelto for 2 days prior to a spine injection.    Follow up: 9/30/2020     Thank you for allowing me to participate in the care of this patient. If you have any questions please not hesitate to contact me.             Please note that this dictation was created using voice recognition software. I have made every reasonable attempt to correct obvious errors but there may be errors of grammar and content that I may have overlooked prior to finalization of this note.      Doc Brady MD  Interventional Spine and Sports Physiatry  Physical Medicine and Rehabilitation  [facility]

## 2020-07-09 NOTE — PATIENT INSTRUCTIONS
Your procedure will be at the [facility] special procedure suite.    [facility] [place] [address]       PRE-PROCEDURE INSTRUCTIONS  You may take your regular medications except:   · No Anti-inflammatories 5 days prior to your procedure. Anti-inflammatories include medicines such as  ibuprofen (Motrin, Advil), Excedrin, Naproxen (Aleve, Anaprox, Naprelan, Naprosyn), Celecoxib (Celebrex), Diclofenac (Voltaren-XR tab), and Meloxicam (Mobic).   · No Glucophage or Metformin 24 hours before your procedure. You may resume next day after your procedure.  · Call the physiatry office if you are taking or prescribed anti-biotics within five days of procedure.  · Please ask provider if you are taking any new diabetes medication.  · CONTINUE TAKING BLOOD PRESSURE MEDICATIONS AS PRESCRIBED.  · Pain medications will not be prescribed on the procedure day. Procedural pain medication may be used by your provider   · Call your doctor's office performing the procedure if you have a fever, chills, rash or new illness prior to your procedure    Anticoagulation/antiplatelet medications  No Blood thinning medications such as Coumadin or Plavix 5 days prior to procedure unless your doctor said to continue these medications. Call your doctor if a new medication is prescribed in this class.     Restrictions for eating before procedure:   · If you are getting procedural sedation, then do not eat to for 8 hours prior to procedure appointment time. Do not drink fluids for four hours prior to your procedure time.   · If you are not having procedural sedation, then Skip the meal prior to your procedure. If you have a morning procedure then skip breakfast. If you have an afternoon procedure then skip lunch.   · You may drink clear liquids up to 2 hours prior to your procedure  · You must have a  the day of procedure to accompany you home.      POST PROCEDURE INSTRUCTIONS   · No heavy lifting, strenuous bending or  strenuous exercise for 3 days after your procedure.  · No hot tubs, baths, swimming for 3 days after your procedure  · You can remove the bandage the day after the procedure.  · IF YOU RECEIVED A STEROID INJECTION. PLEASE NOTE THAT THERE MAY BE A DELAY FOR THE INJECTION TO START WORKING, THE DELAY MAY BE UP TO TWO WEEKS. IF YOU HAVE DIABETES, PLEASE NOTE THAT YOUR SUGAR LEVELS MAY BE ELEVATED FOR 1-2 DAYS AFTER A STEROID INJECTION.  THE STEROID MAY CAUSE TEMPORARY SYMPTOMS WHICH USUALLY RESOLVE ON THEIR OWN WITHIN 1 TO 2 DAYS INCLUDING FACIAL FLUSHING OR A FEELING OF WARMTH ON THE FACE, TEMPORARY INCREASES IN BLOOD SUGAR, INSOMNIA, INCREASED HUNGER  · IF YOU EXPERIENCE PROLONGED WEAKNESS LONGER THAN ONE DAY, BOWEL OR BLADDER INCONTINENCE THEN PLEASE CALL THE PHYSIATRY OFFICE.  · Your leg may feel heavy, weak and numb for up to 1-2 days. Be very careful walking.   ·  You may resume normal activities 3 days after procedure.

## 2020-07-10 ENCOUNTER — APPOINTMENT (OUTPATIENT)
Dept: PHYSICAL THERAPY | Facility: REHABILITATION | Age: 85
End: 2020-07-10
Attending: PHYSICAL MEDICINE & REHABILITATION
Payer: MEDICARE

## 2020-07-15 ENCOUNTER — PHYSICAL THERAPY (OUTPATIENT)
Dept: PHYSICAL THERAPY | Facility: REHABILITATION | Age: 85
End: 2020-07-15
Attending: PHYSICAL MEDICINE & REHABILITATION
Payer: MEDICARE

## 2020-07-15 ENCOUNTER — TELEMEDICINE (OUTPATIENT)
Dept: MEDICAL GROUP | Facility: PHYSICIAN GROUP | Age: 85
End: 2020-07-15
Payer: MEDICARE

## 2020-07-15 VITALS
HEIGHT: 71 IN | BODY MASS INDEX: 19.6 KG/M2 | HEART RATE: 69 BPM | WEIGHT: 140 LBS | OXYGEN SATURATION: 93 % | SYSTOLIC BLOOD PRESSURE: 160 MMHG | DIASTOLIC BLOOD PRESSURE: 83 MMHG

## 2020-07-15 DIAGNOSIS — I10 ESSENTIAL HYPERTENSION: ICD-10-CM

## 2020-07-15 DIAGNOSIS — D68.318 CIRCULATING ANTICOAGULANT DISORDER (HCC): ICD-10-CM

## 2020-07-15 DIAGNOSIS — F13.20 SEDATIVE, HYPNOTIC OR ANXIOLYTIC DEPENDENCE (HCC): ICD-10-CM

## 2020-07-15 DIAGNOSIS — N18.30 STAGE 3 CHRONIC KIDNEY DISEASE: ICD-10-CM

## 2020-07-15 DIAGNOSIS — Z91.81 RISK FOR FALLS: ICD-10-CM

## 2020-07-15 DIAGNOSIS — M51.36 LUMBAR DEGENERATIVE DISC DISEASE: ICD-10-CM

## 2020-07-15 DIAGNOSIS — J43.8 OTHER EMPHYSEMA (HCC): ICD-10-CM

## 2020-07-15 DIAGNOSIS — J96.11 CHRONIC RESPIRATORY FAILURE WITH HYPOXIA (HCC): ICD-10-CM

## 2020-07-15 DIAGNOSIS — I48.91 ATRIAL FIBRILLATION, UNSPECIFIED TYPE (HCC): ICD-10-CM

## 2020-07-15 PROCEDURE — 99214 OFFICE O/P EST MOD 30 MIN: CPT | Mod: 95,CR | Performed by: FAMILY MEDICINE

## 2020-07-15 PROCEDURE — 97110 THERAPEUTIC EXERCISES: CPT

## 2020-07-15 PROCEDURE — 8041 PR SCP AHA: Mod: 95 | Performed by: FAMILY MEDICINE

## 2020-07-15 RX ORDER — LEVOTHYROXINE SODIUM 88 UG/1
88 TABLET ORAL
COMMUNITY
Start: 2020-06-28 | End: 2020-08-14

## 2020-07-15 RX ORDER — LOSARTAN POTASSIUM 100 MG/1
100 TABLET ORAL DAILY
Qty: 100 TAB | Refills: 3
Start: 2020-07-15 | End: 2022-12-27

## 2020-07-15 SDOH — HEALTH STABILITY: MENTAL HEALTH: HOW OFTEN DO YOU HAVE A DRINK CONTAINING ALCOHOL?: 4 OR MORE TIMES A WEEK

## 2020-07-15 SDOH — HEALTH STABILITY: MENTAL HEALTH: HOW MANY STANDARD DRINKS CONTAINING ALCOHOL DO YOU HAVE ON A TYPICAL DAY?: 1 OR 2

## 2020-07-15 ASSESSMENT — FIBROSIS 4 INDEX: FIB4 SCORE: 1.65

## 2020-07-15 NOTE — OP THERAPY DAILY TREATMENT
Outpatient Physical Therapy  DAILY TREATMENT     [facility]  [address], [address]  [place] [address]  Phone:  [phone]  Fax:  [phone]    Date: 07/15/2020    Patient: Eun Be  YOB: 1935  MRN: [id number]     Time Calculation    Start time: 1100  Stop time: 1130 Time Calculation (min): 30 minutes     Chief Complaint: back pain/ balance    Visit #: 4    SUBJECTIVE:  Reports that she has not fallen since seen last. Again comes to therapy without an assistive device. States that she hopes to not need any back injections.       OBJECTIVE:  Current objective measures: forward flexion bias.. slight forward flexion in resting posture. Unable to ambulate without hands for support.            Therapeutic Exercises (CPT 86387):     1. Nustep, lvl 1 x 5min, SA02 remained over 90%    2. Standing weight shifts , x3min with bar in hands for support    3. Sit to stands with walker, x 15    4. Ambulation with walker, 2min, with cues for placement /stepping/ speed    5. Practice with turns in walker , x1min    6. Supine knee to chest stretch, 30 sec x 2    7. Supine pelvic tilt, x1min      Time-based treatments/modalities:    Physical Therapy Timed Treatment Charges  Therapeutic exercise minutes (CPT 63754): 30 minutes      Pain rating (1-10) before treatment:  NA  Pain rating (1-10) after treatment:  NA    ASSESSMENT:   Response to treatment: Pt remains a high fall risk and will continue to recommend walker use for ambulation. No complaints of back pain reported prior/during or after treatment. Non-compliance with AD use overall. Back exercises to continue to progress but patient presents with high fall risk without reported back pain so will continue to stress balance issues.       PLAN/RECOMMENDATIONS:   Plan for treatment: therapy treatment to continue next visit.  Planned interventions for next visit: continue with current treatment.

## 2020-07-15 NOTE — PROGRESS NOTES
Chief Complaint   Patient presents with   • Hypertension     fv meds       HISTORY OF PRESENT ILLNESS: Patient is a 84 y.o. female established patient here today for the following concerns:    1. Other emphysema (HCC)  2. Chronic respiratory failure with hypoxia (HCC)  Reports doing well.  She denies any new symptoms.  She reports the dulera seems to really make a difference.  Has not yet got the sample as they were out previously at the sample pharmacy.     3. Sedative, hypnotic or anxiolytic dependence (HCC)  Continues on alprazolam for anxiety.  She has cut way back from previous large doses multiple times per day.  Still feeling anxious at times.  No depression at this time.      4. Anticoagulation for Afib  Continues on Xarelto.  Will be due for refill soon.  In the past has had a hard time with affordability.  Requests sample pharmacy to continue therapy.    No bleeding issues.     5. Essential hypertension  Since last visit.  BP was running a little lower and we cut back the losartan to 50 mg (1/2 pill of 100 mg).  But since she is now going to PT twice a week and finding it is helping her back pain and her spirits.  BP has been higher into the 160s again.       Past Medical, Social, and Family history reviewed and updated in EPIC    Allergies:Asa [aspirin]; Latex; Penicillins; Tape; and Wasp venom    Current Outpatient Medications   Medication Sig Dispense Refill   • levothyroxine (SYNTHROID) 88 MCG Tab Take 88 mcg by mouth every morning before breakfast.     • Mometasone Furo-Formoterol Fum (DULERA) 200-5 MCG/ACT Aerosol Inhale 1 Puff by mouth 2 Times a Day. Dispense samples 1 Inhaler 2   • rivaroxaban (XARELTO) 15 MG Tab tablet Take 1 Tab by mouth with dinner. 30 Tab 0   • ALPRAZolam (XANAX) 0.25 MG Tab Take 1 Tab by mouth 2 times a day as needed for Sleep or Anxiety for up to 90 days. TAKE ONE TABLET BY MOUTH TWICE A  Tab 0   • losartan (COZAAR) 100 MG Tab Take 0.5 Tabs by mouth every day. Hold for  systolic blood pressure <100 100 Tab 3   • omeprazole (PRILOSEC) 40 MG delayed-release capsule Take 1 Cap by mouth every day. 90 Cap 1   • hydrOXYzine HCl (ATARAX) 50 MG Tab TAKE 1 TAB BY MOUTH 3 TIMES A DAY AS NEEDED. ITCHING 90 Tab 2   • propafenone (RYTHMOL) 150 MG Tab TAKE 1 TABLET BY MOUTH TWICE A  Tab 3   • simvastatin (ZOCOR) 20 MG Tab TAKE 1 TABLET BY MOUTH IN THE EVENING 100 Tab 3   • metoprolol (LOPRESSOR) 25 MG Tab TAKE 1 TAB BY MOUTH TWO TIMES A DAY. 200 Tab 3   • fenofibrate micronized (LOFIBRA) 134 MG capsule Take 1 Cap by mouth every day. 100 Cap 3   • albuterol 108 (90 Base) MCG/ACT Aero Soln inhalation aerosol INHALE 2 PUFFS BY MOUTH EVERY 6 HOURS AS NEEDED FOR SHORTNESS OF BREATH 8.5 Inhaler 11   • fluticasone (FLONASE) 50 MCG/ACT nasal spray SPRAY 1 SPRAY IN NOSE 2 TIMES A DAY. EACH NOSTRIL 3 Bottle 0   • clobetasol (TEMOVATE) 0.05 % Cream Apply 1 Each to affected area(s) 2 times a day as needed.  5   • levothyroxine (SYNTHROID) 100 MCG Tab Take 1 Tab by mouth Every morning on an empty stomach. (Patient not taking: Reported on 7/15/2020) 90 Tab 3   • FIBER SELECT GUMMIES PO Take 1 Tab by mouth 2 times a day as needed (for constipation).       No current facility-administered medications for this visit.          ROS:  Review of Systems   Constitutional: Negative for fever, chills, weight loss and malaise/fatigue.   HENT: Negative for ear pain, nosebleeds, congestion, sore throat and neck pain.    Eyes: Negative for blurred vision.   Respiratory: Negative for cough, sputum production, shortness of breath and wheezing.    Cardiovascular: Negative for chest pain, palpitations,  and leg swelling.   Gastrointestinal: Negative for heartburn, nausea, vomiting, diarrhea and abdominal pain.   Genitourinary: Negative for dysuria, urgency and frequency.   Musculoskeletal: Negative for myalgias, back pain and joint pain.   Skin: Negative for rash and itching.   Neurological: Negative for dizziness,  tingling, tremors, sensory change, focal weakness and headaches.   Endo/Heme/Allergies: Does not bruise/bleed easily.   Psychiatric/Behavioral: Negative for depression, anxiety, suicidal ideas, insomnia and memory loss.      Exam:  Vitals:    07/15/20 1302   BP: 160/83   Pulse: 69   SpO2: 93%         General:  Well nourished, well developed in NAD  Head is grossly normal.  Neck: Supple without JVD   Pulmonary:  Normal effort.   Cardiovascular: Regular rate  Extremities: no clubbing, cyanosis, or edema.  Psych: affect appropriate      Please note that this dictation was created using voice recognition software. I have made every reasonable attempt to correct obvious errors, but I expect that there are errors of grammar and possibly content that I did not discover before finalizing the note.    Assessment/Plan:  1. Other emphysema (HCC)  2. Chronic respiratory failure with hypoxia (HCC)  Continue Inhaled medications, O2.  Flu vaccine in the fall.  HD    3. Sedative, hypnotic or anxiolytic dependence (HCC)  Continue alprazolam at very low dose for now.  Will continue to work on weaning off.     4. Anticoagulation for Afib  Noted, refill for xarelto to sample pharmacy   Discussed importance of not skipping it.     5. Essential hypertension  Restart full tab of losartan 100 and continue to monitor.   Let us know if BP does not start to reduce.     2 months - recheck thyroid prior to follow up        Annual Health Assessment Questions:    1.  Are you currently engaging in any exercise or physical activity? Yes    2.  How would you describe your mood or emotional well-being today? good    3.  Have you had any falls in the last year? Yes    4.  Have you noticed any problems with your balance or had difficulty walking? Yes    5.  In the last six months have you experienced any leakage of urine? No    6. DPA/Advanced Directive: Patient does not have an Advanced Directive.  A packet and workshop information was given on  Advanced Directives.

## 2020-07-16 NOTE — PROGRESS NOTES
I received a message from the patient's cardiologist Dr. Sandoval clearing the patient to be off of Xarelto for 2 days prior to the procedure.    Doc Brady MD

## 2020-07-17 ENCOUNTER — PHYSICAL THERAPY (OUTPATIENT)
Dept: PHYSICAL THERAPY | Facility: REHABILITATION | Age: 85
End: 2020-07-17
Attending: PHYSICAL MEDICINE & REHABILITATION
Payer: MEDICARE

## 2020-07-17 DIAGNOSIS — Z91.81 RISK FOR FALLS: ICD-10-CM

## 2020-07-17 DIAGNOSIS — M51.36 LUMBAR DEGENERATIVE DISC DISEASE: ICD-10-CM

## 2020-07-17 PROCEDURE — 97110 THERAPEUTIC EXERCISES: CPT

## 2020-07-17 NOTE — OP THERAPY DAILY TREATMENT
Outpatient Physical Therapy  DAILY TREATMENT     [facility] Outpatient Physical Therapy [place]  [address], [address]  [place] [address]  Phone:  [phone]  Fax:  [phone]    Date: 07/17/2020    Patient: Eun Be  YOB: 1935  MRN: [id number]     Time Calculation    Start time: 0200  Stop time: 0230 Time Calculation (min): 30 minutes     Chief Complaint: back pain/ balance    Visit #: 5    SUBJECTIVE:  Reports that her back felt better after last visit until the next day. No reproted falls. Her walker is next to her chair at home and that is where is stays.       OBJECTIVE:  Current objective measures: forward flexion bias. Slight forward flexion in resting posture. Unable to ambulate without hands for support.            Therapeutic Exercises (CPT 53648):     1. Nustep, lvl 1 x 5min, SA02 remained over 90%    2. Standing weight shifts , x3min with bar in hands for support    3. Supine march, lvl 1 x 20    4. Ambulation with walker, 2min, with cues for placement /stepping/ speed    5. Supine twist without pain, 1min    6. Supine knee to chest stretch, 30 sec x 2    7. Supine pelvic tilt, x1min      Time-based treatments/modalities:    Physical Therapy Timed Treatment Charges  Therapeutic exercise minutes (CPT 12770): 30 minutes      Pain rating (1-10) before treatment:  NA  Pain rating (1-10) after treatment:  NA    ASSESSMENT:   Response to treatment: Pt remains a high fall risk and will continue to recommend walker use for ambulation. Progression of back exercises tolerated without increased pain. Non-compliance with AD use overall. Back exercises to continue to progress but patient presents with high fall risk without reported back pain so will continue to stress balance issues.       PLAN/RECOMMENDATIONS:   Plan for treatment: therapy treatment to continue next visit.  Planned interventions for next visit: continue with current treatment.

## 2020-07-22 ENCOUNTER — APPOINTMENT (OUTPATIENT)
Dept: PHYSICAL THERAPY | Facility: REHABILITATION | Age: 85
End: 2020-07-22
Attending: PHYSICAL MEDICINE & REHABILITATION
Payer: MEDICARE

## 2020-07-22 NOTE — OP THERAPY DAILY TREATMENT
Outpatient Physical Therapy  DAILY TREATMENT     [facility] Outpatient Physical Therapy [place]  [address], [address]  [place] [address]  Phone:  [phone]  Fax:  [phone]    Date: 07/22/2020    Patient: Eun Be  YOB: 1935  MRN: [id number]     Time Calculation               Chief Complaint: back pain/ balance    Visit #: 6    SUBJECTIVE:  Reports that her back felt better after last visit until the next day. No reproted falls. Her walker is next to her chair at home and that is where is stays.       OBJECTIVE:  Current objective measures: forward flexion bias. Slight forward flexion in resting posture. Unable to ambulate without hands for support.            Therapeutic Exercises (CPT 55781):     1. Nustep, lvl 1 x 5min, SA02 remained over 90%    2. Standing weight shifts , x3min with bar in hands for support    3. Supine march, lvl 1 x 20    4. Ambulation with walker, 2min, with cues for placement /stepping/ speed    5. Supine twist without pain, 1min    6. Supine knee to chest stretch, 30 sec x 2    7. Supine pelvic tilt, x1min      Time-based treatments/modalities:           Pain rating (1-10) before treatment:  NA  Pain rating (1-10) after treatment:  NA    ASSESSMENT:   Response to treatment: Pt remains a high fall risk and will continue to recommend walker use for ambulation. Progression of back exercises tolerated without increased pain. Non-compliance with AD use overall. Back exercises to continue to progress but patient presents with high fall risk without reported back pain so will continue to stress balance issues.       PLAN/RECOMMENDATIONS:   Plan for treatment: therapy treatment to continue next visit.  Planned interventions for next visit: continue with current treatment.

## 2020-07-24 ENCOUNTER — APPOINTMENT (OUTPATIENT)
Dept: PHYSICAL THERAPY | Facility: REHABILITATION | Age: 85
End: 2020-07-24
Attending: PHYSICAL MEDICINE & REHABILITATION
Payer: MEDICARE

## 2020-07-29 ENCOUNTER — PHYSICAL THERAPY (OUTPATIENT)
Dept: PHYSICAL THERAPY | Facility: REHABILITATION | Age: 85
End: 2020-07-29
Attending: PHYSICAL MEDICINE & REHABILITATION
Payer: MEDICARE

## 2020-07-29 DIAGNOSIS — Z91.81 RISK FOR FALLS: ICD-10-CM

## 2020-07-29 DIAGNOSIS — M51.36 LUMBAR DEGENERATIVE DISC DISEASE: ICD-10-CM

## 2020-07-29 PROCEDURE — 97110 THERAPEUTIC EXERCISES: CPT

## 2020-07-31 ENCOUNTER — PHYSICAL THERAPY (OUTPATIENT)
Dept: PHYSICAL THERAPY | Facility: REHABILITATION | Age: 85
End: 2020-07-31
Attending: PHYSICAL MEDICINE & REHABILITATION
Payer: MEDICARE

## 2020-07-31 DIAGNOSIS — M51.36 LUMBAR DEGENERATIVE DISC DISEASE: ICD-10-CM

## 2020-07-31 DIAGNOSIS — Z91.81 RISK FOR FALLS: ICD-10-CM

## 2020-07-31 PROCEDURE — 97110 THERAPEUTIC EXERCISES: CPT

## 2020-07-31 NOTE — OP THERAPY DAILY TREATMENT
Outpatient Physical Therapy  DAILY TREATMENT     [facility] Outpatient Physical Therapy [place]  [address], [address]  [place] [address]  Phone:  [phone]  Fax:  [phone]    Date: 07/31/2020    Patient: Eun Be  YOB: 1935  MRN: [id number]     Time Calculation    Start time: 1110  Stop time: 1140 Time Calculation (min): 30 minutes     Chief Complaint: back pain/ balance    Visit #: 7    SUBJECTIVE:  No reported back pain currently. States she feels better when she comes here. States her oxygen use at home is a little different.       OBJECTIVE:  Current objective measures: forward flexion bias. Slight forward flexion in resting posture. Unable to ambulate without hands for support.            Therapeutic Exercises (CPT 65609):     1. Nustep, lvl 1 x 5min, SA02 remained over 88%    2. Standing weight shifts , x3min with bar in hands for support    3. Supine march, lvl 1 x 20    4. Ambulation with walker, 2min, with cues for placement /stepping/ speed    5. Static balance at bar, feet together on foam, 2min    6. Supine knee to chest stretch, 30 sec x 2      Time-based treatments/modalities:    Physical Therapy Timed Treatment Charges  Therapeutic exercise minutes (CPT 38005): 30 minutes      Pain rating (1-10) before treatment:  NA  Pain rating (1-10) after treatment:  NA    ASSESSMENT:   Response to treatment: no mention of pain during appt. Pt recommended to monitor oxygen at home as it was reading lower but WNL during today's visit. Pain controlled and no reports of increased pain during exercises. High fall risk without reported back pain so will continue to stress balance issues.       PLAN/RECOMMENDATIONS:   Plan for treatment: therapy treatment to continue next visit.  Planned interventions for next visit: continue with current treatment.

## 2020-08-04 ENCOUNTER — PHYSICAL THERAPY (OUTPATIENT)
Dept: PHYSICAL THERAPY | Facility: REHABILITATION | Age: 85
End: 2020-08-04
Attending: PHYSICAL MEDICINE & REHABILITATION
Payer: MEDICARE

## 2020-08-04 DIAGNOSIS — M51.36 LUMBAR DEGENERATIVE DISC DISEASE: ICD-10-CM

## 2020-08-04 DIAGNOSIS — Z91.81 RISK FOR FALLS: ICD-10-CM

## 2020-08-04 DIAGNOSIS — F41.9 ANXIETY: ICD-10-CM

## 2020-08-04 PROCEDURE — 97110 THERAPEUTIC EXERCISES: CPT

## 2020-08-04 PROCEDURE — 97112 NEUROMUSCULAR REEDUCATION: CPT

## 2020-08-04 RX ORDER — ALPRAZOLAM 0.25 MG/1
0.25 TABLET ORAL 2 TIMES DAILY PRN
Qty: 180 TAB | Refills: 0 | OUTPATIENT
Start: 2020-08-04 | End: 2020-11-02

## 2020-08-04 NOTE — TELEPHONE ENCOUNTER
Refused Prescriptions       ALPRAZolam (XANAX) 0.25 MG Tab               Sig: Take 1 Tab by mouth 2 times a day as needed for Sleep or Anxiety for up to 90 days. TAKE ONE TABLET BY MOUTH TWICE A DAY     Disp:  180 Tab    Refills:  0     Start: 8/4/2020 - 11/2/2020     Class: Normal     Refused by: Libertad Estrella M.D.     Refusal reason: Refill not appropriate. (Too early)     For: Anxiety         Fill requested from: [facility]/pharmacy #[id number] - [place], [place] - [address] [place] [address].

## 2020-08-04 NOTE — OP THERAPY DAILY TREATMENT
Outpatient Physical Therapy  DAILY TREATMENT     [facility] Outpatient Physical Therapy [place]  [address], [address]  [place] [address]  Phone:  [phone]  Fax:  [phone]    Date: 08/04/2020    Patient: Eun Be  YOB: 1935  MRN: [id number]     Time Calculation    Start time: 0100  Stop time: 0130 Time Calculation (min): 30 minutes     Chief Complaint: back pain/ balance    Visit #: 8    SUBJECTIVE:  No reported back pain currently. No Falls reported. Pt still not using her walker as instructed.       OBJECTIVE:  Current objective measures: forward flexion bias. Slight forward flexion in resting posture. Unable to ambulate without hands for support.    PT Functional Assessment Tool Used: LEFS  PT Functional Assessment Score: 17/56       Therapeutic Exercises (CPT 49325):     1. Nustep, lvl 1 x 5min, SA02 remained over 88%    2. Standing weight shifts , x3min with bar in hands for support    3. Supine march, lvl 1 x 20    4. Ambulation with walker, 2min, with cues for placement /stepping/ speed    5. Supine knee to chest stretch, 30 sec x 2    Therapeutic Treatments and Modalities:     1. Neuromuscular Re-education (CPT 32270), standing balance on uneven/unstable surfaces x 2min, EC balance unable. standing and reaching balance activites without hand holds/ support.     Time-based treatments/modalities:    Physical Therapy Timed Treatment Charges  Neuromusc re-ed, balance, coor, post minutes (CPT 63381): 15 minutes  Therapeutic exercise minutes (CPT 04494): 15 minutes      Pain rating (1-10) before treatment:  NA  Pain rating (1-10) after treatment:  NA    ASSESSMENT:   Response to treatment: no mention of pain during appt. Oxygen saturation improved since last visit.  Pain controlled and no reports of increased pain during exercises. High fall risk without reported back pain so will continue to stress balance issues.       PLAN/RECOMMENDATIONS:   Plan for treatment: therapy treatment  to continue next visit.  Planned interventions for next visit: continue with current treatment.

## 2020-08-05 NOTE — OP THERAPY PROGRESS SUMMARY
Outpatient Physical Therapy  PROGRESS SUMMARY NOTE      [facility] [place]  [address]  [place] [address]  Phone:  [phone]  Fax:  [phone]    Date of Visit: 08/04/2020    Patient: Eun Be  YOB: 1935  MRN: [id number]     Referring Provider: Doc Brady M.D.  [address]  [address]  [place] [address]   Referring Diagnosis Risk for falls [Z91.81];Lumbar degenerative disc disease [M51.36];Spinal stenosis of lumbar region with neurogenic claudication [M48.062];Lumbar radiculopathy [M54.16];Lumbar spondylosis [M47.816];Chronic bilateral low back pain with bilateral sciatica [M54.42, M54.41, G89.29]     Visit Diagnoses     ICD-10-CM   1. Lumbar degenerative disc disease  M51.36   2. Risk for falls  Z91.81       Rehab Potential: fair    Progress Report Period: through 8/4/20    Functional Assessment Used  PT Functional Assessment Tool Used: LEFS  PT Functional Assessment Score: 17/56       Objective Findings and Assessment:   Patient progression towards goals: Pt remains a serious fall risk despite efforts to implement AD into daily use. Overall non-complaint with regards to AD use. Despite this pt has consistently improved her chronic low back pain and is demonstrating small improvements with balance. Recommend continued therapy to address balance deficits and to decrease chance of falls around the home.        Goals:   Short Term Goals:   1. Patient will be Independent with prescribed Home Exercise Program (HEP) and will be able to demonstrate exercises without cues for improved overall symptoms/activity tolerance.   2. Pt use AD appropriately around the home.   Short term goal time span:  2-4 weeks      Long Term Goals:    3. Pt will improve ability to use walker without cues during transfers to and from a chair   4. Pt will improve TUCKER score to greater than 27/56 indicative of improved function and decreased fall risk.  Long term  goal time span:  4-6 weeks    Plan:   Planned therapy interventions:  Therapeutic Exercise (CPT 88758), Therapeutic Activities (CPT 45992), Manual Therapy (CPT 60194) and Neuromuscular Re-education (CPT 52238)  Frequency:  2x week  Duration in weeks:  6    Referring provider co-signature:  I have reviewed this plan of care and my co-signature certifies the need for services.     Certification Period: 08/04/2020 to 09/16/20    Physician Signature: ________________________________ Date: ______________

## 2020-08-07 ENCOUNTER — PATIENT MESSAGE (OUTPATIENT)
Dept: MEDICAL GROUP | Facility: PHYSICIAN GROUP | Age: 85
End: 2020-08-07

## 2020-08-07 ENCOUNTER — PHYSICAL THERAPY (OUTPATIENT)
Dept: PHYSICAL THERAPY | Facility: REHABILITATION | Age: 85
End: 2020-08-07
Attending: PHYSICAL MEDICINE & REHABILITATION
Payer: MEDICARE

## 2020-08-07 DIAGNOSIS — E03.9 HYPOTHYROIDISM (ACQUIRED): ICD-10-CM

## 2020-08-07 DIAGNOSIS — M51.36 LUMBAR DEGENERATIVE DISC DISEASE: ICD-10-CM

## 2020-08-07 DIAGNOSIS — Z91.81 RISK FOR FALLS: ICD-10-CM

## 2020-08-07 PROCEDURE — 97112 NEUROMUSCULAR REEDUCATION: CPT

## 2020-08-07 PROCEDURE — 97110 THERAPEUTIC EXERCISES: CPT

## 2020-08-07 NOTE — OP THERAPY DAILY TREATMENT
Outpatient Physical Therapy  DAILY TREATMENT     [facility] Outpatient Physical Therapy [place]  [address]  [place] [address]  Phone:  [phone]  Fax:  [phone]    Date: 08/07/2020    Patient: Eun Be  YOB: 1935  MRN: [id number]     Time Calculation    Start time: 0135  Stop time: 0205 Time Calculation (min): 30 minutes     Chief Complaint: back pain/ balance    Visit #: 9    SUBJECTIVE:  Pt states she has no new falls. States that she feels that she can walk around her home more easily. Reports not using her walker.       OBJECTIVE:  Current objective measures: forward flexion bias. Slight forward flexion in resting posture. Ambulates with shuffling gait pattern without grabbing on walls for balance.            Therapeutic Exercises (CPT 50713):     1. Nustep, lvl 1 x 10min, SA02 remained over 88%    Therapeutic Treatments and Modalities:     1. Neuromuscular Re-education (CPT 83559), standing balance on uneven/unstable surfaces x 2min, EC balance unable. standing and reaching balance activites without hand holds/ support. side stepping with minimal UE support (1UE), EC static balance on levl surfaces and on unstable surfaces.     Time-based treatments/modalities:    Physical Therapy Timed Treatment Charges  Neuromusc re-ed, balance, coor, post minutes (CPT 12907): 20 minutes  Therapeutic exercise minutes (CPT 21983): 10 minutes      Pain rating (1-10) before treatment:  NA  Pain rating (1-10) after treatment:  NA    ASSESSMENT:   Response to treatment: no mention of pain during appt. High fall risk without reported back pain so will continue to stress balance issues. Pt has not followed recommendations for AD use.        PLAN/RECOMMENDATIONS:   Plan for treatment: therapy treatment to continue next visit.  Planned interventions for next visit: continue with current treatment.

## 2020-08-11 ENCOUNTER — PHYSICAL THERAPY (OUTPATIENT)
Dept: PHYSICAL THERAPY | Facility: REHABILITATION | Age: 85
End: 2020-08-11
Attending: PHYSICAL MEDICINE & REHABILITATION
Payer: MEDICARE

## 2020-08-11 DIAGNOSIS — Z91.81 RISK FOR FALLS: ICD-10-CM

## 2020-08-11 DIAGNOSIS — M51.36 LUMBAR DEGENERATIVE DISC DISEASE: ICD-10-CM

## 2020-08-11 PROCEDURE — 97110 THERAPEUTIC EXERCISES: CPT

## 2020-08-11 PROCEDURE — 97112 NEUROMUSCULAR REEDUCATION: CPT

## 2020-08-11 NOTE — OP THERAPY DAILY TREATMENT
Outpatient Physical Therapy  DAILY TREATMENT     [facility] Therapy [place]  [address]  [place] [facility] [address]  Phone:  [phone]  Fax:  [phone]    Date: 08/11/2020    Patient: Eun Be  YOB: 1935  MRN: [id number]     Time Calculation    Start time: 0205  Stop time: 0230 Time Calculation (min): 25 minutes     Chief Complaint: back pain/ balance    Visit #: 10    SUBJECTIVE:  Pt comes to appt late. Notes no pain today and no falls.        OBJECTIVE:  Current objective measures: forward flexion bias. Slight forward flexion in resting posture. Ambulates with shuffling gait pattern with grabbing on walls for balance.  Oxygen saturation at start of appt 84% within 5 minutes up to 92%.          Therapeutic Exercises (CPT 91819):     1. Nustep, lvl 2 x 10min, SA02 remained over 88%, rest breaks for oxygen saturation    Therapeutic Treatments and Modalities:     1. Neuromuscular Re-education (CPT 02325), standing balance on uneven/unstable surfaces x 2min, EC balance unable. standing and reaching balance activites without hand holds/ support. side stepping with minimal UE support (1UE), EC static balance on levl surfaces and on unstable surfaces.     Time-based treatments/modalities:    Physical Therapy Timed Treatment Charges  Neuromusc re-ed, balance, coor, post minutes (CPT 61185): 15 minutes  Therapeutic exercise minutes (CPT 59748): 15 minutes      Pain rating (1-10) before treatment:  NA  Pain rating (1-10) after treatment:  NA    ASSESSMENT:   Response to treatment: no mention of pain during appt. Treatment limited by oxygen saturation at start of appt. High fall risk without reported back pain so will continue to stress balance issues. Pt has not followed recommendations for AD use.        PLAN/RECOMMENDATIONS:   Plan for treatment: therapy treatment to continue next visit.  Planned interventions for next visit: continue with current treatment.

## 2020-08-14 RX ORDER — LEVOTHYROXINE SODIUM 0.1 MG/1
100 TABLET ORAL
Qty: 90 TAB | Refills: 3 | Status: SHIPPED | OUTPATIENT
Start: 2020-08-14 | End: 2020-08-25

## 2020-08-14 NOTE — PROGRESS NOTES
Lets continue the 100 mcg until we get the lab results back.  I am sending in a refill to cover.

## 2020-08-19 ENCOUNTER — APPOINTMENT (OUTPATIENT)
Dept: PHYSICAL THERAPY | Facility: REHABILITATION | Age: 85
End: 2020-08-19
Attending: PHYSICAL MEDICINE & REHABILITATION
Payer: MEDICARE

## 2020-08-24 ENCOUNTER — HOSPITAL ENCOUNTER (OUTPATIENT)
Dept: LAB | Facility: MEDICAL CENTER | Age: 85
End: 2020-08-24
Attending: FAMILY MEDICINE
Payer: MEDICARE

## 2020-08-24 DIAGNOSIS — N18.30 STAGE 3 CHRONIC KIDNEY DISEASE: ICD-10-CM

## 2020-08-24 DIAGNOSIS — E03.9 HYPOTHYROIDISM (ACQUIRED): ICD-10-CM

## 2020-08-24 LAB
ANION GAP SERPL CALC-SCNC: 11 MMOL/L (ref 7–16)
BUN SERPL-MCNC: 24 MG/DL (ref 8–22)
CALCIUM SERPL-MCNC: 9.9 MG/DL (ref 8.5–10.5)
CHLORIDE SERPL-SCNC: 102 MMOL/L (ref 96–112)
CO2 SERPL-SCNC: 25 MMOL/L (ref 20–33)
CREAT SERPL-MCNC: 0.87 MG/DL (ref 0.5–1.4)
GLUCOSE SERPL-MCNC: 106 MG/DL (ref 65–99)
POTASSIUM SERPL-SCNC: 4.3 MMOL/L (ref 3.6–5.5)
SODIUM SERPL-SCNC: 138 MMOL/L (ref 135–145)
T3 SERPL-MCNC: 75.7 NG/DL (ref 60–181)
T4 FREE SERPL-MCNC: 1.96 NG/DL (ref 0.93–1.7)
TSH SERPL DL<=0.005 MIU/L-ACNC: 0.28 UIU/ML (ref 0.38–5.33)

## 2020-08-24 PROCEDURE — 84439 ASSAY OF FREE THYROXINE: CPT

## 2020-08-24 PROCEDURE — 84480 ASSAY TRIIODOTHYRONINE (T3): CPT

## 2020-08-24 PROCEDURE — 80048 BASIC METABOLIC PNL TOTAL CA: CPT

## 2020-08-24 PROCEDURE — 36415 COLL VENOUS BLD VENIPUNCTURE: CPT

## 2020-08-24 PROCEDURE — 84443 ASSAY THYROID STIM HORMONE: CPT

## 2020-08-25 DIAGNOSIS — E03.9 HYPOTHYROIDISM (ACQUIRED): ICD-10-CM

## 2020-08-25 RX ORDER — LEVOTHYROXINE SODIUM 88 UG/1
TABLET ORAL
Qty: 45 TAB | Refills: 3 | Status: SHIPPED | OUTPATIENT
Start: 2020-08-25 | End: 2022-12-27

## 2020-08-26 ENCOUNTER — APPOINTMENT (OUTPATIENT)
Dept: PHYSICAL THERAPY | Facility: REHABILITATION | Age: 85
End: 2020-08-26
Attending: PHYSICAL MEDICINE & REHABILITATION
Payer: MEDICARE

## 2020-08-27 ENCOUNTER — HOSPITAL ENCOUNTER (OUTPATIENT)
Facility: REHABILITATION | Age: 85
End: 2020-08-27
Attending: PHYSICAL MEDICINE & REHABILITATION | Admitting: PHYSICAL MEDICINE & REHABILITATION
Payer: MEDICARE

## 2020-09-01 ENCOUNTER — OFFICE VISIT (OUTPATIENT)
Dept: MEDICAL GROUP | Facility: PHYSICIAN GROUP | Age: 85
End: 2020-09-01
Payer: MEDICARE

## 2020-09-01 VITALS
DIASTOLIC BLOOD PRESSURE: 80 MMHG | SYSTOLIC BLOOD PRESSURE: 144 MMHG | HEIGHT: 71 IN | OXYGEN SATURATION: 97 % | WEIGHT: 140.2 LBS | HEART RATE: 52 BPM | RESPIRATION RATE: 16 BRPM | TEMPERATURE: 99.1 F | BODY MASS INDEX: 19.63 KG/M2

## 2020-09-01 DIAGNOSIS — F41.9 ANXIETY: ICD-10-CM

## 2020-09-01 DIAGNOSIS — F41.1 GENERALIZED ANXIETY DISORDER: ICD-10-CM

## 2020-09-01 DIAGNOSIS — I70.0 AORTIC ATHEROSCLEROSIS (HCC): ICD-10-CM

## 2020-09-01 DIAGNOSIS — E03.9 HYPOTHYROIDISM, UNSPECIFIED TYPE: ICD-10-CM

## 2020-09-01 DIAGNOSIS — F13.20 SEDATIVE, HYPNOTIC OR ANXIOLYTIC DEPENDENCE (HCC): ICD-10-CM

## 2020-09-01 DIAGNOSIS — J96.11 CHRONIC RESPIRATORY FAILURE WITH HYPOXIA (HCC): ICD-10-CM

## 2020-09-01 DIAGNOSIS — J43.8 OTHER EMPHYSEMA (HCC): ICD-10-CM

## 2020-09-01 DIAGNOSIS — D68.318 CIRCULATING ANTICOAGULANT DISORDER (HCC): ICD-10-CM

## 2020-09-01 DIAGNOSIS — F32.5 MAJOR DEPRESSIVE DISORDER WITH SINGLE EPISODE, IN FULL REMISSION (HCC): ICD-10-CM

## 2020-09-01 DIAGNOSIS — I48.0 PAROXYSMAL ATRIAL FIBRILLATION (HCC): ICD-10-CM

## 2020-09-01 PROCEDURE — 99214 OFFICE O/P EST MOD 30 MIN: CPT | Performed by: FAMILY MEDICINE

## 2020-09-01 PROCEDURE — 8041 PR SCP AHA: Performed by: FAMILY MEDICINE

## 2020-09-01 RX ORDER — LEVOTHYROXINE SODIUM 0.1 MG/1
100 TABLET ORAL
COMMUNITY
End: 2022-12-27

## 2020-09-01 RX ORDER — MOMETASONE FUROATE AND FORMOTEROL FUMARATE DIHYDRATE 200; 5 UG/1; UG/1
1 AEROSOL RESPIRATORY (INHALATION) 2 TIMES DAILY
Qty: 1 EACH | Refills: 0 | Status: SHIPPED | OUTPATIENT
Start: 2020-09-01 | End: 2022-12-27

## 2020-09-01 RX ORDER — ALPRAZOLAM 0.25 MG/1
0.25 TABLET ORAL 2 TIMES DAILY PRN
Qty: 180 TAB | Refills: 0 | Status: SHIPPED | OUTPATIENT
Start: 2020-09-01 | End: 2020-11-12

## 2020-09-01 ASSESSMENT — FIBROSIS 4 INDEX: FIB4 SCORE: 1.65

## 2020-09-01 NOTE — PROGRESS NOTES
Chief Complaint   Patient presents with   • Atrial Fibrillation     fv        HISTORY OF PRESENT ILLNESS: Patient is a 84 y.o. female established patient here today for the following concerns:    1. Generalized anxiety disorder  2. Major depressive disorder with single episode, in full remission (HCC)  3. Sedative, hypnotic or anxiolytic dependence (HCC)  Eun is here today with her daughter for follow up.  She report that she has been doing ok, but has had a couple of episodes of anxiety related to afib runs.  She reports taking an extra Propafenone and xanax when it occurs she finds this calms it quicker.      4. Other emphysema (HCC)  5. Chronic respiratory failure with hypoxia (HCC)  Continues on O2 at night.  She also continues on Dulera with good control of symptoms.  She does need new sample to sample pharmacy if possible as she is in the donut hole.  No recent exacerbation.      6. Aortic atherosclerosis (HCC)  Noted previously on imaging.  Continues on blood pressure and cholesterol lowering medication without problem.     7. Anticoagulation for Afib  8. Paroxysmal atrial fibrillation (HCC)  Continues on propafenone and metoprolol.  She has had a couple episodes of rapid heart rate and palpitations.  Her daughter has also looked into other NOACs for cost benefit.  She is hoping to change her to Eliquis for cost.      9. Hypothyroidism, unspecified type  10. Anxiety  Also noted on most recent labs that she was running hyperthyroid on most recent dosing.  She was asked to alternate 88 and 100 mcg dose of levothyroxine since last week.  She is tolerating this ok.        Past Medical, Social, and Family history reviewed and updated in EPIC    Allergies:Asa [aspirin], Latex, Penicillins, Tape, and Wasp venom    Current Outpatient Medications   Medication Sig Dispense Refill   • levothyroxine (SYNTHROID) 100 MCG Tab Take 100 mcg by mouth Every morning on an empty stomach. Alternate with the 88mcg     •  apixaban (ELIQUIS) 2.5mg Tab Take 1 Tab by mouth 2 Times a Day. 180 Tab 3   • ALPRAZolam (XANAX) 0.25 MG Tab Take 1 Tab by mouth 2 times a day as needed for Sleep or Anxiety for up to 90 days. TAKE ONE TABLET BY MOUTH TWICE A  Tab 0   • Mometasone Furo-Formoterol Fum (DULERA) 200-5 MCG/ACT Aerosol Inhale 1 Puff by mouth 2 Times a Day. Dispense samples 1 Each 0   • levothyroxine (SYNTHROID) 88 MCG Tab Alternate with 100 mcg every other day. 45 Tab 3   • losartan (COZAAR) 100 MG Tab Take 1 Tab by mouth every day. Hold for systolic blood pressure <100 100 Tab 3   • omeprazole (PRILOSEC) 40 MG delayed-release capsule Take 1 Cap by mouth every day. 90 Cap 1   • hydrOXYzine HCl (ATARAX) 50 MG Tab TAKE 1 TAB BY MOUTH 3 TIMES A DAY AS NEEDED. ITCHING 90 Tab 2   • propafenone (RYTHMOL) 150 MG Tab TAKE 1 TABLET BY MOUTH TWICE A  Tab 3   • simvastatin (ZOCOR) 20 MG Tab TAKE 1 TABLET BY MOUTH IN THE EVENING 100 Tab 3   • metoprolol (LOPRESSOR) 25 MG Tab TAKE 1 TAB BY MOUTH TWO TIMES A DAY. 200 Tab 3   • fenofibrate micronized (LOFIBRA) 134 MG capsule Take 1 Cap by mouth every day. 100 Cap 3   • albuterol 108 (90 Base) MCG/ACT Aero Soln inhalation aerosol INHALE 2 PUFFS BY MOUTH EVERY 6 HOURS AS NEEDED FOR SHORTNESS OF BREATH 8.5 Inhaler 11   • fluticasone (FLONASE) 50 MCG/ACT nasal spray SPRAY 1 SPRAY IN NOSE 2 TIMES A DAY. EACH NOSTRIL 3 Bottle 0   • FIBER SELECT GUMMIES PO Take 1 Tab by mouth 2 times a day as needed (for constipation).     • clobetasol (TEMOVATE) 0.05 % Cream Apply 1 Each to affected area(s) 2 times a day as needed.  5     No current facility-administered medications for this visit.          ROS:  Review of Systems   Constitutional: Negative for fever, chills, weight loss and malaise/fatigue.   HENT: Negative for ear pain, nosebleeds, congestion, sore throat and neck pain.    Eyes: Negative for blurred vision.   Respiratory: Negative for cough, sputum production, shortness of breath and  "wheezing.    Cardiovascular: Negative for chest pain, palpitations,  and leg swelling.   Gastrointestinal: Negative for heartburn, nausea, vomiting, diarrhea and abdominal pain.   Genitourinary: Negative for dysuria, urgency and frequency.   Musculoskeletal: Negative for myalgias, back pain and joint pain.   Skin: Negative for rash and itching.   Neurological: Negative for dizziness, tingling, tremors, sensory change, focal weakness and headaches.   Endo/Heme/Allergies: Does not bruise/bleed easily.   Psychiatric/Behavioral: Negative for depression, anxiety, suicidal ideas, insomnia and memory loss.      Exam:  /80   Pulse (!) 52   Temp 37.3 °C (99.1 °F)   Resp 16   Ht 1.803 m (5' 11\")   Wt 63.6 kg (140 lb 3.2 oz)   SpO2 97%     General:  Well nourished, well developed in NAD  Head is grossly normal.  Neck: Supple without JVD   Pulmonary:  Normal effort.   Cardiovascular: Regular rate  Extremities: no clubbing, cyanosis, or edema.  Psych: affect appropriate      Please note that this dictation was created using voice recognition software. I have made every reasonable attempt to correct obvious errors, but I expect that there are errors of grammar and possibly content that I did not discover before finalizing the note.    Assessment/Plan:  1. Generalized anxiety disorder  2. Major depressive disorder with single episode, in full remission (HCC)  3. Sedative, hypnotic or anxiolytic dependence (HCC)  Continue current medications.  Continue lowest effective dose.  Patient and daughter are reminded on risk and benefits.      4. Other emphysema (HCC)  Continue   - Mometasone Furo-Formoterol Fum (DULERA) 200-5 MCG/ACT Aerosol; Inhale 1 Puff by mouth 2 Times a Day. Dispense samples  Dispense: 1 Each; Refill: 0    5. Chronic respiratory failure with hypoxia (HCC)  Continue O2    6. Aortic atherosclerosis (HCC)  Continue BP and cholesterol control    7. Anticoagulation for Afib  Will change if ok by cardiology. "   - apixaban (ELIQUIS) 2.5mg Tab; Take 1 Tab by mouth 2 Times a Day.  Dispense: 180 Tab; Refill: 3    8. Paroxysmal atrial fibrillation (HCC)  Continue propafenone.  Will continue to adjust thyroid dosing as this likely is contributing   - apixaban (ELIQUIS) 2.5mg Tab; Take 1 Tab by mouth 2 Times a Day.  Dispense: 180 Tab; Refill: 3    9. Hypothyroidism, unspecified type  Continue alternating 88 and 100  Mcg.   - TSH; Standing  - TRIIDOTHYRONINE; Standing  - FREE THYROXINE; Standing    10. Anxiety  renewed  - ALPRAZolam (XANAX) 0.25 MG Tab; Take 1 Tab by mouth 2 times a day as needed for Sleep or Anxiety for up to 90 days. TAKE ONE TABLET BY MOUTH TWICE A DAY  Dispense: 180 Tab; Refill: 0    Follow up in 3 months, sooner prn.

## 2020-09-04 ENCOUNTER — PHYSICAL THERAPY (OUTPATIENT)
Dept: PHYSICAL THERAPY | Facility: REHABILITATION | Age: 85
End: 2020-09-04
Attending: PHYSICAL MEDICINE & REHABILITATION
Payer: MEDICARE

## 2020-09-04 DIAGNOSIS — M48.062 SPINAL STENOSIS OF LUMBAR REGION WITH NEUROGENIC CLAUDICATION: ICD-10-CM

## 2020-09-04 DIAGNOSIS — M51.36 LUMBAR DEGENERATIVE DISC DISEASE: ICD-10-CM

## 2020-09-04 DIAGNOSIS — Z91.81 RISK FOR FALLS: ICD-10-CM

## 2020-09-04 PROCEDURE — 97112 NEUROMUSCULAR REEDUCATION: CPT

## 2020-09-04 PROCEDURE — 97110 THERAPEUTIC EXERCISES: CPT

## 2020-09-04 NOTE — OP THERAPY DAILY TREATMENT
Outpatient Physical Therapy  DAILY TREATMENT     [facility] [place]  [address], [address]  [place] [address]  Phone:  [phone]  Fax:  [phone]    Date: 09/04/2020    Patient: Eun Be  YOB: 1935  MRN: [id number]     Time Calculation    Start time: 1535  Stop time: 1600 Time Calculation (min): 25 minutes     Chief Complaint: back pain/ balance    Visit #: 11    SUBJECTIVE:   Pt states she has not been into therapy lately because her daughter was not feeling well. Pt comes to appt late. Notes no pain today and no falls.        OBJECTIVE:  Current objective measures: forward flexion bias. Slight forward flexion in resting posture. Ambulates with shuffling gait pattern with grabbing on walls for balance.  Oxygen saturation at start of appt 92%.          Therapeutic Exercises (CPT 25611):     1. Nustep, lvl 3 x 8min, SA02 remained over 88%, rest breaks for oxygen saturation    Therapeutic Treatments and Modalities:     1. Neuromuscular Re-education (CPT 69190), standing balance on uneven/unstable surfaces x 2min, EC balance unable. standing and reaching balance activites without hand holds/ support. side stepping with minimal UE support (1UE), EC static balance on levl surfaces and on unstable surfaces.     Time-based treatments/modalities:    Physical Therapy Timed Treatment Charges  Neuromusc re-ed, balance, coor, post minutes (CPT 57886): 22 minutes  Therapeutic exercise minutes (CPT 99077): 8 minutes      Pain rating (1-10) before treatment:  NA  Pain rating (1-10) after treatment:  NA    ASSESSMENT:   Response to treatment: Pt has not followed recommendations for AD use. No mention of pain during appt. Treatment limited by oxygen saturation at start of appt. High fall risk without reported back pain so will continue to stress balance issues.     PLAN/RECOMMENDATIONS:   Plan for treatment: therapy treatment to continue next visit.  Planned interventions  for next visit: continue with current treatment.

## 2020-09-08 DIAGNOSIS — I48.0 PAROXYSMAL ATRIAL FIBRILLATION (HCC): ICD-10-CM

## 2020-09-08 DIAGNOSIS — D68.318 CIRCULATING ANTICOAGULANT DISORDER (HCC): ICD-10-CM

## 2020-09-08 NOTE — PROGRESS NOTES
Please let Eun know that I was able to talk with her cardiologist and she is ok with the change from xarelto to eliquis and requests we use the 5 mg dose BID.  So I am sending that dose to the pharmacy.

## 2020-09-09 ENCOUNTER — PHYSICAL THERAPY (OUTPATIENT)
Dept: PHYSICAL THERAPY | Facility: REHABILITATION | Age: 85
End: 2020-09-09
Attending: PHYSICAL MEDICINE & REHABILITATION
Payer: MEDICARE

## 2020-09-09 DIAGNOSIS — M51.36 LUMBAR DEGENERATIVE DISC DISEASE: ICD-10-CM

## 2020-09-09 DIAGNOSIS — Z91.81 RISK FOR FALLS: ICD-10-CM

## 2020-09-09 PROCEDURE — 97110 THERAPEUTIC EXERCISES: CPT

## 2020-09-09 PROCEDURE — 97112 NEUROMUSCULAR REEDUCATION: CPT

## 2020-09-09 NOTE — OP THERAPY DAILY TREATMENT
Outpatient Physical Therapy  DAILY TREATMENT     [facility] [place]  [address], [address]  [place] [address]  Phone:  [phone]  Fax:  [phone]    Date: 09/09/2020    Patient: Eun Be  YOB: 1935  MRN: [id number]     Time Calculation    Start time: 0335  Stop time: 0400 Time Calculation (min): 25 minutes     Chief Complaint: back pain/ balance    Visit #: 12    SUBJECTIVE:  Pt reports not using her walker still, no new falls reported.         OBJECTIVE:  Current objective measures: forward flexion bias. Slight forward flexion in resting posture. Ambulates with shuffling gait pattern with grabbing on walls for balance.  Oxygen saturation at start of appt 92%.          Therapeutic Exercises (CPT 80076):     1. Nustep, lvl 3 x 8min, SA02 remained over 88%, rest breaks for oxygen saturation    Therapeutic Treatments and Modalities:     1. Neuromuscular Re-education (CPT 08732), standing balance on uneven/unstable surfaces x 2min, EC balance unable. standing and reaching balance activites without hand holds/ support. side stepping with minimal UE support (1UE), EC static balance on levl surfaces and on unstable surfaces.     Time-based treatments/modalities:    Physical Therapy Timed Treatment Charges  Therapeutic exercise minutes (CPT 93143): 25 minutes      Pain rating (1-10) before treatment:  NA  Pain rating (1-10) after treatment:  NA    ASSESSMENT:   Response to treatment: Pt has not followed recommendations for AD use. Has demonstrated plateau with improvement in terms of movement and endurance. No mention of pain during appt. Pt to promote to independence with HEP at next visit.      PLAN/RECOMMENDATIONS:   Plan for treatment: therapy treatment to continue next visit.  Planned interventions for next visit: continue with current treatment.

## 2020-09-30 ENCOUNTER — OFFICE VISIT (OUTPATIENT)
Dept: PHYSICAL MEDICINE AND REHAB | Facility: MEDICAL CENTER | Age: 85
End: 2020-09-30
Payer: MEDICARE

## 2020-09-30 VITALS
HEART RATE: 53 BPM | HEIGHT: 71 IN | TEMPERATURE: 97.9 F | SYSTOLIC BLOOD PRESSURE: 144 MMHG | BODY MASS INDEX: 19.81 KG/M2 | DIASTOLIC BLOOD PRESSURE: 72 MMHG | OXYGEN SATURATION: 97 % | WEIGHT: 141.54 LBS

## 2020-09-30 DIAGNOSIS — M79.10 MYALGIA: ICD-10-CM

## 2020-09-30 DIAGNOSIS — M48.062 SPINAL STENOSIS OF LUMBAR REGION WITH NEUROGENIC CLAUDICATION: ICD-10-CM

## 2020-09-30 DIAGNOSIS — M54.41 CHRONIC BILATERAL LOW BACK PAIN WITH BILATERAL SCIATICA: ICD-10-CM

## 2020-09-30 DIAGNOSIS — M54.16 LUMBAR RADICULOPATHY: ICD-10-CM

## 2020-09-30 DIAGNOSIS — G89.29 CHRONIC BILATERAL LOW BACK PAIN WITH BILATERAL SCIATICA: ICD-10-CM

## 2020-09-30 DIAGNOSIS — M51.36 LUMBAR DEGENERATIVE DISC DISEASE: ICD-10-CM

## 2020-09-30 DIAGNOSIS — M25.559 HIP PAIN: ICD-10-CM

## 2020-09-30 DIAGNOSIS — M54.42 CHRONIC BILATERAL LOW BACK PAIN WITH BILATERAL SCIATICA: ICD-10-CM

## 2020-09-30 DIAGNOSIS — M47.816 LUMBAR SPONDYLOSIS: ICD-10-CM

## 2020-09-30 DIAGNOSIS — M43.16 SPONDYLOLISTHESIS OF LUMBAR REGION: ICD-10-CM

## 2020-09-30 DIAGNOSIS — Z91.81 RISK FOR FALLS: ICD-10-CM

## 2020-09-30 PROCEDURE — 99214 OFFICE O/P EST MOD 30 MIN: CPT | Performed by: PHYSICAL MEDICINE & REHABILITATION

## 2020-09-30 RX ORDER — GABAPENTIN 100 MG/1
100-300 CAPSULE ORAL NIGHTLY PRN
Qty: 90 CAP | Refills: 3 | Status: SHIPPED | OUTPATIENT
Start: 2020-09-30 | End: 2021-03-22

## 2020-09-30 ASSESSMENT — FIBROSIS 4 INDEX: FIB4 SCORE: 1.65

## 2020-09-30 ASSESSMENT — PAIN SCALES - GENERAL: PAINLEVEL: 8=MODERATE-SEVERE PAIN

## 2020-09-30 NOTE — PROGRESS NOTES
Follow up patient note  Interventional spine and sports physiatry, Physical medicine rehabilitation      Chief complaint:   Chief Complaint   Patient presents with   • Follow-Up          HISTORY    Please see new patient note by Dr Brady,  for more details.     HPI  Patient identification: Eun Be  1935,   female  With Diagnoses of Lumbar radiculopathy, Spinal stenosis of lumbar region with neurogenic claudication, Lumbar spondylosis, Lumbar degenerative disc disease, Spondylolisthesis of lumbar region, Risk for falls, Chronic bilateral low back pain with bilateral sciatica, Myalgia, and Hip pain were pertinent to this visit.       Chronic bilateral low back pain radiating down the anterior and posterior aspect of the bilateral legs.  This is worse with lumbar extension.  Worse with walking.  She is only able to walk for approximately 1 row of a store if she does not have a car.  If she does have a cart she can walk through several miles at the store and complete her shopping.  She has a walker but does not like to use it.  She is tried using this at physical therapy and she does prefers not to use a walker. 6/10 she denies falls or trauma.         ROS Red Flags :   Fever, Chills, Sweats: Denies  Involuntary Weight Loss: Denies  Bowel/Bladder Incontinence: Denies  Saddle Anesthesia: Denies        PMHx:   Past Medical History:   Diagnosis Date   • Anxiety    • Arrhythmia    • Arthritis    • Backpain    • Bronchitis    • CATARACT    • Chronic lower back pain 3/11/2014   • Chronic pain    • COPD    • Depression    • EMPHYSEMA    • GERD (gastroesophageal reflux disease)    • Heart burn     hiatal hernia   • HTN (hypertension)    • Hypertension    • Hypertriglyceridemia 2013   • Hypothyroid    • IGT (impaired glucose tolerance)    • MEDICAL HOME    • Osteopenia    • Pain     left side of back   • Panic disorder    • Paroxysmal atrial fibrillation (HCC)    • Personal history of venous  thrombosis and embolism     right leg   • Pneumonia     1974   • Renal disorder     cysts   • Rheumatic fever     4 or 5 years old   • Rheumatic fever     as a kid per patient.   • Thyroid disease    • Unspecified disorder of thyroid        PSHx:   Past Surgical History:   Procedure Laterality Date   • PB EXCIS/UNROOF RENAL CYST  1977   • OTHER  1977    cysts removed from left kidney   • HYSTERECTOMY RADICAL  1963   • OTHER  1963    hemorrhoidectomy   • HEMORRHOIDECTOMY  1963   • HYSTERECTOMY, TOTAL ABDOMINAL  26 years old       Family history   Family History   Problem Relation Age of Onset   • Stroke Brother    • Heart Disease Other          Medications:   Outpatient Medications Marked as Taking for the 9/30/20 encounter (Office Visit) with Doc Brady M.D.   Medication Sig Dispense Refill   • gabapentin (NEURONTIN) 100 MG Cap Take 1-3 Caps by mouth at bedtime as needed (pain). 90 Cap 3   • apixaban (ELIQUIS) 2.5mg Tab Take 2 Tabs by mouth 2 Times a Day. 180 Tab 3   • levothyroxine (SYNTHROID) 100 MCG Tab Take 100 mcg by mouth Every morning on an empty stomach. Alternate with the 88mcg     • ALPRAZolam (XANAX) 0.25 MG Tab Take 1 Tab by mouth 2 times a day as needed for Sleep or Anxiety for up to 90 days. TAKE ONE TABLET BY MOUTH TWICE A  Tab 0   • Mometasone Furo-Formoterol Fum (DULERA) 200-5 MCG/ACT Aerosol Inhale 1 Puff by mouth 2 Times a Day. Dispense samples 1 Each 0   • levothyroxine (SYNTHROID) 88 MCG Tab Alternate with 100 mcg every other day. 45 Tab 3   • losartan (COZAAR) 100 MG Tab Take 1 Tab by mouth every day. Hold for systolic blood pressure <100 100 Tab 3   • omeprazole (PRILOSEC) 40 MG delayed-release capsule Take 1 Cap by mouth every day. 90 Cap 1   • hydrOXYzine HCl (ATARAX) 50 MG Tab TAKE 1 TAB BY MOUTH 3 TIMES A DAY AS NEEDED. ITCHING 90 Tab 2   • propafenone (RYTHMOL) 150 MG Tab TAKE 1 TABLET BY MOUTH TWICE A  Tab 3   • simvastatin (ZOCOR) 20 MG Tab TAKE 1 TABLET BY MOUTH IN  THE EVENING 100 Tab 3   • metoprolol (LOPRESSOR) 25 MG Tab TAKE 1 TAB BY MOUTH TWO TIMES A DAY. 200 Tab 3   • fenofibrate micronized (LOFIBRA) 134 MG capsule Take 1 Cap by mouth every day. 100 Cap 3   • albuterol 108 (90 Base) MCG/ACT Aero Soln inhalation aerosol INHALE 2 PUFFS BY MOUTH EVERY 6 HOURS AS NEEDED FOR SHORTNESS OF BREATH 8.5 Inhaler 11   • fluticasone (FLONASE) 50 MCG/ACT nasal spray SPRAY 1 SPRAY IN NOSE 2 TIMES A DAY. EACH NOSTRIL 3 Bottle 0   • clobetasol (TEMOVATE) 0.05 % Cream Apply 1 Each to affected area(s) 2 times a day as needed.  5        Current Outpatient Medications on File Prior to Visit   Medication Sig Dispense Refill   • apixaban (ELIQUIS) 2.5mg Tab Take 2 Tabs by mouth 2 Times a Day. 180 Tab 3   • levothyroxine (SYNTHROID) 100 MCG Tab Take 100 mcg by mouth Every morning on an empty stomach. Alternate with the 88mcg     • ALPRAZolam (XANAX) 0.25 MG Tab Take 1 Tab by mouth 2 times a day as needed for Sleep or Anxiety for up to 90 days. TAKE ONE TABLET BY MOUTH TWICE A  Tab 0   • Mometasone Furo-Formoterol Fum (DULERA) 200-5 MCG/ACT Aerosol Inhale 1 Puff by mouth 2 Times a Day. Dispense samples 1 Each 0   • levothyroxine (SYNTHROID) 88 MCG Tab Alternate with 100 mcg every other day. 45 Tab 3   • losartan (COZAAR) 100 MG Tab Take 1 Tab by mouth every day. Hold for systolic blood pressure <100 100 Tab 3   • omeprazole (PRILOSEC) 40 MG delayed-release capsule Take 1 Cap by mouth every day. 90 Cap 1   • hydrOXYzine HCl (ATARAX) 50 MG Tab TAKE 1 TAB BY MOUTH 3 TIMES A DAY AS NEEDED. ITCHING 90 Tab 2   • propafenone (RYTHMOL) 150 MG Tab TAKE 1 TABLET BY MOUTH TWICE A  Tab 3   • simvastatin (ZOCOR) 20 MG Tab TAKE 1 TABLET BY MOUTH IN THE EVENING 100 Tab 3   • metoprolol (LOPRESSOR) 25 MG Tab TAKE 1 TAB BY MOUTH TWO TIMES A DAY. 200 Tab 3   • fenofibrate micronized (LOFIBRA) 134 MG capsule Take 1 Cap by mouth every day. 100 Cap 3   • albuterol 108 (90 Base) MCG/ACT Aero Soln  inhalation aerosol INHALE 2 PUFFS BY MOUTH EVERY 6 HOURS AS NEEDED FOR SHORTNESS OF BREATH 8.5 Inhaler 11   • fluticasone (FLONASE) 50 MCG/ACT nasal spray SPRAY 1 SPRAY IN NOSE 2 TIMES A DAY. EACH NOSTRIL 3 Bottle 0   • clobetasol (TEMOVATE) 0.05 % Cream Apply 1 Each to affected area(s) 2 times a day as needed.  5   • FIBER SELECT GUMMIES PO Take 1 Tab by mouth 2 times a day as needed (for constipation).       No current facility-administered medications on file prior to visit.          Allergies:   Allergies   Allergen Reactions   • Asa [Aspirin]    • Latex    • Penicillins    • Tape Hives, Itching and Swelling   • Wasp Venom Swelling       Social Hx:   Social History     Socioeconomic History   • Marital status:      Spouse name: Not on file   • Number of children: 2   • Years of education: Not on file   • Highest education level: Not on file   Occupational History   • Not on file   Social Needs   • Financial resource strain: Not on file   • Food insecurity     Worry: Not on file     Inability: Not on file   • Transportation needs     Medical: Not on file     Non-medical: Not on file   Tobacco Use   • Smoking status: Former Smoker     Packs/day: 0.25     Years: 30.00     Pack years: 7.50     Types: Cigarettes     Quit date:      Years since quittin.7   • Smokeless tobacco: Never Used   • Tobacco comment: quit  ppd x ?yrs (on and off for years since age 15)   Substance and Sexual Activity   • Alcohol use: Yes     Alcohol/week: 0.0 oz     Frequency: 4 or more times a week     Drinks per session: 1 or 2     Comment: wine, 1 glass every other night   • Drug use: No   • Sexual activity: Never   Lifestyle   • Physical activity     Days per week: Not on file     Minutes per session: Not on file   • Stress: Not on file   Relationships   • Social connections     Talks on phone: Not on file     Gets together: Not on file     Attends Jew service: Not on file     Active member of club or  "organization: Not on file     Attends meetings of clubs or organizations: Not on file     Relationship status: Not on file   • Intimate partner violence     Fear of current or ex partner: Not on file     Emotionally abused: Not on file     Physically abused: Not on file     Forced sexual activity: Not on file   Other Topics Concern   •  Service No   • Blood Transfusions No   • Caffeine Concern Yes   • Occupational Exposure No   • Hobby Hazards No   • Sleep Concern No   • Stress Concern Yes   • Weight Concern No   • Special Diet No   • Back Care No   • Exercise Yes   • Bike Helmet No   • Seat Belt Yes   • Self-Exams No   Social History Narrative   • Not on file         EXAMINATION     Physical Exam:   Vitals: /72 (BP Location: Right arm, Patient Position: Sitting, BP Cuff Size: Adult)   Pulse (!) 53   Temp 36.6 °C (97.9 °F) (Temporal)   Ht 1.803 m (5' 11\")   Wt 64.2 kg (141 lb 8.6 oz)   SpO2 97%     Constitutional:   Body Habitus: Body mass index is 19.74 kg/m².  Cooperation: Fully cooperates with exam  Appearance: Well-groomed no disheveled    Respiratory-  breathing comfortable on room air, no audible wheezing  Cardiovascular- capillary refills less than 2 seconds. No lower extremity edema is noted.   Psychiatric- alert and oriented ×3. Normal affect.    MSK and Neuro: -    There are no signs of infection around the injection sites.    decreased active range of motion with flexion, lateral flexion, and rotation bilaterally.   There is decreased active range of motion with lumbar extension.    There is  pain with lumbar extension.   There is pain with facet loading maneuver (extension rotation) with axial low back pain on the BILATERAL side(s)    Palpation:   No tenderness to palpation in midline at T1-T12 levels. No tenderness to palpation in the left and right of the midline T1-L5, NEGATIVE for tenderness to palpation to the para-midline region in the lower lumbar levels.  palpation over SI " joint: negative bilaterally    palpation in hip or over the gluteus medius tendon insertion: negative bilaterally      Lumbar spine Special tests  Neuro tension  Straight leg test positive bilaterally    Slump test positive bilaterally      Key points for the international standards for neurological classification of spinal cord injury (ISNCSCI) to light touch.     Dermatome R L                                      L2 2 2   L3 2 2   L4 2 2   L5 2 2   S1 2 2   S2 2 2         Motor Exam Lower Extremities    ? Myotome R L   Hip flexion L2 5 5   Knee extension L3 5 5   Ankle dorsiflexion L4 5 5   Toe extension L5 5 5   Ankle plantarflexion S1 5 5               MEDICAL DECISION MAKING    DATA    Labs:   Lab Results   Component Value Date/Time    SODIUM 138 08/24/2020 03:31 PM    POTASSIUM 4.3 08/24/2020 03:31 PM    CHLORIDE 102 08/24/2020 03:31 PM    CO2 25 08/24/2020 03:31 PM    GLUCOSE 106 (H) 08/24/2020 03:31 PM    BUN 24 (H) 08/24/2020 03:31 PM    CREATININE 0.87 08/24/2020 03:31 PM    CREATININE 0.76 03/21/2011 12:00 AM    BUNCREATRAT 25 03/21/2011 12:00 AM    GLOMRATE >59 03/21/2011 12:00 AM        Lab Results   Component Value Date/Time    PROTHROMBTM 13.3 09/15/2019 10:25 AM    INR 1.00 09/15/2019 10:25 AM        Lab Results   Component Value Date/Time    WBC 4.8 05/22/2020 02:32 PM    WBC 4.9 03/21/2011 12:00 AM    RBC 4.39 05/22/2020 02:32 PM    RBC 4.68 03/21/2011 12:00 AM    HEMOGLOBIN 13.9 05/22/2020 02:32 PM    HEMATOCRIT 42.9 05/22/2020 02:32 PM    MCV 97.7 05/22/2020 02:32 PM    MCV 96 03/21/2011 12:00 AM    MCH 31.7 05/22/2020 02:32 PM    MCH 32.5 03/21/2011 12:00 AM    MCHC 32.4 (L) 05/22/2020 02:32 PM    MPV 10.9 05/22/2020 02:32 PM    NEUTSPOLYS 72.30 (H) 05/22/2020 02:32 PM    LYMPHOCYTES 18.10 (L) 05/22/2020 02:32 PM    MONOCYTES 6.90 05/22/2020 02:32 PM    EOSINOPHILS 1.90 05/22/2020 02:32 PM    BASOPHILS 0.60 05/22/2020 02:32 PM        Lab Results   Component Value Date/Time    HBA1C 5.7  10/09/2012 10:42 AM          Imaging:   I personally reviewed following images    MRI lumbar spine 6/17/2020  At L3-4 there is moderate to severe central canal stenosis.  No other areas of high-grade central canal stenosis.  There is mild to moderate bilateral neuroforaminal stenosis worst at the L3-4 level.  Facet arthropathy is present bilaterally worst at L3-4 but also present at L4-5 and L5-S1.  Grade 1 spondylolisthesis L3 on L4.  There is mild neuroforaminal stenosis at L4-5 bilaterally.  No acute fractures are seen.      I reviewed the following radiology reports           Results for orders placed during the hospital encounter of 06/26/14   MR-BRAIN-W/O    Impression 1.  Age-related cortical atrophy.                          Results for orders placed during the hospital encounter of 06/26/14   MR-CERVICAL SPINE-W/O    Impression 1.  Minimal cervical spondylotic change at the C5-6 level with moderate bilateral neural foraminal narrowing right greater than left.    2.  Mild left-sided neural foraminal narrowing at the C4-5 level.           Results for orders placed during the hospital encounter of 06/17/20   MR-LUMBAR SPINE-W/O    Impression 1.  L3-4 moderate spinal stenosis    2.  L4-5 mild spinal canal narrowing without spinal stenosis    3.  Multilevel foraminal stenoses describing the findings section    4.  Degenerative subluxations at L1-2 and L3-4    5.  Increase in spinal stenosis at L3-4 since prior MRI 6/28/2014            No follow up imaging is recommended per consensus guidelines of the 2019 ACR Incidental Findings Committee for probably benign incidental simple appearing renal cystic lesion(s) based on imaging criteria.                  Results for orders placed during the hospital encounter of 11/19/09   MR-PELVIS-WITH & W/O AND SEQUENCES    Impression IMPRESSION:     NO ABNORMALITIES SEEN.    MPW:caf      Preliminary report faxed to [phone] on 11/19/2009 at 4:45 p.m., caf    Read By JOSIE  MD LIDIA on Nov 19 2009  4:26PM  : MARIELENA Transcription Date: Nov 19 2009  4:43PM  THIS DOCUMENT HAS BEEN ELECTRONICALLY SIGNED BY: JOSIE MURILLO MD on Nov 20 2009  8:07AM                                                                                              Results for orders placed during the hospital encounter of 03/12/10   CT-ABDOMEN WITH & W/O    Impression IMPRESSION:     1. SIMPLE-APPEARING CYSTS BILATERALLY.  NO EVIDENCE OF ABNORMAL ENHANCING   RENAL MASS.     2. KIDNEYS ALSO DEMONSTRATE AREAS OF BILATERAL CORTICAL SCARRING AND   PROBABLE VASCULAR CALCIFICATIONS.      3. EXTENSIVE ATHEROSCLEROSIS.      JAL/alexys     Read By MARY BURGER MD on Mar 12 2010  1:06PM  : ALEXYS Transcription Date: Mar 12 2010  4:13PM  THIS DOCUMENT HAS BEEN ELECTRONICALLY SIGNED BY: MARY BURGER MD on   Mar 12 2010  4:45PM             Results for orders placed in visit on 05/20/14   CT-ABDOMEN-PELVIS W/O    Impression No acute abnormality is noted. The distal colon is decompressed which is the most likely explanation for mild wall thickening. Less likely explanation is a mild colitis    Mild distal colonic diverticulosis    Stable kidneys with areas of cortical scarring, bilateral cysts and small calcifications which are parenchymal and/or urolithiasis related. No obstructive uropathy is detected                       Results for orders placed during the hospital encounter of 08/18/15   CT-PELVIS W/O PLUS RECONS    Impression 1. No fracture or dislocation is identified.    2. Degenerative changes in the visualized lumbar spine.    3. Prominent atherosclerotic plaque.                      Results for orders placed during the hospital encounter of 10/09/19   DX-CHEST-2 VIEWS    Impression No active disease.                                          Results for orders placed during the hospital encounter of 08/18/15   DX-KNEE COMPLETE 4+ LEFT    Impression No evidence of acute fracture or  dislocation.        Results for orders placed during the hospital encounter of 09/15/19   DX-LUMBAR SPINE-2 OR 3 VIEWS    Impression 1.  No compression deformity or acute fracture is identified.    2.  Mild anterolisthesis at L3-L4.    3.  Degenerative disc disease and facet arthropathy is most prominent in the lower lumbar spine.        Results for orders placed during the hospital encounter of 09/15/19   DX-PELVIS-1 OR 2 VIEWS    Impression No pelvic fracture.                          Results for orders placed during the hospital encounter of 09/15/19   DX-THORACIC SPINE-2 VIEWS    Impression 1.  No compression deformity or acute fracture.    2.  Degenerative disc disease is most prominent in the mid thoracic spine area.                 DIAGNOSIS   Visit Diagnoses     ICD-10-CM   1. Lumbar radiculopathy  M54.16   2. Spinal stenosis of lumbar region with neurogenic claudication  M48.062   3. Lumbar spondylosis  M47.816   4. Lumbar degenerative disc disease  M51.36   5. Spondylolisthesis of lumbar region  M43.16   6. Risk for falls  Z91.81   7. Chronic bilateral low back pain with bilateral sciatica  M54.42    M54.41    G89.29   8. Myalgia  M79.10   9. Hip pain  M25.559         ASSESSMENT and PLAN:     Eun Be  1935,   female      Eun was seen today for follow-up.    Diagnoses and all orders for this visit:    Lumbar radiculopathy  -     REFERRAL TO PHYSICAL THERAPY Reason for Therapy: Eval/Treat/Report  -     gabapentin (NEURONTIN) 100 MG Cap; Take 1-3 Caps by mouth at bedtime as needed (pain).    Spinal stenosis of lumbar region with neurogenic claudication  -     REFERRAL TO PHYSICAL THERAPY Reason for Therapy: Eval/Treat/Report  -     gabapentin (NEURONTIN) 100 MG Cap; Take 1-3 Caps by mouth at bedtime as needed (pain).    Lumbar spondylosis  -     REFERRAL TO PHYSICAL THERAPY Reason for Therapy: Eval/Treat/Report  -     gabapentin (NEURONTIN) 100 MG Cap; Take 1-3 Caps by mouth at  bedtime as needed (pain).    Lumbar degenerative disc disease  -     REFERRAL TO PHYSICAL THERAPY Reason for Therapy: Eval/Treat/Report  -     gabapentin (NEURONTIN) 100 MG Cap; Take 1-3 Caps by mouth at bedtime as needed (pain).    Spondylolisthesis of lumbar region  -     REFERRAL TO PHYSICAL THERAPY Reason for Therapy: Eval/Treat/Report  -     gabapentin (NEURONTIN) 100 MG Cap; Take 1-3 Caps by mouth at bedtime as needed (pain).    Risk for falls  -     REFERRAL TO PHYSICAL THERAPY Reason for Therapy: Eval/Treat/Report  -     gabapentin (NEURONTIN) 100 MG Cap; Take 1-3 Caps by mouth at bedtime as needed (pain).    Chronic bilateral low back pain with bilateral sciatica  -     REFERRAL TO PHYSICAL THERAPY Reason for Therapy: Eval/Treat/Report  -     gabapentin (NEURONTIN) 100 MG Cap; Take 1-3 Caps by mouth at bedtime as needed (pain).    Myalgia  -     REFERRAL TO PHYSICAL THERAPY Reason for Therapy: Eval/Treat/Report  -     gabapentin (NEURONTIN) 100 MG Cap; Take 1-3 Caps by mouth at bedtime as needed (pain).    Hip pain  -     REFERRAL TO PHYSICAL THERAPY Reason for Therapy: Eval/Treat/Report  -     gabapentin (NEURONTIN) 100 MG Cap; Take 1-3 Caps by mouth at bedtime as needed (pain).        Emergency precautions discussed.  We discussed a referral to spine surgery and the patient declined.  We discussed an epidural steroid injection and we will plan to continue with a bilateral L3-4 versus L4-5 transforaminal epidural steroid injection with fluoroscopic guidance.    The risks benefits and alternatives to this procedure were discussed and the patient wishes to proceed with the procedure. Risks include but are not limited to damage to surrounding structures, infection, bleeding, worsening of pain which can be permanent, weakness which can be permanent. Benefits include pain relief, improved function. Alternatives includes not doing the procedure.      Ideally the patient would be off of Xarelto for 2 days prior  to the procedure.  This is if the patient has clearance from her cardiology or PCP Dr. Estrella.  We discussed proceeding with the epidural on or off of Xarelto and the patient understands the risks and wishes to proceed.    Follow up: after the above procedure    Thank you for allowing me to participate in the care of this patient. If you have any questions please not hesitate to contact me.             Please note that this dictation was created using voice recognition software. I have made every reasonable attempt to correct obvious errors but there may be errors of grammar and content that I may have overlooked prior to finalization of this note.      Doc Brady MD  Interventional Spine and Sports Physiatry  Physical Medicine and Rehabilitation  [facility]

## 2020-10-09 ENCOUNTER — PATIENT MESSAGE (OUTPATIENT)
Dept: MEDICAL GROUP | Facility: PHYSICIAN GROUP | Age: 85
End: 2020-10-09

## 2020-10-09 NOTE — TELEPHONE ENCOUNTER
From: Eun Be  To: Libertad Estrella M.D.  Sent: 10/9/2020 8:04 AM PDT  Subject: Prescription Question    Hi, can you please remind me when you want Mom to get the TSH test so that we can determine which dose of Levothyroxine she will be taking? She has been alternating between the 88mg and 100mg for about 5 weeks now.   Thanks,  Faviola

## 2020-10-19 ENCOUNTER — TELEPHONE (OUTPATIENT)
Dept: PHYSICAL THERAPY | Facility: REHABILITATION | Age: 85
End: 2020-10-19

## 2020-10-19 NOTE — OP THERAPY DISCHARGE SUMMARY
Outpatient Physical Therapy  DISCHARGE SUMMARY NOTE      [facility] [place] [facility]  [address], [address]  [place] [address]  Phone:  [phone]  Fax:  [phone]    Date of Visit: 10/19/2020    Patient: Eun Be  YOB: 1935  MRN: [id number]     Referring Provider: Doc Brady M.D.   Referring Diagnosis Risk for falls [Z91.81];Lumbar degenerative disc disease [M51.36];Spinal stenosis of lumbar region with neurogenic claudication [M48.062];Lumbar radiculopathy [M54.16];Lumbar spondylosis [M47.816];Chronic bilateral low back pain with bilateral sciatica [M54.42, M54.41, G89.29]         Functional Assessment Used        Your patient is being discharged from Physical Therapy with the following comments:   · Goals not met    Comments:  Pt continues to be a fall risk and is non-compliant with recommendations for walker use.      Limitations Remaining:  Fall risk    Recommendations:  F/u with PCP, referring physician, recommend 24 hour care if patient is to continue to ambulate without AD.     Rey Siegel, PT, DPT    Date: 10/19/2020

## 2020-10-21 ENCOUNTER — PHARMACY VISIT (OUTPATIENT)
Dept: PHARMACY | Facility: MEDICAL CENTER | Age: 85
End: 2020-10-21
Payer: COMMERCIAL

## 2020-10-21 PROCEDURE — RXMED WILLOW AMBULATORY MEDICATION CHARGE: Performed by: FAMILY MEDICINE

## 2020-10-27 RX ORDER — OMEPRAZOLE 40 MG/1
CAPSULE, DELAYED RELEASE ORAL
Qty: 90 CAP | Refills: 1 | Status: SHIPPED | OUTPATIENT
Start: 2020-10-27

## 2020-11-03 ENCOUNTER — OFFICE VISIT (OUTPATIENT)
Dept: CARDIOLOGY | Facility: MEDICAL CENTER | Age: 85
End: 2020-11-03
Payer: MEDICARE

## 2020-11-03 ENCOUNTER — PHYSICAL THERAPY (OUTPATIENT)
Dept: PHYSICAL THERAPY | Facility: REHABILITATION | Age: 85
End: 2020-11-03
Attending: PHYSICAL MEDICINE & REHABILITATION
Payer: MEDICARE

## 2020-11-03 VITALS
HEART RATE: 59 BPM | OXYGEN SATURATION: 97 % | SYSTOLIC BLOOD PRESSURE: 118 MMHG | BODY MASS INDEX: 19.46 KG/M2 | HEIGHT: 71 IN | WEIGHT: 139 LBS | DIASTOLIC BLOOD PRESSURE: 56 MMHG

## 2020-11-03 DIAGNOSIS — M54.41 CHRONIC BILATERAL LOW BACK PAIN WITH BILATERAL SCIATICA: ICD-10-CM

## 2020-11-03 DIAGNOSIS — M79.10 MYALGIA: ICD-10-CM

## 2020-11-03 DIAGNOSIS — I10 ESSENTIAL HYPERTENSION: ICD-10-CM

## 2020-11-03 DIAGNOSIS — M54.42 CHRONIC BILATERAL LOW BACK PAIN WITH BILATERAL SCIATICA: ICD-10-CM

## 2020-11-03 DIAGNOSIS — E78.2 MIXED HYPERLIPIDEMIA: ICD-10-CM

## 2020-11-03 DIAGNOSIS — M43.16 SPONDYLOLISTHESIS OF LUMBAR REGION: ICD-10-CM

## 2020-11-03 DIAGNOSIS — M25.559 HIP PAIN: ICD-10-CM

## 2020-11-03 DIAGNOSIS — M51.36 LUMBAR DEGENERATIVE DISC DISEASE: ICD-10-CM

## 2020-11-03 DIAGNOSIS — I48.0 PAROXYSMAL ATRIAL FIBRILLATION (HCC): ICD-10-CM

## 2020-11-03 DIAGNOSIS — M47.816 LUMBAR SPONDYLOSIS: ICD-10-CM

## 2020-11-03 DIAGNOSIS — Z79.899 ENCOUNTER FOR LONG-TERM (CURRENT) USE OF HIGH-RISK MEDICATION: ICD-10-CM

## 2020-11-03 DIAGNOSIS — Z91.81 RISK FOR FALLS: ICD-10-CM

## 2020-11-03 DIAGNOSIS — M54.16 LUMBAR RADICULOPATHY: ICD-10-CM

## 2020-11-03 DIAGNOSIS — M48.062 SPINAL STENOSIS OF LUMBAR REGION WITH NEUROGENIC CLAUDICATION: ICD-10-CM

## 2020-11-03 DIAGNOSIS — G89.29 CHRONIC BILATERAL LOW BACK PAIN WITH BILATERAL SCIATICA: ICD-10-CM

## 2020-11-03 DIAGNOSIS — R00.2 PALPITATIONS: Primary | ICD-10-CM

## 2020-11-03 DIAGNOSIS — R06.09 DYSPNEA ON EXERTION: ICD-10-CM

## 2020-11-03 PROCEDURE — 99215 OFFICE O/P EST HI 40 MIN: CPT | Performed by: INTERNAL MEDICINE

## 2020-11-03 PROCEDURE — 97161 PT EVAL LOW COMPLEX 20 MIN: CPT

## 2020-11-03 RX ORDER — PROPAFENONE HYDROCHLORIDE 150 MG/1
150 TABLET, COATED ORAL EVERY 8 HOURS
Qty: 300 TAB | Refills: 3 | Status: SHIPPED | OUTPATIENT
Start: 2020-11-03 | End: 2022-01-20 | Stop reason: SDUPTHER

## 2020-11-03 RX ORDER — PROPAFENONE HYDROCHLORIDE 150 MG/1
150 TABLET, COATED ORAL EVERY 8 HOURS
Qty: 300 TAB | Refills: 3 | Status: SHIPPED | OUTPATIENT
Start: 2020-11-03 | End: 2020-11-03 | Stop reason: SDUPTHER

## 2020-11-03 ASSESSMENT — ENCOUNTER SYMPTOMS
FALLS: 0
LOSS OF CONSCIOUSNESS: 0
ABDOMINAL PAIN: 0
SHORTNESS OF BREATH: 1
DEPRESSION: 0
PND: 0
DIZZINESS: 0
PALPITATIONS: 1
ORTHOPNEA: 0

## 2020-11-03 ASSESSMENT — BALANCE ASSESSMENTS
BALANCE - STANDING STATIC: POOR -
BALANCE - SITTING STATIC: NORMAL
BALANCE - STANDING DYNAMIC: TRACE
BALANCE - SITTING DYNAMIC: NORMAL

## 2020-11-03 ASSESSMENT — FIBROSIS 4 INDEX: FIB4 SCORE: 1.65

## 2020-11-03 NOTE — PROGRESS NOTES
Chief Complaint   Patient presents with   • Atrial Fibrillation   • Aortic Atherosclerosis     F/V Dx: Carotid atherosclerosis   • Hyperlipidemia     F/V Dx: Mixed    • HTN (Controlled)       Subjective:   Eun Be is a 84-year-old female presenting to clinic for follow-up on atrial fibrillation.    Pertinent history:  Atrial fibrillation  Hypertension  Hyperlipidemia  COPD    Patient reports being in her usual state of health till about couple of months ago when she started noticing increasing episodes of atrial fibrillation.  She has had at least 2 or 3 episodes almost all of them last over an hour.  For one of these episodes she had to call EMS.  She has been compliant with her medications.  Most of her symptoms start when she gets short of breath usually because of her mask.  She has been told that she can take an extra dose of propafenone if and when her symptoms occur however usually takes 45 minutes before her medications kick in.    Her blood pressures have been well controlled, like today.    For hyperlipidemia she is on fenofibrate and simvastatin which she is tolerating well.    Past Medical History:   Diagnosis Date   • Anxiety    • Arrhythmia    • Arthritis    • Backpain    • Bronchitis    • CATARACT    • Chronic lower back pain 3/11/2014   • Chronic pain    • COPD    • Depression    • EMPHYSEMA    • GERD (gastroesophageal reflux disease)    • Heart burn     hiatal hernia   • HTN (hypertension)    • Hypertension    • Hypertriglyceridemia 11/7/2013   • Hypothyroid    • IGT (impaired glucose tolerance)    • MEDICAL HOME    • Osteopenia    • Pain     left side of back   • Panic disorder    • Paroxysmal atrial fibrillation (HCC)    • Personal history of venous thrombosis and embolism     right leg   • Pneumonia     1974   • Renal disorder     cysts   • Rheumatic fever     4 or 5 years old   • Rheumatic fever     as a kid per patient.   • Thyroid disease    • Unspecified disorder of thyroid       Past Surgical History:   Procedure Laterality Date   • PB EXCIS/UNROOF RENAL CYST     • OTHER      cysts removed from left kidney   • HYSTERECTOMY RADICAL     • OTHER      hemorrhoidectomy   • HEMORRHOIDECTOMY     • HYSTERECTOMY, TOTAL ABDOMINAL  26 years old     Family History   Problem Relation Age of Onset   • Stroke Brother    • Heart Disease Other      Social History     Socioeconomic History   • Marital status:      Spouse name: Not on file   • Number of children: 2   • Years of education: Not on file   • Highest education level: Not on file   Occupational History   • Not on file   Social Needs   • Financial resource strain: Not on file   • Food insecurity     Worry: Not on file     Inability: Not on file   • Transportation needs     Medical: Not on file     Non-medical: Not on file   Tobacco Use   • Smoking status: Former Smoker     Packs/day: 0.25     Years: 30.00     Pack years: 7.50     Types: Cigarettes     Quit date:      Years since quittin.8   • Smokeless tobacco: Never Used   • Tobacco comment: quit  ppd x ?yrs (on and off for years since age 15)   Substance and Sexual Activity   • Alcohol use: Yes     Alcohol/week: 0.0 oz     Frequency: 4 or more times a week     Drinks per session: 1 or 2     Comment: wine, 1 glass every other night   • Drug use: No   • Sexual activity: Never   Lifestyle   • Physical activity     Days per week: Not on file     Minutes per session: Not on file   • Stress: Not on file   Relationships   • Social connections     Talks on phone: Not on file     Gets together: Not on file     Attends [employer] service: Not on file     Active member of club or organization: Not on file     Attends meetings of clubs or organizations: Not on file     Relationship status: Not on file   • Intimate partner violence     Fear of current or ex partner: Not on file     Emotionally abused: Not on file     Physically abused: Not on file     Forced  sexual activity: Not on file   Other Topics Concern   •  Service No   • Blood Transfusions No   • Caffeine Concern Yes   • Occupational Exposure No   • Hobby Hazards No   • Sleep Concern No   • Stress Concern Yes   • Weight Concern No   • Special Diet No   • Back Care No   • Exercise Yes   • Bike Helmet No   • Seat Belt Yes   • Self-Exams No   Social History Narrative   • Not on file     Allergies   Allergen Reactions   • Asa [Aspirin]    • Latex    • Penicillins    • Tape Hives, Itching and Swelling   • Wasp Venom Swelling     Outpatient Encounter Medications as of 11/3/2020   Medication Sig Dispense Refill   • propafenone (RYTHMOL) 150 MG Tab Take 1 Tab by mouth every 8 hours. 300 Tab 3   • omeprazole (PRILOSEC) 40 MG delayed-release capsule TAKE 1 CAPSULE BY MOUTH EVERY DAY 90 Cap 1   • Mometasone Furo-Formoterol Fum 200-5 MCG/ACT Aerosol INHALE 1 PUFF ORALLY 2 TIMES DAILY 8.8 g 0   • gabapentin (NEURONTIN) 100 MG Cap Take 1-3 Caps by mouth at bedtime as needed (pain). 90 Cap 3   • apixaban (ELIQUIS) 2.5mg Tab Take 2 Tabs by mouth 2 Times a Day. (Patient taking differently: Take 5 mg by mouth 2 Times a Day. 1 TABLET) 180 Tab 3   • levothyroxine (SYNTHROID) 100 MCG Tab Take 100 mcg by mouth Every morning on an empty stomach. Alternate with the 88mcg     • ALPRAZolam (XANAX) 0.25 MG Tab Take 1 Tab by mouth 2 times a day as needed for Sleep or Anxiety for up to 90 days. TAKE ONE TABLET BY MOUTH TWICE A  Tab 0   • Mometasone Furo-Formoterol Fum (DULERA) 200-5 MCG/ACT Aerosol Inhale 1 Puff by mouth 2 Times a Day. Dispense samples 1 Each 0   • levothyroxine (SYNTHROID) 88 MCG Tab Alternate with 100 mcg every other day. 45 Tab 3   • losartan (COZAAR) 100 MG Tab Take 1 Tab by mouth every day. Hold for systolic blood pressure <100 100 Tab 3   • hydrOXYzine HCl (ATARAX) 50 MG Tab TAKE 1 TAB BY MOUTH 3 TIMES A DAY AS NEEDED. ITCHING 90 Tab 2   • simvastatin (ZOCOR) 20 MG Tab TAKE 1 TABLET BY MOUTH IN THE  "EVENING 100 Tab 3   • metoprolol (LOPRESSOR) 25 MG Tab TAKE 1 TAB BY MOUTH TWO TIMES A DAY. 200 Tab 3   • fenofibrate micronized (LOFIBRA) 134 MG capsule Take 1 Cap by mouth every day. 100 Cap 3   • albuterol 108 (90 Base) MCG/ACT Aero Soln inhalation aerosol INHALE 2 PUFFS BY MOUTH EVERY 6 HOURS AS NEEDED FOR SHORTNESS OF BREATH 8.5 Inhaler 11   • fluticasone (FLONASE) 50 MCG/ACT nasal spray SPRAY 1 SPRAY IN NOSE 2 TIMES A DAY. EACH NOSTRIL 3 Bottle 0   • clobetasol (TEMOVATE) 0.05 % Cream Apply 1 Each to affected area(s) 2 times a day as needed.  5   • [DISCONTINUED] propafenone (RYTHMOL) 150 MG Tab Take 1 Tab by mouth every 8 hours. TAKE 1 TABLET BY MOUTH TWICE A  Tab 3   • [DISCONTINUED] propafenone (RYTHMOL) 150 MG Tab TAKE 1 TABLET BY MOUTH TWICE A  Tab 3   • [DISCONTINUED] FIBER SELECT GUMMIES PO Take 1 Tab by mouth 2 times a day as needed (for constipation).       No facility-administered encounter medications on file as of 11/3/2020.      Review of Systems   Constitutional: Negative for malaise/fatigue.   Respiratory: Positive for shortness of breath.    Cardiovascular: Positive for palpitations. Negative for chest pain, orthopnea, leg swelling and PND.   Gastrointestinal: Negative for abdominal pain.   Musculoskeletal: Negative for falls.   Neurological: Negative for dizziness and loss of consciousness.   Psychiatric/Behavioral: Negative for depression.   All other systems reviewed and are negative.       Objective:   /56 (BP Location: Left arm, Patient Position: Sitting, BP Cuff Size: Adult)   Pulse (!) 59   Ht 1.803 m (5' 11\")   Wt 63 kg (139 lb)   LMP 01/01/1963   SpO2 97%   BMI 19.39 kg/m²     Physical Exam   Constitutional: She is oriented to person, place, and time. She appears well-developed and well-nourished. No distress.   HENT:   Head: Normocephalic and atraumatic.   Eyes: Conjunctivae are normal. No scleral icterus.   Neck: Normal range of motion. Neck supple. "   Cardiovascular: Normal rate, regular rhythm and normal heart sounds. Exam reveals no gallop and no friction rub.   No murmur heard.  Pulmonary/Chest: Effort normal and breath sounds normal. No respiratory distress. She has no wheezes. She has no rales.   Musculoskeletal:         General: No edema.   Neurological: She is alert and oriented to person, place, and time.   Skin: Skin is warm and dry. She is not diaphoretic.   Psychiatric: She has a normal mood and affect. Her behavior is normal. Judgment and thought content normal.   Nursing note and vitals reviewed.    Labs performed August 2020 were reviewed and showed normal creatinine.    Assessment:     1. Palpitations     2. Paroxysmal atrial fibrillation (HCC)  NM-CARDIAC STRESS TEST    Cardiac Event Monitor   3. Dyspnea on exertion  NM-CARDIAC STRESS TEST   4. Essential hypertension  propafenone (RYTHMOL) 150 MG Tab    DISCONTINUED: propafenone (RYTHMOL) 150 MG Tab   5. Mixed hyperlipidemia  Lipid Profile   6. Encounter for long-term (current) use of high-risk medication         Medical Decision Making:  Today's Assessment / Status / Plan:     Patient has known atrial fibrillation and reports having more frequent episodes recently.  Will increase propafenone to 150 mg 3 times a day.  Continue metoprolol at current dose.  She will be referred for a myocardial perfusion study given her atrial fibrillation and chronic use of propafenone.    She also reports dyspnea on exertion which is likely secondary to underlying COPD however could be an anginal equivalent also.  Stress test as above.    For hypertension, continue losartan at current dose.  Her blood pressure is at goal.    For hyperlipidemia, continue simvastatin and fenofibrate at current dose.  Lipid panel ordered today.    Return to clinic in 3 months for virtual visit or earlier if needed.    Thank you for allowing me to participate in the care of this patient. Please do not hesitate to contact me with any  questions.    Sonali Sandoval MD, [facility]  Cardiologist  [facility]    PLEASE NOTE: This dictation was created using voice recognition software.

## 2020-11-03 NOTE — OP THERAPY EVALUATION
Outpatient Physical Therapy  INITIAL EVALUATION    [facility] [place]  [address]  [place] [address]  Phone:  [phone]  Fax:  [phone]    Date of Evaluation: 11/03/2020    Patient: Eun Be  YOB: 1935  MRN: [id number]     Referring Provider: Doc Brady M.D.  [id number] [facility]  [address]  [place] [address]   Referring Diagnosis Risk for falls [Z91.81];Lumbar degenerative disc disease [M51.36];Spinal stenosis of lumbar region with neurogenic claudication [M48.062];Lumbar radiculopathy [M54.16];Lumbar spondylosis [M47.816];Chronic bilateral low back pain with bilateral sciatica [M54.42, M54.41, G89.29]     Time Calculation    Start time: 0140  Stop time: 0220 Time Calculation (min): 40 minutes       Chief Complaint: balance, chronic pain  Visit Diagnoses     ICD-10-CM   1. Lumbar radiculopathy  M54.16   2. Spinal stenosis of lumbar region with neurogenic claudication  M48.062   3. Lumbar spondylosis  M47.816   4. Lumbar degenerative disc disease  M51.36   5. Spondylolisthesis of lumbar region  M43.16   6. Risk for falls  Z91.81   7. Chronic bilateral low back pain with bilateral sciatica  M54.42    M54.41    G89.29   8. Myalgia  M79.10   9. Hip pain  M25.559         Subjective   History of Present Illness:     History of chief complaint:  Eun Be is a 84 y.o. female that presents to therapy with chronic back pain, leg weakness and a history of falling. Her last fall was last September as she was having heart issues. She was seen earlier this year for balance training but was non-compliant with treatment and refused to use her walker throughout the course of treatment. She has been referred back to therapy for continued care with instructions to be compliant with appointments and walker use.  She has a rolling walker at home that is not used. She reports ambulating around the home with use of her hands on furniture.      Aggravating factors: chronic pain, hurts all the time. Walking increases pain. Notes that the legs feel weak.   Relieving factors: heat, recliner,     ADL limitations: limited walking ability < quarter mile.         Past Medical History:   Diagnosis Date   • Anxiety    • Arrhythmia    • Arthritis    • Backpain    • Bronchitis    • CATARACT    • Chronic lower back pain 3/11/2014   • Chronic pain    • COPD    • Depression    • EMPHYSEMA    • GERD (gastroesophageal reflux disease)    • Heart burn     hiatal hernia   • HTN (hypertension)    • Hypertension    • Hypertriglyceridemia 11/7/2013   • Hypothyroid    • IGT (impaired glucose tolerance)    • MEDICAL HOME    • Osteopenia    • Pain     left side of back   • Panic disorder    • Paroxysmal atrial fibrillation (HCC)    • Personal history of venous thrombosis and embolism     right leg   • Pneumonia     1974   • Renal disorder     cysts   • Rheumatic fever     4 or 5 years old   • Rheumatic fever     as a kid per patient.   • Thyroid disease    • Unspecified disorder of thyroid      Past Surgical History:   Procedure Laterality Date   • PB EXCIS/UNROOF RENAL CYST  1977   • OTHER  1977    cysts removed from left kidney   • HYSTERECTOMY RADICAL  1963   • OTHER  1963    hemorrhoidectomy   • HEMORRHOIDECTOMY  1963   • HYSTERECTOMY, TOTAL ABDOMINAL  26 years old       Objective   Range of motion and strength:    Back ROM WFL but ability to move without having UE support limited. LE strength is grossly 3+/5 overall with notable increased weakness with Left LE dorsiflexion of 2+/5    Sensation and reflexes:     reflexes not tested. Sensation reported as normal in LE.     Palpation and joint mobility:     Tenderness along the entire lower spine to light palpation in sitting. Unable to tolerate sidelying or prone positioning.     Balance:     Sitting (static): Normal    Sitting (dynamic): Normal    Standing (static): Poor -    Standing (dynamic): Trace    TUCKER  17/56    Gait:      Unsafe, needs assistive device    Coordination and tone:     Initiation tremors among LE noted in open chain movement.     Basic self care and IADL's:     Pt receives care from daughter or son in law regularly for meals, house cleaning etc. Pt does not get into shower or tub for bathing. Uses spongebath          Exercises/Treatment     No treatment provided: pt reported being exhausted from other appts        Assessment, Response and Plan:   Assessment details:  Eun Be is a 84 y.o. female with signs and symptoms consistent with chronic back pain along with severe balance and strength limitations. She has been previously non-compliant with recommended walker use and she as been encouraged to bring her walker with her to upcoming appts. She requires skilled physical therapy intervention to decrease fall risk, decrease pain, increase functional mobility, improve ADL completion and establish a home exercise program.  Goals:   Short Term Goals:   1. Patient will be Independent with prescribed Home Exercise Program (HEP) and will be able to demonstrate exercises without cues for improved overall symptoms/activity tolerance.   2. Pt will demonstrate appropriate walker use without verbal cues for ambulation and transfers.   Short term goal time span:  2-4 weeks      Long Term Goals:    3. Pt will improve ablity to stand still without needing anthying to hold onto for > 1min.   4. Pt will improve TUCKER score to greater than 25/56 indicative of improved function and decreased fall risk.   Long term goal time span:  4-6 weeks    Plan:   Planned therapy interventions:  Therapeutic Exercise (CPT 15694), Therapeutic Activities (CPT 66827), Manual Therapy (CPT 30152), Neuromuscular Re-education (CPT 40021), E Stim Unattended (CPT 80551) and Gait Training (CPT 28892)  Frequency:  2x week  Duration in weeks:  6  Discussed with:  Patient    Functional Assessment Used  PT Functional Assessment Tool  Used: CAITLIN SCOTT Functional Assessment Score: 17/56     Referring provider co-signature:  I have reviewed this plan of care and my co-signature certifies the need for services.    Certification Period: 11/03/2020 to  12/15/20    Physician Signature: ________________________________ Date: ______________

## 2020-11-03 NOTE — LETTER
[facility]   [address], [facility] [address]  [place] [address]  Phone: [phone]  Fax: [phone]              Eun Be  1935    Encounter Date: 11/3/2020    Sonali Sandoval M.D.          PROGRESS NOTE:  Chief Complaint   Patient presents with   • Atrial Fibrillation   • Aortic Atherosclerosis     F/V Dx: Carotid atherosclerosis   • Hyperlipidemia     F/V Dx: Mixed    • HTN (Controlled)       Subjective:   Eun Be is a 84-year-old female presenting to clinic for follow-up on atrial fibrillation.    Pertinent history:  Atrial fibrillation  Hypertension  Hyperlipidemia  COPD    Patient reports being in her usual state of health till about couple of months ago when she started noticing increasing episodes of atrial fibrillation.  She has had at least 2 or 3 episodes almost all of them last over an hour.  For one of these episodes she had to call EMS.  She has been compliant with her medications.  Most of her symptoms start when she gets short of breath usually because of her mask.  She has been told that she can take an extra dose of propafenone if and when her symptoms occur however usually takes 45 minutes before her medications kick in.    Her blood pressures have been well controlled, like today.    For hyperlipidemia she is on fenofibrate and simvastatin which she is tolerating well.    Past Medical History:   Diagnosis Date   • Anxiety    • Arrhythmia    • Arthritis    • Backpain    • Bronchitis    • CATARACT    • Chronic lower back pain 3/11/2014   • Chronic pain    • COPD    • Depression    • EMPHYSEMA    • GERD (gastroesophageal reflux disease)    • Heart burn     hiatal hernia   • HTN (hypertension)    • Hypertension    • Hypertriglyceridemia 11/7/2013   • Hypothyroid    • IGT (impaired glucose tolerance)    • MEDICAL HOME    • Osteopenia    • Pain     left side of back   • Panic disorder    • Paroxysmal atrial fibrillation (HCC)    •  Personal history of venous thrombosis and embolism     right leg   • Pneumonia        • Renal disorder     cysts   • Rheumatic fever     4 or 5 years old   • Rheumatic fever     as a kid per patient.   • Thyroid disease    • Unspecified disorder of thyroid      Past Surgical History:   Procedure Laterality Date   • PB EXCIS/UNROOF RENAL CYST     • OTHER      cysts removed from left kidney   • HYSTERECTOMY RADICAL     • OTHER      hemorrhoidectomy   • HEMORRHOIDECTOMY     • HYSTERECTOMY, TOTAL ABDOMINAL  26 years old     Family History   Problem Relation Age of Onset   • Stroke Brother    • Heart Disease Other      Social History     Socioeconomic History   • Marital status:      Spouse name: Not on file   • Number of children: 2   • Years of education: Not on file   • Highest education level: Not on file   Occupational History   • Not on file   Social Needs   • Financial resource strain: Not on file   • Food insecurity     Worry: Not on file     Inability: Not on file   • Transportation needs     Medical: Not on file     Non-medical: Not on file   Tobacco Use   • Smoking status: Former Smoker     Packs/day: 0.25     Years: 30.00     Pack years: 7.50     Types: Cigarettes     Quit date:      Years since quittin.8   • Smokeless tobacco: Never Used   • Tobacco comment: quit  ppd x ?yrs (on and off for years since age 15)   Substance and Sexual Activity   • Alcohol use: Yes     Alcohol/week: 0.0 oz     Frequency: 4 or more times a week     Drinks per session: 1 or 2     Comment: wine, 1 glass every other night   • Drug use: No   • Sexual activity: Never   Lifestyle   • Physical activity     Days per week: Not on file     Minutes per session: Not on file   • Stress: Not on file   Relationships   • Social connections     Talks on phone: Not on file     Gets together: Not on file     Attends Druze service: Not on file     Active member of club or organization: Not on  file     Attends meetings of clubs or organizations: Not on file     Relationship status: Not on file   • Intimate partner violence     Fear of current or ex partner: Not on file     Emotionally abused: Not on file     Physically abused: Not on file     Forced sexual activity: Not on file   Other Topics Concern   •  Service No   • Blood Transfusions No   • Caffeine Concern Yes   • Occupational Exposure No   • Hobby Hazards No   • Sleep Concern No   • Stress Concern Yes   • Weight Concern No   • Special Diet No   • Back Care No   • Exercise Yes   • Bike Helmet No   • Seat Belt Yes   • Self-Exams No   Social History Narrative   • Not on file     Allergies   Allergen Reactions   • Asa [Aspirin]    • Latex    • Penicillins    • Tape Hives, Itching and Swelling   • Wasp Venom Swelling     Outpatient Encounter Medications as of 11/3/2020   Medication Sig Dispense Refill   • propafenone (RYTHMOL) 150 MG Tab Take 1 Tab by mouth every 8 hours. 300 Tab 3   • omeprazole (PRILOSEC) 40 MG delayed-release capsule TAKE 1 CAPSULE BY MOUTH EVERY DAY 90 Cap 1   • Mometasone Furo-Formoterol Fum 200-5 MCG/ACT Aerosol INHALE 1 PUFF ORALLY 2 TIMES DAILY 8.8 g 0   • gabapentin (NEURONTIN) 100 MG Cap Take 1-3 Caps by mouth at bedtime as needed (pain). 90 Cap 3   • apixaban (ELIQUIS) 2.5mg Tab Take 2 Tabs by mouth 2 Times a Day. (Patient taking differently: Take 5 mg by mouth 2 Times a Day. 1 TABLET) 180 Tab 3   • levothyroxine (SYNTHROID) 100 MCG Tab Take 100 mcg by mouth Every morning on an empty stomach. Alternate with the 88mcg     • ALPRAZolam (XANAX) 0.25 MG Tab Take 1 Tab by mouth 2 times a day as needed for Sleep or Anxiety for up to 90 days. TAKE ONE TABLET BY MOUTH TWICE A  Tab 0   • Mometasone Furo-Formoterol Fum (DULERA) 200-5 MCG/ACT Aerosol Inhale 1 Puff by mouth 2 Times a Day. Dispense samples 1 Each 0   • levothyroxine (SYNTHROID) 88 MCG Tab Alternate with 100 mcg every other day. 45 Tab 3   • losartan  "(COZAAR) 100 MG Tab Take 1 Tab by mouth every day. Hold for systolic blood pressure <100 100 Tab 3   • hydrOXYzine HCl (ATARAX) 50 MG Tab TAKE 1 TAB BY MOUTH 3 TIMES A DAY AS NEEDED. ITCHING 90 Tab 2   • simvastatin (ZOCOR) 20 MG Tab TAKE 1 TABLET BY MOUTH IN THE EVENING 100 Tab 3   • metoprolol (LOPRESSOR) 25 MG Tab TAKE 1 TAB BY MOUTH TWO TIMES A DAY. 200 Tab 3   • fenofibrate micronized (LOFIBRA) 134 MG capsule Take 1 Cap by mouth every day. 100 Cap 3   • albuterol 108 (90 Base) MCG/ACT Aero Soln inhalation aerosol INHALE 2 PUFFS BY MOUTH EVERY 6 HOURS AS NEEDED FOR SHORTNESS OF BREATH 8.5 Inhaler 11   • fluticasone (FLONASE) 50 MCG/ACT nasal spray SPRAY 1 SPRAY IN NOSE 2 TIMES A DAY. EACH NOSTRIL 3 Bottle 0   • clobetasol (TEMOVATE) 0.05 % Cream Apply 1 Each to affected area(s) 2 times a day as needed.  5   • [DISCONTINUED] propafenone (RYTHMOL) 150 MG Tab Take 1 Tab by mouth every 8 hours. TAKE 1 TABLET BY MOUTH TWICE A  Tab 3   • [DISCONTINUED] propafenone (RYTHMOL) 150 MG Tab TAKE 1 TABLET BY MOUTH TWICE A  Tab 3   • [DISCONTINUED] FIBER SELECT GUMMIES PO Take 1 Tab by mouth 2 times a day as needed (for constipation).       No facility-administered encounter medications on file as of 11/3/2020.      Review of Systems   Constitutional: Negative for malaise/fatigue.   Respiratory: Positive for shortness of breath.    Cardiovascular: Positive for palpitations. Negative for chest pain, orthopnea, leg swelling and PND.   Gastrointestinal: Negative for abdominal pain.   Musculoskeletal: Negative for falls.   Neurological: Negative for dizziness and loss of consciousness.   Psychiatric/Behavioral: Negative for depression.   All other systems reviewed and are negative.       Objective:   /56 (BP Location: Left arm, Patient Position: Sitting, BP Cuff Size: Adult)   Pulse (!) 59   Ht 1.803 m (5' 11\")   Wt 63 kg (139 lb)   LMP 01/01/1963   SpO2 97%   BMI 19.39 kg/m²     Physical Exam   "   Constitutional: She is oriented to person, place, and time. She appears well-developed and well-nourished. No distress.   HENT:   Head: Normocephalic and atraumatic.   Eyes: Conjunctivae are normal. No scleral icterus.   Neck: Normal range of motion. Neck supple.   Cardiovascular: Normal rate, regular rhythm and normal heart sounds. Exam reveals no gallop and no friction rub.   No murmur heard.  Pulmonary/Chest: Effort normal and breath sounds normal. No respiratory distress. She has no wheezes. She has no rales.   Musculoskeletal:         General: No edema.   Neurological: She is alert and oriented to person, place, and time.   Skin: Skin is warm and dry. She is not diaphoretic.   Psychiatric: She has a normal mood and affect. Her behavior is normal. Judgment and thought content normal.   Nursing note and vitals reviewed.    Labs performed August 2020 were reviewed and showed normal creatinine.    Assessment:     1. Palpitations     2. Paroxysmal atrial fibrillation (HCC)  NM-CARDIAC STRESS TEST    Cardiac Event Monitor   3. Dyspnea on exertion  NM-CARDIAC STRESS TEST   4. Essential hypertension  propafenone (RYTHMOL) 150 MG Tab    DISCONTINUED: propafenone (RYTHMOL) 150 MG Tab   5. Mixed hyperlipidemia  Lipid Profile   6. Encounter for long-term (current) use of high-risk medication         Medical Decision Making:  Today's Assessment / Status / Plan:     Patient has known atrial fibrillation and reports having more frequent episodes recently.  Will increase propafenone to 150 mg 3 times a day.  Continue metoprolol at current dose.  She will be referred for a myocardial perfusion study given her atrial fibrillation and chronic use of propafenone.    She also reports dyspnea on exertion which is likely secondary to underlying COPD however could be an anginal equivalent also.  Stress test as above.    For hypertension, continue losartan at current dose.  Her blood pressure is at goal.    For hyperlipidemia,  continue simvastatin and fenofibrate at current dose.  Lipid panel ordered today.    Return to clinic in 3 months for virtual visit or earlier if needed.    Thank you for allowing me to participate in the care of this patient. Please do not hesitate to contact me with any questions.    Sonali Sandoval MD, [facility]  Cardiologist  [facility]    PLEASE NOTE: This dictation was created using voice recognition software.           Libertad Estrella M.D.  [address] [place] [id number]  Via In Basket

## 2020-11-05 ENCOUNTER — APPOINTMENT (OUTPATIENT)
Dept: PHYSICAL THERAPY | Facility: REHABILITATION | Age: 85
End: 2020-11-05
Attending: PHYSICAL MEDICINE & REHABILITATION
Payer: MEDICARE

## 2020-11-12 ENCOUNTER — APPOINTMENT (OUTPATIENT)
Dept: PHYSICAL THERAPY | Facility: REHABILITATION | Age: 85
End: 2020-11-12
Attending: PHYSICAL MEDICINE & REHABILITATION
Payer: MEDICARE

## 2020-11-12 DIAGNOSIS — F41.9 ANXIETY: ICD-10-CM

## 2020-11-12 RX ORDER — ALPRAZOLAM 0.25 MG/1
0.25 TABLET ORAL 2 TIMES DAILY PRN
Qty: 180 TAB | Refills: 0 | Status: SHIPPED | OUTPATIENT
Start: 2020-11-12 | End: 2021-02-10

## 2020-11-17 ENCOUNTER — HOSPITAL ENCOUNTER (OUTPATIENT)
Dept: RADIOLOGY | Facility: MEDICAL CENTER | Age: 85
End: 2020-11-17
Attending: INTERNAL MEDICINE
Payer: MEDICARE

## 2020-11-17 DIAGNOSIS — R06.09 DYSPNEA ON EXERTION: ICD-10-CM

## 2020-11-17 DIAGNOSIS — I48.0 PAROXYSMAL ATRIAL FIBRILLATION (HCC): ICD-10-CM

## 2020-11-17 PROCEDURE — 78452 HT MUSCLE IMAGE SPECT MULT: CPT | Mod: 26 | Performed by: INTERNAL MEDICINE

## 2020-11-17 PROCEDURE — A9502 TC99M TETROFOSMIN: HCPCS

## 2020-11-17 PROCEDURE — 93018 CV STRESS TEST I&R ONLY: CPT | Performed by: INTERNAL MEDICINE

## 2020-11-17 RX ORDER — REGADENOSON 0.08 MG/ML
INJECTION, SOLUTION INTRAVENOUS
Status: DISCONTINUED
Start: 2020-11-17 | End: 2020-11-18 | Stop reason: HOSPADM

## 2020-11-18 ENCOUNTER — HOSPITAL ENCOUNTER (OUTPATIENT)
Dept: LAB | Facility: MEDICAL CENTER | Age: 85
End: 2020-11-18
Attending: INTERNAL MEDICINE
Payer: MEDICARE

## 2020-11-18 ENCOUNTER — PHYSICAL THERAPY (OUTPATIENT)
Dept: PHYSICAL THERAPY | Facility: REHABILITATION | Age: 85
End: 2020-11-18
Attending: PHYSICAL MEDICINE & REHABILITATION
Payer: MEDICARE

## 2020-11-18 ENCOUNTER — HOSPITAL ENCOUNTER (OUTPATIENT)
Dept: LAB | Facility: MEDICAL CENTER | Age: 85
End: 2020-11-18
Attending: FAMILY MEDICINE
Payer: MEDICARE

## 2020-11-18 ENCOUNTER — NON-PROVIDER VISIT (OUTPATIENT)
Dept: MEDICAL GROUP | Facility: PHYSICIAN GROUP | Age: 85
End: 2020-11-18
Payer: MEDICARE

## 2020-11-18 DIAGNOSIS — E78.2 MIXED HYPERLIPIDEMIA: ICD-10-CM

## 2020-11-18 DIAGNOSIS — Z91.81 RISK FOR FALLS: ICD-10-CM

## 2020-11-18 DIAGNOSIS — Z23 NEED FOR VACCINATION: ICD-10-CM

## 2020-11-18 DIAGNOSIS — E03.9 HYPOTHYROIDISM, UNSPECIFIED TYPE: ICD-10-CM

## 2020-11-18 LAB
CHOLEST SERPL-MCNC: 154 MG/DL (ref 100–199)
FASTING STATUS PATIENT QL REPORTED: NORMAL
HDLC SERPL-MCNC: 69 MG/DL
LDLC SERPL CALC-MCNC: 65 MG/DL
T3 SERPL-MCNC: 64.9 NG/DL (ref 60–181)
T4 FREE SERPL-MCNC: 1.39 NG/DL (ref 0.93–1.7)
TRIGL SERPL-MCNC: 99 MG/DL (ref 0–149)
TSH SERPL DL<=0.005 MIU/L-ACNC: 4.55 UIU/ML (ref 0.38–5.33)

## 2020-11-18 PROCEDURE — 97112 NEUROMUSCULAR REEDUCATION: CPT

## 2020-11-18 PROCEDURE — 80061 LIPID PANEL: CPT

## 2020-11-18 PROCEDURE — 84480 ASSAY TRIIODOTHYRONINE (T3): CPT

## 2020-11-18 PROCEDURE — 36415 COLL VENOUS BLD VENIPUNCTURE: CPT

## 2020-11-18 PROCEDURE — 90662 IIV NO PRSV INCREASED AG IM: CPT | Performed by: FAMILY MEDICINE

## 2020-11-18 PROCEDURE — 84443 ASSAY THYROID STIM HORMONE: CPT

## 2020-11-18 PROCEDURE — G0008 ADMIN INFLUENZA VIRUS VAC: HCPCS | Performed by: FAMILY MEDICINE

## 2020-11-18 PROCEDURE — 84439 ASSAY OF FREE THYROXINE: CPT

## 2020-11-18 NOTE — NON-PROVIDER
"Eun Be is a 84 y.o. female here for a non-provider visit for:   FLU    Reason for immunization: Annual Flu Vaccine  Immunization records indicate need for vaccine: Yes, confirmed with Epic  Minimum interval has been met for this vaccine: Yes  ABN completed: Not Indicated    Order and dose verified by: [facility]  VIS Dated  08/15/19 was given to patient: Yes  All IAC Questionnaire questions were answered \"No.\"    Patient tolerated injection and no adverse effects were observed or reported: Yes    Pt scheduled for next dose in series: No  "

## 2020-11-18 NOTE — OP THERAPY DAILY TREATMENT
Outpatient Physical Therapy  DAILY TREATMENT     [facility] [place]  [address], [address]  [place] [address]  Phone:  [phone]  Fax:  [phone]    Date: 11/18/2020    Patient: Eun Be  YOB: 1935  MRN: [id number]     Time Calculation    Start time: 0105  Stop time: 0130 Time Calculation (min): 25 minutes         Chief Complaint: fall risk  Visit #: 2    SUBJECTIVE:  Notes she is tired and does not want to do much today.brings in 4 wheeled walker.      OBJECTIVE:  Current objective measures:           Therapeutic Treatments and Modalities:     1. Neuromuscular Re-education (CPT 57721), pt instructed in us of walker with proper transfers with sequencing for brake use/ placement of walker and hands. pt able to complete after several trials without need for verbal feedback     Time-based treatments/modalities:    Physical Therapy Timed Treatment Charges  Neuromusc re-ed, balance, coor, post minutes (CPT 69895): 25 minutes      Pain rating (1-10) before treatment:  NA  Pain rating (1-10) after treatment:  NA    ASSESSMENT:   Response to treatment: Pt was much safer with ambulation using walker. Recommended walker use for all ambulation. Pt to f/u in 2 weeks for continued use with walker.     PLAN/RECOMMENDATIONS:   Plan for treatment: therapy treatment to continue next visit.  Planned interventions for next visit: continue with current treatment.

## 2020-11-19 ENCOUNTER — TELEPHONE (OUTPATIENT)
Dept: MEDICAL GROUP | Facility: PHYSICIAN GROUP | Age: 85
End: 2020-11-19

## 2020-11-20 RX ORDER — ALBUTEROL SULFATE 90 UG/1
2 AEROSOL, METERED RESPIRATORY (INHALATION) EVERY 6 HOURS PRN
Qty: 1 EACH | Refills: 1 | Status: SHIPPED | OUTPATIENT
Start: 2020-11-20

## 2020-11-20 NOTE — TELEPHONE ENCOUNTER
1st attempt: Starteedhart message sent.    Mable TELLO RN, BSN      
If its not too much trouble the alternating is working well.  Otherwise we can have her take the 100 mcg dose but 6 days per week.    
Patient's daughter was given lab results for thyroid.  Results=thyroid looks better.  Faviola states that she is alternating dosages and is asking if she can go to just one.  Please advise.  Thank you.  
Patient's daughter, Faviola, notified.  She is going to alternate.  
160.02

## 2020-11-24 ENCOUNTER — TELEMEDICINE (OUTPATIENT)
Dept: MEDICAL GROUP | Facility: PHYSICIAN GROUP | Age: 85
End: 2020-11-24
Payer: MEDICARE

## 2020-11-24 VITALS
HEART RATE: 55 BPM | TEMPERATURE: 97 F | DIASTOLIC BLOOD PRESSURE: 58 MMHG | OXYGEN SATURATION: 93 % | HEIGHT: 71 IN | SYSTOLIC BLOOD PRESSURE: 130 MMHG | BODY MASS INDEX: 19.6 KG/M2 | WEIGHT: 140 LBS

## 2020-11-24 DIAGNOSIS — I48.0 PAROXYSMAL ATRIAL FIBRILLATION (HCC): ICD-10-CM

## 2020-11-24 DIAGNOSIS — F13.20 SEDATIVE, HYPNOTIC OR ANXIOLYTIC DEPENDENCE (HCC): ICD-10-CM

## 2020-11-24 DIAGNOSIS — D68.318 CIRCULATING ANTICOAGULANT DISORDER (HCC): ICD-10-CM

## 2020-11-24 DIAGNOSIS — Z91.81 RISK FOR FALLS: ICD-10-CM

## 2020-11-24 PROCEDURE — 99214 OFFICE O/P EST MOD 30 MIN: CPT | Mod: 95,CR | Performed by: FAMILY MEDICINE

## 2020-11-24 ASSESSMENT — FIBROSIS 4 INDEX: FIB4 SCORE: 1.65

## 2020-11-24 NOTE — PROGRESS NOTES
Chief Complaint   Patient presents with   • Lab Results   • Follow-Up       HISTORY OF PRESENT ILLNESS: Patient is a 84 y.o. female established patient here today for the following concerns:    This encounter was conducted via Zoom .   Verbal consent was obtained. Patient's identity was verified.      1. Anticoagulation for Afib  2. Paroxysmal atrial fibrillation (HCC)  Did undergo additional work up for recurrent Afib episodes with RVR.  She did have rhythmol increased.  She also had the thyroid medications adjusted.  Still finds that using xanax seems to calm her enough that the afib seems to go away faster.      3. Risk for falls  She continues with PT on her mobility and exercise tolerance.  She really enjoys these sessions, although admits that she struggles with using her walker at home as she feels less steady with it.  No recent falls.     4. Sedative, hypnotic or anxiolytic dependence (HCC)  Continues with the low dose alprazolam use.  She has been able to wean down quite a bit and typically will only take them if she is having severe panic or severe insomnia.        Past Medical, Social, and Family history reviewed and updated in EPIC    Allergies:Asa [aspirin], Latex, Penicillins, Tape, and Wasp venom    Current Outpatient Medications   Medication Sig Dispense Refill   • albuterol 108 (90 Base) MCG/ACT Aero Soln inhalation aerosol INHALE 2 PUFFS BY MOUTH EVERY 6 HOURS AS NEEDED FOR SHORTNESS OF BREATH 1 Each 1   • ALPRAZolam (XANAX) 0.25 MG Tab TAKE 1 TAB BY MOUTH 2 TIMES A DAY AS NEEDED FOR SLEEP OR ANXIETY FOR UP TO 90 DAYS. TAKE ONE TABLET BY MOUTH TWICE A  Tab 0   • propafenone (RYTHMOL) 150 MG Tab Take 1 Tab by mouth every 8 hours. 300 Tab 3   • omeprazole (PRILOSEC) 40 MG delayed-release capsule TAKE 1 CAPSULE BY MOUTH EVERY DAY 90 Cap 1   • gabapentin (NEURONTIN) 100 MG Cap Take 1-3 Caps by mouth at bedtime as needed (pain). 90 Cap 3   • apixaban (ELIQUIS) 2.5mg Tab Take 2 Tabs by mouth 2  Times a Day. (Patient taking differently: Take 5 mg by mouth 2 Times a Day. 1 TABLET) 180 Tab 3   • levothyroxine (SYNTHROID) 100 MCG Tab Take 100 mcg by mouth Every morning on an empty stomach. Alternate with the 88mcg     • Mometasone Furo-Formoterol Fum (DULERA) 200-5 MCG/ACT Aerosol Inhale 1 Puff by mouth 2 Times a Day. Dispense samples 1 Each 0   • levothyroxine (SYNTHROID) 88 MCG Tab Alternate with 100 mcg every other day. 45 Tab 3   • losartan (COZAAR) 100 MG Tab Take 1 Tab by mouth every day. Hold for systolic blood pressure <100 100 Tab 3   • hydrOXYzine HCl (ATARAX) 50 MG Tab TAKE 1 TAB BY MOUTH 3 TIMES A DAY AS NEEDED. ITCHING 90 Tab 2   • simvastatin (ZOCOR) 20 MG Tab TAKE 1 TABLET BY MOUTH IN THE EVENING 100 Tab 3   • metoprolol (LOPRESSOR) 25 MG Tab TAKE 1 TAB BY MOUTH TWO TIMES A DAY. 200 Tab 3   • fenofibrate micronized (LOFIBRA) 134 MG capsule Take 1 Cap by mouth every day. 100 Cap 3   • fluticasone (FLONASE) 50 MCG/ACT nasal spray SPRAY 1 SPRAY IN NOSE 2 TIMES A DAY. EACH NOSTRIL 3 Bottle 0   • clobetasol (TEMOVATE) 0.05 % Cream Apply 1 Each to affected area(s) 2 times a day as needed.  5     No current facility-administered medications for this visit.          ROS:  Review of Systems   Constitutional: Negative for fever, chills, weight loss and malaise/fatigue.   HENT: Negative for ear pain, nosebleeds, congestion, sore throat and neck pain.    Eyes: Negative for blurred vision.   Respiratory: Negative for cough, sputum production, shortness of breath and wheezing.    Cardiovascular: Negative for chest pain, palpitations,  and leg swelling.   Gastrointestinal: Negative for heartburn, nausea, vomiting, diarrhea and abdominal pain.   Genitourinary: Negative for dysuria, urgency and frequency.   Musculoskeletal: Negative for myalgias, back pain and joint pain.   Skin: Negative for rash and itching.   Neurological: Negative for dizziness, tingling, tremors, sensory change, focal weakness and  "headaches.   Endo/Heme/Allergies: Does not bruise/bleed easily.   Psychiatric/Behavioral: Negative for depression, anxiety, suicidal ideas, insomnia and memory loss.      Exam:  /58   Pulse (!) 55   Temp 36.1 °C (97 °F)   Ht 1.803 m (5' 11\")   Wt 63.5 kg (140 lb)   SpO2 93%     General:  Well nourished, well developed in NAD  Head is grossly normal.  Neck: Supple without JVD, Trachea midline, no thyromegaly noted  Pulmonary:  Normal effort. Speaking in full sentences  Psych: affect appropriate  Skin: no Jaundice noted or facial rashes    Please note that this dictation was created using voice recognition software. I have made every reasonable attempt to correct obvious errors, but I expect that there are errors of grammar and possibly content that I did not discover before finalizing the note.    Assessment/Plan:  1. Anticoagulation for Afib  2. Paroxysmal atrial fibrillation (HCC)  Continue current rate and rhythm control.  Anticoagulated.      3. Risk for falls  Continue PT    4. Sedative, hypnotic or anxiolytic dependence (HCC)  Continue very limited use of alprazolam.  Patient and daughter counseled on its use and not recommended in this age group.  At this time due to the afib and COPD with increase in anxiety we will continue current dosing.     Transitional care discussed.         "

## 2020-11-30 ENCOUNTER — PHYSICAL THERAPY (OUTPATIENT)
Dept: PHYSICAL THERAPY | Facility: REHABILITATION | Age: 85
End: 2020-11-30
Attending: PHYSICAL MEDICINE & REHABILITATION
Payer: MEDICARE

## 2020-11-30 DIAGNOSIS — Z91.81 RISK FOR FALLS: ICD-10-CM

## 2020-11-30 PROCEDURE — 97112 NEUROMUSCULAR REEDUCATION: CPT

## 2020-11-30 NOTE — OP THERAPY DAILY TREATMENT
Outpatient Physical Therapy  DAILY TREATMENT     [facility] Outpatient Physical Therapy [place]  [address], [address]  [facility] [place] [address]  Phone:  [phone]  Fax:  [phone]    Date: 11/30/2020    Patient: Eun Be  YOB: 1935  MRN: [id number]     Time Calculation    Start time: 0200  Stop time: 0230 Time Calculation (min): 30 minutes     Chief Complaint: fall risk  Visit #: 3    SUBJECTIVE:  States walker Is at home, no reports of walker leaving the house since last visit. No reported falls.       OBJECTIVE:  Current objective measures: 5tsts 25 sec, 18 sec, SA02 remained between 88 and 92.           Therapeutic Treatments and Modalities:     1. Neuromuscular Re-education (CPT 83324), pt instructed in us of walker with proper transfers with sequencing for brake use/ placement of walker and hands. pt able to complete after several trials without need for verbal feedback     Time-based treatments/modalities:    Physical Therapy Timed Treatment Charges  Neuromusc re-ed, balance, coor, post minutes (CPT 77577): 30 minutes      Pain rating (1-10) before treatment:  NA  Pain rating (1-10) after treatment:  NA    ASSESSMENT:   Response to treatment: instructed pt to bring walker when out the house for ambulation. Overall reduced fall risk when using appropriate AD.  Pt to f/u in 10 days for for continued use/practice with walker.     PLAN/RECOMMENDATIONS:   Plan for treatment: therapy treatment to continue next visit.  Planned interventions for next visit: continue with current treatment.

## 2020-12-09 ENCOUNTER — PHYSICAL THERAPY (OUTPATIENT)
Dept: PHYSICAL THERAPY | Facility: REHABILITATION | Age: 85
End: 2020-12-09
Attending: PHYSICAL MEDICINE & REHABILITATION
Payer: MEDICARE

## 2020-12-09 DIAGNOSIS — Z91.81 RISK FOR FALLS: ICD-10-CM

## 2020-12-09 PROCEDURE — 97110 THERAPEUTIC EXERCISES: CPT

## 2020-12-09 NOTE — OP THERAPY DAILY TREATMENT
Outpatient Physical Therapy  DAILY TREATMENT     [facility] Outpatient Physical Therapy [place]  [address]  [facility] [facility] [address]  Phone:  [phone]  Fax:  [phone]    Date: 12/09/2020    Patient: Eun Be  YOB: 1935  MRN: [id number]     Time Calculation    Start time: 0300  Stop time: 0330 Time Calculation (min): 30 minutes     Chief Complaint: fall risk  Visit #: 4    SUBJECTIVE:    States walker is at home, reports not taking it out of the house since she last was here.     OBJECTIVE:  Current objective measures: 5tsts 25 sec, 25 sec, SA02 remained between 88 and 92.           Therapeutic Exercises (CPT 70253):     1. Nustep, lvl 1 x 5min, SA02 remained over 88%    2. Standing weight shifts , x3min with bar in hands for support    3. Supine march, lvl 1 x 20    4. Ambulation with walker, 2min, with cues for placement /stepping/ speed    5. Static balance at bar, feet together on foam, 2min    6. Supine knee to chest stretch, 30 sec x 2      Time-based treatments/modalities:    Physical Therapy Timed Treatment Charges  Therapeutic exercise minutes (CPT 57474): 30 minutes      Pain rating (1-10) before treatment:  NA  Pain rating (1-10) after treatment:  NA    ASSESSMENT:   Response to treatment: instructed pt to bring walker when out of the house. Pt continues to be a large fall risk. Overall reduced fall risk when using appropriate AD.  Pt to f/u in 10 days for for continued use/practice with walker.     PLAN/RECOMMENDATIONS:   Plan for treatment: therapy treatment to continue next visit.  Planned interventions for next visit: continue with current treatment.

## 2020-12-10 ENCOUNTER — NON-PROVIDER VISIT (OUTPATIENT)
Dept: CARDIOLOGY | Facility: MEDICAL CENTER | Age: 85
End: 2020-12-10
Payer: MEDICARE

## 2020-12-10 ENCOUNTER — TELEPHONE (OUTPATIENT)
Dept: CARDIOLOGY | Facility: MEDICAL CENTER | Age: 85
End: 2020-12-10

## 2020-12-10 DIAGNOSIS — I48.0 PAF (PAROXYSMAL ATRIAL FIBRILLATION) (HCC): ICD-10-CM

## 2020-12-10 DIAGNOSIS — I47.10 SVT (SUPRAVENTRICULAR TACHYCARDIA) (HCC): ICD-10-CM

## 2020-12-14 ENCOUNTER — APPOINTMENT (OUTPATIENT)
Dept: PHYSICAL THERAPY | Facility: REHABILITATION | Age: 85
End: 2020-12-14
Attending: PHYSICAL MEDICINE & REHABILITATION
Payer: MEDICARE

## 2020-12-21 ENCOUNTER — APPOINTMENT (OUTPATIENT)
Dept: PHYSICAL THERAPY | Facility: REHABILITATION | Age: 85
End: 2020-12-21
Attending: PHYSICAL MEDICINE & REHABILITATION
Payer: MEDICARE

## 2020-12-28 ENCOUNTER — APPOINTMENT (OUTPATIENT)
Dept: PHYSICAL THERAPY | Facility: REHABILITATION | Age: 85
End: 2020-12-28
Attending: PHYSICAL MEDICINE & REHABILITATION
Payer: MEDICARE

## 2021-01-07 DIAGNOSIS — I48.0 PAROXYSMAL ATRIAL FIBRILLATION (HCC): ICD-10-CM

## 2021-01-07 PROCEDURE — 93246 EXT ECG>7D<15D RECORDING: CPT | Performed by: INTERNAL MEDICINE

## 2021-01-07 PROCEDURE — 93248 EXT ECG>7D<15D REV&INTERPJ: CPT | Performed by: INTERNAL MEDICINE

## 2021-01-11 DIAGNOSIS — Z23 NEED FOR VACCINATION: ICD-10-CM

## 2021-03-08 ENCOUNTER — TELEPHONE (OUTPATIENT)
Dept: PHYSICAL THERAPY | Facility: REHABILITATION | Age: 86
End: 2021-03-08

## 2021-03-08 NOTE — OP THERAPY DISCHARGE SUMMARY
Outpatient Physical Therapy  DISCHARGE SUMMARY NOTE      [facility] [place]  [address]  [place] [facility] [address]  Phone:  [phone]  Fax:  [phone]    Date of Visit: 03/08/2021    Patient: Eun Be  YOB: 1935  MRN: [id number]     Referring Provider: Doc Brady M.D.   Referring Diagnosis Risk for falls [Z91.81];Lumbar degenerative disc disease [M51.36];Spinal stenosis of lumbar region with neurogenic claudication [M48.062];Lumbar radiculopathy [M54.16];Lumbar spondylosis [M47.816];Chronic bilateral low back pain with bilateral sciatica [M54.42, M54.41, G89.29]         Functional Assessment Used        Your patient is being discharged from Physical Therapy with the following comments:   · Patient has been non-compliant with physical therapy plan of care    Comments:  Eun Be has been discharged due to a lapse in care greater than 30 days. Thank you for the opportunity to assist you and your patient.       Limitations Remaining:  Walker dependent but is non-compliant with walker use.     Recommendations:  F/u with PCP    Rey Siegel, PT, DPT    Date: 3/8/2021

## 2021-03-22 DIAGNOSIS — M25.559 HIP PAIN: ICD-10-CM

## 2021-03-22 DIAGNOSIS — Z91.81 RISK FOR FALLS: ICD-10-CM

## 2021-03-22 DIAGNOSIS — M54.41 CHRONIC BILATERAL LOW BACK PAIN WITH BILATERAL SCIATICA: ICD-10-CM

## 2021-03-22 DIAGNOSIS — M43.16 SPONDYLOLISTHESIS OF LUMBAR REGION: ICD-10-CM

## 2021-03-22 DIAGNOSIS — M54.16 LUMBAR RADICULOPATHY: ICD-10-CM

## 2021-03-22 DIAGNOSIS — M47.816 LUMBAR SPONDYLOSIS: ICD-10-CM

## 2021-03-22 DIAGNOSIS — M48.062 SPINAL STENOSIS OF LUMBAR REGION WITH NEUROGENIC CLAUDICATION: ICD-10-CM

## 2021-03-22 DIAGNOSIS — M54.42 CHRONIC BILATERAL LOW BACK PAIN WITH BILATERAL SCIATICA: ICD-10-CM

## 2021-03-22 DIAGNOSIS — M79.10 MYALGIA: ICD-10-CM

## 2021-03-22 DIAGNOSIS — M51.36 LUMBAR DEGENERATIVE DISC DISEASE: ICD-10-CM

## 2021-03-22 DIAGNOSIS — G89.29 CHRONIC BILATERAL LOW BACK PAIN WITH BILATERAL SCIATICA: ICD-10-CM

## 2021-03-22 RX ORDER — GABAPENTIN 100 MG/1
100-300 CAPSULE ORAL NIGHTLY PRN
Qty: 90 CAPSULE | Refills: 3 | Status: SHIPPED | OUTPATIENT
Start: 2021-03-22

## 2021-05-06 DIAGNOSIS — E78.2 MIXED HYPERLIPIDEMIA: ICD-10-CM

## 2021-05-10 RX ORDER — FENOFIBRATE 134 MG/1
CAPSULE ORAL
Qty: 100 CAPSULE | Refills: 1 | Status: SHIPPED | OUTPATIENT
Start: 2021-05-10

## 2021-06-11 ENCOUNTER — TELEPHONE (OUTPATIENT)
Dept: CARDIOLOGY | Facility: MEDICAL CENTER | Age: 86
End: 2021-06-11

## 2021-06-11 NOTE — TELEPHONE ENCOUNTER
PT no longer with [facility] of insurance. Advised to switch prescription to new cardiologist.   SLC    ----- Message from Susan Vogt, Med Ass't sent at 6/11/2021  9:28 AM PDT -----  Regarding: Need Follow Appointment  Please contact patient to schedule follow up appointment. Thank you

## 2021-07-12 DIAGNOSIS — N18.30 STAGE 3 CHRONIC KIDNEY DISEASE: ICD-10-CM

## 2021-12-07 NOTE — PROGRESS NOTES
Chief Complaint   Patient presents with   • Atrial Fibrillation     hospital follow up       Subjective:   Eun Be is a 83 y.o. female who presents today in follow-up after her emergency room visit and hospital stay for rapid A. Fib    More anxious  As usual they have questions that are well informed  Chest pains and rapid heart rate woke her up.  She is using Xanax.  Does not tell me how much or when.  Told her daughter she needs to call 911 she thought she was going to die    In the hospital they gave her 5 mg of metoprolol and admitted her.  Refused stress test in the morning troponin indeterminate    Past Medical History:   Diagnosis Date   • Anxiety    • Arrhythmia    • Arthritis    • Backpain    • Bronchitis    • CATARACT    • Chronic lower back pain 3/11/2014   • COPD    • EMPHYSEMA    • GERD (gastroesophageal reflux disease)    • Heart burn     hiatal hernia   • HTN (hypertension)    • Hypertension    • Hypertriglyceridemia 11/7/2013   • Hypothyroid    • IGT (impaired glucose tolerance)    • MEDICAL HOME    • Osteopenia    • Pain     left side of back   • Paroxysmal atrial fibrillation (HCC)    • Personal history of venous thrombosis and embolism     right leg   • Pneumonia     1974   • Renal disorder     cysts   • Rheumatic fever     4 or 5 years old   • Rheumatic fever     as a kid per patient.   • Unspecified disorder of thyroid      Past Surgical History:   Procedure Laterality Date   • PB EXCIS/UNROOF RENAL CYST  1977   • OTHER  1977    cysts removed from left kidney   • HYSTERECTOMY RADICAL  1963   • OTHER  1963    hemorrhoidectomy   • HEMORRHOIDECTOMY  1963   • HYSTERECTOMY, TOTAL ABDOMINAL  26 years old     Family History   Problem Relation Age of Onset   • Stroke Brother    • Heart Disease Other      Social History     Socioeconomic History   • Marital status:      Spouse name: Not on file   • Number of children: Not on file   • Years of education: Not on file   • Highest  Spoke with Mega in intake, will evaluate patient.      Davina Eller, RN  12/07/21 2291     education level: Not on file   Occupational History   • Not on file   Social Needs   • Financial resource strain: Not on file   • Food insecurity:     Worry: Not on file     Inability: Not on file   • Transportation needs:     Medical: Not on file     Non-medical: Not on file   Tobacco Use   • Smoking status: Former Smoker     Packs/day: 0.25     Years: 30.00     Pack years: 7.50     Types: Cigarettes   • Smokeless tobacco: Never Used   • Tobacco comment: quit 1990 1/2 ppd x ?yrs (on and off for years since age 15)   Substance and Sexual Activity   • Alcohol use: Yes     Alcohol/week: 0.0 oz     Comment: occasional/rare alcohol use   • Drug use: No   • Sexual activity: Never   Lifestyle   • Physical activity:     Days per week: Not on file     Minutes per session: Not on file   • Stress: Not on file   Relationships   • Social connections:     Talks on phone: Not on file     Gets together: Not on file     Attends Mandaen service: Not on file     Active member of club or organization: Not on file     Attends meetings of clubs or organizations: Not on file     Relationship status: Not on file   • Intimate partner violence:     Fear of current or ex partner: Not on file     Emotionally abused: Not on file     Physically abused: Not on file     Forced sexual activity: Not on file   Other Topics Concern   • Not on file   Social History Narrative   • Not on file     Allergies   Allergen Reactions   • Asa [Aspirin]    • Latex    • Penicillins    • Tape Hives, Itching and Swelling   • Wasp Venom Swelling     Outpatient Encounter Medications as of 8/21/2019   Medication Sig Dispense Refill   • magnesium oxide (MAG-OX) 400 (241.3 Mg) MG Tab tablet Take 1 Tab by mouth every day for 360 days. 90 Tab 3   • metoprolol (LOPRESSOR) 25 MG Tab Take 1 Tab by mouth 2 Times a Day. 60 Tab 0   • fenofibrate micronized (LOFIBRA) 134 MG capsule TAKE 1 CAPSULE BY MOUTH EVERY  Cap 3   • rivaroxaban (XARELTO) 15 MG Tab tablet Take 1  Tab by mouth with dinner. 90 Tab 3   • ALPRAZolam (XANAX) 1 MG Tab Take 1 Tab by mouth 2 times a day as needed for Sleep or Anxiety for up to 90 days. 180 Tab 1   • propafenone (RYTHMOL) 150 MG Tab TAKE 1 TABLET BY MOUTH TWICE A  Tab 3   • omeprazole (PRILOSEC) 40 MG delayed-release capsule TAKE 1 CAPSULE BY MOUTH EVERY DAY 90 Cap 1   • albuterol 108 (90 Base) MCG/ACT Aero Soln inhalation aerosol      • levothyroxine (SYNTHROID) 88 MCG Tab Take 1 Tab by mouth Every morning on an empty stomach. 90 Tab 3   • simvastatin (ZOCOR) 20 MG Tab TAKE 1 TABLET BY MOUTH IN THE EVENING 90 Tab 3   • hydrOXYzine HCl (ATARAX) 50 MG Tab TAKE 1 TABLET BY MOUTH 3 TIMES A DAY AS NEEDED FOR ITCHING. 90 Tab 2   • fluticasone (FLONASE) 50 MCG/ACT nasal spray SPRAY 1 SPRAY IN NOSE 2 TIMES A DAY. EACH NOSTRIL 3 Bottle 0   • Mometasone Furo-Formoterol Fum (DULERA) 200-5 MCG/ACT Aerosol Inhale 1 Puff by mouth 2 Times a Day. 1 Inhaler 11   • albuterol (PROVENTIL) 2.5mg/3ml Nebu Soln solution for nebulization 3 mL by Nebulization route every four hours as needed for Shortness of Breath. 75 mL 1   • FIBER SELECT GUMMIES PO Take  by mouth.     • Probiotic Product (PROBIOTIC PO) Take  by mouth.     • clobetasol (TEMOVATE) 0.05 % Cream Apply 1 Each to affected area(s) as needed.  5   • [DISCONTINUED] NEOMYCIN-POLYMYXIN-HC, OTIC, 1 % Solution Place 4 Drops in ear 2 Times a Day. (Patient not taking: Reported on 8/21/2019) 1 Bottle 0   • Cyanocobalamin (B-12) 1000 MCG CAPS Take 1,000 mg by mouth every day at 6 PM.       No facility-administered encounter medications on file as of 8/21/2019.      Review of Systems   Constitutional: Positive for malaise/fatigue. Negative for chills, fever and weight loss.   HENT: Negative for congestion and hearing loss.    Eyes: Negative for blurred vision, pain and discharge.   Respiratory: Positive for shortness of breath. Negative for cough, hemoptysis and wheezing.    Cardiovascular: Positive for chest pain  "and palpitations. Negative for claudication and leg swelling.   Gastrointestinal: Negative for abdominal pain, nausea and vomiting.   Musculoskeletal: Negative for falls, joint pain and myalgias.   Skin: Negative for itching and rash.   Neurological: Negative for dizziness and loss of consciousness.   Endo/Heme/Allergies: Does not bruise/bleed easily.   Psychiatric/Behavioral: Negative for depression and memory loss. The patient is nervous/anxious and has insomnia.    All other systems reviewed and are negative.       Objective:   /62 (BP Location: Left arm, Patient Position: Sitting)   Pulse 60   Ht 1.803 m (5' 11\")   Wt 66.2 kg (146 lb)   LMP 01/01/1963   SpO2 95%   BMI 20.36 kg/m²     Physical Exam   Constitutional: She is oriented to person, place, and time. No distress.   HENT:   Head: Normocephalic and atraumatic.   Eyes: Pupils are equal, round, and reactive to light. EOM are normal.   Neck: No JVD present. No thyromegaly present.   Cardiovascular: Normal rate and intact distal pulses.   No murmur heard.  Pulmonary/Chest: Breath sounds normal. No respiratory distress. She exhibits no tenderness.   Abdominal: Bowel sounds are normal. She exhibits no distension.   Musculoskeletal: She exhibits no edema or tenderness.   Lymphadenopathy:     She has no cervical adenopathy.   Neurological: She is alert and oriented to person, place, and time. She exhibits normal muscle tone. Coordination normal.   Skin: Skin is warm and dry. No rash noted. She is not diaphoretic.   Psychiatric: She has a normal mood and affect. Her behavior is normal.       Assessment:     1. Atherosclerosis of both carotid arteries     2. Essential hypertension     3. Mixed hyperlipidemia     4. Permanent atrial fibrillation (HCC)  MAGNESIUM   5. Anticoagulated         Medical Decision Making:  Today's Assessment / Status / Plan:     Atrial fibrillation  My concern in the past has been her use of benzo diazepam discussed with her  I " did increase her evening propafenone for 1 week.  We will call her on Monday and see if she feels better.  EKG in the emergency room showed normal QRS  She will stay on the metoprolol as is  She will also be on anticoagulation  We had used an antiarrhythmic strategy because she was so extremely symptomatic with the fibrillation.  We could easily change to sotalol but this would require an admission.  I do have concerns in using this class Ic antiarrhythmic in this elderly lady    Chest pains  Related to rapid rates  Talked about taking extra metoprolol if needed  No evidence of an acute coronary syndrome in my opinion    Discussed at length with family allayed concerns follow with PCP we will talk next week

## 2022-01-20 DIAGNOSIS — I48.0 PAF (PAROXYSMAL ATRIAL FIBRILLATION) (HCC): ICD-10-CM

## 2022-01-20 DIAGNOSIS — I10 ESSENTIAL HYPERTENSION: ICD-10-CM

## 2022-01-20 DIAGNOSIS — R00.2 PALPITATIONS: ICD-10-CM

## 2022-01-20 DIAGNOSIS — I48.0 PAROXYSMAL ATRIAL FIBRILLATION (HCC): ICD-10-CM

## 2022-01-20 DIAGNOSIS — I47.10 SVT (SUPRAVENTRICULAR TACHYCARDIA) (HCC): ICD-10-CM

## 2022-01-21 ENCOUNTER — TELEPHONE (OUTPATIENT)
Dept: CARDIOLOGY | Facility: MEDICAL CENTER | Age: 87
End: 2022-01-21

## 2022-01-21 RX ORDER — PROPAFENONE HYDROCHLORIDE 150 MG/1
150 TABLET, COATED ORAL EVERY 8 HOURS
Qty: 300 TABLET | Refills: 0 | Status: SHIPPED | OUTPATIENT
Start: 2022-01-21 | End: 2022-12-27

## 2022-01-21 NOTE — TELEPHONE ENCOUNTER
PT no longer with [facility] per note on chart- left message on daughters phone about prescriptions being sent and needing to schedule if needing refills, but insurance on file not contracted.   SLC

## 2022-01-21 NOTE — TELEPHONE ENCOUNTER
----- Message from Christelle Stark R.N. sent at 1/21/2022  9:22 AM PST -----  Regarding: needs appt  Please call pt for follow up appointment (PP of AA, further eval and med refills). Thank you!

## 2022-11-03 ENCOUNTER — PATIENT MESSAGE (OUTPATIENT)
Dept: HEALTH INFORMATION MANAGEMENT | Facility: OTHER | Age: 87
End: 2022-11-03

## 2022-12-27 ENCOUNTER — APPOINTMENT (OUTPATIENT)
Dept: RADIOLOGY | Facility: MEDICAL CENTER | Age: 87
DRG: 189 | End: 2022-12-27
Attending: EMERGENCY MEDICINE
Payer: MEDICARE

## 2022-12-27 ENCOUNTER — APPOINTMENT (OUTPATIENT)
Dept: CARDIOLOGY | Facility: MEDICAL CENTER | Age: 87
DRG: 189 | End: 2022-12-27
Attending: INTERNAL MEDICINE
Payer: MEDICARE

## 2022-12-27 ENCOUNTER — HOSPITAL ENCOUNTER (INPATIENT)
Facility: MEDICAL CENTER | Age: 87
LOS: 4 days | DRG: 189 | End: 2022-12-31
Attending: EMERGENCY MEDICINE | Admitting: INTERNAL MEDICINE
Payer: MEDICARE

## 2022-12-27 DIAGNOSIS — Z91.81 RISK FOR FALLS: ICD-10-CM

## 2022-12-27 DIAGNOSIS — E87.1 HYPONATREMIA: ICD-10-CM

## 2022-12-27 DIAGNOSIS — J44.1 ACUTE EXACERBATION OF CHRONIC OBSTRUCTIVE PULMONARY DISEASE (COPD) (HCC): ICD-10-CM

## 2022-12-27 DIAGNOSIS — R09.02 HYPOXIA: ICD-10-CM

## 2022-12-27 PROBLEM — R84.5: Status: ACTIVE | Noted: 2022-12-27

## 2022-12-27 LAB
ALBUMIN SERPL BCP-MCNC: 3.6 G/DL (ref 3.2–4.9)
ALBUMIN/GLOB SERPL: 1.6 G/DL
ALP SERPL-CCNC: 55 U/L (ref 30–99)
ALT SERPL-CCNC: 27 U/L (ref 2–50)
ANION GAP SERPL CALC-SCNC: 9 MMOL/L (ref 7–16)
AST SERPL-CCNC: 17 U/L (ref 12–45)
BASOPHILS # BLD AUTO: 0.1 % (ref 0–1.8)
BASOPHILS # BLD: 0.02 K/UL (ref 0–0.12)
BILIRUB SERPL-MCNC: 1.2 MG/DL (ref 0.1–1.5)
BUN SERPL-MCNC: 27 MG/DL (ref 8–22)
CALCIUM ALBUM COR SERPL-MCNC: 10 MG/DL (ref 8.5–10.5)
CALCIUM SERPL-MCNC: 9.7 MG/DL (ref 8.5–10.5)
CHLORIDE SERPL-SCNC: 91 MMOL/L (ref 96–112)
CO2 SERPL-SCNC: 26 MMOL/L (ref 20–33)
CREAT SERPL-MCNC: 0.7 MG/DL (ref 0.5–1.4)
CRP SERPL HS-MCNC: 0.33 MG/DL (ref 0–0.75)
EKG IMPRESSION: NORMAL
EOSINOPHIL # BLD AUTO: 0.04 K/UL (ref 0–0.51)
EOSINOPHIL NFR BLD: 0.2 % (ref 0–6.9)
ERYTHROCYTE [DISTWIDTH] IN BLOOD BY AUTOMATED COUNT: 42 FL (ref 35.9–50)
GFR SERPLBLD CREATININE-BSD FMLA CKD-EPI: 84 ML/MIN/1.73 M 2
GLOBULIN SER CALC-MCNC: 2.3 G/DL (ref 1.9–3.5)
GLUCOSE SERPL-MCNC: 87 MG/DL (ref 65–99)
HCT VFR BLD AUTO: 41.3 % (ref 37–47)
HGB BLD-MCNC: 14.6 G/DL (ref 12–16)
IMM GRANULOCYTES # BLD AUTO: 0.11 K/UL (ref 0–0.11)
IMM GRANULOCYTES NFR BLD AUTO: 0.7 % (ref 0–0.9)
LACTATE SERPL-SCNC: 1.2 MMOL/L (ref 0.5–2)
LV EJECT FRACT  99904: 60
LV EJECT FRACT MOD 2C 99903: 63.73
LV EJECT FRACT MOD 4C 99902: 51.92
LV EJECT FRACT MOD BP 99901: 49.88
LYMPHOCYTES # BLD AUTO: 1.24 K/UL (ref 1–4.8)
LYMPHOCYTES NFR BLD: 7.4 % (ref 22–41)
MCH RBC QN AUTO: 32.3 PG (ref 27–33)
MCHC RBC AUTO-ENTMCNC: 35.4 G/DL (ref 33.6–35)
MCV RBC AUTO: 91.4 FL (ref 81.4–97.8)
MONOCYTES # BLD AUTO: 1.04 K/UL (ref 0–0.85)
MONOCYTES NFR BLD AUTO: 6.2 % (ref 0–13.4)
NEUTROPHILS # BLD AUTO: 14.36 K/UL (ref 2–7.15)
NEUTROPHILS NFR BLD: 85.4 % (ref 44–72)
NRBC # BLD AUTO: 0 K/UL
NRBC BLD-RTO: 0 /100 WBC
PLATELET # BLD AUTO: 286 K/UL (ref 164–446)
PMV BLD AUTO: 9.7 FL (ref 9–12.9)
POTASSIUM SERPL-SCNC: 4.2 MMOL/L (ref 3.6–5.5)
PROCALCITONIN SERPL-MCNC: 0.14 NG/ML
PROT SERPL-MCNC: 5.9 G/DL (ref 6–8.2)
RBC # BLD AUTO: 4.52 M/UL (ref 4.2–5.4)
SODIUM SERPL-SCNC: 126 MMOL/L (ref 135–145)
WBC # BLD AUTO: 16.8 K/UL (ref 4.8–10.8)

## 2022-12-27 PROCEDURE — 84145 PROCALCITONIN (PCT): CPT

## 2022-12-27 PROCEDURE — 96375 TX/PRO/DX INJ NEW DRUG ADDON: CPT

## 2022-12-27 PROCEDURE — 93306 TTE W/DOPPLER COMPLETE: CPT

## 2022-12-27 PROCEDURE — 700102 HCHG RX REV CODE 250 W/ 637 OVERRIDE(OP): Performed by: INTERNAL MEDICINE

## 2022-12-27 PROCEDURE — 87040 BLOOD CULTURE FOR BACTERIA: CPT

## 2022-12-27 PROCEDURE — 94640 AIRWAY INHALATION TREATMENT: CPT

## 2022-12-27 PROCEDURE — 8E0ZXY6 ISOLATION: ICD-10-PCS | Performed by: INTERNAL MEDICINE

## 2022-12-27 PROCEDURE — 700111 HCHG RX REV CODE 636 W/ 250 OVERRIDE (IP): Performed by: EMERGENCY MEDICINE

## 2022-12-27 PROCEDURE — 96365 THER/PROPH/DIAG IV INF INIT: CPT

## 2022-12-27 PROCEDURE — 700101 HCHG RX REV CODE 250: Performed by: EMERGENCY MEDICINE

## 2022-12-27 PROCEDURE — A9270 NON-COVERED ITEM OR SERVICE: HCPCS | Performed by: INTERNAL MEDICINE

## 2022-12-27 PROCEDURE — 80053 COMPREHEN METABOLIC PANEL: CPT

## 2022-12-27 PROCEDURE — 99223 1ST HOSP IP/OBS HIGH 75: CPT | Mod: AI | Performed by: INTERNAL MEDICINE

## 2022-12-27 PROCEDURE — 99285 EMERGENCY DEPT VISIT HI MDM: CPT

## 2022-12-27 PROCEDURE — 86140 C-REACTIVE PROTEIN: CPT

## 2022-12-27 PROCEDURE — 36415 COLL VENOUS BLD VENIPUNCTURE: CPT

## 2022-12-27 PROCEDURE — 770001 HCHG ROOM/CARE - MED/SURG/GYN PRIV*

## 2022-12-27 PROCEDURE — 71045 X-RAY EXAM CHEST 1 VIEW: CPT

## 2022-12-27 PROCEDURE — 85025 COMPLETE CBC W/AUTO DIFF WBC: CPT

## 2022-12-27 PROCEDURE — 93306 TTE W/DOPPLER COMPLETE: CPT | Mod: 26 | Performed by: INTERNAL MEDICINE

## 2022-12-27 PROCEDURE — 700111 HCHG RX REV CODE 636 W/ 250 OVERRIDE (IP): Performed by: INTERNAL MEDICINE

## 2022-12-27 PROCEDURE — 83605 ASSAY OF LACTIC ACID: CPT

## 2022-12-27 PROCEDURE — 93005 ELECTROCARDIOGRAM TRACING: CPT | Performed by: EMERGENCY MEDICINE

## 2022-12-27 RX ORDER — BISACODYL 10 MG
10 SUPPOSITORY, RECTAL RECTAL
Status: DISCONTINUED | OUTPATIENT
Start: 2022-12-27 | End: 2022-12-31 | Stop reason: HOSPADM

## 2022-12-27 RX ORDER — LOSARTAN POTASSIUM 25 MG/1
25 TABLET ORAL DAILY
Status: ON HOLD | COMMUNITY
End: 2022-12-31

## 2022-12-27 RX ORDER — GUAIFENESIN/DEXTROMETHORPHAN 100-10MG/5
10 SYRUP ORAL EVERY 6 HOURS PRN
Status: DISCONTINUED | OUTPATIENT
Start: 2022-12-27 | End: 2022-12-31 | Stop reason: HOSPADM

## 2022-12-27 RX ORDER — IPRATROPIUM BROMIDE AND ALBUTEROL SULFATE 2.5; .5 MG/3ML; MG/3ML
3 SOLUTION RESPIRATORY (INHALATION)
Status: DISCONTINUED | OUTPATIENT
Start: 2022-12-27 | End: 2022-12-31 | Stop reason: HOSPADM

## 2022-12-27 RX ORDER — DENOSUMAB 60 MG/ML
60 INJECTION SUBCUTANEOUS
COMMUNITY

## 2022-12-27 RX ORDER — TIZANIDINE 4 MG/1
4 TABLET ORAL EVERY 6 HOURS PRN
Status: DISCONTINUED | OUTPATIENT
Start: 2022-12-27 | End: 2022-12-31 | Stop reason: HOSPADM

## 2022-12-27 RX ORDER — FLUTICASONE PROPIONATE AND SALMETEROL 250; 50 UG/1; UG/1
2 POWDER RESPIRATORY (INHALATION) 2 TIMES DAILY
COMMUNITY

## 2022-12-27 RX ORDER — DOXYCYCLINE 100 MG/1
100 TABLET ORAL EVERY 12 HOURS
Status: DISCONTINUED | OUTPATIENT
Start: 2022-12-28 | End: 2022-12-31 | Stop reason: HOSPADM

## 2022-12-27 RX ORDER — ACETAMINOPHEN 325 MG/1
650 TABLET ORAL EVERY 6 HOURS PRN
Status: DISCONTINUED | OUTPATIENT
Start: 2022-12-27 | End: 2022-12-31 | Stop reason: HOSPADM

## 2022-12-27 RX ORDER — VITAMIN B COMPLEX
2000 TABLET ORAL DAILY
Status: DISCONTINUED | OUTPATIENT
Start: 2022-12-28 | End: 2022-12-31 | Stop reason: HOSPADM

## 2022-12-27 RX ORDER — GABAPENTIN 100 MG/1
100-300 CAPSULE ORAL NIGHTLY PRN
Status: DISCONTINUED | OUTPATIENT
Start: 2022-12-27 | End: 2022-12-31 | Stop reason: HOSPADM

## 2022-12-27 RX ORDER — METHYLPREDNISOLONE SODIUM SUCCINATE 125 MG/2ML
125 INJECTION, POWDER, LYOPHILIZED, FOR SOLUTION INTRAMUSCULAR; INTRAVENOUS ONCE
Status: COMPLETED | OUTPATIENT
Start: 2022-12-27 | End: 2022-12-27

## 2022-12-27 RX ORDER — SIMVASTATIN 20 MG
20 TABLET ORAL EVERY EVENING
Status: DISCONTINUED | OUTPATIENT
Start: 2022-12-27 | End: 2022-12-31 | Stop reason: HOSPADM

## 2022-12-27 RX ORDER — ALBUTEROL SULFATE 90 UG/1
2 AEROSOL, METERED RESPIRATORY (INHALATION) EVERY 6 HOURS PRN
Status: DISCONTINUED | OUTPATIENT
Start: 2022-12-27 | End: 2022-12-31

## 2022-12-27 RX ORDER — BUDESONIDE AND FORMOTEROL FUMARATE DIHYDRATE 160; 4.5 UG/1; UG/1
2 AEROSOL RESPIRATORY (INHALATION)
Status: DISCONTINUED | OUTPATIENT
Start: 2022-12-27 | End: 2022-12-31 | Stop reason: HOSPADM

## 2022-12-27 RX ORDER — OMEPRAZOLE 20 MG/1
40 CAPSULE, DELAYED RELEASE ORAL
Status: DISCONTINUED | OUTPATIENT
Start: 2022-12-28 | End: 2022-12-31 | Stop reason: HOSPADM

## 2022-12-27 RX ORDER — ALPRAZOLAM 0.25 MG/1
.25-.5 TABLET ORAL 2 TIMES DAILY PRN
COMMUNITY

## 2022-12-27 RX ORDER — TIZANIDINE 4 MG/1
4 TABLET ORAL EVERY 6 HOURS PRN
COMMUNITY

## 2022-12-27 RX ORDER — LEVOTHYROXINE SODIUM 88 UG/1
88 TABLET ORAL
COMMUNITY

## 2022-12-27 RX ORDER — FLUTICASONE PROPIONATE AND SALMETEROL 250; 50 UG/1; UG/1
2 POWDER RESPIRATORY (INHALATION) 2 TIMES DAILY
Status: DISCONTINUED | OUTPATIENT
Start: 2022-12-27 | End: 2022-12-27

## 2022-12-27 RX ORDER — IPRATROPIUM BROMIDE AND ALBUTEROL SULFATE 2.5; .5 MG/3ML; MG/3ML
3 SOLUTION RESPIRATORY (INHALATION) ONCE
Status: COMPLETED | OUTPATIENT
Start: 2022-12-27 | End: 2022-12-27

## 2022-12-27 RX ORDER — LOSARTAN POTASSIUM 50 MG/1
25 TABLET ORAL DAILY
Status: DISCONTINUED | OUTPATIENT
Start: 2022-12-27 | End: 2022-12-31 | Stop reason: HOSPADM

## 2022-12-27 RX ORDER — PREDNISONE 20 MG/1
40 TABLET ORAL DAILY
Status: COMPLETED | OUTPATIENT
Start: 2022-12-27 | End: 2022-12-31

## 2022-12-27 RX ORDER — CHOLECALCIFEROL (VITAMIN D3) 50 MCG
2000 TABLET ORAL DAILY
COMMUNITY

## 2022-12-27 RX ORDER — POLYETHYLENE GLYCOL 3350 17 G/17G
1 POWDER, FOR SOLUTION ORAL
Status: DISCONTINUED | OUTPATIENT
Start: 2022-12-27 | End: 2022-12-31 | Stop reason: HOSPADM

## 2022-12-27 RX ORDER — PROPAFENONE HYDROCHLORIDE 150 MG/1
150 TABLET, COATED ORAL SEE ADMIN INSTRUCTIONS
COMMUNITY

## 2022-12-27 RX ORDER — FENOFIBRATE 134 MG/1
134 CAPSULE ORAL DAILY
Status: DISCONTINUED | OUTPATIENT
Start: 2022-12-28 | End: 2022-12-31 | Stop reason: HOSPADM

## 2022-12-27 RX ORDER — AMOXICILLIN 250 MG
2 CAPSULE ORAL 2 TIMES DAILY
Status: DISCONTINUED | OUTPATIENT
Start: 2022-12-27 | End: 2022-12-31 | Stop reason: HOSPADM

## 2022-12-27 RX ORDER — AZITHROMYCIN 500 MG/1
500 INJECTION, POWDER, LYOPHILIZED, FOR SOLUTION INTRAVENOUS ONCE
Status: COMPLETED | OUTPATIENT
Start: 2022-12-27 | End: 2022-12-27

## 2022-12-27 RX ORDER — ALPRAZOLAM 0.25 MG/1
.25-.5 TABLET ORAL 2 TIMES DAILY PRN
Status: DISCONTINUED | OUTPATIENT
Start: 2022-12-27 | End: 2022-12-31 | Stop reason: HOSPADM

## 2022-12-27 RX ORDER — PROPAFENONE HYDROCHLORIDE 150 MG/1
150 TABLET, COATED ORAL 2 TIMES DAILY
Status: DISCONTINUED | OUTPATIENT
Start: 2022-12-27 | End: 2022-12-31 | Stop reason: HOSPADM

## 2022-12-27 RX ORDER — LEVOTHYROXINE SODIUM 88 UG/1
88 TABLET ORAL
Status: DISCONTINUED | OUTPATIENT
Start: 2022-12-28 | End: 2022-12-31 | Stop reason: HOSPADM

## 2022-12-27 RX ADMIN — PROPAFENONE HYDROCHLORIDE 150 MG: 150 TABLET, FILM COATED ORAL at 20:58

## 2022-12-27 RX ADMIN — LOSARTAN POTASSIUM 25 MG: 50 TABLET, FILM COATED ORAL at 12:35

## 2022-12-27 RX ADMIN — SIMVASTATIN 20 MG: 40 TABLET, FILM COATED ORAL at 18:45

## 2022-12-27 RX ADMIN — RIVAROXABAN 15 MG: 15 TABLET, FILM COATED ORAL at 18:45

## 2022-12-27 RX ADMIN — AZITHROMYCIN MONOHYDRATE 500 MG: 500 INJECTION, POWDER, LYOPHILIZED, FOR SOLUTION INTRAVENOUS at 08:47

## 2022-12-27 RX ADMIN — IPRATROPIUM BROMIDE AND ALBUTEROL SULFATE 3 ML: 2.5; .5 SOLUTION RESPIRATORY (INHALATION) at 08:59

## 2022-12-27 RX ADMIN — ALPRAZOLAM 0.5 MG: 0.25 TABLET ORAL at 23:06

## 2022-12-27 RX ADMIN — METHYLPREDNISOLONE SODIUM SUCCINATE 125 MG: 125 INJECTION, POWDER, FOR SOLUTION INTRAMUSCULAR; INTRAVENOUS at 09:28

## 2022-12-27 RX ADMIN — PREDNISONE 40 MG: 20 TABLET ORAL at 12:35

## 2022-12-27 RX ADMIN — SENNOSIDES AND DOCUSATE SODIUM 2 TABLET: 50; 8.6 TABLET ORAL at 18:45

## 2022-12-27 ASSESSMENT — LIFESTYLE VARIABLES
EVER FELT BAD OR GUILTY ABOUT YOUR DRINKING: NO
EVER FELT BAD OR GUILTY ABOUT YOUR DRINKING: NO
DOES PATIENT WANT TO STOP DRINKING: NO
DOES PATIENT WANT TO STOP DRINKING: NO
EVER HAD A DRINK FIRST THING IN THE MORNING TO STEADY YOUR NERVES TO GET RID OF A HANGOVER: NO
HOW MANY TIMES IN THE PAST YEAR HAVE YOU HAD 5 OR MORE DRINKS IN A DAY: 0
HOW MANY TIMES IN THE PAST YEAR HAVE YOU HAD 5 OR MORE DRINKS IN A DAY: 0
DO YOU DRINK ALCOHOL: NO
CONSUMPTION TOTAL: NEGATIVE
CONSUMPTION TOTAL: NEGATIVE
HAVE YOU EVER FELT YOU SHOULD CUT DOWN ON YOUR DRINKING: NO
ON A TYPICAL DAY WHEN YOU DRINK ALCOHOL HOW MANY DRINKS DO YOU HAVE: 0
HAVE PEOPLE ANNOYED YOU BY CRITICIZING YOUR DRINKING: NO
EVER HAD A DRINK FIRST THING IN THE MORNING TO STEADY YOUR NERVES TO GET RID OF A HANGOVER: NO
ON A TYPICAL DAY WHEN YOU DRINK ALCOHOL HOW MANY DRINKS DO YOU HAVE: 0
HAVE YOU EVER FELT YOU SHOULD CUT DOWN ON YOUR DRINKING: NO
ALCOHOL_USE: NO
HAVE PEOPLE ANNOYED YOU BY CRITICIZING YOUR DRINKING: NO
TOTAL SCORE: 0
AVERAGE NUMBER OF DAYS PER WEEK YOU HAVE A DRINK CONTAINING ALCOHOL: 0
TOTAL SCORE: 0
TOTAL SCORE: 0
AVERAGE NUMBER OF DAYS PER WEEK YOU HAVE A DRINK CONTAINING ALCOHOL: 0

## 2022-12-27 ASSESSMENT — CHA2DS2 SCORE
AGE 65 TO 74: NO
AGE 75 OR GREATER: YES
DIABETES: NO
SEX: FEMALE
HYPERTENSION: YES
VASCULAR DISEASE: NO
CHF OR LEFT VENTRICULAR DYSFUNCTION: NO
CHA2DS2 VASC SCORE: 4
PRIOR STROKE OR TIA OR THROMBOEMBOLISM: NO

## 2022-12-27 ASSESSMENT — PATIENT HEALTH QUESTIONNAIRE - PHQ9
2. FEELING DOWN, DEPRESSED, IRRITABLE, OR HOPELESS: NOT AT ALL
1. LITTLE INTEREST OR PLEASURE IN DOING THINGS: NOT AT ALL
SUM OF ALL RESPONSES TO PHQ9 QUESTIONS 1 AND 2: 0

## 2022-12-27 NOTE — DISCHARGE PLANNING
Note:  Received information from ED RN that pt's daughter wants to talk to RN COLTEN regarding insurance. RN COLTEN spoke to pt at bedside, received consent for RN COLTEN to call her daughter.     RN COLTEN called pt's daughter, Faviola. Per Faviola, she has questions about [facility] being out of network with pt's insurance, [employer]. RN CM confirmed information with UR Manager and explained insurance situation to pt and pt's daughter. ERP updated.    Per Faviola, pt was discharged home yesterday from [facility] in [place]. They were initially informed that pt needed placement for therapy and she was researching for facilities, but was surprised that pt was discharged home. Pt lives by herself in a mobile home but Faviola lives about 5 miles away. Pt uses a walker. Pt uses home oxygen from [employer].

## 2022-12-27 NOTE — ED PROVIDER NOTES
ED Provider Note    CHIEF COMPLAINT  Chief Complaint   Patient presents with    Shortness of Breath       LIMITATION TO HISTORY   Select: : None    HPI  Eun Be is a 87 y.o. female who presents with shortness of breath.  Patient reports that she was just discharged from the hospital yesterday at [facility] has been feeling increasingly short of breath and weak overnight.  She reports she feels shaky and has had to urinate frequently as well.  She reports cough with this.  No chest pain.  No new fevers or chills.  No leg pain or swelling.  No abdominal pain, nausea or vomiting.    OUTSIDE HISTORIAN(S):  Select: EMS      EXTERNAL RECORDS REVIEWED  Select: Inpatient Notes reviewed records from [facility], patient was recently admitted there for RSV and COPD exacerbation, she was given steroids as well as breathing treatments,    REVIEW OF SYSTEMS  See HPI for further details. All other systems are negative.     PAST MEDICAL HISTORY   has a past medical history of Anxiety, Arrhythmia, Arthritis, Backpain, Bronchitis, CATARACT, Chronic lower back pain (3/11/2014), Chronic pain, COPD, Depression, EMPHYSEMA, GERD (gastroesophageal reflux disease), Heart burn, HTN (hypertension), Hypertension, Hypertriglyceridemia (11/7/2013), Hypothyroid, IGT (impaired glucose tolerance), MEDICAL HOME, Osteopenia, Pain, Panic disorder, Paroxysmal atrial fibrillation (HCC), Personal history of venous thrombosis and embolism, Pneumonia, Renal disorder, Rheumatic fever, Rheumatic fever, Thyroid disease, and Unspecified disorder of thyroid.    SURGICAL HISTORY   has a past surgical history that includes hysterectomy, total abdominal (26 years old); excis/unroof renal cyst (1977); hysterectomy radical (1963); other (1963); other (1977); and hemorrhoidectomy (1963).    FAMILY HISTORY  Family History   Problem Relation Age of Onset    Stroke Brother     Heart Disease Other        SOCIAL HISTORY  Social History  "    Tobacco Use    Smoking status: Former     Packs/day: 0.25     Years: 30.00     Pack years: 7.50     Types: Cigarettes     Quit date:      Years since quittin.0    Smokeless tobacco: Never    Tobacco comments:     quit  ppd x ?yrs (on and off for years since age 15)   Vaping Use    Vaping Use: Never used   Substance and Sexual Activity    Alcohol use: Yes     Alcohol/week: 0.0 oz     Comment: wine, 1 glass every other night    Drug use: No    Sexual activity: Never       CURRENT MEDICATIONS  Home Medications       Reviewed by Love Crawley (Pharmacy Tech) on 22 at 1113  Med List Status: Complete     Medication Last Dose Status   albuterol 108 (90 Base) MCG/ACT Aero Soln inhalation aerosol UNK Active   ALPRAZolam (XANAX) 0.25 MG Tab UNK Active   B Complex-C (SUPER B COMPLEX PO) UNK Active   Calcium-Magnesium-Zinc (IMELDA-MAG-ZINC PO) UNK Active   Cholecalciferol (VITAMIN D) 2000 UNIT Tab UNK Active   denosumab (PROLIA) 60 MG/ML Solution Prefilled Syringe injection 2022 Active   fenofibrate micronized (LOFIBRA) 134 MG capsule UNK Active   fluticasone-salmeterol (ADVAIR DISKUS) 250-50 MCG/ACT AEROSOL POWDER, BREATH ACTIVATED UNK Active   gabapentin (NEURONTIN) 100 MG Cap UNK Active   levothyroxine (SYNTHROID) 88 MCG Tab UNK Active   losartan (COZAAR) 25 MG Tab UNK Active   omeprazole (PRILOSEC) 40 MG delayed-release capsule UNK Active   propafenone (RYTHMOL) 150 MG Tab UNK Active   rivaroxaban (XARELTO) 15 MG Tab tablet UNK Active   simvastatin (ZOCOR) 20 MG Tab UNK Active   tizanidine (ZANAFLEX) 4 MG Tab UNK Active                    ALLERGIES  Allergies   Allergen Reactions    Asa [Aspirin] Rash     Rash     Latex Rash     Rash     Penicillins Unspecified     Unknown childhood reaction     Tape Hives, Itching and Swelling    Wasp Venom Swelling       PHYSICAL EXAM  VITAL SIGNS: BP (!) 153/65   Pulse 84   Temp 36.7 °C (98.1 °F) (Temporal)   Resp (!) 22   Ht 1.778 m (5' 10\")   Wt " 61.2 kg (135 lb)   LMP 1963   SpO2 93%   BMI 19.37 kg/m²      Pulse ox interpretation: Patient is hypoxemic, currently on 5 L  Constitutional: Alert ill-appearing  HENT: No signs of trauma, Bilateral external ears normal, Nose normal.   Eyes: Pupils are equal and reactive, Conjunctiva normal, Non-icteric.   Neck: Normal range of motion, No tenderness, Supple, No stridor.   Cardiovascular: Irregular no murmurs.   Thorax & Lungs: Diminished in the bases with scattered wheezing, No chest tenderness.   Abdomen: Bowel sounds normal, Soft, No tenderness, No masses, No pulsatile masses. No peritoneal signs.  Skin: Warm, Dry, No erythema, No rash.   Back: No bony tenderness, No CVA tenderness.   Extremities: Intact distal pulses, No edema, No tenderness, No cyanosis,  Negative Daryl's sign.   Musculoskeletal: Good range of motion in all major joints. No tenderness to palpation or major deformities noted.   Neurologic: Alert , Normal motor function, Normal sensory function, No focal deficits noted.   Psychiatric: Affect normal, Judgment normal, Mood normal.               DIAGNOSTIC STUDIES / PROCEDURES  EKG  Results for orders placed or performed during the hospital encounter of 22   EKG   Result Value Ref Range    Report       [facility] Emergency Dept.    Test Date:  2022  Pt Name:    JULES DUNCAN               Department: ER  MRN:        [id number]                      Room:        05  Gender:     Female                       Technician: [id number]  :        [id number]                   Requested By:ER TRIAGE PROTOCOL  Order #:    [id number]                    Reading MD: HIWOT SMALLS MD    Measurements  Intervals                                Axis  Rate:       84                           P:  OH:                                      QRS:        -49  QRSD:       143                          T:          98  QT:         381  QTc:        451    Interpretive Statements  Atrial  fibrillation  Left bundle branch block  Compared to ECG 09/15/2019 10:42:47  Ectopic atrial rhythm no longer present  Electronically Signed On 12- 10:44:26 PST by HIWOT SMALLS MD           LABS  Abnormal Labs Reviewed   CBC WITH DIFFERENTIAL - Abnormal; Notable for the following components:       Result Value    WBC 16.8 (*)     MCHC 35.4 (*)     Neutrophils-Polys 85.40 (*)     Lymphocytes 7.40 (*)     Neutrophils (Absolute) 14.36 (*)     Monos (Absolute) 1.04 (*)     All other components within normal limits   COMP METABOLIC PANEL - Abnormal; Notable for the following components:    Sodium 126 (*)     Chloride 91 (*)     Bun 27 (*)     Total Protein 5.9 (*)     All other components within normal limits       RADIOLOGY  I have independently interpreted the diagnostic imaging associated with this visit and am waiting the final reading from the radiologist. Select: EKG(s)   and X-ray(s)    DX-CHEST-PORTABLE (1 VIEW)   Final Result      Possible mild hazy right apical airspace opacity which could represent small infiltrate/pneumonia versus overlapping structures.      EC-ECHOCARDIOGRAM COMPLETE W/O CONT    (Results Pending)         COURSE & MEDICAL DECISION MAKING  Pertinent Labs & Imaging studies reviewed. (See chart for details)  7:17 AM  Patient is evaluated the bedside, will plan for sepsis bundle, DuoNeb, Solu-Medrol, azithromycin    Patient reevaluated after breathing treatment, subjectively feeling improved, still on 4 L, will plan for hospitalization    Case is discussed with Dr. Gongora the hospitalist for admission to the hospital    Differential Diagnosis Considered  COPD exacerbation, pneumonia, viral respiratory infection, pulmonary edema    This is an 87-year-old female presenting with shortness of breath.  She is just discharged yesterday from [facility] for apparent COPD exacerbation with positive RSV.  She has had increased shortness of breath and increasing oxygen requirement, likely  still with some worsening COPD.  As such patient will be hospitalized for continued treatment she has received steroid and breathing treatment here in the emergency department.  Her x-ray does not show any findings of obvious pneumonia and while she does have a leukocytosis likely from her steroid use with her COPD.  We will cover with azithromycin given her COPD and change in symptoms.  She does have a history of atrial fibrillation without evidence of decompensation from this perspective at this time    Is hospitalized in stable condition    FINAL IMPRESSION  1. Acute exacerbation of chronic obstructive pulmonary disease (COPD) (HCC)    2. Hypoxia    3. Hyponatremia           Electronically signed by: Keo Gruber M.D., 12/27/2022 7:17 AM

## 2022-12-27 NOTE — ASSESSMENT & PLAN NOTE
Admit to INPATIENT. Will require 2 or more midnights for titrating of O2, treating atypical pneumonia and COPD exacerbation due to RSV. Home oxygen requirement is 3L PRN  Echo completed 12/28-EF 60%, moderate pericardial effusion  -To continue follow-up with cardiology and pulmonology as outpatient  -O2 and resp per protocol  -Reevaluate for home oxygen, currently at rest 2L   -Incentive spirometry  -Continue with breathing treatments  -Discharge with H/H PT  -No further diuresis

## 2022-12-27 NOTE — H&P
Hospital Medicine History & Physical Note    Date of Service  12/27/2022    Primary Care Physician  Libertad Estrella M.D.    Consultants      Code Status  Full Code    Chief Complaint  Chief Complaint   Patient presents with    Shortness of Breath       History of Presenting Illness  Eun Be is a 87 y.o. female who presented 12/27/2022 with Shortness of Breath  She has a history of lumbar stenosis  but declines surgery, sees Dr. Brady, par Afib on propafenone and Xarelto, sees Dr. Sandoval, hypertension, hyperlipidemia, COPD was on 2LO2. She was discharged from [facility] yesterday for COPD exacerbation and was RSV positive (neg CoVID, neg flu). She has mild cognitive deficits. She presents with shortness of breath, cough and wheezing.   At the ED, she is afebrile, hemodynamically stable but HYPOXIC, requiring 4-5L.  CXR showed R apical hazy opacity on my personal review  Leukocytosis  Procalcitonin 0.14  When I saw her at ED, no acute distress. Wheezing on auscultation.    I discussed the plan of care with patient and bedside RN.    Review of Systems  Review of Systems   Unable to perform ROS: Mental acuity     Past Medical History   has a past medical history of Anxiety, Arrhythmia, Arthritis, Backpain, Bronchitis, CATARACT, Chronic lower back pain (3/11/2014), Chronic pain, COPD, Depression, EMPHYSEMA, GERD (gastroesophageal reflux disease), Heart burn, HTN (hypertension), Hypertension, Hypertriglyceridemia (11/7/2013), Hypothyroid, IGT (impaired glucose tolerance), MEDICAL HOME, Osteopenia, Pain, Panic disorder, Paroxysmal atrial fibrillation (HCC), Personal history of venous thrombosis and embolism, Pneumonia, Renal disorder, Rheumatic fever, Rheumatic fever, Thyroid disease, and Unspecified disorder of thyroid.    Surgical History   has a past surgical history that includes hysterectomy, total abdominal (26 years old); pr excis/unroof renal cyst (1977); hysterectomy radical (1963); other (1963); other  (1977); and hemorrhoidectomy (1963).     Family History  family history includes Heart Disease in an other family member; Stroke in her brother.   Family history reviewed with patient. There is no family history that is pertinent to the chief complaint.     Social History   reports that she quit smoking about 24 years ago. Her smoking use included cigarettes. She has a 7.50 pack-year smoking history. She has never used smokeless tobacco. She reports current alcohol use. She reports that she does not use drugs.    Allergies  Allergies   Allergen Reactions    Asa [Aspirin] Rash     Rash     Latex Rash     Rash     Penicillins Unspecified     Unknown childhood reaction     Tape Hives, Itching and Swelling    Wasp Venom Swelling       Medications  Prior to Admission Medications   Prescriptions Last Dose Informant Patient Reported? Taking?   ALPRAZolam (XANAX) 0.25 MG Tab UNK at UNK Family Member Yes Yes   Sig: Take 0.25-0.5 mg by mouth 2 times a day as needed for Sleep or Anxiety.   B Complex-C (SUPER B COMPLEX PO) UNK at K Family Member Yes Yes   Sig: Take 1 Tablet by mouth every day.   Calcium-Magnesium-Zinc (IMELDA-MAG-ZINC PO) UNK at [facility] Family Member Yes Yes   Sig: Take 1 Tablet by mouth every day.   Cholecalciferol (VITAMIN D) 2000 UNIT Tab UNK at K Family Member Yes Yes   Sig: Take 2,000 Units by mouth every day.   albuterol 108 (90 Base) MCG/ACT Aero Soln inhalation aerosol UNK at UNK Family Member No No   Sig: INHALE 2 PUFFS BY MOUTH EVERY 6 HOURS AS NEEDED FOR SHORTNESS OF BREATH   denosumab (PROLIA) 60 MG/ML Solution Prefilled Syringe injection 7/26/2022 at [facility] Family Member Yes Yes   Sig: Inject 60 mg under the skin every 6 months.   fenofibrate micronized (LOFIBRA) 134 MG capsule UNK at K Family Member No No   Sig: TAKE 1 CAPSULE BY MOUTH EVERY DAY   fluticasone-salmeterol (ADVAIR) 250-50 MCG/ACT AEROSOL POWDER, BREATH ACTIVATED UNK at [facility] Family Member Yes Yes   Sig: Inhale 2 Puffs 2 times a day.    gabapentin (NEURONTIN) 100 MG Cap UNK at UNK Family Member No No   Sig: TAKE 1-3 CAPS BY MOUTH AT BEDTIME AS NEEDED (PAIN).   levothyroxine (SYNTHROID) 88 MCG Tab UNK at UNK Family Member Yes Yes   Sig: Take 88 mcg by mouth every morning on an empty stomach.   losartan (COZAAR) 25 MG Tab UNK at UNK Family Member Yes Yes   Sig: Take 25 mg by mouth every day.   omeprazole (PRILOSEC) 40 MG delayed-release capsule UNK at UNK Family Member No No   Sig: TAKE 1 CAPSULE BY MOUTH EVERY DAY   propafenone (RYTHMOL) 150 MG Tab UNK at UNK Family Member Yes Yes   Sig: Take 150 mg by mouth see administration instructions. Takes twice a day scheduled and then at bedtime as needed   rivaroxaban (XARELTO) 15 MG Tab tablet UNK at UNK Family Member Yes Yes   Sig: Take  by mouth with dinner.   simvastatin (ZOCOR) 20 MG Tab UNK at UNK Family Member No No   Sig: TAKE 1 TABLET BY MOUTH IN THE EVENING   tizanidine (ZANAFLEX) 4 MG Tab UNK at UNK Family Member Yes Yes   Sig: Take 4 mg by mouth every 6 hours as needed. Indications: Muscle Spasticity      Facility-Administered Medications: None       Physical Exam  Temp:  [36.7 °C (98.1 °F)] 36.7 °C (98.1 °F)  Pulse:  [] 86  Resp:  [18-25] 25  BP: (132-198)/(72-83) 132/83  SpO2:  [86 %-95 %] 93 %  Blood Pressure : 132/83   Temperature: 36.7 °C (98.1 °F)   Pulse: 86   Respiration: (!) 25   Pulse Oximetry: 93 %       Physical Exam  Vitals and nursing note reviewed.   Constitutional:       General: She is not in acute distress.     Comments: Elderly   HENT:      Head: Normocephalic and atraumatic.      Right Ear: External ear normal.      Left Ear: External ear normal.      Nose: Nose normal.      Mouth/Throat:      Mouth: Mucous membranes are moist.   Eyes:      General: No scleral icterus.     Conjunctiva/sclera: Conjunctivae normal.   Cardiovascular:      Rate and Rhythm: Normal rate. Rhythm irregular.      Pulses: Normal pulses.      Heart sounds: Murmur heard.     No friction rub. No  gallop.   Pulmonary:      Effort: Pulmonary effort is normal. No respiratory distress.      Breath sounds: No stridor. Wheezing present. No rhonchi or rales.   Chest:      Chest wall: No tenderness.   Abdominal:      General: Abdomen is flat. Bowel sounds are normal. There is no distension.      Palpations: Abdomen is soft.      Tenderness: There is no abdominal tenderness. There is no guarding or rebound.   Musculoskeletal:         General: No swelling, tenderness or deformity. Normal range of motion.      Cervical back: Normal range of motion and neck supple. No rigidity.   Skin:     General: Skin is warm.      Coloration: Skin is not jaundiced.   Neurological:      General: No focal deficit present.      Mental Status: She is alert and oriented to person, place, and time. Mental status is at baseline.      Comments: Cognitive deficits.   Psychiatric:         Mood and Affect: Mood normal.         Behavior: Behavior normal.         Thought Content: Thought content normal.         Judgment: Judgment normal.       Laboratory:  Recent Labs     12/27/22  0735   WBC 16.8*   RBC 4.52   HEMOGLOBIN 14.6   HEMATOCRIT 41.3   MCV 91.4   MCH 32.3   MCHC 35.4*   RDW 42.0   PLATELETCT 286   MPV 9.7     Recent Labs     12/27/22  0735   SODIUM 126*   POTASSIUM 4.2   CHLORIDE 91*   CO2 26   GLUCOSE 87   BUN 27*   CREATININE 0.70   CALCIUM 9.7     Recent Labs     12/27/22  0735   ALTSGPT 27   ASTSGOT 17   ALKPHOSPHAT 55   TBILIRUBIN 1.2   GLUCOSE 87         No results for input(s): NTPROBNP in the last 72 hours.      No results for input(s): TROPONINT in the last 72 hours.    Imaging:  DX-CHEST-PORTABLE (1 VIEW)   Final Result      Possible mild hazy right apical airspace opacity which could represent small infiltrate/pneumonia versus overlapping structures.        Assessment/Plan:    * Acute and chronic respiratory failure with hypoxia, RSV+, COPD exacerbation (HCC)  Assessment & Plan  Admit to INPATIENT. Will require 2 or more  midnights for titrating of O2, treating atypical pneumonia, treating COPD with breathing treatments and safe discharge with PT/OT assessment.  Echo ordered  O2 and resp per protocol      Positive sputum culture for RSV  Assessment & Plan  Ordered droplet precautions  Conservative management as above    Acute exacerbation of chronic obstructive pulmonary disease (COPD) (HCC)  Assessment & Plan  O2, breathing treatments  Prednisone course and doxycycline      Hypothyroid- (present on admission)  Assessment & Plan  Continue Synthroid    Atrial fibrillation (HCC)- (present on admission)  Assessment & Plan  Vitals:    12/27/22 0931   BP: 132/83   Pulse: 86   Resp: (!) 25   Temp:    SpO2: 93%     HR stab;e  Continue anticoagulation and propafenone    Mixed hyperlipidemia- (present on admission)  Assessment & Plan  Continue statin and fenofibrates    Essential hypertension- (present on admission)  Assessment & Plan  Vitals:    12/27/22 0931   BP: 132/83   Pulse: 86   Resp: (!) 25   Temp:    SpO2: 93%     Stable resume blood pressure meds        VTE prophylaxis: therapeutic Xarelto

## 2022-12-27 NOTE — ASSESSMENT & PLAN NOTE
Blood pressure labile with range of -171, expressed dizziness therefore will hold on increasing BP medication  -continue to monitor  -home losartan 25mg

## 2022-12-27 NOTE — ED TRIAGE NOTES
"Chief Complaint   Patient presents with    Shortness of Breath       Pt BIB EMS from home for above complaint. Pt was discharge from hospital last night after week long stay for RSV. Pt states she woke up this morning feeling worse. On EMS arrival pt was sating at 89% on baseline 3L NC. Pt received 1 albuterol tx and 2 duonebs prior to arrival with no relief. Denies CP. Chart up for ERP.     BP (!) 198/72   Pulse 82   Temp 36.7 °C (98.1 °F) (Temporal)   Resp 20   Ht 1.778 m (5' 10\")   Wt 61.2 kg (135 lb)   LMP 01/01/1963   SpO2 94%   BMI 19.37 kg/m²     "

## 2022-12-28 PROBLEM — R09.02 HYPOXIA: Status: RESOLVED | Noted: 2022-12-27 | Resolved: 2022-12-28

## 2022-12-28 PROCEDURE — 700111 HCHG RX REV CODE 636 W/ 250 OVERRIDE (IP): Performed by: INTERNAL MEDICINE

## 2022-12-28 PROCEDURE — 99233 SBSQ HOSP IP/OBS HIGH 50: CPT | Performed by: INTERNAL MEDICINE

## 2022-12-28 PROCEDURE — 97535 SELF CARE MNGMENT TRAINING: CPT

## 2022-12-28 PROCEDURE — 770001 HCHG ROOM/CARE - MED/SURG/GYN PRIV*

## 2022-12-28 PROCEDURE — 94669 MECHANICAL CHEST WALL OSCILL: CPT

## 2022-12-28 PROCEDURE — 97162 PT EVAL MOD COMPLEX 30 MIN: CPT

## 2022-12-28 PROCEDURE — 97165 OT EVAL LOW COMPLEX 30 MIN: CPT

## 2022-12-28 PROCEDURE — A9270 NON-COVERED ITEM OR SERVICE: HCPCS | Performed by: INTERNAL MEDICINE

## 2022-12-28 PROCEDURE — 700102 HCHG RX REV CODE 250 W/ 637 OVERRIDE(OP): Performed by: INTERNAL MEDICINE

## 2022-12-28 PROCEDURE — 94664 DEMO&/EVAL PT USE INHALER: CPT

## 2022-12-28 RX ORDER — FUROSEMIDE 10 MG/ML
20 INJECTION INTRAMUSCULAR; INTRAVENOUS ONCE
Status: COMPLETED | OUTPATIENT
Start: 2022-12-28 | End: 2022-12-28

## 2022-12-28 RX ADMIN — ALPRAZOLAM 0.25 MG: 0.25 TABLET ORAL at 21:39

## 2022-12-28 RX ADMIN — RIVAROXABAN 15 MG: 15 TABLET, FILM COATED ORAL at 18:03

## 2022-12-28 RX ADMIN — LEVOTHYROXINE SODIUM 88 MCG: 0.09 TABLET ORAL at 05:28

## 2022-12-28 RX ADMIN — FUROSEMIDE 20 MG: 10 INJECTION, SOLUTION INTRAMUSCULAR; INTRAVENOUS at 13:45

## 2022-12-28 RX ADMIN — OMEPRAZOLE 40 MG: 20 CAPSULE, DELAYED RELEASE ORAL at 05:28

## 2022-12-28 RX ADMIN — PROPAFENONE HYDROCHLORIDE 150 MG: 150 TABLET, FILM COATED ORAL at 18:02

## 2022-12-28 RX ADMIN — DOXYCYCLINE 100 MG: 100 TABLET, FILM COATED ORAL at 05:28

## 2022-12-28 RX ADMIN — SIMVASTATIN 20 MG: 40 TABLET, FILM COATED ORAL at 18:04

## 2022-12-28 RX ADMIN — BUDESONIDE AND FORMOTEROL FUMARATE DIHYDRATE 2 PUFF: 160; 4.5 AEROSOL RESPIRATORY (INHALATION) at 09:29

## 2022-12-28 RX ADMIN — DOXYCYCLINE 100 MG: 100 TABLET, FILM COATED ORAL at 18:03

## 2022-12-28 RX ADMIN — PREDNISONE 40 MG: 20 TABLET ORAL at 05:28

## 2022-12-28 RX ADMIN — FENOFIBRATE 134 MG: 134 CAPSULE ORAL at 05:28

## 2022-12-28 RX ADMIN — LOSARTAN POTASSIUM 25 MG: 50 TABLET, FILM COATED ORAL at 05:29

## 2022-12-28 RX ADMIN — Medication 2000 UNITS: at 05:28

## 2022-12-28 RX ADMIN — PROPAFENONE HYDROCHLORIDE 150 MG: 150 TABLET, FILM COATED ORAL at 05:31

## 2022-12-28 ASSESSMENT — COGNITIVE AND FUNCTIONAL STATUS - GENERAL
SUGGESTED CMS G CODE MODIFIER MOBILITY: CI
CLIMB 3 TO 5 STEPS WITH RAILING: A LITTLE
DAILY ACTIVITIY SCORE: 24
MOBILITY SCORE: 23
SUGGESTED CMS G CODE MODIFIER DAILY ACTIVITY: CH

## 2022-12-28 ASSESSMENT — CHA2DS2 SCORE
CHF OR LEFT VENTRICULAR DYSFUNCTION: NO
AGE 75 OR GREATER: YES
PRIOR STROKE OR TIA OR THROMBOEMBOLISM: NO
HYPERTENSION: YES
AGE 65 TO 74: NO
CHA2DS2 VASC SCORE: 4
SEX: FEMALE
VASCULAR DISEASE: NO
DIABETES: NO

## 2022-12-28 ASSESSMENT — ENCOUNTER SYMPTOMS
SHORTNESS OF BREATH: 1
COUGH: 1
SORE THROAT: 0
NERVOUS/ANXIOUS: 1
HEADACHES: 0
HEARTBURN: 0
PALPITATIONS: 0
SPUTUM PRODUCTION: 0
VOMITING: 0
CHILLS: 0
DIZZINESS: 0
CONSTIPATION: 0
MYALGIAS: 1
BACK PAIN: 0
WHEEZING: 1
NAUSEA: 0
ABDOMINAL PAIN: 0
DIARRHEA: 0
FEVER: 0

## 2022-12-28 ASSESSMENT — GAIT ASSESSMENTS
DEVIATION: SHUFFLED GAIT;BRADYKINETIC
ASSISTIVE DEVICE: FRONT WHEEL WALKER
DISTANCE (FEET): 80
GAIT LEVEL OF ASSIST: SUPERVISED

## 2022-12-28 ASSESSMENT — ACTIVITIES OF DAILY LIVING (ADL): TOILETING: INDEPENDENT

## 2022-12-28 NOTE — PROGRESS NOTES
Hospital Medicine Daily Progress Note    Date of Service  12/28/2022    Chief Complaint  Eun Be is a 87 y.o. female admitted 12/27/2022 with shortness of breath    Hospital Course  87-year-old female past medical history of atrial fibrillation on Xarelto GERD, hypertension, passive smoking, COPD presented 12/27/2022 with shortness of breath.  Patient was recently discharged from [facility] due to COPD exacerbation from RSV positive.  Here in emergency room patient hypoxic 86% on 3 L of oxygen.  Chest x-ray right apical hazy opacities, leukocytosis, procalcitonin normal.  Significant wheezing on auscultation.  She admitted for COPD exacerbation for active treatment, steroids.  Doxycycline for bronchitis.  Echocardiogram showed moderate pericardial effusions, preserved systolic function normal LV, EF 60%    Interval Problem Update  12/28/2022-patient reports significant improvement in her breathing.  She is 94% on 4 L of oxygen.  Still significant wheezing on auscultation.  Blood pressure stable 118/68, heart rate 64.  Continue RT treatment.  Follow-up BNP. One time lasix 20 mg IV given to keep lungs dry. Goal to dc her tomorrow with oxygen home and home monitoring     I have discussed this patient's plan of care and discharge plan at IDT rounds today with Case Management, Nursing, Nursing leadership, and other members of the IDT team.    Consultants/Specialty  none    Code Status  Full Code    Disposition  Patient is not medically cleared for discharge.   Anticipate discharge to to home with close outpatient follow-up.  I have placed the appropriate orders for post-discharge needs.    Review of Systems  Review of Systems   Constitutional:  Positive for malaise/fatigue. Negative for chills and fever.   HENT:  Negative for sore throat.    Respiratory:  Positive for cough, shortness of breath and wheezing. Negative for sputum production.    Cardiovascular:  Negative for chest pain and  palpitations.   Gastrointestinal:  Negative for abdominal pain, constipation, diarrhea, heartburn, nausea and vomiting.   Genitourinary:  Negative for dysuria and urgency.   Musculoskeletal:  Positive for myalgias. Negative for back pain.   Neurological:  Negative for dizziness and headaches.   Psychiatric/Behavioral:  The patient is nervous/anxious.       Physical Exam  Temp:  [36.3 °C (97.4 °F)-37 °C (98.6 °F)] 36.3 °C (97.4 °F)  Pulse:  [64-91] 82  Resp:  [16-21] 18  BP: (118-180)/(63-77) 118/68  SpO2:  [93 %-97 %] 95 %    Physical Exam  Vitals and nursing note reviewed.   Constitutional:       Appearance: She is ill-appearing. She is not toxic-appearing.      Comments: frail   HENT:      Head: Normocephalic and atraumatic.   Eyes:      General: No scleral icterus.  Cardiovascular:      Rate and Rhythm: Normal rate.      Heart sounds: No murmur heard.  Pulmonary:      Effort: Pulmonary effort is normal.      Breath sounds: Wheezing, rhonchi and rales present.   Abdominal:      General: There is no distension.      Palpations: Abdomen is soft.      Tenderness: There is no abdominal tenderness. There is no guarding.   Musculoskeletal:         General: No swelling.   Skin:     General: Skin is dry.      Coloration: Skin is pale.   Neurological:      Mental Status: She is oriented to person, place, and time.   Psychiatric:         Mood and Affect: Mood normal.         Behavior: Behavior normal.         Thought Content: Thought content normal.         Judgment: Judgment normal.       Fluids  No intake or output data in the 24 hours ending 12/28/22 1259    Laboratory  Recent Labs     12/27/22  0735   WBC 16.8*   RBC 4.52   HEMOGLOBIN 14.6   HEMATOCRIT 41.3   MCV 91.4   MCH 32.3   MCHC 35.4*   RDW 42.0   PLATELETCT 286   MPV 9.7     Recent Labs     12/27/22  0735   SODIUM 126*   POTASSIUM 4.2   CHLORIDE 91*   CO2 26   GLUCOSE 87   BUN 27*   CREATININE 0.70   CALCIUM 9.7                   Imaging  EC-ECHOCARDIOGRAM  COMPLETE W/O CONT   Final Result      DX-CHEST-PORTABLE (1 VIEW)   Final Result      Possible mild hazy right apical airspace opacity which could represent small infiltrate/pneumonia versus overlapping structures.           Assessment/Plan  * Acute and chronic respiratory failure with hypoxia, RSV+, COPD exacerbation (HCC)  Assessment & Plan  Admit to INPATIENT. Will require 2 or more midnights for titrating of O2, treating atypical pneumonia, treating COPD with breathing treatments and safe discharge with PT/OT assessment.  Echo completed 12/28-unremarkable  To continue follow-up with cardiology as outpatient  O2 and resp per protocol  Reevaluate for home oxygen  Incentive spirometry  12/28-Lasix 20 mg IV given    Positive sputum culture for RSV  Assessment & Plan  Ordered droplet precautions  Conservative management as above    Acute exacerbation of chronic obstructive pulmonary disease (COPD) (HCC)- (present on admission)  Assessment & Plan  O2, breathing treatments  Prednisone course and doxycycline      Hypothyroid- (present on admission)  Assessment & Plan  Continue Synthroid    Atrial fibrillation (HCC)- (present on admission)  Assessment & Plan  Vitals:    12/27/22 0931   BP: 132/83   Pulse: 86   Resp: (!) 25   Temp:    SpO2: 93%     HR stab;e  Continue anticoagulation and propafenone    Mixed hyperlipidemia- (present on admission)  Assessment & Plan  Continue statin and fenofibrates    Essential hypertension- (present on admission)  Assessment & Plan  Vitals:    12/27/22 0931   BP: 132/83   Pulse: 86   Resp: (!) 25   Temp:    SpO2: 93%     Stable resume blood pressure meds  12/28-blood pressure improving.  Continue to monitor.  Continue same treatment         VTE prophylaxis: therapeutic anticoagulation with xarelto     I have performed a physical exam and reviewed and updated ROS and Plan today (12/28/2022). In review of yesterday's note (12/27/2022), there are no changes except as documented  above.

## 2022-12-28 NOTE — ED NOTES
"Pt's daughter called, states pt was supposed to go back to [facility] and their insurance does not cover [facility].  Message left with CM with daughter's contact information.  Pt verbalizes she was \"being kicked out\" from [facility] and went home last night \"alone and I could not get to my food\".  Pt states her son was supposed to be there but \"can't drive in dark and I got home in the dark\".  Unable to place PIV x 3 staff.  Keesha RN bedside w/ US.  "
ABX initiated per MAR.  RT aware of ordered treatment.  
Admitting MD at bedside  
ECHO tech at bedside  
Med Rec completed per patient's daughter   Allergies reviewed    Daughter states that pt has not taken any medications since she was discharged from [facility]       
Patient changed and provided clean linens. No other needs at this time  
Patient medicated per MAR. Clean linens provided. Purewick repositioned for patient. No other needs at this time.  
Patient medicated per MAR. No other needs at this time  
Patient provided lunch tray. No other needs at this time.  
Pt medicated per MAR.  Pt placed on PureWick.  Pt received phone call from daughter and talking on the phone at this time.  
Report received from HÉCTOR Reyes. Care assumed of patient.  
Report to Freida ANAYA.  
Report to HÉCTOR Ibrahim. Patient to [id number].  
bed rails

## 2022-12-28 NOTE — HOSPITAL COURSE
Mrs. Eun Be is an 87-year-old female past medical history of atrial fibrillation on Xarelto GERD, hypertension, passive smoking, COPD presented 12/27/2022 with shortness of breath.  Patient was recently discharged from [facility] due to COPD exacerbation from RSV positive.  Here in emergency room patient hypoxic 86% on 3 L of oxygen.  Chest x-ray right apical hazy opacities, leukocytosis, procalcitonin normal.  Significant wheezing on auscultation.  She was admitted for COPD exacerbation for active treatment with steroids and doxycycline for bronchitis.  Echocardiogram showed moderate pericardial effusions, preserved systolic function normal LV, EF 60%.  Patient received Lasix 20mg IV diuresis for 2 days with improvement in oxygenation to 2L, less than her home baseline of 3L prn.

## 2022-12-28 NOTE — PROGRESS NOTES
4 Eyes Skin Assessment Completed by HÉCTOR Vo and HÉCTOR Read.    Head WDL  Ears WDL  Nose WDL  Mouth WDL  Neck WDL  Breast/Chest Redness/Pink under breast bilaterally    Shoulder Blades WDL  Spine WDL  (R) Arm/Elbow/Hand Redness, Bruising, and Edema  (L) Arm/Elbow/Hand Redness, Bruising, and Edema  Abdomen WDL  Groin WDL  Scrotum/Coccyx/Buttocks Redness and Blanching  (R) Leg WDL  (L) Leg Incision  (R) Heel/Foot/Toe WDL  (L) Heel/Foot/Toe WDL          Devices In Places Nasal Cannula      Interventions In Place N/A    Possible Skin Injury No    Pictures Uploaded Into Epic N/A  Wound Consult Placed N/A  RN Wound Prevention Protocol Ordered No

## 2022-12-28 NOTE — THERAPY
Occupational Therapy   Initial Evaluation     Patient Name: Eun Be  Age:  87 y.o., Sex:  female  Medical Record #: [id number]  Today's Date: 12/28/2022          Assessment  Patient is 87 y.o. female with a diagnosis of SOB, RSV.   Pt is at or near his/her functional baseline. Pt with no further skilled OT needs in the acute care setting at this time.      Plan            Discharge Recommendations: (P) Anticipate that the patient will have no further occupational therapy needs after discharge from the hospital        12/28/22 1102   Prior Living Situation   Housing / Facility [address] [facility]   Steps Into Home 0  (has a ramp)   Equipment Owned 4-Wheel Walker   Lives with - Patient's Self Care Capacity Alone and Able to Care For Self   Prior Level of ADL Function   Self Feeding Independent   Grooming / Hygiene Independent   Bathing Independent   Dressing Independent   Toileting Independent   ADL Assessment   Grooming Supervision   Upper Body Dressing Supervision   Lower Body Dressing Supervision   Toileting Supervision   Functional Mobility   Sit to Stand Supervised   Bed, Chair, Wheelchair Transfer Supervised   Anticipated Discharge Equipment and Recommendations   Discharge Recommendations Anticipate that the patient will have no further occupational therapy needs after discharge from the hospital

## 2022-12-28 NOTE — THERAPY
"Physical Therapy   Initial Evaluation     Patient Name: Eun Be  Age:  87 y.o., Sex:  female  Medical Record #: [id number]  Today's Date: 12/28/2022     Precautions  Precautions: Fall Risk  Comments: uses 3L of O2 at baseline    Assessment  87-year-old female past medical history of atrial fibrillation on Xarelto GERD, hypertension, passive smoking, COPD presented 12/27/2022 with shortness of breath.  Patient was recently discharged from [facility] due to COPD exacerbation from RSV positive.  Here in emergency room patient hypoxic 86% on 3 L of oxygen.  Chest x-ray right apical hazy opacities, leukocytosis, procalcitonin normal.  Significant wheezing on auscultation.  She admitted for COPD exacerbation for active treatment, steroids.  Doxycycline for bronchitis.  Echocardiogram showed moderate pericardial effusions, preserved systolic function normal LV, EF 60%.    Patient presents to PT eval with impaired activity tolerance, which is expected with her RSV and COPD exacerbation. She was able to mobilize at a household level with her FWW and reports that she is homebound at baseline. Her daughter and son provide assist for household duties. No further acute care PT needs at this time.     Plan    Physical Therapy Initial Treatment Plan   Duration: Evaluation only    DC Equipment Recommendations: None  Discharge Recommendations: Recommend home health for continued physical therapy services       Subjective    \"[place] viruses\"     Objective       12/28/22 1315   Precautions   Precautions Fall Risk   Comments uses 3L of O2 at baseline   Pain 0 - 10 Group   Therapist Pain Assessment 0;Post Activity Pain Same as Prior to Activity   Prior Living Situation   Prior Services Home-Independent   Housing / Facility Mobile Home   Steps Into Home 0  (ramp)   Equipment Owned 4-Wheel Walker   Lives with - Patient's Self Care Capacity Alone and Able " to Care For Self   Comments daughter drives her to grocery shop and to MD appts   Prior Level of Functional Mobility   Bed Mobility Independent   Transfer Status Independent   Ambulation Independent   Distance Ambulation (Feet)   (short community distances)   Assistive Devices Used Front-Wheel Walker   Comments poor activity tolerance at baseline   History of Falls   History of Falls Yes   Date of Last Fall   (reports frequent falls)   Cognition    Cognition / Consciousness WDL   Level of Consciousness Alert   Comments needs encouragement, but cooperative   Passive ROM Upper Body   Passive ROM Upper Body WDL   Active ROM Upper Body   Active ROM Upper Body  WDL   Strength Upper Body   Upper Body Strength  WDL   Strength Lower Body   Lower Body Strength  WDL   Sensation Lower Body   Lower Extremity Sensation   X   Comments baseline PN in B feet   Other Treatments   Other Treatments Provided discussed the pathologie of prolonged bedrest   Balance Assessment   Sitting Balance (Static) Fair   Sitting Balance (Dynamic) Fair   Standing Balance (Static) Fair   Standing Balance (Dynamic) Fair -   Weight Shift Sitting Fair   Weight Shift Standing Fair   Comments w/FWW   Bed Mobility    Supine to Sit Supervised   Scooting Supervised   Gait Analysis   Gait Level Of Assist Supervised   Assistive Device Front Wheel Walker   Distance (Feet) 80   # of Times Distance was Traveled 1   Deviation Shuffled Gait;Bradykinetic   Functional Mobility   Sit to Stand Supervised   Bed, Chair, Wheelchair Transfer Supervised   How much difficulty does the patient currently have...   Turning over in bed (including adjusting bedclothes, sheets and blankets)? 4   Sitting down on and standing up from a chair with arms (e.g., wheelchair, bedside commode, etc.) 4   Moving from lying on back to sitting on the side of the bed? 4   How much help from another person does the patient currently need...   Moving to and from a bed to a chair (including a  wheelchair)? 4   Need to walk in a hospital room? 4   Climbing 3-5 steps with a railing? 3   6 clicks Mobility Score 23   Activity Tolerance   Sitting in Chair post session   Sitting Edge of Bed 5 min   Standing 5 min   Education Group   Education Provided Role of Physical Therapist;Gait Training;Use of Assistive Device   Role of Physical Therapist Patient Response Patient;Acceptance;Explanation;Demonstration;Verbal Demonstration;Action Demonstration   Gait Training Patient Response Patient;Acceptance;Explanation;Demonstration;Verbal Demonstration;Action Demonstration   Use of Assistive Device Patient Response Patient;Acceptance;Demonstration;Explanation;Verbal Demonstration;Action Demonstration   Physical Therapy Initial Treatment Plan    Duration Evaluation only   Anticipated Discharge Equipment and Recommendations   DC Equipment Recommendations None   Discharge Recommendations Recommend home health for continued physical therapy services     Belen Balderrama, PT, DPT, GCS

## 2022-12-28 NOTE — RESPIRATORY CARE
COPD EDUCATION by COPD CLINICAL EDUCATOR  12/27/2022 at 15:03 PM by Nadege Zaragoza, RRT     Requested clarification of COPD Order set x 2 from admitting physician. Call placed to Respiratory to evaluate patient. Team to see and IP Pulmonary notified

## 2022-12-29 LAB
ALBUMIN SERPL BCP-MCNC: 3.1 G/DL (ref 3.2–4.9)
BUN SERPL-MCNC: 28 MG/DL (ref 8–22)
CALCIUM ALBUM COR SERPL-MCNC: 9.9 MG/DL (ref 8.5–10.5)
CALCIUM SERPL-MCNC: 9.2 MG/DL (ref 8.5–10.5)
CHLORIDE SERPL-SCNC: 97 MMOL/L (ref 96–112)
CO2 SERPL-SCNC: 29 MMOL/L (ref 20–33)
CREAT SERPL-MCNC: 0.62 MG/DL (ref 0.5–1.4)
ERYTHROCYTE [DISTWIDTH] IN BLOOD BY AUTOMATED COUNT: 43.8 FL (ref 35.9–50)
GFR SERPLBLD CREATININE-BSD FMLA CKD-EPI: 86 ML/MIN/1.73 M 2
GLUCOSE SERPL-MCNC: 84 MG/DL (ref 65–99)
HCT VFR BLD AUTO: 36.9 % (ref 37–47)
HGB BLD-MCNC: 12.9 G/DL (ref 12–16)
MAGNESIUM SERPL-MCNC: 1.1 MG/DL (ref 1.5–2.5)
MCH RBC QN AUTO: 32.5 PG (ref 27–33)
MCHC RBC AUTO-ENTMCNC: 35 G/DL (ref 33.6–35)
MCV RBC AUTO: 92.9 FL (ref 81.4–97.8)
PHOSPHATE SERPL-MCNC: 1.8 MG/DL (ref 2.5–4.5)
PLATELET # BLD AUTO: 216 K/UL (ref 164–446)
PMV BLD AUTO: 10.4 FL (ref 9–12.9)
POTASSIUM SERPL-SCNC: 3.5 MMOL/L (ref 3.6–5.5)
RBC # BLD AUTO: 3.97 M/UL (ref 4.2–5.4)
SODIUM SERPL-SCNC: 131 MMOL/L (ref 135–145)
WBC # BLD AUTO: 17.5 K/UL (ref 4.8–10.8)

## 2022-12-29 PROCEDURE — 94669 MECHANICAL CHEST WALL OSCILL: CPT

## 2022-12-29 PROCEDURE — 83735 ASSAY OF MAGNESIUM: CPT

## 2022-12-29 PROCEDURE — 94640 AIRWAY INHALATION TREATMENT: CPT

## 2022-12-29 PROCEDURE — A9270 NON-COVERED ITEM OR SERVICE: HCPCS | Performed by: INTERNAL MEDICINE

## 2022-12-29 PROCEDURE — 700111 HCHG RX REV CODE 636 W/ 250 OVERRIDE (IP): Performed by: INTERNAL MEDICINE

## 2022-12-29 PROCEDURE — 700102 HCHG RX REV CODE 250 W/ 637 OVERRIDE(OP): Performed by: INTERNAL MEDICINE

## 2022-12-29 PROCEDURE — A9270 NON-COVERED ITEM OR SERVICE: HCPCS

## 2022-12-29 PROCEDURE — 700111 HCHG RX REV CODE 636 W/ 250 OVERRIDE (IP)

## 2022-12-29 PROCEDURE — 770001 HCHG ROOM/CARE - MED/SURG/GYN PRIV*

## 2022-12-29 PROCEDURE — 85027 COMPLETE CBC AUTOMATED: CPT

## 2022-12-29 PROCEDURE — 700102 HCHG RX REV CODE 250 W/ 637 OVERRIDE(OP)

## 2022-12-29 PROCEDURE — 99233 SBSQ HOSP IP/OBS HIGH 50: CPT | Performed by: INTERNAL MEDICINE

## 2022-12-29 PROCEDURE — 36415 COLL VENOUS BLD VENIPUNCTURE: CPT

## 2022-12-29 PROCEDURE — 80069 RENAL FUNCTION PANEL: CPT

## 2022-12-29 RX ORDER — FUROSEMIDE 10 MG/ML
20 INJECTION INTRAMUSCULAR; INTRAVENOUS ONCE
Status: COMPLETED | OUTPATIENT
Start: 2022-12-29 | End: 2022-12-29

## 2022-12-29 RX ORDER — MAGNESIUM SULFATE HEPTAHYDRATE 40 MG/ML
4 INJECTION, SOLUTION INTRAVENOUS ONCE
Status: COMPLETED | OUTPATIENT
Start: 2022-12-29 | End: 2022-12-29

## 2022-12-29 RX ORDER — GUAIFENESIN 600 MG/1
600 TABLET, EXTENDED RELEASE ORAL EVERY 12 HOURS
Status: DISCONTINUED | OUTPATIENT
Start: 2022-12-29 | End: 2022-12-31 | Stop reason: HOSPADM

## 2022-12-29 RX ADMIN — PREDNISONE 40 MG: 20 TABLET ORAL at 04:15

## 2022-12-29 RX ADMIN — FUROSEMIDE 20 MG: 10 INJECTION, SOLUTION INTRAMUSCULAR; INTRAVENOUS at 17:08

## 2022-12-29 RX ADMIN — LOSARTAN POTASSIUM 25 MG: 50 TABLET, FILM COATED ORAL at 06:00

## 2022-12-29 RX ADMIN — FENOFIBRATE 134 MG: 134 CAPSULE ORAL at 04:15

## 2022-12-29 RX ADMIN — GUAIFENESIN 600 MG: 600 TABLET, EXTENDED RELEASE ORAL at 09:56

## 2022-12-29 RX ADMIN — RIVAROXABAN 15 MG: 15 TABLET, FILM COATED ORAL at 17:07

## 2022-12-29 RX ADMIN — Medication 2000 UNITS: at 04:14

## 2022-12-29 RX ADMIN — DOXYCYCLINE 100 MG: 100 TABLET, FILM COATED ORAL at 04:14

## 2022-12-29 RX ADMIN — BUDESONIDE AND FORMOTEROL FUMARATE DIHYDRATE 2 PUFF: 160; 4.5 AEROSOL RESPIRATORY (INHALATION) at 20:14

## 2022-12-29 RX ADMIN — MAGNESIUM SULFATE HEPTAHYDRATE 4 G: 40 INJECTION, SOLUTION INTRAVENOUS at 12:47

## 2022-12-29 RX ADMIN — DOXYCYCLINE 100 MG: 100 TABLET, FILM COATED ORAL at 17:07

## 2022-12-29 RX ADMIN — DIBASIC SODIUM PHOSPHATE, MONOBASIC POTASSIUM PHOSPHATE AND MONOBASIC SODIUM PHOSPHATE 500 MG: 852; 155; 130 TABLET ORAL at 12:45

## 2022-12-29 RX ADMIN — PROPAFENONE HYDROCHLORIDE 150 MG: 150 TABLET, FILM COATED ORAL at 17:08

## 2022-12-29 RX ADMIN — SIMVASTATIN 20 MG: 40 TABLET, FILM COATED ORAL at 17:07

## 2022-12-29 RX ADMIN — GUAIFENESIN 600 MG: 600 TABLET, EXTENDED RELEASE ORAL at 17:07

## 2022-12-29 RX ADMIN — PROPAFENONE HYDROCHLORIDE 150 MG: 150 TABLET, FILM COATED ORAL at 04:15

## 2022-12-29 RX ADMIN — LEVOTHYROXINE SODIUM 88 MCG: 0.09 TABLET ORAL at 04:15

## 2022-12-29 RX ADMIN — ACETAMINOPHEN 650 MG: 325 TABLET ORAL at 21:23

## 2022-12-29 RX ADMIN — DIBASIC SODIUM PHOSPHATE, MONOBASIC POTASSIUM PHOSPHATE AND MONOBASIC SODIUM PHOSPHATE 500 MG: 852; 155; 130 TABLET ORAL at 09:21

## 2022-12-29 RX ADMIN — OMEPRAZOLE 40 MG: 20 CAPSULE, DELAYED RELEASE ORAL at 04:15

## 2022-12-29 RX ADMIN — ALPRAZOLAM 0.5 MG: 0.25 TABLET ORAL at 21:23

## 2022-12-29 RX ADMIN — BUDESONIDE AND FORMOTEROL FUMARATE DIHYDRATE 2 PUFF: 160; 4.5 AEROSOL RESPIRATORY (INHALATION) at 09:21

## 2022-12-29 ASSESSMENT — ENCOUNTER SYMPTOMS
NAUSEA: 0
DIZZINESS: 1
HEADACHES: 0
DIARRHEA: 0
FOCAL WEAKNESS: 0
BLOOD IN STOOL: 0
FEVER: 0
COUGH: 1
CHILLS: 0
SHORTNESS OF BREATH: 1
WHEEZING: 1
WEAKNESS: 1
BLURRED VISION: 0
VOMITING: 0
PALPITATIONS: 0
MYALGIAS: 0
ABDOMINAL PAIN: 0
SPUTUM PRODUCTION: 0
HEMOPTYSIS: 0
CONSTIPATION: 0

## 2022-12-29 ASSESSMENT — PAIN DESCRIPTION - PAIN TYPE: TYPE: ACUTE PAIN

## 2022-12-29 NOTE — RESPIRATORY CARE
COPD EDUCATION by COPD CLINICAL EDUCATOR  12/28/2022  at  4:00 PM by Libertad Pearce RRT     Patient interviewed by COPD education team.  Patient unable to participate in full program.  A short intervention has been conducted.  A comprehensive packet including information about COPD, types of treatments to manage their disease and safe home Oxygen usage was provided and reviewed with patient at the bedside.    Patient has poor memory and states her daughter manages her health appointments. Patient daughter called and spoken with over the phone. Patient has recently established with [facility] and had pulmonary function testing and a chest CT done in December 2022.  Call placed to [facility] group to obtain some of this information. Patients daughter also states her mother was recently discharged from [facility] and was set up to receive home health services through [facility], but she became unwell again and re-admitted to the hospital at [facility]. Patient's daughter requests  call her to further discuss anticipated discharge plans, communicated to discharge team.     Patient does not have smart phone to participate in RPM.     COPD Screen  COPD Risk Screening  Do you have a history of COPD?: Yes  Do you have a Pulmonologist?: Yes    COPD Assessment  COPD Clinical Specialists ONLY  COPD Education Initiated: Yes--Short Intervention (Given COPD booklet, called and spoke with daughter who manages her mothers health appointments, no smart phone for RPM)  DME Company: [facility]  DME Equipment Type: O2 @ 3 L at night  Physician Name: Libertad Estrella M.D.  Pulmonary Follow Up Appointment:  Friday, Jan 6th at 2:40 pm w/ Luciano Mejia MD (called to request records and f/u appt)  Pulmonologist Name: [facility]  Palliative Care:  (not ordered, never seen)  Referrals Initiated: Yes  Pulmonary Rehab: Yes  Smoking Cessation: N/A (quit in 1999)  Palliative Care:   "(not ordered, never seen)  Hospice: N/A  Home Health Care: Yes  [facility] Outreach: N/A  [facility] Group: N/A  [facility]: N/A  Private In-Home Care Agency: N/A  Is this a COPD exacerbation patient?: Yes (per H&P, filtered order set used, [facility] attending)  $ Demo/Eval of SVN's, MDI's and Aerosols: Yes (reviewed meds)  (OP) Pulmonary Function Testing: Yes (done recently at [facility] 12/2022: FEV1 60%, ratio 74%)    PFT Results    No results found for: PFT    Meds to Beds  Would the patient like to opt in for Bedside Medication Delivery at Discharge?: Yes, interested     MY COPD ACTION PLAN     It is recommended that patients and physicians /healthcare providers complete this action plan together. This plan should be discussed at each physician visit and updated as needed.    The green, yellow and red zones show groups of symptoms of COPD. This list of symptoms is not comprehensive, and you may experience other symptoms. In the \"Actions\" column, your healthcare provider has recommended actions for you to take based on your symptoms.    Patient Name: Eun Be   YOB: 1935   Last Updated on:     Green Zone:  I am doing well today Actions     Usual activitiy and exercise level   Take daily medications     Usual amounts of cough and phlegm/mucus   Use oxygen as prescribed     Sleep well at night   Continue regular exercise/diet plan     Appetite is good   At all times avoid cigarette smoke, inhaled irritants     Daily Medications (these medications are taken every day):   Fluticosone/Salmeterol (Advair) 1 Puff Twice daily     Additional Information:  Rinse mouth after taking    Yellow Zone:  I am having a bad day or a COPD flare Actions     More breathless than usual   Continue daily medications     I have less energy for my daily activities   Use quick relief inhaler as ordered     Increased or thicker phlegm/mucus   Use oxygen as prescribed     Using quick relief " "inhaler/nebulizer more often   Get plenty of rest     Swelling of ankles more than usual   Use pursed lip breathing     More coughing than usual   At all times avoid cigarette smoke, inhaled irritants     I feel like I have a \"chest cold\"     Poor sleep and my symptoms woke me up     My appetite is not good     My medicine is not helping      Call provider immediately if symptoms don’t improve     Continue daily medications, add rescue medications:   Albuterol 2 Puffs Every 6 hours PRN       Medications to be used during a flare up, (as Discussed with Provider):           Additional Information:  Use the spacer with your rescue inhaler    Red Zone:  I need urgent medical care Actions     Severe shortness of breath even at rest   Call 911 or seek medical care immediately     Not able to do any activity because of breathing      Fever or shaking chills      Feeling confused or very drowsy       Chest pains      Coughing up blood                  "

## 2022-12-29 NOTE — CARE PLAN
Problem: Bronchopulmonary Hygiene  Goal: Increase mobilization of retained secretions  Description: Target End Date:  2 to 3 days    1.  Perform bronchopulmonary therapy as indicated by assessment  2.  Perform airway suctioning  3.  Perform actions to maintain patient airway  Outcome: Progressing  Flowsheets (Taken 12/28/2022 1500)  Bronchopulmonary Hygiene Indications: Patient with Chronic Pulmonary Disease on Home Frequency  Bronchopulmonary Hygiene Outcomes: Patient at stable baseline      Respiratory Update    Treatment modality: Flutter  Frequency: QID    Pt tolerating current treatments well with no adverse reactions.

## 2022-12-29 NOTE — DISCHARGE PLANNING
Received Choice Form @: 5245  Agency/ Facility Name: [facility]  Referral Sent per Choice Form @: Not sent as there is a F2F but no order     Received Choice Form @: 2850  Agency/ Facility Name: [facility]  Referral Sent per Choice Form @: Did not send as there are no orders or F2F

## 2022-12-29 NOTE — FACE TO FACE
Face to Face Supporting Documentation - Home Health    The encounter with this patient was in whole or in part the primary reason for home health admission.    Date of encounter:   Patient:                    MRN:                       YOB: 2022  Eun Be  [id number]  1935     Home health to see patient for:  Skilled Nursing care for assessment, interventions & education and Physical Therapy evaluation and treatment    Skilled need for:  Exacerbation of Chronic Disease State COPD and atrial fibrillation and Comment: RSV    Skilled nursing interventions to include:  Comment: assistance with medications, oxygen monitoring     Homebound status evidenced by:  Needs the assistance of another person in order to leave the home. Leaving home requires a considerable and taxing effort. There is a normal inability to leave the home.    Community Physician to provide follow up care: Libertad Estrella M.D.     Optional Interventions? No      I certify the face to face encounter for this home health care referral meets the CMS requirements and the encounter/clinical assessment with the patient was, in whole, or in part, for the medical condition(s) listed above, which is the primary reason for home health care. Based on my clinical findings: the service(s) are medically necessary, support the need for home health care, and the homebound criteria are met.  I certify that this patient has had a face to face encounter by myself.  Dony Guevara M.D. - NPI: [id number]

## 2022-12-29 NOTE — DIETARY
"Nutrition services: Day 1 of admit.  Eun Be is an 87 y.o. female with admitting DX of hypoxia.    Consult received for wt loss (2-13 lbs in <1 week), poor PO per admit screen. Pt busy with staff during attempted visit at bedside. She appeared adequately nourished. Poor PO likely 2' recent COPD exacerbation and increased shortness of breath.    Assessment:  Height: 177.8 cm (5' 10\")  Weight: 61.2 kg (135 lb)  Body mass index is 19.37 kg/m²., BMI classification: normal  Diet/Intake: Regular; PO >50% for two meals thus far    Evaluation:   Admitted with shortness of breath.  PMH of COPD, Emphysema, GERD, heartburn, hypertension, hypertriglyceridemia, impaired glucose tolerance, osteopenia, pneumonia, thyroid disease.  Sodium 126, BUN 27  Deltasone, Pericolace, Vitamin D3    Malnutrition Risk: Unable to determine at this time.    Recommendations/Plan:  Encourage intake of meals  Document intake of all meals as % taken in ADLs to provide interdisciplinary communication across all shifts.   Monitor weight.  Nutrition rep will continue to see patient for ongoing meal and snack preferences.   Oral nutrition supplements as needed to bolster nutrition.    RD will follow per dept guidelines.          "

## 2022-12-29 NOTE — CARE PLAN
The patient is Watcher - Medium risk of patient condition declining or worsening    Shift Goals  Clinical Goals: wean O2; mobilize with PT, O2 walk test  Patient Goals: rest; food  Family Goals: comfort    Progress made toward(s) clinical / shift goals: Lung sounds have crackles. Pt verbalized understanding of IS use and inhalers. Will continue to monitor. Weaned down to 3L    Patient is not progressing towards the following goals:

## 2022-12-29 NOTE — PROGRESS NOTES
[facility] Internal Medicine Daily Progress Note    Date of Service  12/29/2022    [facility] Team: R IM White Team   Attending: Rox Sanders M.d.  Senior Resident: Dr. Orlando Khan  Intern:  Dr. Dony Guevara  Contact Number: [phone]    Chief Complaint  Eun Be is a 87 y.o. female admitted 12/27/2022 with shortness of breath    ID  Mrs. Eun Be is an 87-year-old female past medical history of atrial fibrillation on Xarelto GERD, hypertension, passive smoking, COPD presented 12/27/2022 with shortness of breath and admitted for COPD exacerbation from RSV    Hospital Course  Mrs. Eun Be is an 87-year-old female past medical history of atrial fibrillation on Xarelto GERD, hypertension, passive smoking, COPD presented 12/27/2022 with shortness of breath.  Patient was recently discharged from [facility] due to COPD exacerbation from RSV positive.  Here in emergency room patient hypoxic 86% on 3 L of oxygen.  Chest x-ray right apical hazy opacities, leukocytosis, procalcitonin normal.  Significant wheezing on auscultation.  She was admitted for COPD exacerbation for active treatment with steroids and doxycycline for bronchitis.  Echocardiogram showed moderate pericardial effusions, preserved systolic function normal LV, EF 60%.  Patient with continuous wheezing and rhonchi, undergoing IV Lasix diuresis.     Interval Problem Update  No acute events overnight.  This am patient states she does not feel well enough for discharge and ambulatory oxygen walk test. Feels short of breath on 3L which is her home oxygen requirement prn. States she did not like the food yesterday and hasn't eaten much, will eat more today to gain strength. Still has some wheezing and rhonchi with congestion. Denied chest pain, fever, chills.   -Lasix 20mg IV   -Continue steroids and Doxycycline  -Home O2 Evaluation and possible discharge tomorrow     I have discussed this patient's plan of care and  discharge plan at IDT rounds today with Case Management, Nursing, Nursing leadership, and other members of the IDT team.    Consultants/Specialty  none    Code Status  Full Code    Disposition  Patient is not medically cleared for discharge.   Anticipate discharge to to home with organized home healthcare and close outpatient follow-up.  I have placed the appropriate orders for post-discharge needs.    Review of Systems  Review of Systems   Constitutional:  Positive for malaise/fatigue. Negative for chills and fever.   HENT:  Positive for hearing loss.    Eyes:  Negative for blurred vision.   Respiratory:  Positive for cough, shortness of breath and wheezing. Negative for hemoptysis and sputum production.    Cardiovascular:  Negative for chest pain, palpitations and leg swelling.   Gastrointestinal:  Negative for abdominal pain, blood in stool, constipation, diarrhea, nausea and vomiting.   Genitourinary:  Negative for dysuria and hematuria.   Musculoskeletal:  Negative for myalgias.   Neurological:  Positive for dizziness and weakness. Negative for focal weakness and headaches.      Physical Exam  Temp:  [36.1 °C (96.9 °F)-37.1 °C (98.7 °F)] 37.1 °C (98.7 °F)  Pulse:  [60-72] 72  Resp:  [15-20] 20  BP: (117-171)/(55-74) 117/55  SpO2:  [91 %-97 %] 93 %    Physical Exam  Constitutional:       General: She is not in acute distress.     Appearance: Normal appearance. She is ill-appearing. She is not toxic-appearing or diaphoretic.   HENT:      Head: Normocephalic and atraumatic.      Right Ear: External ear normal.      Left Ear: External ear normal.      Nose: Nose normal.      Mouth/Throat:      Mouth: Mucous membranes are moist.   Eyes:      General: No scleral icterus.        Right eye: No discharge.         Left eye: No discharge.      Extraocular Movements: Extraocular movements intact.      Conjunctiva/sclera: Conjunctivae normal.      Comments: Pupils equal   Cardiovascular:      Rate and Rhythm: Normal rate  and regular rhythm.      Pulses: Normal pulses.      Heart sounds: Normal heart sounds. No murmur heard.  Pulmonary:      Effort: Pulmonary effort is normal. No respiratory distress.      Breath sounds: Wheezing and rhonchi present.      Comments: Bilateral expiratory wheezing from ausculation of the chest, noted RLL expiratory wheezing  Chest and back auscultation inspiratory and expiratory rhonchi  Chest:      Chest wall: No tenderness.   Abdominal:      General: Abdomen is flat. Bowel sounds are normal. There is no distension.      Palpations: Abdomen is soft.      Tenderness: There is no abdominal tenderness.   Musculoskeletal:         General: Normal range of motion.      Cervical back: Normal range of motion.      Right lower leg: No edema.      Left lower leg: No edema.   Skin:     General: Skin is warm and dry.   Neurological:      General: No focal deficit present.      Mental Status: She is alert and oriented to person, place, and time.   Psychiatric:         Mood and Affect: Mood normal.         Behavior: Behavior normal.         Thought Content: Thought content normal.         Judgment: Judgment normal.       Fluids    Intake/Output Summary (Last 24 hours) at 12/29/2022 1558  Last data filed at 12/29/2022 1500  Gross per 24 hour   Intake 360 ml   Output --   Net 360 ml       Laboratory  Recent Labs     12/27/22  0735 12/29/22  0439   WBC 16.8* 17.5*   RBC 4.52 3.97*   HEMOGLOBIN 14.6 12.9   HEMATOCRIT 41.3 36.9*   MCV 91.4 92.9   MCH 32.3 32.5   MCHC 35.4* 35.0   RDW 42.0 43.8   PLATELETCT 286 216   MPV 9.7 10.4     Recent Labs     12/27/22  0735 12/29/22  0439   SODIUM 126* 131*   POTASSIUM 4.2 3.5*   CHLORIDE 91* 97   CO2 26 29   GLUCOSE 87 84   BUN 27* 28*   CREATININE 0.70 0.62   CALCIUM 9.7 9.2                   Imaging  EC-ECHOCARDIOGRAM COMPLETE W/O CONT   Final Result      DX-CHEST-PORTABLE (1 VIEW)   Final Result      Possible mild hazy right apical airspace opacity which could represent small  infiltrate/pneumonia versus overlapping structures.           Assessment/Plan  Problem Representation:  Mrs. Eun Be is an 87-year-old female past medical history of atrial fibrillation on Xarelto GERD, hypertension, passive smoking, COPD presented 12/27/2022 with shortness of breath and admitted for COPD exacerbation from RSV undergoing treatment with steroids, Doxycycline and Lasix IV diuresis.    * Acute and chronic respiratory failure with hypoxia, RSV+, COPD exacerbation (HCC)  Assessment & Plan  Admit to INPATIENT. Will require 2 or more midnights for titrating of O2, treating atypical pneumonia and COPD exacerbation due to RSV. Home oxygen requirement is 3L PRN  Echo completed 12/28-EF 60%, moderate pericardial effusion  -To continue follow-up with cardiology and pulmonology as outpatient  -O2 and resp per protocol  -Reevaluate for home oxygen, currently at rest 3L   -Incentive spirometry  -Continue with breathing treatments  -Discharge with H/H PT  -12/29 Lasix 20 mg IV given    Positive sputum culture for RSV  Assessment & Plan  Ordered droplet precautions  Conservative management as above    Acute exacerbation of chronic obstructive pulmonary disease (COPD) (HCC)- (present on admission)  Assessment & Plan  O2, breathing treatments  Prednisone course and doxycycline      Hypothyroid- (present on admission)  Assessment & Plan  Continue Synthroid    Atrial fibrillation (HCC)- (present on admission)  Assessment & Plan  Heart rate remains 60-70s  Continue anticoagulation and propafenone    Mixed hyperlipidemia- (present on admission)  Assessment & Plan  Continue statin and fenofibrates    Essential hypertension- (present on admission)  Assessment & Plan  Blood pressure labile with increases in the am to SBP 150s-170s with recent to 117  -continue to monitor  -home losartan 25mg          VTE prophylaxis: SCDs/TEDs and therapeutic anticoagulation with Xarelto 15mg    I have performed a physical exam and  reviewed and updated ROS and Plan today (12/29/2022). In review of yesterday's note (12/28/2022), there are no changes except as documented above.

## 2022-12-29 NOTE — CARE PLAN
The patient is Stable - Low risk of patient condition declining or worsening    Shift Goals  Clinical Goals: monitor VS and rest  Patient Goals: rest    Problem: Knowledge Deficit - Standard  Goal: Patient and family/care givers will demonstrate understanding of plan of care, disease process/condition, diagnostic tests and medications  Outcome: Progressing     Problem: Knowledge Deficit - COPD  Goal: Patient/significant other demonstrates understanding of disease process, utilization of the Action Plan, medications and discharge instruction  Outcome: Progressing     Problem: Risk for Infection - COPD  Goal: Patient will remain free from signs and symptoms of infection  Outcome: Progressing     Problem: Nutrition - Advanced  Goal: Patient will display progressive weight gain toward goal have adequate food and fluid intake  Outcome: Progressing     Problem: Ineffective Airway Clearance  Goal: Patient will maintain patent airway with clear/clearing breath sounds  Outcome: Progressing     Problem: Impaired Gas Exchange  Goal: Patient will demonstrate improved ventilation and adequate oxygenation and participate in treatment regimen within the level of ability/situation.  Outcome: Progressing     Problem: Risk for Aspiration  Goal: Patient's risk for aspiration will be absent or decrease  Outcome: Progressing     Problem: Self Care  Goal: Patient will have the ability to perform ADLs independently or with assistance (bathe, groom, dress, toilet and feed)  Outcome: Progressing

## 2022-12-29 NOTE — PROGRESS NOTES
[facility] [place] ATTENDING NOTE:       I saw and examined the patient and discussed the management with the resident staff.  I reviewed the resident's note and agree with the resident's findings and plan as documented in the resident's note except as documented in the attending note. Please reference resident daily note for complete information.     The chart was reviewed and summarized.  Available labs, imaging, O2 sats ,  EKGs were reviewed. Available nursing, consultant, and resident notes were reviewed. I am actively involved in the patient's care    DOS 12/29/2022    87-year-old female past medical history of atrial fibrillation on Xarelto GERD, hypertension, passive smoking, COPD presented 12/27/2022 with shortness of breath.  Patient was recently discharged from [facility] due to COPD exacerbation from RSV positive.  Here in emergency room patient hypoxic 86% on 3 L of oxygen.  Chest x-ray right apical hazy opacities, leukocytosis, procalcitonin normal.  Significant wheezing on auscultation.  She admitted for COPD exacerbation for active treatment, steroids.  Doxycycline for bronchitis.  Echocardiogram showed moderate pericardial effusions, preserved systolic function normal LV, EF 60%    12/29/2022- improving, however still very coarse breathing, + wheezing, easily fatigued. Continue IV diuretic and breathing treatment. If improving hopefully home tomorrow with home health

## 2022-12-29 NOTE — CARE PLAN
The patient is Stable - Low risk of patient condition declining or worsening    Shift Goals  Clinical Goals: monirtor rest  Patient Goals: monitor rest    Progress made toward(s) clinical / shift goals:    Problem: Knowledge Deficit - COPD  Goal: Patient/significant other demonstrates understanding of disease process, utilization of the Action Plan, medications and discharge instruction  Outcome: Progressing     Problem: Nutrition - Advanced  Goal: Patient will display progressive weight gain toward goal have adequate food and fluid intake  Outcome: Progressing     Problem: Ineffective Airway Clearance  Goal: Patient will maintain patent airway with clear/clearing breath sounds  Outcome: Progressing       Patient is not progressing towards the following goals:

## 2022-12-30 LAB
ALBUMIN SERPL BCP-MCNC: 2.6 G/DL (ref 3.2–4.9)
BASOPHILS # BLD AUTO: 0.2 % (ref 0–1.8)
BASOPHILS # BLD: 0.03 K/UL (ref 0–0.12)
BUN SERPL-MCNC: 36 MG/DL (ref 8–22)
CALCIUM ALBUM COR SERPL-MCNC: 9.7 MG/DL (ref 8.5–10.5)
CALCIUM SERPL-MCNC: 8.6 MG/DL (ref 8.5–10.5)
CHLORIDE SERPL-SCNC: 99 MMOL/L (ref 96–112)
CO2 SERPL-SCNC: 27 MMOL/L (ref 20–33)
CREAT SERPL-MCNC: 0.81 MG/DL (ref 0.5–1.4)
EOSINOPHIL # BLD AUTO: 0.17 K/UL (ref 0–0.51)
EOSINOPHIL NFR BLD: 1.1 % (ref 0–6.9)
ERYTHROCYTE [DISTWIDTH] IN BLOOD BY AUTOMATED COUNT: 44.8 FL (ref 35.9–50)
GFR SERPLBLD CREATININE-BSD FMLA CKD-EPI: 70 ML/MIN/1.73 M 2
GLUCOSE SERPL-MCNC: 96 MG/DL (ref 65–99)
HCT VFR BLD AUTO: 33.6 % (ref 37–47)
HGB BLD-MCNC: 11.5 G/DL (ref 12–16)
IMM GRANULOCYTES # BLD AUTO: 0.13 K/UL (ref 0–0.11)
IMM GRANULOCYTES NFR BLD AUTO: 0.8 % (ref 0–0.9)
LYMPHOCYTES # BLD AUTO: 1.11 K/UL (ref 1–4.8)
LYMPHOCYTES NFR BLD: 7.2 % (ref 22–41)
MAGNESIUM SERPL-MCNC: 1.9 MG/DL (ref 1.5–2.5)
MCH RBC QN AUTO: 31.9 PG (ref 27–33)
MCHC RBC AUTO-ENTMCNC: 34.2 G/DL (ref 33.6–35)
MCV RBC AUTO: 93.3 FL (ref 81.4–97.8)
MONOCYTES # BLD AUTO: 0.74 K/UL (ref 0–0.85)
MONOCYTES NFR BLD AUTO: 4.8 % (ref 0–13.4)
NEUTROPHILS # BLD AUTO: 13.32 K/UL (ref 2–7.15)
NEUTROPHILS NFR BLD: 85.9 % (ref 44–72)
NRBC # BLD AUTO: 0 K/UL
NRBC BLD-RTO: 0 /100 WBC
PHOSPHATE SERPL-MCNC: 2.9 MG/DL (ref 2.5–4.5)
PLATELET # BLD AUTO: 191 K/UL (ref 164–446)
PMV BLD AUTO: 10.4 FL (ref 9–12.9)
POTASSIUM SERPL-SCNC: 3.6 MMOL/L (ref 3.6–5.5)
RBC # BLD AUTO: 3.6 M/UL (ref 4.2–5.4)
SODIUM SERPL-SCNC: 135 MMOL/L (ref 135–145)
WBC # BLD AUTO: 15.5 K/UL (ref 4.8–10.8)

## 2022-12-30 PROCEDURE — A9270 NON-COVERED ITEM OR SERVICE: HCPCS | Performed by: INTERNAL MEDICINE

## 2022-12-30 PROCEDURE — 83735 ASSAY OF MAGNESIUM: CPT

## 2022-12-30 PROCEDURE — 700111 HCHG RX REV CODE 636 W/ 250 OVERRIDE (IP): Performed by: INTERNAL MEDICINE

## 2022-12-30 PROCEDURE — A9270 NON-COVERED ITEM OR SERVICE: HCPCS

## 2022-12-30 PROCEDURE — 36415 COLL VENOUS BLD VENIPUNCTURE: CPT

## 2022-12-30 PROCEDURE — 94669 MECHANICAL CHEST WALL OSCILL: CPT

## 2022-12-30 PROCEDURE — 700105 HCHG RX REV CODE 258

## 2022-12-30 PROCEDURE — 80069 RENAL FUNCTION PANEL: CPT

## 2022-12-30 PROCEDURE — 85025 COMPLETE CBC W/AUTO DIFF WBC: CPT

## 2022-12-30 PROCEDURE — 770001 HCHG ROOM/CARE - MED/SURG/GYN PRIV*

## 2022-12-30 PROCEDURE — 99232 SBSQ HOSP IP/OBS MODERATE 35: CPT | Mod: GC | Performed by: HOSPITALIST

## 2022-12-30 PROCEDURE — 700102 HCHG RX REV CODE 250 W/ 637 OVERRIDE(OP)

## 2022-12-30 PROCEDURE — 700111 HCHG RX REV CODE 636 W/ 250 OVERRIDE (IP)

## 2022-12-30 PROCEDURE — 700102 HCHG RX REV CODE 250 W/ 637 OVERRIDE(OP): Performed by: INTERNAL MEDICINE

## 2022-12-30 RX ORDER — SODIUM CHLORIDE 9 MG/ML
500 INJECTION, SOLUTION INTRAVENOUS ONCE
Status: COMPLETED | OUTPATIENT
Start: 2022-12-30 | End: 2022-12-30

## 2022-12-30 RX ORDER — MAGNESIUM SULFATE HEPTAHYDRATE 40 MG/ML
4 INJECTION, SOLUTION INTRAVENOUS ONCE
Status: COMPLETED | OUTPATIENT
Start: 2022-12-30 | End: 2022-12-30

## 2022-12-30 RX ORDER — FLUTICASONE PROPIONATE 50 MCG
2 SPRAY, SUSPENSION (ML) NASAL DAILY
Status: DISCONTINUED | OUTPATIENT
Start: 2022-12-30 | End: 2022-12-31 | Stop reason: HOSPADM

## 2022-12-30 RX ORDER — POTASSIUM CHLORIDE 20 MEQ/1
40 TABLET, EXTENDED RELEASE ORAL ONCE
Status: COMPLETED | OUTPATIENT
Start: 2022-12-30 | End: 2022-12-30

## 2022-12-30 RX ADMIN — GUAIFENESIN 600 MG: 600 TABLET, EXTENDED RELEASE ORAL at 17:59

## 2022-12-30 RX ADMIN — FLUTICASONE PROPIONATE 100 MCG: 50 SPRAY, METERED NASAL at 10:37

## 2022-12-30 RX ADMIN — FENOFIBRATE 134 MG: 134 CAPSULE ORAL at 05:17

## 2022-12-30 RX ADMIN — GUAIFENESIN 600 MG: 600 TABLET, EXTENDED RELEASE ORAL at 05:17

## 2022-12-30 RX ADMIN — SODIUM CHLORIDE 500 ML: 9 INJECTION, SOLUTION INTRAVENOUS at 14:56

## 2022-12-30 RX ADMIN — SODIUM CHLORIDE 500 ML: 9 INJECTION, SOLUTION INTRAVENOUS at 18:01

## 2022-12-30 RX ADMIN — LEVOTHYROXINE SODIUM 88 MCG: 0.09 TABLET ORAL at 05:17

## 2022-12-30 RX ADMIN — MAGNESIUM SULFATE HEPTAHYDRATE 4 G: 40 INJECTION, SOLUTION INTRAVENOUS at 08:31

## 2022-12-30 RX ADMIN — SIMVASTATIN 20 MG: 40 TABLET, FILM COATED ORAL at 18:00

## 2022-12-30 RX ADMIN — ALPRAZOLAM 0.5 MG: 0.25 TABLET ORAL at 22:07

## 2022-12-30 RX ADMIN — PROPAFENONE HYDROCHLORIDE 150 MG: 150 TABLET, FILM COATED ORAL at 18:00

## 2022-12-30 RX ADMIN — PREDNISONE 40 MG: 20 TABLET ORAL at 05:17

## 2022-12-30 RX ADMIN — LOSARTAN POTASSIUM 25 MG: 50 TABLET, FILM COATED ORAL at 05:18

## 2022-12-30 RX ADMIN — DOXYCYCLINE 100 MG: 100 TABLET, FILM COATED ORAL at 18:00

## 2022-12-30 RX ADMIN — POTASSIUM CHLORIDE 40 MEQ: 1500 TABLET, EXTENDED RELEASE ORAL at 06:34

## 2022-12-30 RX ADMIN — PROPAFENONE HYDROCHLORIDE 150 MG: 150 TABLET, FILM COATED ORAL at 05:20

## 2022-12-30 RX ADMIN — BUDESONIDE AND FORMOTEROL FUMARATE DIHYDRATE 2 PUFF: 160; 4.5 AEROSOL RESPIRATORY (INHALATION) at 08:29

## 2022-12-30 RX ADMIN — BUDESONIDE AND FORMOTEROL FUMARATE DIHYDRATE 2 PUFF: 160; 4.5 AEROSOL RESPIRATORY (INHALATION) at 20:00

## 2022-12-30 RX ADMIN — OMEPRAZOLE 40 MG: 20 CAPSULE, DELAYED RELEASE ORAL at 05:17

## 2022-12-30 RX ADMIN — DOXYCYCLINE 100 MG: 100 TABLET, FILM COATED ORAL at 05:17

## 2022-12-30 RX ADMIN — Medication 2000 UNITS: at 05:19

## 2022-12-30 RX ADMIN — RIVAROXABAN 15 MG: 15 TABLET, FILM COATED ORAL at 17:59

## 2022-12-30 ASSESSMENT — ENCOUNTER SYMPTOMS
CONSTIPATION: 0
COUGH: 0
WEAKNESS: 1
SHORTNESS OF BREATH: 1
VOMITING: 0
HEMOPTYSIS: 0
BLURRED VISION: 0
ABDOMINAL PAIN: 0
DIZZINESS: 1
FEVER: 0
DIARRHEA: 0
SPUTUM PRODUCTION: 0
HEADACHES: 0
WHEEZING: 1
FOCAL WEAKNESS: 0
CHILLS: 0
MYALGIAS: 0
NAUSEA: 0
BLOOD IN STOOL: 0

## 2022-12-30 ASSESSMENT — PAIN DESCRIPTION - PAIN TYPE
TYPE: ACUTE PAIN
TYPE: ACUTE PAIN

## 2022-12-30 ASSESSMENT — PATIENT HEALTH QUESTIONNAIRE - PHQ9
SUM OF ALL RESPONSES TO PHQ9 QUESTIONS 1 AND 2: 0
2. FEELING DOWN, DEPRESSED, IRRITABLE, OR HOPELESS: NOT AT ALL
1. LITTLE INTEREST OR PLEASURE IN DOING THINGS: NOT AT ALL

## 2022-12-30 NOTE — DISCHARGE PLANNING
Case Management Discharge Planning    Admission Date: 12/27/2022  GMLOS: 4  ALOS: 2    6-Clicks ADL Score: 24  6-Clicks Mobility Score: 23      Anticipated Discharge Dispo: Discharge Disposition: D/T to home under HHA care in anticipation of covered skilled care (06)    DME Needed: Patient is at baseline O2 (3L) and is on service with [facility].      Action(s) Taken: Case discussed with Dr. Sanders.  Anticipate that the patient will be clear for discharge tomorrow.  There is currently no HH order in place, only a F2F.  HH orders will be placed, and a new home O2 eval can be done if needed.    RN COLTEN met with the patient and her daughter Faviola to discuss the discharge plan.  Patient was discharged from [facility] the day prior to admission here at [facility].  Patient was previously set up with [facility] for Home O2 and with [facility].  [facility] had delivered an O2 concentrator, but they had yet to deliver any portable tanks.  Patient was admitted to [facility] prior to starting HH.  Patient asked to [facility] with [facility] for O2 and [facility] for HH.  Choice forms faxed to [employer].    Escalations Completed: Provider    Medically Clear: No    Next Steps: Care Coordination to follow up with HH referral.  Care Coordination to follow up with with [facility] to have an O2 tank delivered to the hospital for discharge.    Barriers to Discharge: Medical clearance and Oxygen Delivery    Is the patient up for discharge tomorrow: Yes    Is transport arranged for discharge disposition: Patient can get a ride home from her daughter Faviola.

## 2022-12-30 NOTE — CARE PLAN
The patient is Stable - Low risk of patient condition declining or worsening    Shift Goals Maintain safety  Clinical Goals: Wean O2 and O2 walk test  Patient Goals: Sleep and move comfortably  Family Goals: comfort    Progress made toward(s) clinical / shift goals:    Problem: Knowledge Deficit - Standard  Goal: Patient and family/care givers will demonstrate understanding of plan of care, disease process/condition, diagnostic tests and medications  Outcome: Progressing     Problem: Knowledge Deficit - COPD  Goal: Patient/significant other demonstrates understanding of disease process, utilization of the Action Plan, medications and discharge instruction  Outcome: Progressing       Patient is not progressing towards the following goals:

## 2022-12-30 NOTE — PROGRESS NOTES
[facility] Internal Medicine Daily Progress Note    Date of Service  12/30/2022    [facility] Team: R IM White Team   Attending: VALENTINA Matute M.d.  Senior Resident: Dr. Orlando Khan  Intern:  Dr. Dony Guevara  Contact Number: [phone]    Chief Complaint  Eun Be is a 87 y.o. female admitted 12/27/2022 with shortness of breath    ID  Mrs. Eun Be is an 87-year-old female past medical history of atrial fibrillation on Xarelto GERD, hypertension, passive smoking, COPD presented 12/27/2022 with shortness of breath and admitted for COPD exacerbation from RSV    Hospital Course  Mrs. Eun Be is an 87-year-old female past medical history of atrial fibrillation on Xarelto GERD, hypertension, passive smoking, COPD presented 12/27/2022 with shortness of breath.  Patient was recently discharged from [facility] due to COPD exacerbation from RSV positive.  Here in emergency room patient hypoxic 86% on 3 L of oxygen.  Chest x-ray right apical hazy opacities, leukocytosis, procalcitonin normal.  Significant wheezing on auscultation.  She was admitted for COPD exacerbation for active treatment with steroids and doxycycline for bronchitis.  Echocardiogram showed moderate pericardial effusions, preserved systolic function normal LV, EF 60%.  Patient received Lasix 20mg IV diuresis for 2 days with improvement in oxygenation to 2L, less than her home baseline of 3L prn.     Interval Problem Update  No acute events overnight.  This am patient states she still does not feel well enough for discharge and ambulatory oxygen walk test, willing to do walk test after lunch. Oxygenation has improved to 2L at rest, per her nurse does have O2 desats to 87% without oxygen. Patient adamantly declines SNF   -Home oxygen walk test for oxygen parameters-> has oxygen concentrator at home will need tanks  -orthostatics positive when conducting O2 eval today: sitting 125/54, standing 90/49 will  give 500 cc NS bolus and recheck BP for determination of additional fluid resuscitation    I have discussed this patient's plan of care and discharge plan at IDT rounds today with Case Management, Nursing, Nursing leadership, and other members of the IDT team.    Consultants/Specialty  none    Code Status  Full Code    Disposition  Patient is not medically cleared for discharge.   Anticipate discharge to to home with organized home healthcare and close outpatient follow-up.  I have placed the appropriate orders for post-discharge needs.    Review of Systems  Review of Systems   Constitutional:  Positive for malaise/fatigue. Negative for chills and fever.   HENT:  Positive for hearing loss.    Eyes:  Negative for blurred vision.   Respiratory:  Positive for shortness of breath and wheezing. Negative for cough, hemoptysis and sputum production.    Cardiovascular:  Negative for chest pain.   Gastrointestinal:  Negative for abdominal pain, blood in stool, constipation, diarrhea, nausea and vomiting.   Genitourinary:  Negative for dysuria and hematuria.   Musculoskeletal:  Negative for myalgias.   Neurological:  Positive for dizziness and weakness. Negative for focal weakness and headaches.      Physical Exam  Temp:  [36.7 °C (98.1 °F)-37.1 °C (98.7 °F)] 36.7 °C (98.1 °F)  Pulse:  [66-78] 70  Resp:  [18-21] 18  BP: (117-156)/(54-61) 153/61  SpO2:  [90 %-94 %] 94 %    Physical Exam  Constitutional:       General: She is not in acute distress.     Appearance: Normal appearance. She is ill-appearing. She is not toxic-appearing or diaphoretic.   HENT:      Head: Normocephalic and atraumatic.      Right Ear: External ear normal.      Left Ear: External ear normal.      Nose: Nose normal.      Mouth/Throat:      Mouth: Mucous membranes are moist.   Eyes:      General: No scleral icterus.        Right eye: No discharge.         Left eye: No discharge.      Extraocular Movements: Extraocular movements intact.       Conjunctiva/sclera: Conjunctivae normal.      Comments: Pupils equal   Cardiovascular:      Rate and Rhythm: Normal rate and regular rhythm.      Pulses: Normal pulses.      Heart sounds: Normal heart sounds. No murmur heard.  Pulmonary:      Effort: Pulmonary effort is normal. No respiratory distress.      Breath sounds: Wheezing and rhonchi present.      Comments: Bilateral expiratory wheezing from ausculation of the chest  Chest and back auscultation inspiratory and expiratory rhonchi  Chest:      Chest wall: No tenderness.   Abdominal:      General: Abdomen is flat. Bowel sounds are normal. There is no distension.      Palpations: Abdomen is soft.      Tenderness: There is no abdominal tenderness.   Musculoskeletal:         General: Normal range of motion.      Cervical back: Normal range of motion.      Right lower leg: No edema.      Left lower leg: No edema.   Skin:     General: Skin is warm and dry.   Neurological:      General: No focal deficit present.      Mental Status: She is alert and oriented to person, place, and time.   Psychiatric:         Mood and Affect: Mood normal.         Behavior: Behavior normal.         Thought Content: Thought content normal.         Judgment: Judgment normal.       Fluids    Intake/Output Summary (Last 24 hours) at 12/30/2022 1330  Last data filed at 12/29/2022 1500  Gross per 24 hour   Intake 120 ml   Output --   Net 120 ml       Laboratory  Recent Labs     12/29/22  0439 12/30/22  0443   WBC 17.5* 15.5*   RBC 3.97* 3.60*   HEMOGLOBIN 12.9 11.5*   HEMATOCRIT 36.9* 33.6*   MCV 92.9 93.3   MCH 32.5 31.9   MCHC 35.0 34.2   RDW 43.8 44.8   PLATELETCT 216 191   MPV 10.4 10.4     Recent Labs     12/29/22  0439 12/30/22  0443   SODIUM 131* 135   POTASSIUM 3.5* 3.6   CHLORIDE 97 99   CO2 29 27   GLUCOSE 84 96   BUN 28* 36*   CREATININE 0.62 0.81   CALCIUM 9.2 8.6                   Imaging  EC-ECHOCARDIOGRAM COMPLETE W/O CONT   Final Result      DX-CHEST-PORTABLE (1 VIEW)   Final  Result      Possible mild hazy right apical airspace opacity which could represent small infiltrate/pneumonia versus overlapping structures.           Assessment/Plan  Problem Representation:  Mrs. Eun Be is an 87-year-old female past medical history of atrial fibrillation on Xarelto GERD, hypertension, passive smoking, COPD presented 12/27/2022 with shortness of breath and admitted for COPD exacerbation from RSV undergoing treatment with steroids, Doxycycline with improvement in oxygenation to 2L.    * Acute and chronic respiratory failure with hypoxia, RSV+, COPD exacerbation (HCC)  Assessment & Plan  Admit to INPATIENT. Will require 2 or more midnights for titrating of O2, treating atypical pneumonia and COPD exacerbation due to RSV. Home oxygen requirement is 3L PRN  Echo completed 12/28-EF 60%, moderate pericardial effusion  -To continue follow-up with cardiology and pulmonology as outpatient  -O2 and resp per protocol  -Reevaluate for home oxygen, currently at rest 2L   -Incentive spirometry  -Continue with breathing treatments  -Discharge with H/H PT  -No further diuresis    Positive sputum culture for RSV  Assessment & Plan  Ordered droplet precautions  Conservative management as above    Acute exacerbation of chronic obstructive pulmonary disease (COPD) (HCC)- (present on admission)  Assessment & Plan  O2, breathing treatments  Prednisone course and doxycycline      Hypothyroid- (present on admission)  Assessment & Plan  Continue Synthroid    Atrial fibrillation (HCC)- (present on admission)  Assessment & Plan  Heart rate remains 60-70s  Continue anticoagulation and propafenone    Mixed hyperlipidemia- (present on admission)  Assessment & Plan  Continue statin and fenofibrates    Essential hypertension- (present on admission)  Assessment & Plan  Blood pressure labile with range of -171, expressed dizziness therefore will hold on increasing BP medication  -continue to monitor  -home  losartan 25mg          VTE prophylaxis: SCDs/TEDs and therapeutic anticoagulation with Xarelto 15mg    I have performed a physical exam and reviewed and updated ROS and Plan today (12/30/2022). In review of yesterday's note (12/29/2022), there are no changes except as documented above.

## 2022-12-30 NOTE — DISCHARGE PLANNING
HH referral sent to [facility] via [facility].

## 2022-12-31 VITALS
DIASTOLIC BLOOD PRESSURE: 74 MMHG | HEART RATE: 74 BPM | SYSTOLIC BLOOD PRESSURE: 134 MMHG | OXYGEN SATURATION: 95 % | TEMPERATURE: 96.8 F | RESPIRATION RATE: 18 BRPM | BODY MASS INDEX: 19.33 KG/M2 | WEIGHT: 135 LBS | HEIGHT: 70 IN

## 2022-12-31 LAB
BASOPHILS # BLD AUTO: 0.1 % (ref 0–1.8)
BASOPHILS # BLD: 0.01 K/UL (ref 0–0.12)
EOSINOPHIL # BLD AUTO: 0.13 K/UL (ref 0–0.51)
EOSINOPHIL NFR BLD: 1.1 % (ref 0–6.9)
ERYTHROCYTE [DISTWIDTH] IN BLOOD BY AUTOMATED COUNT: 44.7 FL (ref 35.9–50)
HCT VFR BLD AUTO: 30 % (ref 37–47)
HGB BLD-MCNC: 10.4 G/DL (ref 12–16)
IMM GRANULOCYTES # BLD AUTO: 0.12 K/UL (ref 0–0.11)
IMM GRANULOCYTES NFR BLD AUTO: 1 % (ref 0–0.9)
LYMPHOCYTES # BLD AUTO: 0.99 K/UL (ref 1–4.8)
LYMPHOCYTES NFR BLD: 8.3 % (ref 22–41)
MCH RBC QN AUTO: 32.4 PG (ref 27–33)
MCHC RBC AUTO-ENTMCNC: 34.7 G/DL (ref 33.6–35)
MCV RBC AUTO: 93.5 FL (ref 81.4–97.8)
MONOCYTES # BLD AUTO: 0.59 K/UL (ref 0–0.85)
MONOCYTES NFR BLD AUTO: 4.9 % (ref 0–13.4)
NEUTROPHILS # BLD AUTO: 10.09 K/UL (ref 2–7.15)
NEUTROPHILS NFR BLD: 84.6 % (ref 44–72)
NRBC # BLD AUTO: 0 K/UL
NRBC BLD-RTO: 0 /100 WBC
PLATELET # BLD AUTO: 185 K/UL (ref 164–446)
PMV BLD AUTO: 10.6 FL (ref 9–12.9)
RBC # BLD AUTO: 3.21 M/UL (ref 4.2–5.4)
WBC # BLD AUTO: 11.9 K/UL (ref 4.8–10.8)

## 2022-12-31 PROCEDURE — 700111 HCHG RX REV CODE 636 W/ 250 OVERRIDE (IP): Performed by: INTERNAL MEDICINE

## 2022-12-31 PROCEDURE — 85025 COMPLETE CBC W/AUTO DIFF WBC: CPT

## 2022-12-31 PROCEDURE — 99239 HOSP IP/OBS DSCHRG MGMT >30: CPT | Mod: GC | Performed by: HOSPITALIST

## 2022-12-31 PROCEDURE — 700102 HCHG RX REV CODE 250 W/ 637 OVERRIDE(OP): Performed by: INTERNAL MEDICINE

## 2022-12-31 PROCEDURE — 36415 COLL VENOUS BLD VENIPUNCTURE: CPT

## 2022-12-31 PROCEDURE — 700102 HCHG RX REV CODE 250 W/ 637 OVERRIDE(OP)

## 2022-12-31 PROCEDURE — 94669 MECHANICAL CHEST WALL OSCILL: CPT

## 2022-12-31 PROCEDURE — A9270 NON-COVERED ITEM OR SERVICE: HCPCS

## 2022-12-31 PROCEDURE — 94760 N-INVAS EAR/PLS OXIMETRY 1: CPT

## 2022-12-31 PROCEDURE — A9270 NON-COVERED ITEM OR SERVICE: HCPCS | Performed by: INTERNAL MEDICINE

## 2022-12-31 RX ADMIN — GUAIFENESIN 600 MG: 600 TABLET, EXTENDED RELEASE ORAL at 04:41

## 2022-12-31 RX ADMIN — BUDESONIDE AND FORMOTEROL FUMARATE DIHYDRATE 2 PUFF: 160; 4.5 AEROSOL RESPIRATORY (INHALATION) at 04:46

## 2022-12-31 RX ADMIN — DOXYCYCLINE 100 MG: 100 TABLET, FILM COATED ORAL at 04:40

## 2022-12-31 RX ADMIN — SENNOSIDES AND DOCUSATE SODIUM 2 TABLET: 50; 8.6 TABLET ORAL at 04:40

## 2022-12-31 RX ADMIN — BUDESONIDE AND FORMOTEROL FUMARATE DIHYDRATE 2 PUFF: 160; 4.5 AEROSOL RESPIRATORY (INHALATION) at 07:46

## 2022-12-31 RX ADMIN — OMEPRAZOLE 40 MG: 20 CAPSULE, DELAYED RELEASE ORAL at 04:40

## 2022-12-31 RX ADMIN — FENOFIBRATE 134 MG: 134 CAPSULE ORAL at 04:41

## 2022-12-31 RX ADMIN — PREDNISONE 40 MG: 20 TABLET ORAL at 04:40

## 2022-12-31 RX ADMIN — Medication 2000 UNITS: at 04:40

## 2022-12-31 RX ADMIN — LEVOTHYROXINE SODIUM 88 MCG: 0.09 TABLET ORAL at 04:40

## 2022-12-31 RX ADMIN — FLUTICASONE PROPIONATE 100 MCG: 50 SPRAY, METERED NASAL at 04:39

## 2022-12-31 RX ADMIN — PROPAFENONE HYDROCHLORIDE 150 MG: 150 TABLET, FILM COATED ORAL at 07:46

## 2022-12-31 NOTE — DISCHARGE INSTRUCTIONS
Discharge Instructions    Discharged to home by car with relative. Discharged via wheelchair, hospital escort: Yes.  Special equipment needed: Oxygen    Be sure to schedule a follow-up appointment with your primary care doctor or any specialists as instructed.     Discharge Plan:   Diet Plan: Discussed  Activity Level: Discussed  Confirmed Follow up Appointment: Appointment Scheduled  Confirmed Symptoms Management: Discussed  Medication Reconciliation Updated: Yes  Influenza Vaccine Indication: Not indicated: Previously immunized this influenza season and > 8 years of age    I understand that a diet low in cholesterol, fat, and sodium is recommended for good health. Unless I have been given specific instructions below for another diet, I accept this instruction as my diet prescription.   Other diet: Regular    Special Instructions: Chronic Obstructive Pulmonary Disease (COPD) is a long-term, progressive lung disease that makes it harder to breathe. It includes chronic bronchitis, emphysema, and refractory (non-reversible) asthma. With COPD, the airways in your lungs become inflamed and thicken, and the tissue where oxygen is exchanged is destroyed. The flow of air in and out of your lungs decreases. When that happens, less oxygen gets into your body tissues, and it becomes harder to get rid of the waste gas carbon dioxide. As the disease gets worse, shortness of breath makes it harder to remain active. There is no cure for COPD, but it is preventable and treatable.    COPD Patient Discharge Instructions    Diet  Follow a low fat, low cholesterol, low salt diet unless instructed otherwise by your Doctor. Read the labels on the back of food products and track your intake of fat, cholesterol and salt.  No smoking  Discontinuing smoking will have the biggest impact on preventing progression of disease.  To participate in [employer]’s Quit Tobacco Program, call [phone] or visit [email or web address]  Oxygen  If your  doctor has order that you wear oxygen at home, it is important to wear it as ordered.  Do not smoke, vape, or use e-cigarettes with oxygen on.  Store in an appropriate location: upright in its delgadillo or laid flat down, away from open flames and stoves.   Do not use oil-based creams and moisturizers (ie: petroleum products, oil-based lip moisturizers) or aerosol sprays (ie: hair sprays or deodorants) when using your oxygen equipment.  Be careful with tubing placement to prevent yourself and others from tripping.  Medications  Refer to your personalized Action Plan to manage your symptoms.  Warning signs of an exacerbation  Breathing fast and shallow, worsening shortness of breath (like you just finished exercising)  Chest tightness  Increases in sputum production  Changes in sputum color  Lower oxygen levels than baseline  When to call your doctor  If the warning signs of an exacerbation do not improve  Refer to your personalized Action Plan   Pulmonary Rehab  Your doctor has ordered you a referral to Pulmonary Rehab. Call [phone] to schedule an appointment  Attend your follow-up appointment with your PCP and/or Pulmonologist  Remote Monitoring: At the direction of the remote monitoring on-call provider, you may increase your oxygen by 2 liters above your baseline.     See the educational handout provided by your COPD Navigator for more information. This also explains more about COPD, symptoms of an exacerbation, and some of the tests that you have undergone.    -Is this patient being discharged with medication to prevent blood clots?  Yes, Xarelto   Rivaroxaban oral tablets  What is this medicine?  RIVAROXABAN (ri va MARTINA a ban) is an anticoagulant (blood thinner). It is used to treat blood clots in the lungs or in the veins. It is also used to prevent blood clots in the lungs or in the veins. It is also used to lower the chance of stroke in people with a medical condition called atrial fibrillation.  This medicine  may be used for other purposes; ask your health care provider or pharmacist if you have questions.  COMMON BRAND NAME(S): Xarelto, Xarelto Starter Pack  What should I tell my health care provider before I take this medicine?  They need to know if you have any of these conditions:  antiphospholipid antibody syndrome  artificial heart valve  bleeding disorders  bleeding in the brain  blood in your stools (black or tarry stools) or if you have blood in your vomit  history of blood clots  history of stomach bleeding  kidney disease  liver disease  low blood counts, like low white cell, platelet, or red cell counts  recent or planned spinal or epidural procedure  take medicines that treat or prevent blood clots  an unusual or allergic reaction to rivaroxaban, other medicines, foods, dyes, or preservatives  pregnant or trying to get pregnant  breast-feeding  How should I use this medicine?  Take this medicine by mouth with a glass of water. Follow the directions on the prescription label. Take your medicine at regular intervals. Do not take it more often than directed. Do not stop taking except on your doctor's advice. Stopping this medicine may increase your risk of a blood clot. Be sure to refill your prescription before you run out of medicine.  If you are taking this medicine after hip or knee replacement surgery, take it with or without food. If you are taking this medicine for atrial fibrillation, take it with your evening meal. If you are taking this medicine to treat blood clots, take it with food at the same time each day. If you are unable to swallow your tablet, you may crush the tablet and mix it in applesauce. Then, immediately eat the applesauce. You should eat more food right after you eat the applesauce containing the crushed tablet.  Talk to your pediatrician regarding the use of this medicine in children. Special care may be needed.  Overdosage: If you think you have taken too much of this medicine  contact a poison control center or emergency room at once.  NOTE: This medicine is only for you. Do not share this medicine with others.  What if I miss a dose?  If you take your medicine once a day and miss a dose, take the missed dose as soon as you remember. If it is almost time for your next dose, take only that dose. Do not take double or extra doses.  If you take your medicine twice a day and miss a dose, take the missed dose immediately. In this instance, 2 tablets may be taken at the same time. The next day you should take 1 tablet twice a day as directed.  What may interact with this medicine?  Do not take this medicine with any of the following medications:  defibrotide  This medicine may also interact with the following medications:  aspirin and aspirin-like medicines  certain antibiotics like erythromycin, azithromycin, and clarithromycin  certain medicines for fungal infections like ketoconazole and itraconazole  certain medicines for irregular heart beat like amiodarone, quinidine, dronedarone  certain medicines for seizures like carbamazepine, phenytoin  certain medicines that treat or prevent blood clots like warfarin, enoxaparin, and dalteparin  conivaptan  felodipine  indinavir  lopinavir; ritonavir  NSAIDS, medicines for pain and inflammation, like ibuprofen or naproxen  ranolazine  rifampin  ritonavir  SNRIs, medicines for depression, like desvenlafaxine, duloxetine, levomilnacipran, venlafaxine  SSRIs, medicines for depression, like citalopram, escitalopram, fluoxetine, fluvoxamine, paroxetine, sertraline  Leeroy's wort  verapamil  This list may not describe all possible interactions. Give your health care provider a list of all the medicines, herbs, non-prescription drugs, or dietary supplements you use. Also tell them if you smoke, drink alcohol, or use illegal drugs. Some items may interact with your medicine.  What should I watch for while using this medicine?  Visit your healthcare  professional for regular checks on your progress. You may need blood work done while you are taking this medicine. Your condition will be monitored carefully while you are receiving this medicine. It is important not to miss any appointments.  Avoid sports and activities that might cause injury while you are using this medicine. Severe falls or injuries can cause unseen bleeding. Be careful when using sharp tools or knives. Consider using an electric razor. Take special care brushing or flossing your teeth. Report any injuries, bruising, or red spots on the skin to your healthcare professional.  If you are going to need surgery or other procedure, tell your healthcare professional that you are taking this medicine.  Wear a medical ID bracelet or chain. Carry a card that describes your disease and details of your medicine and dosage times.  What side effects may I notice from receiving this medicine?  Side effects that you should report to your doctor or health care professional as soon as possible:  allergic reactions like skin rash, itching or hives, swelling of the face, lips, or tongue  back pain  redness, blistering, peeling or loosening of the skin, including inside the mouth  signs and symptoms of bleeding such as bloody or black, tarry stools; red or dark-brown urine; spitting up blood or brown material that looks like coffee grounds; red spots on the skin; unusual bruising or bleeding from the eye, gums, or nose  signs and symptoms of a blood clot such as chest pain; shortness of breath; pain, swelling, or warmth in the leg  signs and symptoms of a stroke such as changes in vision; confusion; trouble speaking or understanding; severe headaches; sudden numbness or weakness of the face, arm or leg; trouble walking; dizziness; loss of coordination  Side effects that usually do not require medical attention (report to your doctor or health care professional if they continue or are bothersome):  dizziness  muscle  pain  This list may not describe all possible side effects. Call your doctor for medical advice about side effects. You may report side effects to FDA at 2-273-HCM-5259.  Where should I keep my medicine?  Keep out of the reach of children.  Store at room temperature between 15 and 30 degrees C (59 and 86 degrees F). Throw away any unused medicine after the expiration date.  NOTE: This sheet is a summary. It may not cover all possible information. If you have questions about this medicine, talk to your doctor, pharmacist, or health care provider.  © 2020 [employer] (2020-03-16 09:45:59)    Is patient discharged on Warfarin / Coumadin?   No

## 2022-12-31 NOTE — CARE PLAN
The patient is Stable - Low risk of patient condition declining or worsening    Shift Goals: pending discharge and home health setup; increase mobility and nutrition  Clinical Goals: Wean O2  Patient Goals: Sleep and move comfortably  Family Goals: comfort    Progress made toward(s) clinical / shift goals:  see above    Problem: Knowledge Deficit - Standard  Goal: Patient and family/care givers will demonstrate understanding of plan of care, disease process/condition, diagnostic tests and medications  Outcome: Progressing     Problem: Knowledge Deficit - COPD  Goal: Patient/significant other demonstrates understanding of disease process, utilization of the Action Plan, medications and discharge instruction  Outcome: Progressing     Problem: Risk for Infection - COPD  Goal: Patient will remain free from signs and symptoms of infection  Outcome: Progressing     Problem: Nutrition - Advanced  Goal: Patient will display progressive weight gain toward goal have adequate food and fluid intake  Outcome: Progressing     Problem: Ineffective Airway Clearance  Goal: Patient will maintain patent airway with clear/clearing breath sounds  Outcome: Progressing     Problem: Impaired Gas Exchange  Goal: Patient will demonstrate improved ventilation and adequate oxygenation and participate in treatment regimen within the level of ability/situation.  Outcome: Progressing       Patient is not progressing towards the following goals:

## 2022-12-31 NOTE — DISCHARGE PLANNING
Case Management Discharge Planning    Admission Date: 12/27/2022  GMLOS: 4  ALOS: 4    6-Clicks ADL Score: 24  6-Clicks Mobility Score: 23      Anticipated Discharge Dispo: Discharge Disposition: D/T to home under [facility] care in anticipation of covered skilled care (06)  Discharge Address: home    DME Needed: Yes    DME Ordered: Yes    Action(s) Taken: Updated Provider/Nurse on Discharge Plan    Escalations Completed: None    Medically Clear: Yes    Next Steps: travel O2 tank from [employer]     Barriers to Discharge: None    Is the patient up for discharge tomorrow: No today  DC today and has need for travel tank for ride home. Call placed to [facility]- Call placed to her current company to request tank @ bedside.   IMM signed-  DC Plan DC today -has ride home w/ [employer] pending travel tank. [facility] has accepted her and informed pt to expect a call from them or call them in the next 24-48 hrs.  Pt in agreement with the plan.     1315 Rec'd call back from [facility]- Travel tank for discharge will be delivered within 2 hrs- Team informed.

## 2022-12-31 NOTE — PROGRESS NOTES
Pt is resting in bed, A&O4, with no complaints of pain, and is on 2 liter O2. All fall precautions are in place, belongings at bedside table. Care endorse to day shift nurse.

## 2022-12-31 NOTE — PROGRESS NOTES
0800: Went in to see patient this morning and the patient was alert and orientated x4 and were where able to get patient to sit up and walk to the door. Patient was still a little dizzy and weak. The patient's orthostatic vitals were done prior to walk and [facility] medical was notified that the patient was still positive starting at 161 systolic to 137 systolic when standing. The patient also required 3 liters of oxygen when ambulating due to her SpO2 dropping to 85% and 1 liter at rest.     0930: [facility] medical came by to see patient and the patient stated that her daughter would help her at home and that she wanted to go home and not to a SNF to get stronger. [facility] medical said that the patient was okay to discharge to home with home health without authorization as long as the patient gets their oxygen tank delivered to beside.     1400: Oxygen tank was delivered to bedside at this time. Daughter was notified and said she was coming to pick her up soon.      1445: Went over discharge paperwork and to continue medication except losartan per MD with patient. Educated daughter on how to manage oxygen tank. Patient's IV was taken out and patient was dressed. Patient was wheeled out to main entrance.

## 2022-12-31 NOTE — FACE TO FACE
Face to Face Supporting Documentation - Home Health    The encounter with this patient was in whole or in part the primary reason for home health admission.    Date of encounter:   Patient:                    MRN:                       YOB: 2022  Eun Be  [id number]  1935     Home health to see patient for:  Skilled Nursing care for assessment, interventions & education and Physical Therapy evaluation and treatment    Skilled need for:  Exacerbation of Chronic Disease State COPD, weakness and Comment: RSV    Skilled nursing interventions to include:  Comment: Assistance with medications and oxygen     Homebound status evidenced by:  Need the aid of supportive devices such as crutches, canes, wheelchairs or walkers. Leaving home requires a considerable and taxing effort. There is a normal inability to leave the home.    Community Physician to provide follow up care: Libertad Estrella M.D.     Optional Interventions? No      I certify the face to face encounter for this home health care referral meets the CMS requirements and the encounter/clinical assessment with the patient was, in whole, or in part, for the medical condition(s) listed above, which is the primary reason for home health care. Based on my clinical findings: the service(s) are medically necessary, support the need for home health care, and the homebound criteria are met.  I certify that this patient has had a face to face encounter by myself.  Dony Guevara M.D. - NPI: [id number]

## 2022-12-31 NOTE — FACE TO FACE
"Face to Face Note  -  Durable Medical Equipment    Dony Guevara M.D. - NPI: [id number]  I certify that this patient is under my care and that they had a durable medical equipment(DME)face to face encounter by myself that meets the physician DME face-to-face encounter requirements with this patient on:    Date of encounter:   Patient:                    MRN:                       YOB: 2022  Eun Be  [id number]  1935     The encounter with the patient was in whole, or in part, for the following medical condition, which is the primary reason for durable medical equipment:  COPD and Other - RSV    I certify that, based on my findings, the following durable medical equipment is medically necessary:    Oxygen   HOME O2 Saturation Measurements:(Values must be present for Home Oxygen orders)  Room air sat at rest: 88  Room air sat with amb: 85  With liters of O2: 3, O2 sat at rest with O2: 92  With Liters of O2: 3, O2 sat with amb with O2 : 90  Is the patient mobile?: Yes  If patient feels more short of breath, they can go up to 6 liters per minute and contact healthcare provider.    Supporting Symptoms: The patient requires supplemental oxygen, as the following interventions have been tried with limited or no improvement: \"Bronchodilators and/or steroid inhalers, \"Oral and/or IV steroids, \"Ambulation with oximetry, and \"Incentive spirometry.    My Clinical findings support the need for the above equipment due to:  Hypoxia  "

## 2022-12-31 NOTE — DISCHARGE SUMMARY
Discharge Summary    Date of Admission: 12/27/2022  Date of Discharge: No discharge date for patient encounter.  Discharging Attending: VALENTINA Matute M.D.   Discharging Senior Resident: Dr. Orlando Khan MD  Discharging Intern: Dr. OMER Guevara MD    CHIEF COMPLAINT ON ADMISSION  Chief Complaint   Patient presents with    Shortness of Breath       Reason for Admission  COPD exacerbation  Respiratory syncytial virus infection  Acute on chronic respiratory failure      Admission Date  12/27/2022    CODE STATUS  Full Code    HPI & HOSPITAL COURSE    Eun Be is a 87F presented on 12/27 for dyspnea in setting of recent COPD exacerbation; pmhx includes COPD (no home oxygen), atrial fibrillation (xarelto, rate control), HTN, GERD; admitted 12/27 for COPD exacerbation, RSV infection.    Recently hospitalised at [facility] for COPD exacerbation, RSV infection. Was discharged to home on 12/26 with home oxygen. Had a worsening in dyspnea, and came to [facility]. Had increase in supplemental oxygen demands. Treated with steroids for COPD exacerbation. Supplemental oxygen demands improved over the next 2 days. She did get diuretics to improve oxygenation status, but this may have resulted in orthostatic hypotension. Morning of discharge she felt improved and was nearly completely off supplemental oxygen.    #COPD exacerbation  #Respiratory syncytial virus infection  #acute on chronic respiratory failure  NO PFT on file, however, likely GOLD class C given multiple hospitalisations for COPD exacerbation. Not on home oxygen prior to recent admissions. Given course of steroids with improvement in oxygenation.  -Albuterol INH q6h/PRN  -Advair 250-50 INH 2puff BID  -Supplemental oxygen to saturations >88% (3L)  -Pulmonary medicine follow up for PFT  -PCP for PFT referrals, follow up on completion    #atrial fibrillation  Stable.  -Xarelto 15mg PO qD    #hypothyroid  -synthroid 88mcg PO qAM    #Chronic pain  -Tizanidine 4mg PO  q6h/PRN  -Gabapentin 100-300mg PO qHS/PRN      Therefore, she is discharged in fair and stable condition to home with close outpatient follow-up.    The patient met 2-midnight criteria for an inpatient stay at the time of discharge.    PHYSICAL EXAM ON DISCHARGE  Temp:  [36 °C (96.8 °F)-36.8 °C (98.3 °F)] 36 °C (96.8 °F)  Pulse:  [68-90] 72  Resp:  [17-22] 20  BP: ()/(49-76) 134/74  SpO2:  [91 %-95 %] 94 %    Physical Exam    GEN: well appearing, no acute distress  CV: RRR, no m/g/r  Pulm: CTAB, no rales, wheeze, crackles  Abd: soft, nontender, nondistended  Extremities: shoulder/elbow flexion/extension 5/5 bilaterally, knee flexion/extension 5/5 bilaterally, no peripheral edema  Neuro: CN II-XII intact, no focal deficits, Aox4  Psych: Denies any SI/HI morning of discharge    Discharge Date  12/31/22    FOLLOW UP ITEMS POST DISCHARGE  PCP for pulmonary referral follow up, medication adjustments  Pulmonary medicine: PFT completion, COPD follow up, consideration of chronic azithromycin    DISCHARGE DIAGNOSES  Principal Problem:    Acute and chronic respiratory failure with hypoxia, RSV+, COPD exacerbation (HCC) POA: Unknown      Overview: On nocturnal oxygen for COPD. 2L      Please use this code for oxygen use  Active Problems:    Essential hypertension POA: Yes    Mixed hyperlipidemia POA: Yes    Atrial fibrillation (HCC) POA: Yes      Overview: Persistant, on antiarrythmics, intolerant of systemic anticoagulation    Hypothyroid POA: Yes    Acute exacerbation of chronic obstructive pulmonary disease (COPD) (HCC) POA: Yes    Positive sputum culture for RSV POA: Unknown  Resolved Problems:    Hypoxia POA: Yes      FOLLOW UP  No future appointments.  Luciano Mejia M.D.  [address] [id number] [phone]    Go on 1/6/2023  Please go to your follow up appointment with Luciano Mejia M.D. on Friday January 6,2023 at 2:20pm.    [facility]  [address]  [place] [id number]  [phone]          MEDICATIONS ON DISCHARGE     Medication List        CONTINUE taking these medications        Instructions   albuterol 108 (90 Base) MCG/ACT Aers inhalation aerosol   INHALE 2 PUFFS BY MOUTH EVERY 6 HOURS AS NEEDED FOR SHORTNESS OF BREATH  Dose: 2 Puff     ALPRAZolam 0.25 MG Tabs  Commonly known as: XANAX   Take 0.25-0.5 mg by mouth 2 times a day as needed for Sleep or Anxiety.  Dose: 0.25-0.5 mg     IMELDA-MAG-ZINC PO   Take 1 Tablet by mouth every day.  Dose: 1 Tablet     fenofibrate micronized 134 MG capsule  Commonly known as: LOFIBRA   TAKE 1 CAPSULE BY MOUTH EVERY DAY     fluticasone-salmeterol 250-50 MCG/ACT Aepb  Commonly known as: Advair   Inhale 2 Puffs 2 times a day.  Dose: 2 Puff     gabapentin 100 MG Caps  Commonly known as: NEURONTIN   TAKE 1-3 CAPS BY MOUTH AT BEDTIME AS NEEDED (PAIN).  Dose: 100-300 mg     levothyroxine 88 MCG Tabs  Commonly known as: SYNTHROID   Take 88 mcg by mouth every morning on an empty stomach.  Dose: 88 mcg     omeprazole 40 MG delayed-release capsule  Commonly known as: PRILOSEC   TAKE 1 CAPSULE BY MOUTH EVERY DAY     Prolia 60 MG/ML Sosy injection  Generic drug: denosumab   Inject 60 mg under the skin every 6 months.  Dose: 60 mg     propafenone 150 MG Tabs  Commonly known as: RYTHMOL   Take 150 mg by mouth see administration instructions. Takes twice a day scheduled and then at bedtime as needed  Dose: 150 mg     rivaroxaban 15 MG Tabs tablet  Commonly known as: XARELTO   Take  by mouth with dinner.     simvastatin 20 MG Tabs  Commonly known as: ZOCOR   TAKE 1 TABLET BY MOUTH IN THE EVENING     SUPER B COMPLEX PO   Take 1 Tablet by mouth every day.  Dose: 1 Tablet     tizanidine 4 MG Tabs  Commonly known as: ZANAFLEX   Take 4 mg by mouth every 6 hours as needed. Indications: Muscle Spasticity  Dose: 4 mg     vitamin D 2000 UNIT Tabs   Take 2,000 Units by mouth every day.  Dose: 2,000 Units            STOP taking these  medications      losartan 25 MG Tabs  Commonly known as: COZAAR              Allergies  Allergies   Allergen Reactions    Asa [Aspirin] Rash     Rash     Latex Rash     Rash     Penicillins Unspecified     Unknown childhood reaction     Tape Hives, Itching and Swelling    Wasp Venom Swelling       DIET  Orders Placed This Encounter   Procedures    Diet Order Diet: Regular     Standing Status:   Standing     Number of Occurrences:   1     Order Specific Question:   Diet:     Answer:   Regular [1]       ACTIVITY  As tolerated.  Weight bearing as tolerated    CONSULTATIONS  N/a    PROCEDURES  N/a

## 2022-12-31 NOTE — PROGRESS NOTES
Bedside report received and patient care assumed. Pt is resting in bed, A&O4, with no complaints of pain, and is on 2 liter O2. All fall precautions are in place, belongings at bedside table.  Pt was updated on POC, no questions or concerns. Pt educated on use of call light for assistance.

## 2022-12-31 NOTE — CARE PLAN
The patient is Stable - Low risk of patient condition declining or worsening    Shift Goals  Clinical Goals: Wean O2  Patient Goals: Sleep and move comfortably  Family Goals: comfort    Progress made toward(s) clinical / shift goals:    Problem: Impaired Gas Exchange  Goal: Patient will demonstrate improved ventilation and adequate oxygenation and participate in treatment regimen within the level of ability/situation.  Outcome: Progressing  Flowsheets (Taken 12/31/2022 0324)  Impaired Gas Exchange:   Assesed rate/rhythm/depth of effort of respirations   Assessed oxygenation   Assessed color and body temperature   Assessed breath sounds   Encouraged deep-slow or pursed-lip breathing exercises   Monitored changes in the level of consciousness and mental status   Elevated head of bed       Patient is not progressing towards the following goals:

## 2023-01-01 LAB
BACTERIA BLD CULT: NORMAL
BACTERIA BLD CULT: NORMAL
SIGNIFICANT IND 70042: NORMAL
SIGNIFICANT IND 70042: NORMAL
SITE SITE: NORMAL
SITE SITE: NORMAL
SOURCE SOURCE: NORMAL
SOURCE SOURCE: NORMAL

## 2023-01-25 NOTE — DOCUMENTATION QUERY
[facility]                                                                       Query Response Note      PATIENT:               JULES DUNCAN  ACCT #:                  [id number]  MRN:                     [id number]  :                      1935  ADMIT DATE:       2022 7:02 AM  DISCH DATE:        2022 3:40 PM  RESPONDING  PROVIDER #:        [id number]           QUERY TEXT:    Please clarify status of this condition:    NOTE:  If an appropriate response is not listed below, please respond with a new note.       The patient's Clinical Indicators include:  Clinical indicators  Temp 98.1 WBC 16.8 SpO2 86 on 3L in ED    Treatment or Monitoring  Per ED: ?Her x-ray does not show any findings of obvious pneumonia and while she does  have a leukocytosis likely from her steroid use with her COPD.? Per H&P: ?CXR showed R apical hazy opacity on my personal review Leukocytosis Procalcitonin 0.14? Treated with Prednisone course and doxycycline. Per PN : She admitted for COPD exacerbation for  active treatment, steroids.  Doxycycline for bronchitis.?    Risk Factors  Will require 2 or more midnights for titrating of O2, treating atypical pneumonia and COPD exacerbation due to RSV    [employer] contact information  [email or web address]  Options provided:   -- Pneumonia is ruled in   -- Pneumonia is  ruled out   -- Unable to determine      Query created by: Shelbie Thompson on 2023 12:25 PM    RESPONSE TEXT:    Pneumonia is ruled out          Electronically signed by:  MEDARDO RUDD MD 2023 3:23 PM

## 2023-05-20 NOTE — TELEPHONE ENCOUNTER
Called pharmacy. Denied. Has new Rx on file.   This document is complete and the patient is ready for discharge.

## 2023-09-25 ENCOUNTER — APPOINTMENT (OUTPATIENT)
Dept: RADIOLOGY | Facility: MEDICAL CENTER | Age: 88
DRG: 312 | End: 2023-09-25
Attending: EMERGENCY MEDICINE
Payer: MEDICARE

## 2023-09-25 ENCOUNTER — HOSPITAL ENCOUNTER (INPATIENT)
Facility: MEDICAL CENTER | Age: 88
LOS: 4 days | DRG: 312 | End: 2023-10-03
Attending: EMERGENCY MEDICINE | Admitting: HOSPITALIST
Payer: MEDICARE

## 2023-09-25 DIAGNOSIS — R55 SYNCOPE, UNSPECIFIED SYNCOPE TYPE: ICD-10-CM
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
## 2023-09-25 DIAGNOSIS — S09.90XA CLOSED HEAD INJURY, INITIAL ENCOUNTER: ICD-10-CM

## 2023-09-25 DIAGNOSIS — I15.9 SECONDARY HYPERTENSION: ICD-10-CM
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
## 2023-09-25 DIAGNOSIS — R07.9 CHEST PAIN, UNSPECIFIED TYPE: ICD-10-CM

## 2023-09-25 DIAGNOSIS — I10 HYPERTENSION, UNSPECIFIED TYPE: ICD-10-CM

## 2023-09-25 LAB
ALBUMIN SERPL BCP-MCNC: 4.1 G/DL (ref 3.2–4.9)
ALBUMIN/GLOB SERPL: 1.9 G/DL
ALP SERPL-CCNC: 44 U/L (ref 30–99)
ALT SERPL-CCNC: 14 U/L (ref 2–50)
ANION GAP SERPL CALC-SCNC: 12 MMOL/L (ref 7–16)
APTT PPP: 37.2 SEC (ref 24.7–36)
AST SERPL-CCNC: 17 U/L (ref 12–45)
BASOPHILS # BLD AUTO: 0.6 % (ref 0–1.8)
BASOPHILS # BLD: 0.03 K/UL (ref 0–0.12)
BILIRUB SERPL-MCNC: 0.4 MG/DL (ref 0.1–1.5)
BUN SERPL-MCNC: 22 MG/DL (ref 8–22)
CALCIUM ALBUM COR SERPL-MCNC: 9.7 MG/DL (ref 8.5–10.5)
CALCIUM SERPL-MCNC: 9.8 MG/DL (ref 8.5–10.5)
CHLORIDE SERPL-SCNC: 105 MMOL/L (ref 96–112)
CO2 SERPL-SCNC: 22 MMOL/L (ref 20–33)
CREAT SERPL-MCNC: 0.83 MG/DL (ref 0.5–1.4)
EOSINOPHIL # BLD AUTO: 0.1 K/UL (ref 0–0.51)
EOSINOPHIL NFR BLD: 1.9 % (ref 0–6.9)
ERYTHROCYTE [DISTWIDTH] IN BLOOD BY AUTOMATED COUNT: 48.7 FL (ref 35.9–50)
GFR SERPLBLD CREATININE-BSD FMLA CKD-EPI: 68 ML/MIN/1.73 M 2
GLOBULIN SER CALC-MCNC: 2.2 G/DL (ref 1.9–3.5)
GLUCOSE SERPL-MCNC: 105 MG/DL (ref 65–99)
HCT VFR BLD AUTO: 36.9 % (ref 37–47)
HGB BLD-MCNC: 12.2 G/DL (ref 12–16)
IMM GRANULOCYTES # BLD AUTO: 0.01 K/UL (ref 0–0.11)
IMM GRANULOCYTES NFR BLD AUTO: 0.2 % (ref 0–0.9)
INR PPP: 1.6 (ref 0.87–1.13)
LYMPHOCYTES # BLD AUTO: 0.66 K/UL (ref 1–4.8)
LYMPHOCYTES NFR BLD: 12.5 % (ref 22–41)
MCH RBC QN AUTO: 31.1 PG (ref 27–33)
MCHC RBC AUTO-ENTMCNC: 33.1 G/DL (ref 32.2–35.5)
MCV RBC AUTO: 94.1 FL (ref 81.4–97.8)
MONOCYTES # BLD AUTO: 0.29 K/UL (ref 0–0.85)
MONOCYTES NFR BLD AUTO: 5.5 % (ref 0–13.4)
NEUTROPHILS # BLD AUTO: 4.21 K/UL (ref 1.82–7.42)
NEUTROPHILS NFR BLD: 79.3 % (ref 44–72)
NRBC # BLD AUTO: 0 K/UL
NRBC BLD-RTO: 0 /100 WBC (ref 0–0.2)
PLATELET # BLD AUTO: 209 K/UL (ref 164–446)
PMV BLD AUTO: 9.8 FL (ref 9–12.9)
POTASSIUM SERPL-SCNC: 4.1 MMOL/L (ref 3.6–5.5)
PROT SERPL-MCNC: 6.3 G/DL (ref 6–8.2)
PROTHROMBIN TIME: 19.2 SEC (ref 12–14.6)
RBC # BLD AUTO: 3.92 M/UL (ref 4.2–5.4)
SODIUM SERPL-SCNC: 139 MMOL/L (ref 135–145)
TROPONIN T SERPL-MCNC: 19 NG/L (ref 6–19)
WBC # BLD AUTO: 5.3 K/UL (ref 4.8–10.8)

## 2023-09-25 PROCEDURE — 80053 COMPREHEN METABOLIC PANEL: CPT

## 2023-09-25 PROCEDURE — A9270 NON-COVERED ITEM OR SERVICE: HCPCS | Performed by: EMERGENCY MEDICINE

## 2023-09-25 PROCEDURE — 85025 COMPLETE CBC W/AUTO DIFF WBC: CPT

## 2023-09-25 PROCEDURE — 93005 ELECTROCARDIOGRAM TRACING: CPT | Performed by: EMERGENCY MEDICINE

## 2023-09-25 PROCEDURE — 72125 CT NECK SPINE W/O DYE: CPT

## 2023-09-25 PROCEDURE — 99285 EMERGENCY DEPT VISIT HI MDM: CPT

## 2023-09-25 PROCEDURE — 70450 CT HEAD/BRAIN W/O DYE: CPT

## 2023-09-25 PROCEDURE — 85610 PROTHROMBIN TIME: CPT

## 2023-09-25 PROCEDURE — 71045 X-RAY EXAM CHEST 1 VIEW: CPT

## 2023-09-25 PROCEDURE — 36415 COLL VENOUS BLD VENIPUNCTURE: CPT

## 2023-09-25 PROCEDURE — 84484 ASSAY OF TROPONIN QUANT: CPT

## 2023-09-25 PROCEDURE — 85730 THROMBOPLASTIN TIME PARTIAL: CPT

## 2023-09-25 PROCEDURE — 700102 HCHG RX REV CODE 250 W/ 637 OVERRIDE(OP): Performed by: EMERGENCY MEDICINE

## 2023-09-25 RX ORDER — HYDROCODONE BITARTRATE AND ACETAMINOPHEN 5; 325 MG/1; MG/1
1 TABLET ORAL ONCE
Status: COMPLETED | OUTPATIENT
Start: 2023-09-26 | End: 2023-09-25

## 2023-09-25 RX ORDER — ALPRAZOLAM 1 MG/1
1 TABLET ORAL NIGHTLY PRN
Status: DISCONTINUED | OUTPATIENT
Start: 2023-09-25 | End: 2023-09-26

## 2023-09-25 RX ADMIN — ALPRAZOLAM 1 MG: 1 TABLET ORAL at 23:49

## 2023-09-25 RX ADMIN — HYDROCODONE BITARTRATE AND ACETAMINOPHEN 1 TABLET: 5; 325 TABLET ORAL at 23:48

## 2023-09-25 ASSESSMENT — FIBROSIS 4 INDEX: FIB4 SCORE: 1.54

## 2023-09-25 ASSESSMENT — PAIN DESCRIPTION - PAIN TYPE: TYPE: ACUTE PAIN

## 2023-09-26 ENCOUNTER — APPOINTMENT (OUTPATIENT)
Dept: CARDIOLOGY | Facility: MEDICAL CENTER | Age: 88
DRG: 312 | End: 2023-09-26
Attending: HOSPITALIST
Payer: MEDICARE
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
## 2023-09-26 PROBLEM — R07.9 CHEST PAIN: Status: ACTIVE | Noted: 2023-09-26

## 2023-09-26 PROBLEM — J44.9 COPD (CHRONIC OBSTRUCTIVE PULMONARY DISEASE) (HCC): Status: ACTIVE | Noted: 2022-12-27

## 2023-09-26 PROBLEM — J96.11 CHRONIC RESPIRATORY FAILURE WITH HYPOXIA (HCC): Status: ACTIVE | Noted: 2023-09-26

## 2023-09-26 PROBLEM — R53.81 DEBILITY: Status: ACTIVE | Noted: 2023-09-26

## 2023-09-26 LAB
EKG IMPRESSION: NORMAL
EKG IMPRESSION: NORMAL
LV EJECT FRACT  99904: 60
LV EJECT FRACT MOD 2C 99903: 71.72
LV EJECT FRACT MOD 4C 99902: 66.56
LV EJECT FRACT MOD BP 99901: 70.52
TROPONIN T SERPL-MCNC: 27 NG/L (ref 6–19)
TROPONIN T SERPL-MCNC: 28 NG/L (ref 6–19)

## 2023-09-26 PROCEDURE — 97162 PT EVAL MOD COMPLEX 30 MIN: CPT

## 2023-09-26 PROCEDURE — 93005 ELECTROCARDIOGRAM TRACING: CPT | Performed by: EMERGENCY MEDICINE

## 2023-09-26 PROCEDURE — G0378 HOSPITAL OBSERVATION PER HR: HCPCS

## 2023-09-26 PROCEDURE — 97166 OT EVAL MOD COMPLEX 45 MIN: CPT

## 2023-09-26 PROCEDURE — 700111 HCHG RX REV CODE 636 W/ 250 OVERRIDE (IP): Performed by: EMERGENCY MEDICINE

## 2023-09-26 PROCEDURE — 99223 1ST HOSP IP/OBS HIGH 75: CPT | Performed by: HOSPITALIST

## 2023-09-26 PROCEDURE — 84484 ASSAY OF TROPONIN QUANT: CPT

## 2023-09-26 PROCEDURE — 97535 SELF CARE MNGMENT TRAINING: CPT

## 2023-09-26 PROCEDURE — 700101 HCHG RX REV CODE 250: Performed by: NURSE PRACTITIONER

## 2023-09-26 PROCEDURE — 96374 THER/PROPH/DIAG INJ IV PUSH: CPT

## 2023-09-26 PROCEDURE — 700102 HCHG RX REV CODE 250 W/ 637 OVERRIDE(OP): Performed by: HOSPITALIST

## 2023-09-26 PROCEDURE — A9270 NON-COVERED ITEM OR SERVICE: HCPCS | Performed by: HOSPITALIST

## 2023-09-26 PROCEDURE — 36415 COLL VENOUS BLD VENIPUNCTURE: CPT

## 2023-09-26 PROCEDURE — 700101 HCHG RX REV CODE 250: Performed by: HOSPITALIST

## 2023-09-26 PROCEDURE — 93306 TTE W/DOPPLER COMPLETE: CPT

## 2023-09-26 PROCEDURE — 93306 TTE W/DOPPLER COMPLETE: CPT | Mod: 26 | Performed by: INTERNAL MEDICINE

## 2023-09-26 RX ORDER — ALPRAZOLAM 0.25 MG/1
0.25 TABLET ORAL 2 TIMES DAILY PRN
Status: DISCONTINUED | OUTPATIENT
Start: 2023-09-26 | End: 2023-10-03 | Stop reason: HOSPADM

## 2023-09-26 RX ORDER — LIDOCAINE 50 MG/G
1 PATCH TOPICAL EVERY 24 HOURS
Status: DISCONTINUED | OUTPATIENT
Start: 2023-09-26 | End: 2023-09-26

## 2023-09-26 RX ORDER — GABAPENTIN 100 MG/1
100 CAPSULE ORAL NIGHTLY PRN
Status: DISCONTINUED | OUTPATIENT
Start: 2023-09-26 | End: 2023-10-03 | Stop reason: HOSPADM

## 2023-09-26 RX ORDER — ALBUTEROL SULFATE 90 UG/1
2 AEROSOL, METERED RESPIRATORY (INHALATION) EVERY 6 HOURS PRN
Status: DISCONTINUED | OUTPATIENT
Start: 2023-09-26 | End: 2023-10-03 | Stop reason: HOSPADM

## 2023-09-26 RX ORDER — ACETAMINOPHEN 325 MG/1
650 TABLET ORAL EVERY 6 HOURS PRN
Status: DISCONTINUED | OUTPATIENT
Start: 2023-09-26 | End: 2023-10-03 | Stop reason: HOSPADM

## 2023-09-26 RX ORDER — LEVOTHYROXINE SODIUM 88 UG/1
88 TABLET ORAL
Status: DISCONTINUED | OUTPATIENT
Start: 2023-09-26 | End: 2023-10-03 | Stop reason: HOSPADM

## 2023-09-26 RX ORDER — OXYCODONE HYDROCHLORIDE 5 MG/1
5 TABLET ORAL
Status: DISCONTINUED | OUTPATIENT
Start: 2023-09-26 | End: 2023-10-03 | Stop reason: HOSPADM

## 2023-09-26 RX ORDER — OMEPRAZOLE 20 MG/1
40 CAPSULE, DELAYED RELEASE ORAL DAILY
Status: DISCONTINUED | OUTPATIENT
Start: 2023-09-26 | End: 2023-10-03 | Stop reason: HOSPADM

## 2023-09-26 RX ORDER — FLUTICASONE PROPIONATE AND SALMETEROL 250; 50 UG/1; UG/1
2 POWDER RESPIRATORY (INHALATION) 2 TIMES DAILY
Status: DISCONTINUED | OUTPATIENT
Start: 2023-09-26 | End: 2023-09-26

## 2023-09-26 RX ORDER — SIMVASTATIN 20 MG
20 TABLET ORAL EVERY EVENING
Status: DISCONTINUED | OUTPATIENT
Start: 2023-09-26 | End: 2023-10-03 | Stop reason: HOSPADM

## 2023-09-26 RX ORDER — LIDOCAINE 50 MG/G
2 PATCH TOPICAL EVERY 24 HOURS
Status: DISCONTINUED | OUTPATIENT
Start: 2023-09-26 | End: 2023-10-03 | Stop reason: HOSPADM

## 2023-09-26 RX ORDER — LABETALOL HYDROCHLORIDE 5 MG/ML
10 INJECTION, SOLUTION INTRAVENOUS EVERY 4 HOURS PRN
Status: DISCONTINUED | OUTPATIENT
Start: 2023-09-26 | End: 2023-10-03 | Stop reason: HOSPADM

## 2023-09-26 RX ORDER — ONDANSETRON 4 MG/1
4 TABLET, ORALLY DISINTEGRATING ORAL EVERY 4 HOURS PRN
Status: DISCONTINUED | OUTPATIENT
Start: 2023-09-26 | End: 2023-10-03 | Stop reason: HOSPADM

## 2023-09-26 RX ORDER — TIZANIDINE 4 MG/1
4 TABLET ORAL EVERY 6 HOURS PRN
Status: DISCONTINUED | OUTPATIENT
Start: 2023-09-26 | End: 2023-10-03 | Stop reason: HOSPADM

## 2023-09-26 RX ORDER — HYDRALAZINE HYDROCHLORIDE 20 MG/ML
20 INJECTION INTRAMUSCULAR; INTRAVENOUS ONCE
Status: COMPLETED | OUTPATIENT
Start: 2023-09-26 | End: 2023-09-26

## 2023-09-26 RX ORDER — ONDANSETRON 2 MG/ML
4 INJECTION INTRAMUSCULAR; INTRAVENOUS EVERY 4 HOURS PRN
Status: DISCONTINUED | OUTPATIENT
Start: 2023-09-26 | End: 2023-10-03 | Stop reason: HOSPADM

## 2023-09-26 RX ORDER — MORPHINE SULFATE 4 MG/ML
4 INJECTION INTRAVENOUS
Status: DISCONTINUED | OUTPATIENT
Start: 2023-09-26 | End: 2023-10-03 | Stop reason: HOSPADM

## 2023-09-26 RX ORDER — OXYCODONE HYDROCHLORIDE 10 MG/1
10 TABLET ORAL
Status: DISCONTINUED | OUTPATIENT
Start: 2023-09-26 | End: 2023-10-03 | Stop reason: HOSPADM

## 2023-09-26 RX ORDER — PROPAFENONE HYDROCHLORIDE 150 MG/1
150 TABLET, COATED ORAL 2 TIMES DAILY
Status: DISCONTINUED | OUTPATIENT
Start: 2023-09-26 | End: 2023-10-03 | Stop reason: HOSPADM

## 2023-09-26 RX ADMIN — TIZANIDINE 4 MG: 4 TABLET ORAL at 06:43

## 2023-09-26 RX ADMIN — ALPRAZOLAM 0.25 MG: 0.25 TABLET ORAL at 22:05

## 2023-09-26 RX ADMIN — LIDOCAINE 1 PATCH: 50 PATCH TOPICAL at 06:11

## 2023-09-26 RX ADMIN — HYDRALAZINE HYDROCHLORIDE 20 MG: 20 INJECTION, SOLUTION INTRAMUSCULAR; INTRAVENOUS at 00:34

## 2023-09-26 RX ADMIN — SIMVASTATIN 20 MG: 20 TABLET, FILM COATED ORAL at 17:38

## 2023-09-26 RX ADMIN — MOMETASONE FUROATE AND FORMOTEROL FUMARATE DIHYDRATE 2 PUFF: 200; 5 AEROSOL RESPIRATORY (INHALATION) at 06:43

## 2023-09-26 RX ADMIN — LEVOTHYROXINE SODIUM 88 MCG: 0.09 TABLET ORAL at 06:22

## 2023-09-26 RX ADMIN — OMEPRAZOLE 40 MG: 20 CAPSULE, DELAYED RELEASE ORAL at 06:22

## 2023-09-26 RX ADMIN — LIDOCAINE PATCH 5% 2 PATCH: 700 PATCH TOPICAL at 15:12

## 2023-09-26 RX ADMIN — PROPAFENONE HYDROCHLORIDE 150 MG: 150 TABLET, FILM COATED ORAL at 17:38

## 2023-09-26 RX ADMIN — PROPAFENONE HYDROCHLORIDE 150 MG: 150 TABLET, FILM COATED ORAL at 06:42

## 2023-09-26 RX ADMIN — RIVAROXABAN 15 MG: 15 TABLET, FILM COATED ORAL at 17:38

## 2023-09-26 ASSESSMENT — COGNITIVE AND FUNCTIONAL STATUS - GENERAL
WALKING IN HOSPITAL ROOM: A LITTLE
DAILY ACTIVITIY SCORE: 18
DRESSING REGULAR LOWER BODY CLOTHING: A LOT
HELP NEEDED FOR BATHING: A LOT
TOILETING: A LITTLE
MOVING TO AND FROM BED TO CHAIR: UNABLE
MOVING FROM LYING ON BACK TO SITTING ON SIDE OF FLAT BED: A LOT
SUGGESTED CMS G CODE MODIFIER MOBILITY: CL
TURNING FROM BACK TO SIDE WHILE IN FLAT BAD: A LOT
STANDING UP FROM CHAIR USING ARMS: A LITTLE
SUGGESTED CMS G CODE MODIFIER DAILY ACTIVITY: CK
MOBILITY SCORE: 13
CLIMB 3 TO 5 STEPS WITH RAILING: A LOT
DRESSING REGULAR UPPER BODY CLOTHING: A LITTLE

## 2023-09-26 ASSESSMENT — ENCOUNTER SYMPTOMS
DEPRESSION: 0
WEAKNESS: 0
RESPIRATORY NEGATIVE: 1
ORTHOPNEA: 0
DIARRHEA: 0
HEARTBURN: 0
DIZZINESS: 0
PSYCHIATRIC NEGATIVE: 1
CHILLS: 0
GASTROINTESTINAL NEGATIVE: 1
VOMITING: 0
STRIDOR: 0
BACK PAIN: 1
DIAPHORESIS: 0
BACK PAIN: 0
NAUSEA: 0
CARDIOVASCULAR NEGATIVE: 1
SORE THROAT: 0
PHOTOPHOBIA: 0
CLAUDICATION: 0
HEADACHES: 0
SINUS PAIN: 0
BLOOD IN STOOL: 0
SPUTUM PRODUCTION: 0
MYALGIAS: 0
FALLS: 0
COUGH: 0
WHEEZING: 0
TREMORS: 0
HEMOPTYSIS: 0
BLURRED VISION: 0
BRUISES/BLEEDS EASILY: 0
FALLS: 1
POLYDIPSIA: 0
PND: 0
FEVER: 0
MYALGIAS: 1
FLANK PAIN: 0
HALLUCINATIONS: 0
PALPITATIONS: 0
DOUBLE VISION: 0
CONSTIPATION: 0
ABDOMINAL PAIN: 0
WEAKNESS: 1
TINGLING: 0
NECK PAIN: 0
SHORTNESS OF BREATH: 1
EYES NEGATIVE: 1
EYE PAIN: 0

## 2023-09-26 ASSESSMENT — LIFESTYLE VARIABLES
HOW MANY TIMES IN THE PAST YEAR HAVE YOU HAD 5 OR MORE DRINKS IN A DAY: 0
EVER FELT BAD OR GUILTY ABOUT YOUR DRINKING: NO
ALCOHOL_USE: YES
TOTAL SCORE: 0
HAVE YOU EVER FELT YOU SHOULD CUT DOWN ON YOUR DRINKING: NO
HAVE PEOPLE ANNOYED YOU BY CRITICIZING YOUR DRINKING: NO
EVER HAD A DRINK FIRST THING IN THE MORNING TO STEADY YOUR NERVES TO GET RID OF A HANGOVER: NO
AVERAGE NUMBER OF DAYS PER WEEK YOU HAVE A DRINK CONTAINING ALCOHOL: 1
TOTAL SCORE: 0
TOTAL SCORE: 0
DOES PATIENT WANT TO STOP DRINKING: NO
CONSUMPTION TOTAL: NEGATIVE
SUBSTANCE_ABUSE: 0
ON A TYPICAL DAY WHEN YOU DRINK ALCOHOL HOW MANY DRINKS DO YOU HAVE: 0

## 2023-09-26 ASSESSMENT — CHA2DS2 SCORE
HYPERTENSION: NO
DIABETES: NO
CHF OR LEFT VENTRICULAR DYSFUNCTION: NO
CHA2DS2 VASC SCORE: 3
VASCULAR DISEASE: NO
AGE 75 OR GREATER: YES
AGE 65 TO 74: NO
SEX: FEMALE

## 2023-09-26 ASSESSMENT — GAIT ASSESSMENTS
DISTANCE (FEET): 100
DEVIATION: BRADYKINETIC;DECREASED HEEL STRIKE;DECREASED TOE OFF
GAIT LEVEL OF ASSIST: STANDBY ASSIST
ASSISTIVE DEVICE: FRONT WHEEL WALKER

## 2023-09-26 ASSESSMENT — FIBROSIS 4 INDEX: FIB4 SCORE: 1.89

## 2023-09-26 ASSESSMENT — PAIN DESCRIPTION - PAIN TYPE
TYPE: ACUTE PAIN

## 2023-09-26 ASSESSMENT — ACTIVITIES OF DAILY LIVING (ADL): TOILETING: INDEPENDENT

## 2023-09-26 NOTE — PROGRESS NOTES
4 Eyes Skin Assessment Completed by HÉCTOR Medeiros and HÉCTOR Hull.    Head Bruising  Ears WDL  Nose WDL  Mouth WDL  Neck WDL  Breast/Chest WDL  Shoulder Blades WDL  Spine WDL  (R) Arm/Elbow/Hand Bruising  (L) Arm/Elbow/Hand Bruising  Abdomen WDL  Groin WDL  Scrotum/Coccyx/Buttocks Redness and Non-Blanching  (R) Leg Bruising  (L) Leg Bruising  (R) Heel/Foot/Toe WDL  (L) Heel/Foot/Toe WDL          Devices In Places Nasal Cannula      Interventions In Place NC W/Ear Foams    Possible Skin Injury Yes    Pictures Uploaded Into Epic Yes  Wound Consult Placed N/A  RN Wound Prevention Protocol Ordered Yes

## 2023-09-26 NOTE — THERAPY
Occupational Therapy   Initial Evaluation     Patient Name: Eun Be  Age:  87 y.o., Sex:  female  Medical Record #: [id number]  Today's Date: 9/26/2023     Precautions  Precautions: Fall Risk  Comments: L pectoral pain    Assessment  Patient is 87 y.o. female admitted to [facility] after GLF resulting in hematoma on R elbow and L sided chest pain. Chest x-ray revealed no acute pulmonary process and no definite rib fx's. Pt has PMHx of osetopenia, CKD, hypothyroid, major depressive disorder, mixed hyperlipidemia, HTN, COPD and a host of other comorbidities. Pt utilizes 2L of oxygen at baseline.     Pt greeted and agreeable to OT evaluation. Upon OT arrival, pt lying in semi fowlers w/dtr present. Dtr reports that prior to hospital stay, pt's PLOF was IND w/all ADLs and IADLs. However, dtr mentioned that pt refuses to take showers/bathes even though she owns a shower seat. Pt's dtr explained that she is fearful of falling so she utilizes light sponge bathes daily. Pt primarily utilizes 4WW in the community and SPC in the home. Pt participated in seated and standing ADLs (toileting  w/SBA and grooming w/CGA). Pt completed all txf's w/CGA using FWW. Pt required vc's for safety awareness, proper body mechanics, and hand placement on FWW. Pt was able to tolerate <3 minutes of standing. Pt returned to EOB post OT evaluation. During OT eval, pt demonstrated decreased dynamic standing balance, activity tolerance, pain management, functional mobility, dynamic sitting balance, endurance, and safety awareness which impact her ability to complete ADLs. Pt would benefit from further skilled acute IP OT services 3x/wk to address these deficits.     Plan  Occupational Therapy Initial Treatment Plan   Treatment Interventions: Self Care / Activities of Daily Living, Therapeutic Exercises, Therapeutic Activity, Neuro Re-Education / Balance, Adaptive Equipment    DC Equipment Recommendations: Unable to determine at this  "time  Discharge Recommendations: Recommend home health for continued occupational therapy services (If pt's pain improves, pt would likely benefit from HH OT for increased independence w/bathing, dressing, and toileting in natural environment and evaluation of home safety/setup due to falling at home.)     Subjective  \"I need to go to the bathroom or can I use this little stick [purewick]?\"     Objective   09/26/23 1415   Prior Living Situation   Housing / Facility Mobile Home   Bathroom Set up Bathtub / Shower Combination   Equipment Owned Ramp;Tub / Shower Seat;Single Point Cane;4-Wheel Walker  (Pt's dtr reports that she owns all of the equipment but primarily utilizes SPC and 4WW.)   Lives with - Patient's Self Care Capacity Alone and Able to Care For Self   Comments Pt's dtr present during OT evaluation and reports that she is available to assist her w/driving. However, pt is unsure if dtr can assist w/other tasks as she cannot move in to her dtr's home.   Prior Level of ADL Function   Self Feeding Independent   Grooming / Hygiene Independent   Bathing Independent  (Pt reports that she does not take showers/baths as she is too fearful of falling. However, pt reports she completes light sponge baths.)   Dressing Independent   Toileting Independent   Prior Level of IADL Function   Medication Management Independent   Laundry Independent   Kitchen Mobility Independent   Finances Independent   Home Management Independent   Shopping Independent   Prior Level Of Mobility Independent With Device in Community;Independent With Device in Home  (Pt utilizes 4WW in the community and utilizes SPC in the home.)   Occupation (Pre-Hospital Vocational) Retired Due To Age   History of Falls   History of Falls Yes   Date of Last Fall   (reason for admit)   Precautions   Precautions Fall Risk   Vitals   O2 Delivery Device None - Room Air   Pain 0 - 10 Group   Therapist Pain Assessment Post Activity Pain Same as Prior to " Activity  (Pt reported pain in LUE and chest region. However, pt did not specify a numerical pain rating. RN notified.)   Cognition    Cognition / Consciousness WDL   Level of Consciousness Alert   Safety Awareness Impaired   Comments Pt is pleasant and cooperative. Pt requires vc's for safety awareness, proper body mechanics and hand placement on FWW.   Active ROM Upper Body   Active ROM Upper Body  X  (generalized weakness in BUE; However pt may have possible L pectoral muscle tear)   Strength Upper Body   Upper Body Strength  X   Comments   (generalized weakness in BUE)   Neurological Concerns   Neurological Concerns No   Balance Assessment   Sitting Balance (Static) Fair   Sitting Balance (Dynamic) Fair -   Standing Balance (Static) Fair   Standing Balance (Dynamic) Fair -   Weight Shift Sitting Fair   Weight Shift Standing Fair   Comments w/FWW   Bed Mobility    Supine to Sit Minimal Assist  (HOB elevated; required Jaxson to manage BLE across bed in order to sit EOB)   Scooting Standby Assist   ADL Assessment   Grooming Standing;Contact Guard Assist  (washed her hands while standing sinkside w/FWW; Pt required vc's for safety awareness as she was observed to place wet hands on FWW and begin ambulating.)   Toileting Standby Assist   Comments No other ADLs were performed secondary to pain.   How much help from another person does the patient currently need...   Putting on and taking off regular lower body clothing? 2   Bathing (including washing, rinsing, and drying)? 2   Toileting, which includes using a toilet, bedpan, or urinal? 3   Putting on and taking off regular upper body clothing? 3   Taking care of personal grooming such as brushing teeth? 4   Eating meals? 4   6 Clicks Daily Activity Score 18   Functional Mobility   Sit to Stand Contact Guard Assist   Bed, Chair, Wheelchair Transfer Contact Guard Assist   Toilet Transfers Contact Guard Assist   Transfer Method Stand Step   Mobility semi  fowlers>EOB>toilet>standing sinkside>EOB  (w/FWW)   Activity Tolerance   Sitting Edge of Bed <5 mins   Standing <3 mins   Patient / Family Goals   Patient / Family Goal #1 get stronger so I can go home   Short Term Goals   Short Term Goal # 1 Pt will complete functional ADL txf w/spv.   Short Term Goal # 2 Pt will complete LBD using AE PRN w/spv.   Short Term Goal # 3 Pt will complete UBD w/spv.   Education Group   Education Provided Role of Occupational Therapist;Activities of Daily Living   Role of Occupational Therapist Patient Response Patient;Acceptance;Explanation;Verbal Demonstration   ADL Patient Response Patient;Acceptance;Explanation;Verbal Demonstration;Action Demonstration   Additional Comments Pt received education on OT's role in POC and progression of occupational performance. Pt and pt's dtr verbalized understanding. Pt also educated on importance of frequent OOB activity, and adaptive techniques for ADLs/txfs.   Occupational Therapy Initial Treatment Plan    Treatment Interventions Self Care / Activities of Daily Living;Therapeutic Exercises;Therapeutic Activity;Neuro Re-Education / Balance;Adaptive Equipment   Problem List   Problem List Decreased Active Daily Living Skills;Decreased Homemaking Skills;Decreased Functional Mobility;Decreased Activity Tolerance;Safety Awareness Deficits / Cognition   Anticipated Discharge Equipment and Recommendations   DC Equipment Recommendations Unable to determine at this time   Discharge Recommendations Recommend home health for continued occupational therapy services  (If pt's pain improves, pt would likely benefit from HH OT for increased independence w/bathing, dressing, and toileting in natural environment and evaluation of home safety/setup due to falling at home.)   Interdisciplinary Plan of Care Collaboration   IDT Collaboration with  Nursing;Physical Therapist   Patient Position at End of Therapy Edge of Bed;Call Light within Reach;Tray Table within  Reach;Phone within Reach;Family / Friend in Room   Collaboration Comments RN updated   Session Information   Date / Session Number  9/26 #1 (1/3, 10/2)

## 2023-09-26 NOTE — PROGRESS NOTES
Patient arrived to unit via gurney on tele monitor and 2 L NC. Patient stood and pivoted to bed with staggering gait. Patient resting in bed, A&O x4 with slight confusion, NAD. Patient states moderate chest tenderness.. POC discussed with patient, all questions addressed. Call light within reach.

## 2023-09-26 NOTE — DISCHARGE PLANNING
Care Transition Team Assessment    Spoke with patient and daughter at bedside and verified all information. Lives alone in mobile home with ramp. Uses walker and has home O2 3L as needed with [facility]. PCP Libertad Estrella. Has had HHC earlier this year. Has [employer] insurance. Anticipate home with HHC. Daughter will be ride @ D/C.     Information Source  Orientation Level: Oriented to place, Oriented to situation, Oriented to person (Confused with time but remembered after)  Information Given By: Other (Comments), Patient (Daughter)  Informant's Name: Faviola Morales/patient    Readmission Evaluation  Is this a readmission?: No    Interdisciplinary Discharge Planning  Primary Care Physician: Libertad Estrella  Lives with - Patient's Self Care Capacity:  (Lives alone and has had a fall.)  Patient or legal guardian wants to designate a caregiver: No  Support Systems: Children  Housing / Facility: Mobile Home  Do You Take your Prescribed Medications Regularly: Yes  Able to Return to Previous ADL's: Future Time w/Therapy  Mobility Issues: Yes  Prior Services:  (Had HHC branden [facility])  Durable Medical Equipment: Home Oxygen, Walker    Discharge Preparedness  What are your discharge supports?: Child  Prior Functional Level: Uses Walker    Functional Assesment  Prior Functional Level: Uses Walker    Finances  Prescription Coverage: Yes    Discharge Risks or Barriers  Patient risk factors: Vulnerable adult    Anticipated Discharge Information  Discharge Disposition: D/T to home under HHA care in anticipation of covered skilled care (06)  Discharge Address: [address] [place] [address]  Discharge Contact Phone Number: [phone]

## 2023-09-26 NOTE — DISCHARGE PLANNING
Received request for HHC. Spoke with daughter and patient at bedside with verbal consent from patient. Verified all information. Discussed HHC. Choice form filled out and signed by daughter. Choice form faxed to Dodie TURK. Message sent to Dodie TURK to update.

## 2023-09-26 NOTE — PROGRESS NOTES
4 Eyes Skin Assessment Completed by HÉCTOR Nguyễn and HÉCTOR Alonzo.    Head WDL  Ears WDL  Nose WDL  Mouth WDL  Neck WDL  Breast/Chest WDL  Shoulder Blades WDL  Spine WDL  (R) Arm/Elbow/Hand Bruising, hematoma  (L) Arm/Elbow/Hand Bruising  Abdomen WDL  Groin WDL  Scrotum/Coccyx/Buttocks WDL  (R) Leg WDL  (L) Leg WDL  (R) Heel/Foot/Toe WDL  (L) Heel/Foot/Toe WDL          Devices In Places Tele Box, Pulse Ox, and Nasal Cannula      Interventions In Place Pillows    Possible Skin Injury No    Pictures Uploaded Into Epic N/A  Wound Consult Placed N/A  RN Wound Prevention Protocol Ordered No

## 2023-09-26 NOTE — ASSESSMENT & PLAN NOTE
Uncontrolled   Given symptomatic orthostatic drop she will need close monitoring and slow titration of antihypertensive therapy  amlodipine 5 mg daily  Started low dose lisinopril 10/1  Pressures more reasonably controled now 130-150; pt up with staff and no postural symptoms  Not aiming for tight control given problems postural dizziness and subsequent GLFs  Monitor blood pressure  Fall precautions

## 2023-09-26 NOTE — ED NOTES
Pt transported upstairs by tech, pt belongings and paperwork at bedside, VS stable, NAD, pt on full portable oxygen tank

## 2023-09-26 NOTE — ED PROVIDER NOTES
ED Provider Note    CHIEF COMPLAINT  No chief complaint on file.      EXTERNAL RECORDS REVIEWED  Inpatient Notes recent inpatient visit  10 months prior for COPD exacerbation.        LIMITATION TO HISTORY   Select: : None  OUTSIDE HISTORIAN(S):  EMS      Eun Be is a 87 y.o. female who presents to the emergency department with chief complaint of ground-level fall/syncopal event.  Patient was using the restroom.  She got up from the toilet reportedly felt weak and fell to the side hitting her head on the wall and then landed onto her right elbow/left chest.  Minimal pain in the left side of her chest moderate pain in her right elbow.  No chest pain or palpitations prior to the event no headache or altered mental status prior to the event of shaking movement no loss control urination or oral trauma no recent fevers or chills no other acute symptom change or concern.    PAST MEDICAL HISTORY   has a past medical history of Anxiety, Arrhythmia, Arthritis, Backpain, Bronchitis, CATARACT, Chronic lower back pain (3/11/2014), Chronic pain, COPD, Depression, EMPHYSEMA, GERD (gastroesophageal reflux disease), Heart burn, HTN (hypertension), Hypertension, Hypertriglyceridemia (11/7/2013), Hypothyroid, IGT (impaired glucose tolerance), MEDICAL HOME, Osteopenia, Pain, Panic disorder, Paroxysmal atrial fibrillation (HCC), Personal history of venous thrombosis and embolism, Pneumonia, Renal disorder, Rheumatic fever, Rheumatic fever, Thyroid disease, and Unspecified disorder of thyroid.    SURGICAL HISTORY   has a past surgical history that includes hysterectomy, total abdominal (26 years old); excis/unroof renal cyst (1977); hysterectomy radical (1963); other (1963); other (1977); and hemorrhoidectomy (1963).    FAMILY HISTORY  Family History   Problem Relation Age of Onset    Stroke Brother     Heart Disease Other        SOCIAL HISTORY  Social History     Tobacco Use    Smoking status: Former     Current packs/day:  "0.00     Average packs/day: 0.3 packs/day for 30.0 years (7.5 ttl pk-yrs)     Types: Cigarettes     Start date:      Quit date:      Years since quittin.7    Smokeless tobacco: Never    Tobacco comments:     quit   1 ppd x ?yrs (on and off for years since age 15)   Vaping Use    Vaping Use: Never used   Substance and Sexual Activity    Alcohol use: Yes     Alcohol/week: 0.0 oz     Comment: wine, 1 glass every other night    Drug use: No    Sexual activity: Never       CURRENT MEDICATIONS  Home Medications    **Home medications have not yet been reviewed for this encounter**         ALLERGIES  Allergies   Allergen Reactions    Asa [Aspirin] Rash     Rash     Latex Rash     Rash     Penicillins Unspecified     Unknown childhood reaction     Tape Hives, Itching and Swelling    Wasp Venom Swelling       PHYSICAL EXAM  VITAL SIGNS: BP (!) 218/98   Pulse 71   Temp 36.7 °C (98.1 °F) (Temporal)   Resp 18   Ht 1.778 m (5' 10\")   Wt 63.5 kg (140 lb)   LMP 1963   SpO2 98%   BMI 20.09 kg/m²      Pulse ox interpretation: I interpret this pulse ox as normal.  Constitutional: Alert and oriented x 3, minimal distress  HEENT: Minor tenderness to palpation in the left occiput, normocephalic, pupils are equal round reactive to light extraocular movements are intact. The nares is clear, external ears are normal, mouth shows moist mucous membranes normal dentition for age  Neck: Supple, no JVD no tracheal deviation Minor tenderness both midline and left paraspinal cervical musculature  Cardiovascular: Regular rate and rhythm no murmur rub or gallop 2+ pulses peripherally x4  Thorax & Lungs: No respiratory distress, no wheezes rales or rhonchi, No chest tenderness.   GI: Soft nontender nondistended positive bowel sounds, no peritoneal signs  Skin: Warm dry no acute rash or lesion  Musculoskeletal: Left upper bilateral lower extremities are unremarkable the right upper normal range and strength of the " right shoulder moderate ecchymosis and pain with extension over the right elbow normal cap refill sensation and  right hand.  Neurologic: Cranial nerves III through XII are grossly intact no sensory deficit no cerebellar dysfunction   Psychiatric: Appropriate affect for situation at this time      DIAGNOSTIC STUDIES / PROCEDURES  Results for orders placed or performed during the hospital encounter of 09/25/23   CBC WITH DIFFERENTIAL   Result Value Ref Range    WBC 5.3 4.8 - 10.8 K/uL    RBC 3.92 (L) 4.20 - 5.40 M/uL    Hemoglobin 12.2 12.0 - 16.0 g/dL    Hematocrit 36.9 (L) 37.0 - 47.0 %    MCV 94.1 81.4 - 97.8 fL    MCH 31.1 27.0 - 33.0 pg    MCHC 33.1 32.2 - 35.5 g/dL    RDW 48.7 35.9 - 50.0 fL    Platelet Count 209 164 - 446 K/uL    MPV 9.8 9.0 - 12.9 fL    Neutrophils-Polys 79.30 (H) 44.00 - 72.00 %    Lymphocytes 12.50 (L) 22.00 - 41.00 %    Monocytes 5.50 0.00 - 13.40 %    Eosinophils 1.90 0.00 - 6.90 %    Basophils 0.60 0.00 - 1.80 %    Immature Granulocytes 0.20 0.00 - 0.90 %    Nucleated RBC 0.00 0.00 - 0.20 /100 WBC    Neutrophils (Absolute) 4.21 1.82 - 7.42 K/uL    Lymphs (Absolute) 0.66 (L) 1.00 - 4.80 K/uL    Monos (Absolute) 0.29 0.00 - 0.85 K/uL    Eos (Absolute) 0.10 0.00 - 0.51 K/uL    Baso (Absolute) 0.03 0.00 - 0.12 K/uL    Immature Granulocytes (abs) 0.01 0.00 - 0.11 K/uL    NRBC (Absolute) 0.00 K/uL   COMP METABOLIC PANEL   Result Value Ref Range    Sodium 139 135 - 145 mmol/L    Potassium 4.1 3.6 - 5.5 mmol/L    Chloride 105 96 - 112 mmol/L    Co2 22 20 - 33 mmol/L    Anion Gap 12.0 7.0 - 16.0    Glucose 105 (H) 65 - 99 mg/dL    Bun 22 8 - 22 mg/dL    Creatinine 0.83 0.50 - 1.40 mg/dL    Calcium 9.8 8.5 - 10.5 mg/dL    Correct Calcium 9.7 8.5 - 10.5 mg/dL    AST(SGOT) 17 12 - 45 U/L    ALT(SGPT) 14 2 - 50 U/L    Alkaline Phosphatase 44 30 - 99 U/L    Total Bilirubin 0.4 0.1 - 1.5 mg/dL    Albumin 4.1 3.2 - 4.9 g/dL    Total Protein 6.3 6.0 - 8.2 g/dL    Globulin 2.2 1.9 - 3.5 g/dL    A-G  Ratio 1.9 g/dL   TROPONIN   Result Value Ref Range    Troponin T 19 6 - 19 ng/L   PRTOTHROMBIN TIME (INR)   Result Value Ref Range    PT 19.2 (H) 12.0 - 14.6 sec    INR 1.60 (H) 0.87 - 1.13   APTT   Result Value Ref Range    APTT 37.2 (H) 24.7 - 36.0 sec   ESTIMATED GFR   Result Value Ref Range    GFR (CKD-EPI) 68 >60 mL/min/1.73 m 2   TROPONIN   Result Value Ref Range    Troponin T 28 (H) 6 - 19 ng/L   EKG   Result Value Ref Range    Report       [facility] Emergency Dept.    Test Date:  2023  Pt Name:    JULES DUNCAN               Department: ER  MRN:        [id number]                      Room:       BL 15  Gender:     Female                       Technician: [id number]  :        1935                   Requested By:HENRY HAMILTON  Order #:    [id number]                    Reading MD: HENRY HAMILTON MD    Measurements  Intervals                                Axis  Rate:       67                           P:          265  PA:         132                          QRS:        -52  QRSD:       154                          T:          97  QT:         422  QTc:        446    Interpretive Statements  Sinus or ectopic atrial rhythm  Left bundle branch block  Compared to ECG 2022 07:17:23  Ectopic atrial rhythm now present  Atrial fibrillation no longer present  Electronically Signed On 2023 02:30:43 PDT by HENRY HAMILTON MD     EKG   Result Value Ref Range    Report       [facility] Emergency Dept.    Test Date:  2023  Pt Name:    JULES DUNCAN               Department: ER  MRN:        [id number]                      Room:       BL 15  Gender:     Female                       Technician: [id number]  :        [id number]                   Requested By:HENRY HAMILTON  Order #:    [id number]                    Reading MD:    Measurements  Intervals                                Axis  Rate:       65                           P:           0  TX:         165                          QRS:        -60  QRSD:       154                          T:          90  QT:         473  QTc:        492    Interpretive Statements  Sinus rhythm  Left bundle branch block  Compared to ECG 09/25/2023 22:48:00  Ectopic atrial rhythm no longer present          RADIOLOGY  I have independently interpreted the diagnostic imaging associated with this visit and am waiting the final reading from the radiologist.   My preliminary interpretation is as follows: No acute intracranial hemorrhage    Radiologist interpretation:   DX-CHEST-PORTABLE (1 VIEW)   Final Result         1.  Left basilar atelectasis, no focal infiltrate   2.  Atherosclerosis      CT-CSPINE WITHOUT PLUS RECONS   Final Result         1.  Multilevel degenerative changes of the cervical spine limit diagnostic sensitivity of this examination.   2.  Mild anterolisthesis C4 on C5, appears likely related to degenerative facet arthrosis and stable since prior study, otherwise no acute traumatic bony injury of the cervical spine is apparent.   3.  Atherosclerosis      CT-HEAD W/O   Final Result         1.  No acute intracranial abnormality is identified, there are nonspecific white matter changes, commonly associated with small vessel ischemic disease.  Associated mild cerebral atrophy is noted.               COURSE & MEDICAL DECISION MAKING    ED Observation Status? Yes; I am placing the patient in to an observation status due to a diagnostic uncertainty as well as therapeutic intensity. Patient placed in observation status at 10:46 PM, 9/25/2023.     Observation plan is as follows: CT head and C-spine chest x-ray elbow x-ray basic laboratory evaluation including troponin renal function and EKG    Upon Reevaluation, the patient's condition has: not improved; and will be escalated to hospitalization.    Patient discharged from ED Observation status at 04:35 (Time) 09/26/23  (Date).     ASSESSMENT, COURSE AND PLAN    Care  Narrative: Pleasant 87-year-old female who presented for syncopal event head injury and left-sided chest pain.  Chest pain initially felt to be most likely musculoskeletal after fall.  Initial troponin was negative her EKG is consistent with her previous with a left bundle branch block no overt obvious ischemia.  Patient was observed for several hours continued to have chest pain she had significant improvement of her blood pressure with 1 dose of hydralazine to the point of being borderline hypotensive however reported ongoing chest pain therefore repeat troponin and repeat EKG were completed EKG was unchanged however troponin had elevated.  Due to her presentation with extreme hypertension I feel that this is a likely the cause of this elevated troponin she has no other evidence of aortic pathology there is been no tachycardia no shortness of breath her pain is reproducible.  However with her tenuous blood pressure and increasing troponin will admit for observation and following of her pain/serial troponins.  I discussed this case up to this point with hospitalist Dr. Jamil and patient's admitted for ongoing management.    HTN/IDDM FOLLOW UP:  The patient has known hypertension and is being followed by their primary care doctor      ADDITIONAL PROBLEM LIST    DISPOSITION AND DISCUSSIONS    I have discussed management of the patient with the following physicians and RASHAD's:    MD Omero, hospitalist    Discussion of management with other [facility] or appropriate source(s): None     Escalation of care considered, and ultimately not performed:    Barriers to care at this time, including but not limited to: .     Decision tools and prescription drugs considered including, but not limited to: .    FINAL DIAGNOSIS  1. Ground-level fall Active   2. Closed head injury, initial encounter Active   3. Syncope, unspecified syncope type Active   4. Hypertension, unspecified type Active   5.  Chest pain       Electronically signed  by: Flavio Aleman M.D.

## 2023-09-26 NOTE — ASSESSMENT & PLAN NOTE
Atypical chest pain with chest wall tenderness  Likely related to musculoskeletal  Continue pain management with topical lidocaine  Scheduled Tylenol

## 2023-09-26 NOTE — THERAPY
"Physical Therapy   Initial Evaluation     Patient Name: Eun Be  Age:  87 y.o., Sex:  female  Medical Record #: [id number]  Today's Date: 9/26/2023     Precautions  Precautions: Fall Risk  Comments: L pectoral pain    Assessment  Patient is an 87 y.o. female who was admitted after a syncopal episode after standing up from the toilet at home. Pt c/o L chest pain and is getting a cardiac workup that has been negative thus far. Pt's pain is reproducible with palpation over the L pectoral region and strength testing for shoulder flexion with adduction. APRN notified. PMH significant for Afib, COPD, HTN, osteopenia, CKD, mild cognitive impairment.    Pt received in bed and agreeable to PT evaluation. Pt is limited with mobility by the pain in her L chest, generalized weakness, and fatigue. Pt required min A for bed mobility and standby assist for OOB tasks, but with extra time and effort required. Pt would benefit from increased mobility with nursing staff while admitted to gain strength for return home. Discussed with pt about talking to her daughter to see if she can have some increased assistance initially. Will follow for acute PT to progress as able.    Plan    Physical Therapy Initial Treatment Plan   Treatment Plan : Bed Mobility, Gait Training, Neuro Re-Education / Balance, Self Care / Home Evaluation, Therapeutic Activities, Therapeutic Exercise  Treatment Frequency: 4 Times per Week  Duration: Until Therapy Goals Met    DC Equipment Recommendations: None  Discharge Recommendations: Recommend home health for continued physical therapy services (and increased family support)     Subjective    \"Ouch, that's where it hurts\" when L pectoral muscle is palpated.     Objective       09/26/23 1215   Precautions   Precautions Fall Risk   Comments L pectoral pain   Pain 0 - 10 Group   Therapist Pain Assessment Post Activity Pain Same as Prior to Activity;Nurse Notified  (c/o pain to palpation and movement at L " pectoral muscle, RN aware)   Prior Living Situation   Prior Services Home-Independent   Housing / Facility Mobile Home   Steps Into Home 0  (ramp)   Steps In Home 0   Equipment Owned 4-Wheel Walker   Lives with - Patient's Self Care Capacity Alone and Able to Care For Self   Comments Reports her daughter lives in town and assists her with driving. Pt unsure if daughter can stay with her to help, but stated she cannot stay at daughter's home.   Prior Level of Functional Mobility   Bed Mobility Independent   Transfer Status Independent   Ambulation Independent   Assistive Devices Used 4-Wheel Walker   History of Falls   History of Falls Yes   Date of Last Fall   (reason for admission)   Cognition    Level of Consciousness Alert   Comments Pleasant and cooperative   Active ROM Upper Body   Comments Pain with L shoulder flexion and adduction, points to L pectoral muscle as source of pain   Passive ROM Lower Body   Passive ROM Lower Body WDL   Active ROM Lower Body    Active ROM Lower Body  WDL   Strength Lower Body   Comments BLE generalized weakness, 3+/5   Sensation Lower Body   Comments Sensitivity to B big toes from recent surgery. Otherwise denies LE sensory changes   Lower Body Muscle Tone   Lower Body Muscle Tone  WDL   Coordination Lower Body    Coordination Lower Body  WDL   Balance Assessment   Sitting Balance (Static) Fair   Sitting Balance (Dynamic) Fair -   Standing Balance (Static) Fair -   Standing Balance (Dynamic) Fair -   Weight Shift Sitting Fair   Weight Shift Standing Fair   Comments with FWW, no overt LOB   Bed Mobility    Supine to Sit Minimal Assist   Sit to Supine Standby Assist   Scooting Standby Assist   Comments Extra time and HHA for leverage with sup>sit   Gait Analysis   Gait Level Of Assist Standby Assist   Assistive Device Front Wheel Walker   Distance (Feet) 100   # of Times Distance was Traveled 1   Deviation Bradykinetic;Decreased Heel Strike;Decreased Toe Off  (increased L hip  flexion for L foot clearance)   Comments Cues for use of FWW as pt reports typically using a 4WW   Functional Mobility   Sit to Stand Standby Assist   Bed, Chair, Wheelchair Transfer Standby Assist   Transfer Method Stand Step   Mobility up with FWW   How much difficulty does the patient currently have...   Turning over in bed (including adjusting bedclothes, sheets and blankets)? 2   Sitting down on and standing up from a chair with arms (e.g., wheelchair, bedside commode, etc.) 2   Moving from lying on back to sitting on the side of the bed? 1   How much help from another person does the patient currently need...   Moving to and from a bed to a chair (including a wheelchair)? 3   Need to walk in a hospital room? 3   Climbing 3-5 steps with a railing? 2   6 clicks Mobility Score 13   Short Term Goals    Short Term Goal # 1 Pt will perform bed mobility with supervision to progress function in 6 visits.   Short Term Goal # 2 Pt will transfer with FWW and supervision to progress function in 6 visits.   Short Term Goal # 3 Pt will ambulate 150ft with FWW and supervision to progress function in 6 visits.   Education Group   Education Provided Role of Physical Therapist   Role of Physical Therapist Patient Response Patient;Acceptance;Explanation;Verbal Demonstration   Physical Therapy Initial Treatment Plan    Treatment Plan  Bed Mobility;Gait Training;Neuro Re-Education / Balance;Self Care / Home Evaluation;Therapeutic Activities;Therapeutic Exercise   Treatment Frequency 4 Times per Week   Duration Until Therapy Goals Met   Problem List    Problems Impaired Bed Mobility;Impaired Transfers;Impaired Ambulation;Functional Strength Deficit;Pain   Anticipated Discharge Equipment and Recommendations   DC Equipment Recommendations None   Discharge Recommendations Recommend home health for continued physical therapy services  (and increased family support)   Interdisciplinary Plan of Care Collaboration   IDT Collaboration with   Nursing   Patient Position at End of Therapy In Bed;Call Light within Reach;Tray Table within Reach;Phone within Reach   Collaboration Comments RN updated

## 2023-09-26 NOTE — ED NOTES
ERP aware of pt's pain level, medication provided according to MAR, ERP aware of elevated blood pressure

## 2023-09-26 NOTE — PROGRESS NOTES
[facility] Medicine Daily Progress Note    Date of Service  9/26/2023    Chief Complaint  Eun Be is a 87 y.o. female admitted 9/25/2023 with GLF    Hospital Course  Ms. Eun Be is a 87 y.o. female with past medical history of COPD, chronic hypoxic respiratory failure on 2 L of oxygen, atrial fibrillation on Xarelto who presents on 9/25/2023  for a fall and chest pain.      The patient was going to the bathroom when she suddenly fell on the right elbow that caused a bruise.  Afterwards she started complaining about pain in the left-sided chest that gets worse when she takes a deep breath. The pain is nonradiating and nonpositional.  She has not tried to exert herself since the pain started.  She denies losing consciousness or hitting her head.  She does take anticoagulation at home.     In ER, patient presents with a blood pressure of 242/104.  Chest x-ray interpreted by me found no acute pulmonary process. No definite rib fractures were seen. EKG found normal sinus rhythm with left bundle branch block.  Patient admitted to hospital medicine for management of care.    During this course of stay, an echocardiogram was pursued noting small pericardial effusion without evidence of hemodynamic compromise with normal left ventricular chamber size and LVEF approximately 60%, normal IVC size and normal RV size and systolic function..  Patient was also evaluated by physical therapy with noted possible left pectoralis muscle tear.  Left upper chest pain is reproducible specially when palpated.  Hold ACS work-up as this is unlikely cardiac related.  Troponin also negative and plateau.    Home health ordered for patient as she will likely need continuous PT/OT.  Patient lives alone but has family here in town.      Interval Problem Update  -Patient seen and examined.  Noted left upper chest left shoulder pain more significant when palpated.  Likely chest muscle tear from falling.  Patient currently  unstable and will likely need home health for continued physical therapy.  -Plan of care: Continue pain management specifically left upper chest shoulder pain; added lidocaine patch and muscle relaxant along with a sling due to possible muscle tear; continue PT/OT  -Disposition: Patient will need home health establish for continued PT/OT; currently not back to baseline  -Lab work: Reviewed; expected  -VSS at this time    I have discussed this patient's plan of care and discharge plan at IDT rounds today with Case Management, Nursing, Nursing leadership, and other members of the IDT team.    Consultants/Specialty  NONE    Code Status  Full Code    Disposition  The patient is not medically cleared for discharge to home or a post-acute facility.  Anticipate discharge to: home with organized home healthcare and close outpatient follow-up    I have placed the appropriate orders for post-discharge needs.    Review of Systems  Review of Systems   Constitutional:  Positive for malaise/fatigue. Negative for chills and fever.   HENT: Negative.     Eyes: Negative.    Respiratory: Negative.     Cardiovascular: Negative.    Gastrointestinal: Negative.    Genitourinary: Negative.    Musculoskeletal:  Positive for back pain, falls, joint pain and myalgias.   Skin: Negative.    Neurological:  Positive for weakness.   Endo/Heme/Allergies: Negative.    Psychiatric/Behavioral: Negative.          Physical Exam  Temp:  [36.7 °C (98.1 °F)-37.1 °C (98.7 °F)] 37.1 °C (98.7 °F)  Pulse:  [60-81] 64  Resp:  [16-19] 18  BP: (100-242)/() 144/67  SpO2:  [95 %-98 %] 95 %    Physical Exam  Vitals and nursing note reviewed.   HENT:      Head: Normocephalic.      Nose: Nose normal.      Mouth/Throat:      Mouth: Mucous membranes are moist.      Pharynx: Oropharynx is clear.   Eyes:      Pupils: Pupils are equal, round, and reactive to light.   Cardiovascular:      Rate and Rhythm: Normal rate and regular rhythm.      Pulses: Normal pulses.       Heart sounds: Normal heart sounds.   Pulmonary:      Effort: Pulmonary effort is normal.      Breath sounds: Normal breath sounds.   Abdominal:      General: Bowel sounds are normal.      Palpations: Abdomen is soft.   Musculoskeletal:         General: Tenderness present.      Cervical back: Normal range of motion and neck supple.   Skin:     General: Skin is dry.      Capillary Refill: Capillary refill takes 2 to 3 seconds.   Neurological:      Mental Status: She is alert. Mental status is at baseline.      Motor: Weakness present.         Fluids  No intake or output data in the 24 hours ending 09/26/23 1348    Laboratory  Recent Labs     09/25/23  2242   WBC 5.3   RBC 3.92*   HEMOGLOBIN 12.2   HEMATOCRIT 36.9*   MCV 94.1   MCH 31.1   MCHC 33.1   RDW 48.7   PLATELETCT 209   MPV 9.8     Recent Labs     09/25/23  2242   SODIUM 139   POTASSIUM 4.1   CHLORIDE 105   CO2 22   GLUCOSE 105*   BUN 22   CREATININE 0.83   CALCIUM 9.8     Recent Labs     09/25/23  2242   APTT 37.2*   INR 1.60*               Imaging  EC-ECHOCARDIOGRAM COMPLETE W/O CONT   Final Result      DX-CHEST-PORTABLE (1 VIEW)   Final Result         1.  Left basilar atelectasis, no focal infiltrate   2.  Atherosclerosis      CT-CSPINE WITHOUT PLUS RECONS   Final Result         1.  Multilevel degenerative changes of the cervical spine limit diagnostic sensitivity of this examination.   2.  Mild anterolisthesis C4 on C5, appears likely related to degenerative facet arthrosis and stable since prior study, otherwise no acute traumatic bony injury of the cervical spine is apparent.   3.  Atherosclerosis      CT-HEAD W/O   Final Result         1.  No acute intracranial abnormality is identified, there are nonspecific white matter changes, commonly associated with small vessel ischemic disease.  Associated mild cerebral atrophy is noted.              Assessment/Plan  * Chest pain- (present on admission)  Assessment & Plan  Atypical chest pain with chest wall  tenderness  Likely related to musculoskeletal  Lidocaine patch and pain control  Follow troponins  Monitor on telemetry  Hold ACS rule out as this is likely musculoskeletal pain as this is reproducible when palpated  Added arm sling      Debility  Assessment & Plan  Reports recurrent falls  Lives alone  Ordered home health for continued PT/OT and home health aide  Has family that lives here in town    Chronic respiratory failure with hypoxia (HCC)  Assessment & Plan  On 2 L of O2 which is her baseline    COPD (chronic obstructive pulmonary disease) (HCC)  Assessment & Plan  Not in exacerbation  Continue home inhalers    Essential hypertension- (present on admission)  Assessment & Plan  Hypertensive emergency  Uncontrolled  IV as needed medications have been ordered           VTE prophylaxis:   SCDs/TEDs      I have performed a physical exam and reviewed and updated ROS and Plan today (9/26/2023). In review of yesterday's note (9/25/2023), there are no changes except as documented above.    Please note that this dictation was created using voice recognition software. I have made every reasonable attempt to correct obvious errors, but there may be errors of grammar and possibly content that I did not discover before finalizing the note.    Electronically signed by:  Dr. AUSTIN Negrete, DNP, APRN, FNP-C  Hospitalist Services  [facility]  [phone]  [email or web address]  09/26/23                 [phone]

## 2023-09-26 NOTE — DISCHARGE PLANNING
Received Choice form @: 5112  Agency/Facility Name: [facility]  Referral sent per Choice form @: 1355    @[phone]  Agency[facility]ility Name: [facility]  Spoke To: Cindy  Outcome: DPA received call per Cindy she mentioned to send referral to [facility], if they don't accept patient then they can accept.

## 2023-09-26 NOTE — ED TRIAGE NOTES
Chief Complaint   Patient presents with    T-5000 Head Injury     Pt was brought from home by EMS due to GLF, and sustained head injury  No LOC, No external bleeding noted  + thinners  BP is elevated as per EMS: 219/86 mmHg  Noted hematoma at right elbow area  Pt has chronic back pain       On oxygen 3 LPM via nasal cannula - baseline  Brought by EMS with the above complaints as a case of TBI    IV gauge 20 at left AC    Vitals:    09/25/23 2228   BP: (!) 218/98   Pulse: 71   Resp: 18   Temp: 36.7 °C (98.1 °F)   SpO2: 98%     Seen by ERP at charge desk  Sent to CT scan  Endorsed to the assigned primary RN: Nanda

## 2023-09-26 NOTE — H&P
Hospital Medicine History & Physical Note    Date of Service  9/26/2023    Primary Care Physician  Libertad Estrella M.D.    Consultants      Specialist Names:     Code Status  Full Code    Chief Complaint  Chief Complaint   Patient presents with    T-5000 Head Injury     Pt was brought from home by EMS due to GLF, and sustained head injury  No LOC, No external bleeding noted  + thinners  BP is elevated as per EMS: 219/86 mmHg  Noted hematoma at right elbow area  Pt has chronic back pain       History of Presenting Illness  Eun Be is a 87 y.o. female who presented 9/25/2023 with past medical history of COPD, chronic hypoxic respiratory failure on 2 L of oxygen, atrial fibrillation on Xarelto who presents for a fall and chest pain.  The patient was going to the bathroom when she suddenly fell on the right elbow that caused a bruise.  Afterwards she started complaining about pain in the left-sided chest that gets worse when she takes a deep breath.  The pain is nonradiating and nonpositional.  She has not tried to exert herself since the pain started.  She denies losing consciousness or hitting her head.  She does take anticoagulation at home.    Patient presents with a blood pressure of 242/104.  Chest x-ray interpreted by me found no acute pulmonary process.  No definite rib fractures were seen  EKG found normal sinus rhythm with left bundle branch block  I discussed the plan of care with patient.    Review of Systems  Review of Systems   Constitutional:  Negative for chills, diaphoresis, fever and malaise/fatigue.   HENT:  Negative for congestion, ear discharge, ear pain, hearing loss, nosebleeds, sinus pain, sore throat and tinnitus.    Eyes:  Negative for blurred vision, double vision, photophobia and pain.   Respiratory:  Positive for shortness of breath. Negative for cough, hemoptysis, sputum production, wheezing and stridor.    Cardiovascular:  Positive for chest pain. Negative for palpitations,  orthopnea, claudication, leg swelling and PND.   Gastrointestinal:  Negative for abdominal pain, blood in stool, constipation, diarrhea, heartburn, melena, nausea and vomiting.   Genitourinary:  Negative for dysuria, flank pain, frequency, hematuria and urgency.   Musculoskeletal:  Negative for back pain, falls, joint pain, myalgias and neck pain.   Skin:  Negative for itching and rash.   Neurological:  Negative for dizziness, tingling, tremors, weakness and headaches.   Endo/Heme/Allergies:  Negative for environmental allergies and polydipsia. Does not bruise/bleed easily.   Psychiatric/Behavioral:  Negative for depression, hallucinations, substance abuse and suicidal ideas.        Past Medical History   has a past medical history of Anxiety, Arrhythmia, Arthritis, Backpain, Bronchitis, CATARACT, Chronic lower back pain (3/11/2014), Chronic pain, COPD, Depression, EMPHYSEMA, GERD (gastroesophageal reflux disease), Heart burn, HTN (hypertension), Hypertension, Hypertriglyceridemia (11/7/2013), Hypothyroid, IGT (impaired glucose tolerance), MEDICAL HOME, Osteopenia, Pain, Panic disorder, Paroxysmal atrial fibrillation (HCC), Personal history of venous thrombosis and embolism, Pneumonia, Renal disorder, Rheumatic fever, Rheumatic fever, Thyroid disease, and Unspecified disorder of thyroid.    Surgical History   has a past surgical history that includes hysterectomy, total abdominal (26 years old); pr excis/unroof renal cyst (1977); hysterectomy radical (1963); other (1963); other (1977); and hemorrhoidectomy (1963).     Family History  family history includes Heart Disease in an other family member; Stroke in her brother.   Family history reviewed with patient. There is no family history that is pertinent to the chief complaint.     Social History   reports that she quit smoking about 24 years ago. Her smoking use included cigarettes. She started smoking about 54 years ago. She has a 7.5 pack-year smoking history. She  has never used smokeless tobacco. She reports current alcohol use. She reports that she does not use drugs.    Allergies  Allergies   Allergen Reactions    Asa [Aspirin] Rash     Rash     Latex Rash     Rash     Penicillins Unspecified     Unknown childhood reaction     Tape Hives, Itching and Swelling    Wasp Venom Swelling       Medications  Prior to Admission Medications   Prescriptions Last Dose Informant Patient Reported? Taking?   ALPRAZolam (XANAX) 0.25 MG Tab  Family Member Yes No   Sig: Take 0.25-0.5 mg by mouth 2 times a day as needed for Sleep or Anxiety.   B Complex-C (SUPER B COMPLEX PO)  Family Member Yes No   Sig: Take 1 Tablet by mouth every day.   Calcium-Magnesium-Zinc (IMELDA-MAG-ZINC PO)  Family Member Yes No   Sig: Take 1 Tablet by mouth every day.   Cholecalciferol (VITAMIN D) 2000 UNIT Tab  Family Member Yes No   Sig: Take 2,000 Units by mouth every day.   albuterol 108 (90 Base) MCG/ACT Aero Soln inhalation aerosol  Family Member No No   Sig: INHALE 2 PUFFS BY MOUTH EVERY 6 HOURS AS NEEDED FOR SHORTNESS OF BREATH   denosumab (PROLIA) 60 MG/ML Solution Prefilled Syringe injection  Family Member Yes No   Sig: Inject 60 mg under the skin every 6 months.   fenofibrate micronized (LOFIBRA) 134 MG capsule  Family Member No No   Sig: TAKE 1 CAPSULE BY MOUTH EVERY DAY   fluticasone-salmeterol (ADVAIR) 250-50 MCG/ACT AEROSOL POWDER, BREATH ACTIVATED  Family Member Yes No   Sig: Inhale 2 Puffs 2 times a day.   gabapentin (NEURONTIN) 100 MG Cap  Family Member No No   Sig: TAKE 1-3 CAPS BY MOUTH AT BEDTIME AS NEEDED (PAIN).   levothyroxine (SYNTHROID) 88 MCG Tab  Family Member Yes No   Sig: Take 88 mcg by mouth every morning on an empty stomach.   omeprazole (PRILOSEC) 40 MG delayed-release capsule  Family Member No No   Sig: TAKE 1 CAPSULE BY MOUTH EVERY DAY   propafenone (RYTHMOL) 150 MG Tab  Family Member Yes No   Sig: Take 150 mg by mouth see administration instructions. Takes twice a day scheduled and  then at bedtime as needed   rivaroxaban (XARELTO) 15 MG Tab tablet  Family Member Yes No   Sig: Take  by mouth with dinner.   simvastatin (ZOCOR) 20 MG Tab  Family Member No No   Sig: TAKE 1 TABLET BY MOUTH IN THE EVENING   tizanidine (ZANAFLEX) 4 MG Tab  Family Member Yes No   Sig: Take 4 mg by mouth every 6 hours as needed. Indications: Muscle Spasticity      Facility-Administered Medications: None       Physical Exam  Temp:  [36.7 °C (98.1 °F)-36.8 °C (98.3 °F)] 36.8 °C (98.3 °F)  Pulse:  [60-81] 60  Resp:  [16-19] 17  BP: (100-242)/() 138/62  SpO2:  [95 %-98 %] 95 %  Blood Pressure : 138/62   Temperature: 36.8 °C (98.3 °F)   Pulse: 60   Respiration: 17   Pulse Oximetry: 95 %       Physical Exam  Vitals and nursing note reviewed.   Constitutional:       General: She is not in acute distress.     Appearance: Normal appearance. She is not ill-appearing, toxic-appearing or diaphoretic.   HENT:      Head: Normocephalic and atraumatic.      Nose: No congestion or rhinorrhea.      Mouth/Throat:      Pharynx: No oropharyngeal exudate or posterior oropharyngeal erythema.   Eyes:      General: No scleral icterus.  Neck:      Vascular: No carotid bruit or JVD.   Cardiovascular:      Rate and Rhythm: Normal rate and regular rhythm.      Pulses: Normal pulses.      Heart sounds: Normal heart sounds. No murmur heard.     No friction rub. No gallop.   Pulmonary:      Effort: Pulmonary effort is normal. No respiratory distress.      Breath sounds: No stridor. No wheezing, rhonchi or rales.   Chest:      Chest wall: Tenderness present.   Abdominal:      General: Abdomen is flat. There is no distension.      Palpations: There is no mass.      Tenderness: There is no abdominal tenderness. There is no left CVA tenderness, guarding or rebound.      Hernia: No hernia is present.   Musculoskeletal:         General: Signs of injury present. No swelling. Normal range of motion.      Cervical back: No rigidity. No muscular  "tenderness.      Right lower leg: No edema.      Left lower leg: No edema.   Lymphadenopathy:      Cervical: No cervical adenopathy.   Skin:     General: Skin is warm and dry.      Capillary Refill: Capillary refill takes more than 3 seconds.      Coloration: Skin is not jaundiced or pale.      Findings: Bruising present. No erythema.   Neurological:      Mental Status: She is alert.         Laboratory:  Recent Labs     09/25/23 2242   WBC 5.3   RBC 3.92*   HEMOGLOBIN 12.2   HEMATOCRIT 36.9*   MCV 94.1   MCH 31.1   MCHC 33.1   RDW 48.7   PLATELETCT 209   MPV 9.8     Recent Labs     09/25/23 2242   SODIUM 139   POTASSIUM 4.1   CHLORIDE 105   CO2 22   GLUCOSE 105*   BUN 22   CREATININE 0.83   CALCIUM 9.8     Recent Labs     09/25/23 2242   ALTSGPT 14   ASTSGOT 17   ALKPHOSPHAT 44   TBILIRUBIN 0.4   GLUCOSE 105*     Recent Labs     09/25/23 2242   APTT 37.2*   INR 1.60*     No results for input(s): \"NTPROBNP\" in the last 72 hours.      Recent Labs     09/25/23 2242 09/26/23  0205   TROPONINT 19 28*       Imaging:  DX-CHEST-PORTABLE (1 VIEW)   Final Result         1.  Left basilar atelectasis, no focal infiltrate   2.  Atherosclerosis      CT-CSPINE WITHOUT PLUS RECONS   Final Result         1.  Multilevel degenerative changes of the cervical spine limit diagnostic sensitivity of this examination.   2.  Mild anterolisthesis C4 on C5, appears likely related to degenerative facet arthrosis and stable since prior study, otherwise no acute traumatic bony injury of the cervical spine is apparent.   3.  Atherosclerosis      CT-HEAD W/O   Final Result         1.  No acute intracranial abnormality is identified, there are nonspecific white matter changes, commonly associated with small vessel ischemic disease.  Associated mild cerebral atrophy is noted.         EC-ECHOCARDIOGRAM COMPLETE W/O CONT    (Results Pending)       X-Ray:  I have personally reviewed the images and compared with prior images.  EKG:  I have " personally reviewed the images and compared with prior images.    Assessment/Plan:  Justification for Admission Status  I anticipate this patient is appropriate for observation status at this time because chest pain    Patient will need a Telemetry bed on MEDICAL service .  The need is secondary to chest pain.    * Chest pain- (present on admission)  Assessment & Plan  Atypical chest pain with chest wall tenderness  Likely related to musculoskeletal  Lidocaine patch and pain control  Follow troponins  Monitor on telemetry      Chronic respiratory failure with hypoxia (HCC)  Assessment & Plan  On 2 L of O2 which is her baseline    COPD (chronic obstructive pulmonary disease) (HCC)  Assessment & Plan  Not in exacerbation  Continue home inhalers    Essential hypertension- (present on admission)  Assessment & Plan  Hypertensive emergency  Uncontrolled  IV as needed medications have been ordered          VTE prophylaxis: SCDs/TEDs

## 2023-09-27 LAB
ALBUMIN SERPL BCP-MCNC: 3.3 G/DL (ref 3.2–4.9)
ALBUMIN/GLOB SERPL: 1.7 G/DL
ALP SERPL-CCNC: 35 U/L (ref 30–99)
ALT SERPL-CCNC: 13 U/L (ref 2–50)
ANION GAP SERPL CALC-SCNC: 9 MMOL/L (ref 7–16)
AST SERPL-CCNC: 16 U/L (ref 12–45)
BASOPHILS # BLD AUTO: 0.2 % (ref 0–1.8)
BASOPHILS # BLD: 0.01 K/UL (ref 0–0.12)
BILIRUB SERPL-MCNC: 0.4 MG/DL (ref 0.1–1.5)
BUN SERPL-MCNC: 33 MG/DL (ref 8–22)
CALCIUM ALBUM COR SERPL-MCNC: 9.3 MG/DL (ref 8.5–10.5)
CALCIUM SERPL-MCNC: 8.7 MG/DL (ref 8.5–10.5)
CHLORIDE SERPL-SCNC: 107 MMOL/L (ref 96–112)
CO2 SERPL-SCNC: 21 MMOL/L (ref 20–33)
CREAT SERPL-MCNC: 1.24 MG/DL (ref 0.5–1.4)
EOSINOPHIL # BLD AUTO: 0.15 K/UL (ref 0–0.51)
EOSINOPHIL NFR BLD: 3.3 % (ref 0–6.9)
ERYTHROCYTE [DISTWIDTH] IN BLOOD BY AUTOMATED COUNT: 50.5 FL (ref 35.9–50)
GFR SERPLBLD CREATININE-BSD FMLA CKD-EPI: 42 ML/MIN/1.73 M 2
GLOBULIN SER CALC-MCNC: 2 G/DL (ref 1.9–3.5)
GLUCOSE SERPL-MCNC: 119 MG/DL (ref 65–99)
HCT VFR BLD AUTO: 32.6 % (ref 37–47)
HGB BLD-MCNC: 10.8 G/DL (ref 12–16)
IMM GRANULOCYTES # BLD AUTO: 0.02 K/UL (ref 0–0.11)
IMM GRANULOCYTES NFR BLD AUTO: 0.4 % (ref 0–0.9)
LYMPHOCYTES # BLD AUTO: 0.73 K/UL (ref 1–4.8)
LYMPHOCYTES NFR BLD: 16.2 % (ref 22–41)
MCH RBC QN AUTO: 31.3 PG (ref 27–33)
MCHC RBC AUTO-ENTMCNC: 33.1 G/DL (ref 32.2–35.5)
MCV RBC AUTO: 94.5 FL (ref 81.4–97.8)
MONOCYTES # BLD AUTO: 0.39 K/UL (ref 0–0.85)
MONOCYTES NFR BLD AUTO: 8.6 % (ref 0–13.4)
NEUTROPHILS # BLD AUTO: 3.21 K/UL (ref 1.82–7.42)
NEUTROPHILS NFR BLD: 71.3 % (ref 44–72)
NRBC # BLD AUTO: 0 K/UL
NRBC BLD-RTO: 0 /100 WBC (ref 0–0.2)
PLATELET # BLD AUTO: 185 K/UL (ref 164–446)
PMV BLD AUTO: 10.1 FL (ref 9–12.9)
POTASSIUM SERPL-SCNC: 3.8 MMOL/L (ref 3.6–5.5)
PROT SERPL-MCNC: 5.3 G/DL (ref 6–8.2)
RBC # BLD AUTO: 3.45 M/UL (ref 4.2–5.4)
SODIUM SERPL-SCNC: 137 MMOL/L (ref 135–145)
WBC # BLD AUTO: 4.5 K/UL (ref 4.8–10.8)

## 2023-09-27 PROCEDURE — G0378 HOSPITAL OBSERVATION PER HR: HCPCS

## 2023-09-27 PROCEDURE — 97602 WOUND(S) CARE NON-SELECTIVE: CPT

## 2023-09-27 PROCEDURE — 700102 HCHG RX REV CODE 250 W/ 637 OVERRIDE(OP): Performed by: HOSPITALIST

## 2023-09-27 PROCEDURE — 99232 SBSQ HOSP IP/OBS MODERATE 35: CPT | Performed by: HOSPITALIST

## 2023-09-27 PROCEDURE — 700101 HCHG RX REV CODE 250: Performed by: NURSE PRACTITIONER

## 2023-09-27 PROCEDURE — 97530 THERAPEUTIC ACTIVITIES: CPT

## 2023-09-27 PROCEDURE — 96375 TX/PRO/DX INJ NEW DRUG ADDON: CPT

## 2023-09-27 PROCEDURE — 85025 COMPLETE CBC W/AUTO DIFF WBC: CPT

## 2023-09-27 PROCEDURE — 80053 COMPREHEN METABOLIC PANEL: CPT

## 2023-09-27 PROCEDURE — 700105 HCHG RX REV CODE 258: Performed by: HOSPITALIST

## 2023-09-27 PROCEDURE — 700111 HCHG RX REV CODE 636 W/ 250 OVERRIDE (IP): Performed by: HOSPITALIST

## 2023-09-27 PROCEDURE — A9270 NON-COVERED ITEM OR SERVICE: HCPCS | Performed by: HOSPITALIST

## 2023-09-27 PROCEDURE — 36415 COLL VENOUS BLD VENIPUNCTURE: CPT

## 2023-09-27 RX ORDER — SODIUM CHLORIDE, SODIUM LACTATE, POTASSIUM CHLORIDE, AND CALCIUM CHLORIDE .6; .31; .03; .02 G/100ML; G/100ML; G/100ML; G/100ML
500 INJECTION, SOLUTION INTRAVENOUS ONCE
Status: COMPLETED | OUTPATIENT
Start: 2023-09-27 | End: 2023-09-27

## 2023-09-27 RX ORDER — LIDOCAINE 50 MG/G
2 PATCH TOPICAL EVERY 24 HOURS
Qty: 10 PATCH | Refills: 0 | Status: SHIPPED | OUTPATIENT
Start: 2023-09-27

## 2023-09-27 RX ORDER — ACETAMINOPHEN 500 MG
500-1000 TABLET ORAL EVERY 6 HOURS PRN
Qty: 30 TABLET | Refills: 0 | COMMUNITY
Start: 2023-09-27

## 2023-09-27 RX ADMIN — LEVOTHYROXINE SODIUM 88 MCG: 0.09 TABLET ORAL at 06:15

## 2023-09-27 RX ADMIN — LIDOCAINE PATCH 5% 2 PATCH: 700 PATCH TOPICAL at 15:53

## 2023-09-27 RX ADMIN — OXYCODONE HYDROCHLORIDE 5 MG: 5 TABLET ORAL at 07:58

## 2023-09-27 RX ADMIN — OMEPRAZOLE 40 MG: 20 CAPSULE, DELAYED RELEASE ORAL at 06:15

## 2023-09-27 RX ADMIN — OXYCODONE HYDROCHLORIDE 10 MG: 10 TABLET ORAL at 19:40

## 2023-09-27 RX ADMIN — ALPRAZOLAM 0.25 MG: 0.25 TABLET ORAL at 21:42

## 2023-09-27 RX ADMIN — SIMVASTATIN 20 MG: 20 TABLET, FILM COATED ORAL at 17:50

## 2023-09-27 RX ADMIN — PROPAFENONE HYDROCHLORIDE 150 MG: 150 TABLET, FILM COATED ORAL at 17:50

## 2023-09-27 RX ADMIN — PROPAFENONE HYDROCHLORIDE 150 MG: 150 TABLET, FILM COATED ORAL at 06:15

## 2023-09-27 RX ADMIN — RIVAROXABAN 15 MG: 15 TABLET, FILM COATED ORAL at 17:50

## 2023-09-27 RX ADMIN — SODIUM CHLORIDE, POTASSIUM CHLORIDE, SODIUM LACTATE AND CALCIUM CHLORIDE 500 ML: 600; 310; 30; 20 INJECTION, SOLUTION INTRAVENOUS at 14:01

## 2023-09-27 RX ADMIN — LABETALOL HYDROCHLORIDE 10 MG: 5 INJECTION INTRAVENOUS at 07:59

## 2023-09-27 ASSESSMENT — GAIT ASSESSMENTS
ASSISTIVE DEVICE: 4 WHEEL WALKER
DISTANCE (FEET): 3
GAIT LEVEL OF ASSIST: MINIMAL ASSIST

## 2023-09-27 ASSESSMENT — PAIN DESCRIPTION - PAIN TYPE
TYPE: ACUTE PAIN

## 2023-09-27 ASSESSMENT — COGNITIVE AND FUNCTIONAL STATUS - GENERAL
TURNING FROM BACK TO SIDE WHILE IN FLAT BAD: UNABLE
CLIMB 3 TO 5 STEPS WITH RAILING: TOTAL
MOBILITY SCORE: 10
SUGGESTED CMS G CODE MODIFIER MOBILITY: CL
MOVING FROM LYING ON BACK TO SITTING ON SIDE OF FLAT BED: UNABLE
MOVING TO AND FROM BED TO CHAIR: UNABLE
WALKING IN HOSPITAL ROOM: A LITTLE
STANDING UP FROM CHAIR USING ARMS: A LITTLE

## 2023-09-27 ASSESSMENT — ENCOUNTER SYMPTOMS
DIZZINESS: 1
FEVER: 0

## 2023-09-27 NOTE — WOUND TEAM
[facility]  Inpatient Services  Wound and Skin Care Brief Evaluation    Admission Date: 9/25/2023     Last order of IP CONSULT TO WOUND CARE was found on 9/26/2023 from Hospital Encounter on 9/25/2023     HPI, PMH, SH: Reviewed    Chief Complaint   Patient presents with    T-5000 Head Injury     Pt was brought from home by EMS due to GLF, and sustained head injury  No LOC, No external bleeding noted  + thinners  BP is elevated as per EMS: 219/86 mmHg  Noted hematoma at right elbow area  Pt has chronic back pain     Diagnosis: Chest pain [R07.9]  Fall from ground level [W18.30XA]    Unit where seen by Wound Team: T302/01     Wound consult placed regarding sacrum. Chart and images reviewed. This clinician in to assess patient. Patient pleasant and agreeable. Sacrum with intact blanchable erythema, some areas with natural skin hyperpigmentation. Barrier paste applied  BL heels intact with blanchable erythema, offloading adhesive foams already in place.  Continue all appropriate pressure ulcer prevention measures.    No pressure injuries or advanced wound care needs identified. Wound consult completed. Wound team signing off, re-consult if patient has further advanced wound care needs.         PREVENTATIVE INTERVENTIONS:    Q shift Duc - performed per nursing policy  Q shift pressure point assessments - performed per nursing policy    Surface/Positioning  Low Airloss - Currently in Place  Reposition q 2 hours - Currently in Place    Offloading/Redistribution  Heel offloading dressing (Silicone dressing) - Currently in Place    Containment/Moisture Prevention    Purwick/Condom Cath - Currently in Place  Barrier paste - Applied this Visit

## 2023-09-27 NOTE — DISCHARGE PLANNING
Case Management Discharge Planning    Admission Date: 9/25/2023  GMLOS:    ALOS: 0    6-Clicks ADL Score: 18  6-Clicks Mobility Score: 13  PT and/or OT Eval ordered: Yes  Post-acute Referrals Ordered: Yes  Post-acute Choice Obtained: Yes  Has referral(s) been sent to post-acute provider:  Yes      Anticipated Discharge Dispo: Discharge Disposition: D/T to home under A care in anticipation of covered skilled care (06)  Discharge Address: [address] [place] [address]  Discharge Contact Phone Number: [phone]    DME Needed: No    Action(s) Taken: Discussed in IDT rounds. Patient is medically cleared to dc home with home health. Patient has been accepted by [facility]. Pending PT/OT re-eval.     NO other CM needs identified at this time.     Escalations Completed: None    Medically Clear: Yes

## 2023-09-27 NOTE — DISCHARGE PLANNING
[facility]itional [facility] Coordination    Referral from: Dr Michael Delaney  Insurance Provider on Facesheet: [facility]  Potential Rehab Diagnosis: Debility    Chart review indicates patient may have on going medical management and may have therapy needs to possibly meet inpatient rehab facility criteria with the goal of returning to community.    D/C support: Lives alone, daughter     Physiatry consultation pended per protocol.     [facility] is not contracted with [facility], recommend referring to [facility] as appropriate. Please reach out if PM&R consult is requested for medical management [id number], [facility] will no longer follow.     Thank you for the referral.

## 2023-09-27 NOTE — THERAPY
Physical Therapy   Daily Treatment     Patient Name: Eun Be  Age:  87 y.o., Sex:  female  Medical Record #: [id number]  Today's Date: 9/27/2023     Precautions  Precautions: Fall Risk  Comments: L pectoral pain    Assessment    Attempted to assist pt earlier, pt's daughter called. Went back and pt agreed to PT. Pt require extra time and assistance with bed mobility, limited due to pain L chest area. Sat at EOB without assist. Once standing, pt c/o being dizzy, BP positive for orthostatic hypotension. Attempted another stand and pt able to tolerate better without as much dizziness but fatigued quickly. Pt did not feel up to sitting in the chair. Pt tremulous, min SOB with initial EOB sitting. Pt stated that her memory has progressively gotten worse. Pt stated that she is lonely. Asked about possibility of living in ILF or assisted for companionship and pt stated she was not ready for that. Pt does not feel that she can home at this time, she will not have assistance at home. Pt not appropriate for home at this time. Patient with decreased activity tolerance, high fall risk, decreased standing balance, gait deviations, pain, cognitive impairment and will continue to benefit from Acute Care Physical Therapy to assist towards established goals. Anticipate pt will benefit from post acute placement upon DC from hospital.       Plan    Treatment Plan Status: Continue Current Treatment Plan  Type of Treatment: Bed Mobility, Gait Training, Neuro Re-Education / Balance, Self Care / Home Evaluation, Therapeutic Activities, Therapeutic Exercise  Treatment Frequency: 4 Times per Week  Treatment Duration: Until Therapy Goals Met    DC Equipment Recommendations: None  Discharge Recommendations: Recommend post-acute placement for additional physical therapy services prior to discharge home      Subjective    Charge RN requested PT session today due to pt's RN does not feel that pt is safe for DC today.      Objective        09/27/23 1213   Precautions   Precautions Fall Risk   Comments L pectoral pain   Pain 0 - 10 Group   Location Chest;Axilla   Therapist Pain Assessment During Activity;Post Activity Pain Same as Prior to Activity;Nurse Notified   Cognition    Cognition / Consciousness X   Orientation Level Not Oriented to Year  (pt stated that her memory has progressively gotten worse with time)   Level of Consciousness Alert   Safety Awareness Impaired   Initiation Impaired   Comments pt talkative and required redirection often   Neurological Concerns   Neurological Concerns Yes   Comments tremulous   Balance   Sitting Balance (Static) Fair   Sitting Balance (Dynamic) Fair -   Standing Balance (Static) Fair -   Standing Balance (Dynamic) Fair -   Weight Shift Sitting Fair   Weight Shift Standing Poor   Skilled Intervention Verbal Cuing;Tactile Cuing;Facilitation   Comments stnading balance assessed with 4WW   Bed Mobility    Supine to Sit Minimal Assist   Sit to Supine Standby Assist   Scooting Standby Assist   Skilled Intervention Tactile Cuing   Comments HOB elevated 45 degrees, require extra time   Gait Analysis   Gait Level Of Assist Minimal Assist   Assistive Device 4 Wheel Walker   Distance (Feet) 3   # of Times Distance was Traveled 1   Deviation   (side steps, unsteady,)   Skilled Intervention Tactile Cuing;Facilitation   Functional Mobility   Sit to Stand Minimal Assist   Bed, Chair, Wheelchair Transfer Minimal Assist   Transfer Method Stand Step   Mobility with 4WW   How much difficulty does the patient currently have...   Turning over in bed (including adjusting bedclothes, sheets and blankets)? 1   Sitting down on and standing up from a chair with arms (e.g., wheelchair, bedside commode, etc.) 1   Moving from lying on back to sitting on the side of the bed? 1   How much help from another person does the patient currently need...   Moving to and from a bed to a chair (including a wheelchair)? 3   Need to walk in a  hospital room? 3   Climbing 3-5 steps with a railing? 1   6 clicks Mobility Score 10   Activity Tolerance   Sitting in Chair pt stated she did not feel up to sitting in chair   Sitting Edge of Bed pt tremulous at EOB, min fatigued   Standing < 1 minute, first attempt, 3 minutes second attempt, pt fatigued   Short Term Goals    Short Term Goal # 1 Pt will perform bed mobility with supervision to progress function in 6 visits.   Goal Outcome # 1 Progressing slower than expected   Short Term Goal # 2 Pt will transfer with FWW and supervision to progress function in 6 visits.   Goal Outcome # 2 Progressing slower than expected   Short Term Goal # 3 Pt will ambulate 150ft with FWW and supervision to progress function in 6 visits.   Goal Outcome # 3 Progressing slower than expected   Education Group   Education Provided Role of Physical Therapist;Use of Assistive Device;Gait Training   Role of Physical Therapist Patient Response Patient;Acceptance;Explanation;Verbal Demonstration   Gait Training Patient Response Patient;Acceptance;Explanation;Action Demonstration   Use of Assistive Device Patient Response Patient;Acceptance;Explanation;Action Demonstration;Demonstration   Physical Therapy Treatment Plan   Physical Therapy Treatment Plan Continue Current Treatment Plan   Treatment Plan  Bed Mobility;Gait Training;Neuro Re-Education / Balance;Self Care / Home Evaluation;Therapeutic Activities;Therapeutic Exercise   Treatment Frequency 4 Times per Week   Duration Until Therapy Goals Met   Anticipated Discharge Equipment and Recommendations   DC Equipment Recommendations None   Discharge Recommendations Recommend post-acute placement for additional physical therapy services prior to discharge home   Interdisciplinary Plan of Care Collaboration   IDT Collaboration with  Nursing;Physician   Patient Position at End of Therapy In Bed;Bed Alarm On;Call Light within Reach;Tray Table within Reach;Phone within Reach     Orthostatic  Blood pressure:  Supine with HOB elevated 45 degrees: 173/63  Seated at EOB: 169/59  Pt stood for less than 1 minute due to dizziness, took BP while seated: 149/60    2nd trial:  Seated EOB after sitting 2 minutes: 173/63  Standing after 1 min: 156/65  Sitting EOB after standin/55

## 2023-09-27 NOTE — CARE PLAN
The patient is Stable - Low risk of patient condition declining or worsening    Shift Goals  Clinical Goals: pain control  Patient Goals: rest  Family Goals: Not present    Progress made toward(s) clinical / shift goals:  Educated patient on pain rating scales, frequent pain assessments in place, medicating per MAR, non pharmaceutical pain relief such as heat pads and positioning in use.        Patient is not progressing towards the following goals:

## 2023-09-27 NOTE — CARE PLAN
Problem: Pain - Standard  Goal: Alleviation of pain or a reduction in pain to the patient’s comfort goal  Description: Target End Date:  Prior to discharge or change in level of care    Document on Vitals flowsheet    1.  Document pain using the appropriate pain scale per order or unit policy  2.  Educate and implement non-pharmacologic comfort measures (i.e. relaxation, distraction, massage, cold/heat therapy, etc.)  3.  Pain management medications as ordered  4.  Reassess pain after pain med administration per policy  5.  If opiods administered assess patient's response to pain medication is appropriate per POSS sedation scale  6.  Follow pain management plan developed in collaboration with patient and interdisciplinary team (including palliative care or pain specialists if applicable)  Outcome: Progressing   The patient is Stable - Low risk of patient condition declining or worsening    Shift Goals  Clinical Goals: Pain control, safe mobility  Patient Goals: rest, comfort  Family Goals: Not present    Progress made toward(s) clinical / shift goals:        Pt. Vital signs were in normal range.   Pt. Has no new skin integrity issue/impairment. Pressure injury protocol was initiated.  Pt. Verbalized understanding on the care provided.

## 2023-09-27 NOTE — PROGRESS NOTES
4 Eyes Skin Assessment Completed by HÉCTOR Mclean and HÉCTOR Salmeron.    Head WDL  Ears WDL  Nose WDL  Mouth WDL  Neck WDL  Breast/Chest WDL  Shoulder Blades WDL  Spine WDL  (R) Arm/Elbow/Hand Redness, Bruising, and Discoloration  (L) Arm/Elbow/Hand Redness, Bruising, and Discoloration  Abdomen WDL  Groin WDL  Scrotum/Coccyx/Buttocks Redness, Non-Blanching, and Discoloration  (R) Leg WDL  (L) Leg WDL  (R) Heel/Foot/Toe Redness, Blanching  (L) Heel/Foot/Toe Non-Blanching          Devices In Places Blood Pressure Cuff, Pulse Ox, and Nasal Cannula      Interventions In Place Gray Ear Foams, Heel Mepilex, Q2 Turns, Low Air Loss Mattress, and Barrier Cream    Possible Skin Injury Yes    Pictures Uploaded Into Epic N/A  Wound Consult Placed No  RN Wound Prevention Protocol Ordered Yes

## 2023-09-27 NOTE — PROGRESS NOTES
[facility] Medicine Daily Progress Note    Date of Service  9/27/2023    Chief Complaint  Eun Be is a 87 y.o. female admitted 9/25/2023 with chest wall pain after a fall    Hospital Course  Ms. Eun Be is a 87 y.o. female with past medical history of COPD, chronic hypoxic respiratory failure on 2 L of oxygen, atrial fibrillation on Xarelto who presents on 9/25/2023  for a fall and chest pain.      The patient was going to the bathroom when she suddenly fell on the right elbow that caused a bruise.  Afterwards she started complaining about pain in the left-sided chest that gets worse when she takes a deep breath. The pain is nonradiating and nonpositional.  She has not tried to exert herself since the pain started.  She denies losing consciousness or hitting her head.  She does take anticoagulation at home.     In ER, patient presents with a blood pressure of 242/104.  Chest x-ray interpreted by me found no acute pulmonary process. No definite rib fractures were seen. EKG found normal sinus rhythm with left bundle branch block.  Patient admitted to hospital medicine for management of care.    During this course of stay, an echocardiogram was pursued noting small pericardial effusion without evidence of hemodynamic compromise with normal left ventricular chamber size and LVEF approximately 60%, normal IVC size and normal RV size and systolic function..  Patient was also evaluated by physical therapy with noted possible left pectoralis muscle tear.  Left upper chest pain is reproducible specially when palpated.  Hold ACS work-up as this is unlikely cardiac related.  Troponin also negative and plateau.    Home health ordered for patient as she will likely need continuous PT/OT.  Patient lives alone but has family here in town.    Interval Problem Update    Patient is complaining of chest wall pain  Evaluated by PT OT with dizziness with mobilization and orthostatic drop in blood pressure  On 2 L  nasal cannula  I reviewed CBC hemoglobin 10.8  I reviewed CMP  I reviewed echocardiogram with normal EF and normal RV function      I have discussed this patient's plan of care and discharge plan at IDT rounds today with Case Management, Nursing, Nursing leadership, and other members of the IDT team.    Consultants/Specialty       Code Status  Full Code    Disposition  Medically Cleared  I have placed the appropriate orders for post-discharge needs.    Review of Systems  Review of Systems   Constitutional:  Positive for malaise/fatigue. Negative for fever.   Cardiovascular:  Positive for chest pain.   Neurological:  Positive for dizziness.   All other systems reviewed and are negative.       Physical Exam  Temp:  [36.3 °C (97.3 °F)-36.5 °C (97.7 °F)] 36.3 °C (97.3 °F)  Pulse:  [58-71] 62  Resp:  [16-17] 16  BP: (132-211)/(48-82) 152/55  SpO2:  [96 %-98 %] 98 %    Physical Exam  Vitals and nursing note reviewed.   Constitutional:       General: She is not in acute distress.  HENT:      Head: Normocephalic and atraumatic.      Nose: Nose normal. No rhinorrhea.      Mouth/Throat:      Pharynx: No oropharyngeal exudate or posterior oropharyngeal erythema.   Eyes:      General: No scleral icterus.        Right eye: No discharge.         Left eye: No discharge.   Cardiovascular:      Rate and Rhythm: Normal rate and regular rhythm.      Heart sounds: Normal heart sounds. No murmur heard.     No friction rub. No gallop.   Pulmonary:      Effort: Pulmonary effort is normal. No respiratory distress.      Breath sounds: No stridor. Decreased breath sounds present. No wheezing or rhonchi.   Chest:      Chest wall: Tenderness present.   Abdominal:      General: Bowel sounds are normal. There is no distension.      Palpations: Abdomen is soft. There is no mass.      Tenderness: There is no abdominal tenderness. There is no rebound.   Musculoskeletal:         General: No swelling or tenderness.      Cervical back: Neck supple. No  rigidity.   Skin:     General: Skin is warm and dry.      Coloration: Skin is not cyanotic or jaundiced.      Nails: There is no clubbing.   Neurological:      General: No focal deficit present.      Mental Status: She is alert and oriented to person, place, and time.      Cranial Nerves: No cranial nerve deficit.      Motor: No weakness.   Psychiatric:         Mood and Affect: Mood normal.         Behavior: Behavior normal.         Fluids    Intake/Output Summary (Last 24 hours) at 9/27/2023 1339  Last data filed at 9/27/2023 0406  Gross per 24 hour   Intake --   Output 200 ml   Net -200 ml       Laboratory  Recent Labs     09/25/23 2242 09/27/23  0310   WBC 5.3 4.5*   RBC 3.92* 3.45*   HEMOGLOBIN 12.2 10.8*   HEMATOCRIT 36.9* 32.6*   MCV 94.1 94.5   MCH 31.1 31.3   MCHC 33.1 33.1   RDW 48.7 50.5*   PLATELETCT 209 185   MPV 9.8 10.1     Recent Labs     09/25/23 2242 09/27/23 0310   SODIUM 139 137   POTASSIUM 4.1 3.8   CHLORIDE 105 107   CO2 22 21   GLUCOSE 105* 119*   BUN 22 33*   CREATININE 0.83 1.24   CALCIUM 9.8 8.7     Recent Labs     09/25/23 2242   APTT 37.2*   INR 1.60*               Imaging  EC-ECHOCARDIOGRAM COMPLETE W/O CONT   Final Result      DX-CHEST-PORTABLE (1 VIEW)   Final Result         1.  Left basilar atelectasis, no focal infiltrate   2.  Atherosclerosis      CT-CSPINE WITHOUT PLUS RECONS   Final Result         1.  Multilevel degenerative changes of the cervical spine limit diagnostic sensitivity of this examination.   2.  Mild anterolisthesis C4 on C5, appears likely related to degenerative facet arthrosis and stable since prior study, otherwise no acute traumatic bony injury of the cervical spine is apparent.   3.  Atherosclerosis      CT-HEAD W/O   Final Result         1.  No acute intracranial abnormality is identified, there are nonspecific white matter changes, commonly associated with small vessel ischemic disease.  Associated mild cerebral atrophy is noted.               Assessment/Plan  * Chest pain- (present on admission)  Assessment & Plan  Atypical chest pain with chest wall tenderness  Likely related to musculoskeletal  Continue pain management with topical lidocaine  Scheduled Tylenol    Debility  Assessment & Plan  PT OT courtney    Fall from ground level- (present on admission)  Assessment & Plan  PT OT eval's  Physiatry consult    Chronic respiratory failure with hypoxia (HCC)  Assessment & Plan  On 2 L of O2 which is her baseline    COPD (chronic obstructive pulmonary disease) (HCC)  Assessment & Plan  Not in exacerbation  Continue home inhalers  Continue home oxygen    Atrial fibrillation (HCC)- (present on admission)  Assessment & Plan  Continue Rythmol and Xarelto    Essential hypertension- (present on admission)  Assessment & Plan  Elevated blood pressure likely related to pain  Given orthostatic drop we will not aggressively treat unless systolic greater than 180  Monitor blood pressure with better pain control           VTE prophylaxis: Xarelto    I have performed a physical exam and reviewed and updated ROS and Plan today (9/27/2023). In review of yesterday's note (9/26/2023), there are no changes except as documented above.

## 2023-09-28 PROCEDURE — 700102 HCHG RX REV CODE 250 W/ 637 OVERRIDE(OP): Performed by: HOSPITALIST

## 2023-09-28 PROCEDURE — 700111 HCHG RX REV CODE 636 W/ 250 OVERRIDE (IP): Performed by: HOSPITALIST

## 2023-09-28 PROCEDURE — A9270 NON-COVERED ITEM OR SERVICE: HCPCS | Performed by: HOSPITALIST

## 2023-09-28 PROCEDURE — 99232 SBSQ HOSP IP/OBS MODERATE 35: CPT | Performed by: HOSPITALIST

## 2023-09-28 PROCEDURE — 700105 HCHG RX REV CODE 258: Performed by: HOSPITALIST

## 2023-09-28 PROCEDURE — 96376 TX/PRO/DX INJ SAME DRUG ADON: CPT

## 2023-09-28 PROCEDURE — G0378 HOSPITAL OBSERVATION PER HR: HCPCS

## 2023-09-28 PROCEDURE — 700101 HCHG RX REV CODE 250: Performed by: NURSE PRACTITIONER

## 2023-09-28 RX ORDER — SODIUM CHLORIDE, SODIUM LACTATE, POTASSIUM CHLORIDE, CALCIUM CHLORIDE 600; 310; 30; 20 MG/100ML; MG/100ML; MG/100ML; MG/100ML
INJECTION, SOLUTION INTRAVENOUS CONTINUOUS
Status: DISCONTINUED | OUTPATIENT
Start: 2023-09-28 | End: 2023-09-29

## 2023-09-28 RX ORDER — POLYETHYLENE GLYCOL 3350 17 G/17G
1 POWDER, FOR SOLUTION ORAL DAILY
Status: DISCONTINUED | OUTPATIENT
Start: 2023-09-28 | End: 2023-10-03 | Stop reason: HOSPADM

## 2023-09-28 RX ORDER — DOCUSATE SODIUM 100 MG/1
100 CAPSULE, LIQUID FILLED ORAL 2 TIMES DAILY
Status: DISCONTINUED | OUTPATIENT
Start: 2023-09-28 | End: 2023-10-03 | Stop reason: HOSPADM

## 2023-09-28 RX ADMIN — RIVAROXABAN 15 MG: 15 TABLET, FILM COATED ORAL at 17:06

## 2023-09-28 RX ADMIN — PROPAFENONE HYDROCHLORIDE 150 MG: 150 TABLET, FILM COATED ORAL at 04:34

## 2023-09-28 RX ADMIN — LABETALOL HYDROCHLORIDE 10 MG: 5 INJECTION INTRAVENOUS at 15:39

## 2023-09-28 RX ADMIN — POLYETHYLENE GLYCOL 3350 1 PACKET: 17 POWDER, FOR SOLUTION ORAL at 08:11

## 2023-09-28 RX ADMIN — OXYCODONE HYDROCHLORIDE 10 MG: 10 TABLET ORAL at 08:27

## 2023-09-28 RX ADMIN — OMEPRAZOLE 40 MG: 20 CAPSULE, DELAYED RELEASE ORAL at 04:34

## 2023-09-28 RX ADMIN — DOCUSATE SODIUM 100 MG: 100 CAPSULE, LIQUID FILLED ORAL at 08:11

## 2023-09-28 RX ADMIN — SODIUM CHLORIDE, POTASSIUM CHLORIDE, SODIUM LACTATE AND CALCIUM CHLORIDE: 600; 310; 30; 20 INJECTION, SOLUTION INTRAVENOUS at 17:09

## 2023-09-28 RX ADMIN — DOCUSATE SODIUM 100 MG: 100 CAPSULE, LIQUID FILLED ORAL at 17:06

## 2023-09-28 RX ADMIN — LEVOTHYROXINE SODIUM 88 MCG: 0.09 TABLET ORAL at 04:34

## 2023-09-28 RX ADMIN — OXYCODONE HYDROCHLORIDE 10 MG: 10 TABLET ORAL at 13:58

## 2023-09-28 RX ADMIN — LIDOCAINE PATCH 5% 2 PATCH: 700 PATCH TOPICAL at 13:33

## 2023-09-28 RX ADMIN — ALPRAZOLAM 0.25 MG: 0.25 TABLET ORAL at 19:45

## 2023-09-28 RX ADMIN — SIMVASTATIN 20 MG: 20 TABLET, FILM COATED ORAL at 17:06

## 2023-09-28 RX ADMIN — PROPAFENONE HYDROCHLORIDE 150 MG: 150 TABLET, FILM COATED ORAL at 17:06

## 2023-09-28 ASSESSMENT — ENCOUNTER SYMPTOMS: DIZZINESS: 1

## 2023-09-28 ASSESSMENT — PAIN DESCRIPTION - PAIN TYPE
TYPE: ACUTE PAIN

## 2023-09-28 NOTE — DISCHARGE PLANNING
Received Choice form @: 1034  Agency/Facility Name: [facility]  Referral sent per Choice form @: [phone]

## 2023-09-28 NOTE — DISCHARGE PLANNING
Case Management Discharge Planning    Admission Date: 9/25/2023  GMLOS:    ALOS: 0    6-Clicks ADL Score: 18  6-Clicks Mobility Score: 10  PT and/or OT Eval ordered: Yes  Post-acute Referrals Ordered: Yes  Post-acute Choice Obtained: Yes  Has referral(s) been sent to post-acute provider:  Yes      Anticipated Discharge Dispo: Discharge Disposition: D/T to home under [facility] care in anticipation of covered skilled care (06)  Discharge Address: [address] [place] [address]  Discharge Contact Phone Number: [phone]    DME Needed: No    Action(s) Taken: Updated Provider/Nurse on Discharge Plan and Referral(s) sent    Escalations Completed: None    Medically Clear: Yes    Next Steps: f/u with [facility]'s bed availability.    Barriers to Discharge: Pending Placement    Is the patient up for discharge tomorrow: No      Patient discussed in rounds.   COLTEN placed a [facility] and left a message for Annie, admission director.     ~0928~ COLTEN received a call from Annie and she stated that there are no beds available until Monday Oct. 2, because they do not admit on a Sunday. Per Annie she will place the patient under review. COLTEN requested that if a bed is available that we lease need it for this patient.   Choice form obtained and faxed to the DPA.

## 2023-09-28 NOTE — CARE PLAN
Problem: Pain - Standard  Goal: Alleviation of pain or a reduction in pain to the patient’s comfort goal  Description: Target End Date:  Prior to discharge or change in level of care    Document on Vitals flowsheet    1.  Document pain using the appropriate pain scale per order or unit policy  2.  Educate and implement non-pharmacologic comfort measures (i.e. relaxation, distraction, massage, cold/heat therapy, etc.)  3.  Pain management medications as ordered  4.  Reassess pain after pain med administration per policy  5.  If opiods administered assess patient's response to pain medication is appropriate per POSS sedation scale  6.  Follow pain management plan developed in collaboration with patient and interdisciplinary team (including palliative care or pain specialists if applicable)  Outcome: Progressing   The patient is Stable - Low risk of patient condition declining or worsening    Shift Goals  Clinical Goals: Safe mobility, Bowel movement, Q2 turns  Patient Goals: rest, comfort  Family Goals: Not present    Progress made toward(s) clinical / shift goals:        Pt. Verbalized pain as 8, from the scale of 1-10, 10 being the most painful.  Pt. Has no new skin integrity issue/ impairment.  Pressure injury protocols were initiated.

## 2023-09-28 NOTE — PROGRESS NOTES
0655 Patient's in bed. Bedside report received from ABRAHAM Mclean RN at the beginning of the shift.     0757 Notified Dr Michael Delaney that patient has no bowel protocol. New order received and acknowledged (see MAR).     0810 Patient's sitting up in bed. Educated on the importance/use of IS at least 10x every hour while awake, able to reach 1500. Noted confusion, re oriented. Fall protocol in effect. Call light within reach. Reminded patient to call for assist. Assessment completed. No distress noted. Plan of care reviewed with the patient. Verbalized understanding.    0827 Medicated with Oxycodone (see MAR) for c/o's left axillary, rib cage pain, rates pain 8/10.     0950 Patient's in bed. No distress noted.     1140 Patient's in bed. No distress noted.    1241 New order received and acknowledged from Dr Michael Delaney - Orthostatic blood pressure.    1403 Received a call from Dr Michael Delaney.  Notified MD of patient's Orthostatics bp (see flowsheet). New order received and acknowledged. (Place DENNIS hose to BLE).    1432 New order received and acknowledged from Dr Michael Delaney - see MAR.    1539 Medicated with Labetalol by HÉCTOR Alvarado (See MAR) for bp 181/67.    1610 Re checked /59 (see flowsheet).    1900 Patient's in bed. No changes in status. Bedside report given to Oncoming NOC RN (Eli).

## 2023-09-28 NOTE — PROGRESS NOTES
4 Eyes Skin Assessment Completed by HÉCTOR Mclean and HÉCTOR Torres.    Head WDL  Ears WDL  Nose WDL  Mouth WDL  Neck WDL  Breast/Chest WDL  Shoulder Blades WDL  Spine WDL  (R) Arm/Elbow/Hand Redness and Bruising  (L) Arm/Elbow/Hand Redness, Blanching, and Bruising  Abdomen WDL  Groin WDL  Scrotum/Coccyx/Buttocks Redness and Discoloration, Slow to naren  (R) Leg WDL  (L) Leg WDL  (R) Heel/Foot/Toe Boggy  (L) Heel/Foot/Toe Non-Blanching          Devices In Places Pulse Ox and Nasal Cannula      Interventions In Place NC W/Ear Foams, Heel Mepilex, Pillows, Q2 Turns, Low Air Loss Mattress, and Barrier Cream    Possible Skin Injury Yes    Pictures Uploaded Into Epic N/A  Wound Consult Placed N/A  RN Wound Prevention Protocol Ordered Yes

## 2023-09-28 NOTE — CARE PLAN
The patient is Stable - Low risk of patient condition declining or worsening    Shift Goals  Clinical Goals: pain control, BM, mobility  Patient Goals: pain control, comfort  Family Goals: no family present    Progress made toward(s) clinical / shift goals:    Problem: Pain - Standard  Goal: Alleviation of pain or a reduction in pain to the patient’s comfort goal  Outcome: Progressing   Educated on pain scale. Encouraged to verbalize pain. Will medicate as per MAR.    Problem: Skin Integrity  Goal: Skin integrity is maintained or improved  Outcome: Progressing   On Low airloss mattress. Barrier cream applied for every incontinence episode. Q 2 hour turn, Frequent in continence checks..    Patient is not progressing towards the following goals:

## 2023-09-28 NOTE — DIETARY
"Nutrition services: Day 0 of admit.  Eun Be is a 87 y.o. female with admitting DX of chest pain, fall from ground level  Consult received for low BMI.    I met with patient at bedside this morning. She reports to me that she has been losing weight for about a month since her dog .  She has had no appetite which she attributes to mourning. She is a small eating and has stopped cooking most of the time.  She lives by her self and has family near by that bring her food and prepared meals.  She reports to me that her weight has been in the high 120's.  Based on chart review, weight has been 135-140 pounds most recently. Reviewed importance of nutrition. Discussed supplements and ways to optimize nutrition. She reports she occasionally has supplements at home and is agreeable to having them while admitted.       Assessment:  Height: 177.8 cm (5' 10\")  Weight: 60 kg (132 lb 4.4 oz)  Body mass index is 18.98 kg/m²., BMI classification: Normal but low for age  Diet/Intake: Regular diet - >50-75% of recent meals consumed.     Wt Readings from Last 6 Encounters:   23 60 kg (132 lb 4.4 oz)   22 61.2 kg (135 lb)   20 63.5 kg (140 lb)   20 63 kg (139 lb)   20 64.2 kg (141 lb 8.6 oz)   20 63.6 kg (140 lb 3.2 oz)         Evaluation:   History reviewed - COPD, osteopenia, renal cysts noted  Labs and meds reviewed   Last BM   Physical findings: mild clavicle protrusion, prominent brow bone with sunken orbital areas, thin skin on hands with prominent bones (question if this is age related)    Malnutrition Risk: Patient does not appear to meet ASPEN criteria for severe malnutrition though it is difficult to differentiate between aging and wasting in the patient. .  However, suspect moderate malnutrition related to social circumstances related to decreased PO intake estimated 50-75% of normal over the last couple months as well as signs of moderate muscle and fat loss and weight " loss of ~ 5% in the last couple of months.     Recommendations/Plan:  Supplements added - boost plus BID.   Encourage intake of meals and supplements.   Document intake of all PO  as % taken in ADL's to provide interdisciplinary communication across all shifts.   Monitor weight.  Nutrition rep will continue to see patient for ongoing meal and snack preferences.     RD monitoring

## 2023-09-28 NOTE — PROGRESS NOTES
[facility] Medicine Daily Progress Note    Date of Service  9/28/2023    Chief Complaint  Eun Be is a 87 y.o. female admitted 9/25/2023 with chest wall pain after a fall    Hospital Course  Ms. Eun Be is a 87 y.o. female with past medical history of COPD, chronic hypoxic respiratory failure on 2 L of oxygen, atrial fibrillation on Xarelto who presents on 9/25/2023  for a fall and chest pain.      The patient was going to the bathroom when she suddenly fell on the right elbow that caused a bruise.  Afterwards she started complaining about pain in the left-sided chest that gets worse when she takes a deep breath. The pain is nonradiating and nonpositional.  She has not tried to exert herself since the pain started.  She denies losing consciousness or hitting her head.  She does take anticoagulation at home.     In ER, patient presents with a blood pressure of 242/104.  Chest x-ray interpreted by me found no acute pulmonary process. No definite rib fractures were seen. EKG found normal sinus rhythm with left bundle branch block.  Patient admitted to hospital medicine for management of care.    During this course of stay, an echocardiogram was pursued noting small pericardial effusion without evidence of hemodynamic compromise with normal left ventricular chamber size and LVEF approximately 60%, normal IVC size and normal RV size and systolic function..  Patient was also evaluated by physical therapy with noted possible left pectoralis muscle tear.  Left upper chest pain is reproducible specially when palpated.  Hold ACS work-up as this is unlikely cardiac related.  Troponin also negative and plateau.    Home health ordered for patient as she will likely need continuous PT/OT.  Patient lives alone but has family here in town.    Interval Problem Update    Patient complains of dizziness with mobilization with orthostatic drop in blood pressure  I ordered IV fluids and support stockings  Chest wall  pain is controlled      I have discussed this patient's plan of care and discharge plan at IDT rounds today with Case Management, Nursing, Nursing leadership, and other members of the IDT team.    Consultants/Specialty       Code Status  Full Code    Disposition  The patient is not medically cleared for discharge to home or a post-acute facility.  Anticipate discharge to: an inpatient rehabilitation hospital    I have placed the appropriate orders for post-discharge needs.    Review of Systems  Review of Systems   Constitutional:  Positive for malaise/fatigue.   Cardiovascular:  Positive for chest pain.   Neurological:  Positive for dizziness.   All other systems reviewed and are negative.       Physical Exam  Temp:  [36.4 °C (97.5 °F)-36.7 °C (98.1 °F)] 36.7 °C (98.1 °F)  Pulse:  [60-70] 60  Resp:  [16-18] 18  BP: (121-181)/(53-92) 121/57  SpO2:  [96 %-99 %] 99 %    Physical Exam  Vitals and nursing note reviewed.   Constitutional:       General: She is not in acute distress.  HENT:      Head: Normocephalic and atraumatic.      Nose: Nose normal. No rhinorrhea.      Mouth/Throat:      Pharynx: No oropharyngeal exudate or posterior oropharyngeal erythema.   Eyes:      General: No scleral icterus.        Right eye: No discharge.         Left eye: No discharge.   Cardiovascular:      Rate and Rhythm: Normal rate and regular rhythm.      Heart sounds: Normal heart sounds. No murmur heard.     No friction rub. No gallop.   Pulmonary:      Effort: Pulmonary effort is normal. No respiratory distress.      Breath sounds: Normal breath sounds. No stridor. No wheezing, rhonchi or rales.   Chest:      Chest wall: No tenderness.   Abdominal:      General: Bowel sounds are normal. There is no distension.      Palpations: Abdomen is soft. There is no mass.      Tenderness: There is abdominal tenderness. There is no rebound.   Musculoskeletal:         General: No swelling or tenderness.      Cervical back: Neck supple. No  rigidity.   Skin:     General: Skin is warm and dry.      Coloration: Skin is not cyanotic or jaundiced.      Nails: There is no clubbing.   Neurological:      General: No focal deficit present.      Mental Status: She is alert and oriented to person, place, and time.      Cranial Nerves: No cranial nerve deficit.      Motor: No weakness.   Psychiatric:         Mood and Affect: Mood normal.         Behavior: Behavior normal.         Fluids    Intake/Output Summary (Last 24 hours) at 9/28/2023 1432  Last data filed at 9/28/2023 1300  Gross per 24 hour   Intake 480 ml   Output 550 ml   Net -70 ml         Laboratory  Recent Labs     09/25/23 2242 09/27/23  0310   WBC 5.3 4.5*   RBC 3.92* 3.45*   HEMOGLOBIN 12.2 10.8*   HEMATOCRIT 36.9* 32.6*   MCV 94.1 94.5   MCH 31.1 31.3   MCHC 33.1 33.1   RDW 48.7 50.5*   PLATELETCT 209 185   MPV 9.8 10.1       Recent Labs     09/25/23 2242 09/27/23 0310   SODIUM 139 137   POTASSIUM 4.1 3.8   CHLORIDE 105 107   CO2 22 21   GLUCOSE 105* 119*   BUN 22 33*   CREATININE 0.83 1.24   CALCIUM 9.8 8.7       Recent Labs     09/25/23 2242   APTT 37.2*   INR 1.60*                 Imaging  EC-ECHOCARDIOGRAM COMPLETE W/O CONT   Final Result      DX-CHEST-PORTABLE (1 VIEW)   Final Result         1.  Left basilar atelectasis, no focal infiltrate   2.  Atherosclerosis      CT-CSPINE WITHOUT PLUS RECONS   Final Result         1.  Multilevel degenerative changes of the cervical spine limit diagnostic sensitivity of this examination.   2.  Mild anterolisthesis C4 on C5, appears likely related to degenerative facet arthrosis and stable since prior study, otherwise no acute traumatic bony injury of the cervical spine is apparent.   3.  Atherosclerosis      CT-HEAD W/O   Final Result         1.  No acute intracranial abnormality is identified, there are nonspecific white matter changes, commonly associated with small vessel ischemic disease.  Associated mild cerebral atrophy is noted.                 Assessment/Plan  * Chest pain- (present on admission)  Assessment & Plan  Atypical chest pain with chest wall tenderness  Likely related to musculoskeletal  Continue pain management with topical lidocaine  Scheduled Tylenol    Debility  Assessment & Plan  PT OT eval    Fall from ground level- (present on admission)  Assessment & Plan  PT OT eval's  IV hydration support stockings for orthostatic symptoms recheck CBC  Rehab referral    Chronic respiratory failure with hypoxia (HCC)  Assessment & Plan  On 2 L of O2 which is her baseline    COPD (chronic obstructive pulmonary disease) (HCC)  Assessment & Plan  Not in exacerbation  Continue home inhalers  Continue home oxygen    Atrial fibrillation (HCC)- (present on admission)  Assessment & Plan  Continue Rythmol and Xarelto    Essential hypertension- (present on admission)  Assessment & Plan  Elevated blood pressure likely related to pain  Given symptomatic orthostatic drop we will not aggressively treat unless systolic greater than 180  Monitor blood pressure with better pain control           VTE prophylaxis: Xarelto    I have performed a physical exam and reviewed and updated ROS and Plan today (9/28/2023). In review of yesterday's note (9/27/2023), there are no changes except as documented above.

## 2023-09-29 PROBLEM — I95.1 ORTHOSTATIC HYPOTENSION: Status: ACTIVE | Noted: 2023-09-29

## 2023-09-29 PROCEDURE — 700101 HCHG RX REV CODE 250: Performed by: NURSE PRACTITIONER

## 2023-09-29 PROCEDURE — 700102 HCHG RX REV CODE 250 W/ 637 OVERRIDE(OP): Performed by: HOSPITALIST

## 2023-09-29 PROCEDURE — A9270 NON-COVERED ITEM OR SERVICE: HCPCS | Performed by: HOSPITALIST

## 2023-09-29 PROCEDURE — 96376 TX/PRO/DX INJ SAME DRUG ADON: CPT

## 2023-09-29 PROCEDURE — 99232 SBSQ HOSP IP/OBS MODERATE 35: CPT | Performed by: HOSPITALIST

## 2023-09-29 PROCEDURE — 770001 HCHG ROOM/CARE - MED/SURG/GYN PRIV*

## 2023-09-29 RX ORDER — BISACODYL 10 MG
10 SUPPOSITORY, RECTAL RECTAL ONCE
Status: COMPLETED | OUTPATIENT
Start: 2023-09-29 | End: 2023-09-29

## 2023-09-29 RX ORDER — AMLODIPINE BESYLATE 5 MG/1
2.5 TABLET ORAL
Status: DISCONTINUED | OUTPATIENT
Start: 2023-09-29 | End: 2023-09-30

## 2023-09-29 RX ADMIN — PROPAFENONE HYDROCHLORIDE 150 MG: 150 TABLET, FILM COATED ORAL at 16:32

## 2023-09-29 RX ADMIN — LEVOTHYROXINE SODIUM 88 MCG: 0.09 TABLET ORAL at 05:39

## 2023-09-29 RX ADMIN — OXYCODONE HYDROCHLORIDE 5 MG: 5 TABLET ORAL at 18:11

## 2023-09-29 RX ADMIN — SIMVASTATIN 20 MG: 20 TABLET, FILM COATED ORAL at 16:26

## 2023-09-29 RX ADMIN — POLYETHYLENE GLYCOL 3350 1 PACKET: 17 POWDER, FOR SOLUTION ORAL at 05:40

## 2023-09-29 RX ADMIN — LABETALOL HYDROCHLORIDE 10 MG: 5 INJECTION INTRAVENOUS at 16:33

## 2023-09-29 RX ADMIN — LIDOCAINE PATCH 5% 2 PATCH: 700 PATCH TOPICAL at 14:04

## 2023-09-29 RX ADMIN — DOCUSATE SODIUM 100 MG: 100 CAPSULE, LIQUID FILLED ORAL at 05:39

## 2023-09-29 RX ADMIN — AMLODIPINE BESYLATE 2.5 MG: 5 TABLET ORAL at 09:53

## 2023-09-29 RX ADMIN — PROPAFENONE HYDROCHLORIDE 150 MG: 150 TABLET, FILM COATED ORAL at 05:39

## 2023-09-29 RX ADMIN — RIVAROXABAN 15 MG: 15 TABLET, FILM COATED ORAL at 16:26

## 2023-09-29 RX ADMIN — BISACODYL 10 MG: 10 SUPPOSITORY RECTAL at 09:55

## 2023-09-29 RX ADMIN — LABETALOL HYDROCHLORIDE 10 MG: 5 INJECTION INTRAVENOUS at 05:41

## 2023-09-29 RX ADMIN — OMEPRAZOLE 40 MG: 20 CAPSULE, DELAYED RELEASE ORAL at 05:39

## 2023-09-29 RX ADMIN — ALPRAZOLAM 0.25 MG: 0.25 TABLET ORAL at 21:23

## 2023-09-29 ASSESSMENT — PAIN DESCRIPTION - PAIN TYPE
TYPE: ACUTE PAIN

## 2023-09-29 ASSESSMENT — ENCOUNTER SYMPTOMS
CONSTIPATION: 1
FEVER: 0
CHILLS: 0

## 2023-09-29 NOTE — CARE PLAN
The patient is Stable - Low risk of patient condition declining or worsening    Shift Goals  Clinical Goals: safety, rest, and pain control  Patient Goals: get good sleep  Family Goals: None present    Progress made toward(s) clinical / shift goals:    Problem: Skin Integrity  Goal: Skin integrity is maintained or improved  Description: Target End Date:  Prior to discharge or change in level of care    Document interventions on Skin Risk/Duc flowsheet groups and corresponding LDA    1.  Assess and monitor skin integrity, appearance and/or temperature  2.  Assess risk factors for impaired skin integrity and/or pressures ulcers  3.  Implement precautions to protect skin integrity in collaboration with interdisciplinary team  4.  Implement pressure ulcer prevention protocol if at risk for skin breakdown  5.  Confirm wound care consult if at risk for skin breakdown  6.  Ensure patient use of pressure relieving devices  (Low air loss bed, waffle overlay, heel protectors, ROHO cushion, etc)  Outcome: Progressing       Patient is not progressing towards the following goals:

## 2023-09-29 NOTE — DISCHARGE PLANNING
Spoke with Annie at [facility], they have declined patient as no DX that supports acute rehab.  RN COLTEN Bobby advised.

## 2023-09-29 NOTE — CARE PLAN
The patient is Stable - Low risk of patient condition declining or worsening    Shift Goals  Clinical Goals: pain control, monitor BP, safety, BM, mobility  Patient Goals: pain control, comfort  Family Goals: no family present    Progress made toward(s) clinical / shift goals:    Problem: Pain - Standard  Goal: Alleviation of pain or a reduction in pain to the patient’s comfort goal  Outcome: Progressing   Educated on pain scale. Encouraged to verbalize pain. Will medicate as per MAR.     Problem: Skin Integrity  Goal: Skin integrity is maintained or improved  Outcome: Progressing   On Low Airloss mattress. Barrier cream applied for every incontinence episode. Q 2 hour turn in effect. Frequent incontinence checks.     Problem: Knowledge Deficit - Standard  Goal: Patient and family/care givers will demonstrate understanding of plan of care, disease process/condition, diagnostic tests and medications  Outcome: Progressing  Note: POC discussed with the patient. Questions answered. Verbalized understanding.     Patient is not progressing towards the following goals:

## 2023-09-29 NOTE — PROGRESS NOTES
0655 Patient's in bed. Bedside report received from ABRAHAM Benitez RN at the beginning of the shift.     0825 Dr Michael Delaney visited. POC discussed with the patient.    0833 Notified MD of patient's elevated bp and still no BM.. New order received and acknowledged (see MAR).     0835 Patient's sitting up in bed. Educated on the importance/use of IS at least 10x every hour while awake, able to reach 1250. Noted confusion, re oriented. Fall protocol in effect. Call light within reach. Reminded patient to call for assist. Assessment completed. No distress noted. Plan of care reviewed with the patient. Verbalized understanding.    0951 Orthostatics BP and HR done as per order (see flowsheet).    1125 Patient's in bed. No distress noted.     1325 Patient's in bed. Son visiting. No distress noted.    1520 Patient's in bed. No distress noted.    1633 Medicated with Labetalol (see MAR) for bp 193/78.    1645 Voalted Dr Michael Delaney of patient's elevated bp 193/78.     1811 Medicated with Oxycodone (see MAR) for c/o's left shoulder ad axilla pain, rates pain 5/10.    1910 Patient's in bed. No changes in status. Bedside report given to Oncoming ABRAHAM RN' (Corie).

## 2023-09-29 NOTE — DISCHARGE PLANNING
Case Management Discharge Planning    Admission Date: 9/25/2023  GMLOS: 2.7  ALOS: 0    6-Clicks ADL Score: 18  6-Clicks Mobility Score: 10  PT and/or OT Eval ordered: Yes  Post-acute Referrals Ordered: Yes  Post-acute Choice Obtained: Yes  Has referral(s) been sent to post-acute provider:  Yes      Anticipated Discharge Dispo: Discharge Disposition: D/T to home under [facility] care in anticipation of covered skilled care (06)  Discharge Address: [address] [place] [address]  Discharge Contact Phone Number: [phone]    DME Needed: No    Action(s) Taken: Updated Provider/Nurse on Discharge Plan    Escalations Completed: Speciality Provider    Medically Clear: No    Next Steps:  f/u with [facility]    Barriers to Discharge: Pending Placement    Is the patient up for discharge tomorrow: No      COLTEN reached out to [facility] and spoke with Annie. Per Annie she is holding the bed for the patient on Monday but has not received the referral.   COLTEN sent a request to the [facility] to resend the referral.     COLTEN reached out to Annie and left a voicemail inquiring If she has received the referral.   ~ 12:30 ~ COLTEN received a voicemail from Annie with [facility]. Per Annie she will decline the patient at this time.   CM will make the patient aware.   COLTEN reached out to [facility] to see if she has any bed availabe for today. COLTEN was able to leave a voicemail.   COLTEN will check with the patient to see of [facility] is an option.  ~14:00~ COLTEN spoke with Linn with [facility] and she stated that she will not have any beds until Monday.   Patient is to be transported[facility]to [facility] on Monday October 2, 2023 @ 12n.   Patient was made aware.    Impression: Other secondary cataract, left eye: H26.492. OS. Plan: Discussed diagnosis in detail with patient. No treatment is required at this time. Patient will update glassess RX first. Will continue to observe condition and or symptoms.

## 2023-09-29 NOTE — DISCHARGE PLANNING
Updated referral sent to [facility] as per request. Spoke with Annie at [facility] she will review and advise, Per Annie they will not have an open  until Monday of next week.

## 2023-09-29 NOTE — PROGRESS NOTES
Assumed care of patient at bedside report from Day RN. Updated on POC. Patient currently A & O x 3, confused on time; on 2 L O2 via NC; up max assist with mild complaints of acute pain. Assessment completed. Pt refuses pain meds at this time. Call light within reach.  Fall precautions in place. Bed locked and in lowest position. All questions answered. No other needs indicated at this time.

## 2023-09-29 NOTE — PROGRESS NOTES
[facility] Medicine Daily Progress Note    Date of Service  9/29/2023    Chief Complaint  Eun Be is a 87 y.o. female admitted 9/25/2023 with chest wall pain after a fall    Hospital Course  Ms. Eun Be is a 87 y.o. female with past medical history of COPD, chronic hypoxic respiratory failure on 2 L of oxygen, atrial fibrillation on Xarelto who presents on 9/25/2023  for a fall and chest pain.      The patient was going to the bathroom when she suddenly fell on the right elbow that caused a bruise.  Afterwards she started complaining about pain in the left-sided chest that gets worse when she takes a deep breath. The pain is nonradiating and nonpositional.  She has not tried to exert herself since the pain started.  She denies losing consciousness or hitting her head.  She does take anticoagulation at home.     In ER, patient presents with a blood pressure of 242/104.  Chest x-ray interpreted by me found no acute pulmonary process. No definite rib fractures were seen. EKG found normal sinus rhythm with left bundle branch block.  Patient admitted to hospital medicine for management of care.    During this course of stay, an echocardiogram was pursued noting small pericardial effusion without evidence of hemodynamic compromise with normal left ventricular chamber size and LVEF approximately 60%, normal IVC size and normal RV size and systolic function..  Patient was also evaluated by physical therapy with noted possible left pectoralis muscle tear.  Left upper chest pain is reproducible specially when palpated.  Hold ACS work-up as this is unlikely cardiac related.  Troponin also negative and plateau.    Home health ordered for patient as she will likely need continuous PT/OT.  Patient lives alone but has family here in town.    Interval Problem Update    Patient's blood pressure is uncontrolled  this morning  I ordered amlodipine  Given orthostatic symptoms we will start a low-dose and  titrate as tolerated  She is afebrile on 2 L nasal cannula  She is constipated I ordered milk of magnesia and Dulcolax supp    I have discussed this patient's plan of care and discharge plan at IDT rounds today with Case Management, Nursing, Nursing leadership, and other members of the IDT team.    Consultants/Specialty       Code Status  Full Code    Disposition  The patient is not medically cleared for discharge to home or a post-acute facility.  Anticipate discharge to: an inpatient rehabilitation hospital    I have placed the appropriate orders for post-discharge needs.    Review of Systems  Review of Systems   Constitutional:  Negative for chills and fever.   Cardiovascular:  Positive for chest pain (Chest wall pain).   Gastrointestinal:  Positive for constipation.   All other systems reviewed and are negative.       Physical Exam  Temp:  [36.2 °C (97.2 °F)-36.4 °C (97.5 °F)] 36.4 °C (97.5 °F)  Pulse:  [55-66] 66  Resp:  [17-20] 17  BP: (131-211)/() 131/105  SpO2:  [97 %-98 %] 97 %    Physical Exam  Vitals and nursing note reviewed.   Constitutional:       General: She is not in acute distress.  HENT:      Head: Normocephalic and atraumatic.      Nose: Nose normal. No rhinorrhea.      Mouth/Throat:      Pharynx: No oropharyngeal exudate or posterior oropharyngeal erythema.   Eyes:      General: No scleral icterus.        Right eye: No discharge.         Left eye: No discharge.   Cardiovascular:      Rate and Rhythm: Normal rate and regular rhythm.      Heart sounds: Normal heart sounds. No murmur heard.     No friction rub. No gallop.   Pulmonary:      Effort: Pulmonary effort is normal. No respiratory distress.      Breath sounds: Normal breath sounds. No stridor. No wheezing, rhonchi or rales.   Chest:      Chest wall: Tenderness present.   Abdominal:      General: Bowel sounds are normal. There is no distension.      Palpations: Abdomen is soft. There is no mass.      Tenderness: There is no abdominal  tenderness. There is no rebound.   Musculoskeletal:         General: No swelling or tenderness.      Cervical back: Neck supple. No rigidity.   Skin:     General: Skin is warm and dry.      Coloration: Skin is not cyanotic or jaundiced.      Findings: Bruising present.      Nails: There is no clubbing.   Neurological:      General: No focal deficit present.      Mental Status: She is alert and oriented to person, place, and time.      Cranial Nerves: No cranial nerve deficit.      Motor: No weakness.   Psychiatric:         Mood and Affect: Mood normal.         Behavior: Behavior normal.         Fluids    Intake/Output Summary (Last 24 hours) at 9/29/2023 1403  Last data filed at 9/29/2023 0835  Gross per 24 hour   Intake 480 ml   Output 1020 ml   Net -540 ml         Laboratory  Recent Labs     09/27/23  0310   WBC 4.5*   RBC 3.45*   HEMOGLOBIN 10.8*   HEMATOCRIT 32.6*   MCV 94.5   MCH 31.3   MCHC 33.1   RDW 50.5*   PLATELETCT 185   MPV 10.1       Recent Labs     09/27/23  0310   SODIUM 137   POTASSIUM 3.8   CHLORIDE 107   CO2 21   GLUCOSE 119*   BUN 33*   CREATININE 1.24   CALCIUM 8.7                       Imaging  EC-ECHOCARDIOGRAM COMPLETE W/O CONT   Final Result      DX-CHEST-PORTABLE (1 VIEW)   Final Result         1.  Left basilar atelectasis, no focal infiltrate   2.  Atherosclerosis      CT-CSPINE WITHOUT PLUS RECONS   Final Result         1.  Multilevel degenerative changes of the cervical spine limit diagnostic sensitivity of this examination.   2.  Mild anterolisthesis C4 on C5, appears likely related to degenerative facet arthrosis and stable since prior study, otherwise no acute traumatic bony injury of the cervical spine is apparent.   3.  Atherosclerosis      CT-HEAD W/O   Final Result         1.  No acute intracranial abnormality is identified, there are nonspecific white matter changes, commonly associated with small vessel ischemic disease.  Associated mild cerebral atrophy is noted.                 Assessment/Plan  * Chest pain- (present on admission)  Assessment & Plan  Atypical chest pain with chest wall tenderness  Likely related to musculoskeletal  Continue pain management with topical lidocaine  Scheduled Tylenol    Debility  Assessment & Plan  PT OT courtney    Fall from ground level- (present on admission)  Assessment & Plan  PT OT eval's   support stockings for orthostatic symptoms  Rehab referral  I ordered repeat CBC and chemistry panel    Chronic respiratory failure with hypoxia (HCC)  Assessment & Plan  On 2 L of O2 which is her baseline    COPD (chronic obstructive pulmonary disease) (HCC)  Assessment & Plan  Not in exacerbation  Continue home inhalers  Continue home oxygen    Atrial fibrillation (HCC)- (present on admission)  Assessment & Plan  Continue Rythmol and Xarelto    Essential hypertension- (present on admission)  Assessment & Plan  Uncontrolled with significantly elevated blood pressure today  Given symptomatic orthostatic drop she will need close monitoring as we start antihypertensive therapy patient at risk of worsening orthostatic symptoms and falls  I have ordered amlodipine 2.5 mg  Monitor blood pressure and recheck orthostatic vitals  Fall precautions           VTE prophylaxis: Xarelto    I have performed a physical exam and reviewed and updated ROS and Plan today (9/29/2023). In review of yesterday's note (9/28/2023), there are no changes except as documented above.

## 2023-09-29 NOTE — THERAPY
Physical Therapy Contact Note    Patient Name: Eun Be  Age:  87 y.o., Sex:  female  Medical Record #: [id number]  Today's Date: 9/29/2023 09/29/23 1040   Interdisciplinary Plan of Care Collaboration   IDT Collaboration with  Nursing   Collaboration Comments Attempted to see pt for PT tx. RN reported pt jaswant bed pan post supository and is still +orthostatics. PT will follow up when able.

## 2023-09-30 LAB
ANION GAP SERPL CALC-SCNC: 7 MMOL/L (ref 7–16)
BUN SERPL-MCNC: 21 MG/DL (ref 8–22)
CALCIUM SERPL-MCNC: 9.2 MG/DL (ref 8.5–10.5)
CHLORIDE SERPL-SCNC: 108 MMOL/L (ref 96–112)
CO2 SERPL-SCNC: 24 MMOL/L (ref 20–33)
CREAT SERPL-MCNC: 0.76 MG/DL (ref 0.5–1.4)
ERYTHROCYTE [DISTWIDTH] IN BLOOD BY AUTOMATED COUNT: 49.7 FL (ref 35.9–50)
GFR SERPLBLD CREATININE-BSD FMLA CKD-EPI: 75 ML/MIN/1.73 M 2
GLUCOSE SERPL-MCNC: 103 MG/DL (ref 65–99)
HCT VFR BLD AUTO: 32.4 % (ref 37–47)
HGB BLD-MCNC: 10.8 G/DL (ref 12–16)
MCH RBC QN AUTO: 32 PG (ref 27–33)
MCHC RBC AUTO-ENTMCNC: 33.3 G/DL (ref 32.2–35.5)
MCV RBC AUTO: 96.1 FL (ref 81.4–97.8)
PLATELET # BLD AUTO: 176 K/UL (ref 164–446)
PMV BLD AUTO: 10.4 FL (ref 9–12.9)
POTASSIUM SERPL-SCNC: 4.5 MMOL/L (ref 3.6–5.5)
RBC # BLD AUTO: 3.37 M/UL (ref 4.2–5.4)
SODIUM SERPL-SCNC: 139 MMOL/L (ref 135–145)
WBC # BLD AUTO: 4.7 K/UL (ref 4.8–10.8)

## 2023-09-30 PROCEDURE — A9270 NON-COVERED ITEM OR SERVICE: HCPCS | Performed by: HOSPITALIST

## 2023-09-30 PROCEDURE — 85027 COMPLETE CBC AUTOMATED: CPT

## 2023-09-30 PROCEDURE — 770001 HCHG ROOM/CARE - MED/SURG/GYN PRIV*

## 2023-09-30 PROCEDURE — 80048 BASIC METABOLIC PNL TOTAL CA: CPT

## 2023-09-30 PROCEDURE — 700102 HCHG RX REV CODE 250 W/ 637 OVERRIDE(OP): Performed by: HOSPITALIST

## 2023-09-30 PROCEDURE — 700101 HCHG RX REV CODE 250: Performed by: NURSE PRACTITIONER

## 2023-09-30 PROCEDURE — 99232 SBSQ HOSP IP/OBS MODERATE 35: CPT | Performed by: HOSPITALIST

## 2023-09-30 PROCEDURE — 36415 COLL VENOUS BLD VENIPUNCTURE: CPT

## 2023-09-30 RX ORDER — AMLODIPINE BESYLATE 5 MG/1
5 TABLET ORAL
Status: DISCONTINUED | OUTPATIENT
Start: 2023-10-01 | End: 2023-10-03 | Stop reason: HOSPADM

## 2023-09-30 RX ORDER — AMLODIPINE BESYLATE 5 MG/1
2.5 TABLET ORAL ONCE
Status: COMPLETED | OUTPATIENT
Start: 2023-09-30 | End: 2023-09-30

## 2023-09-30 RX ADMIN — DOCUSATE SODIUM 100 MG: 100 CAPSULE, LIQUID FILLED ORAL at 17:01

## 2023-09-30 RX ADMIN — AMLODIPINE BESYLATE 2.5 MG: 5 TABLET ORAL at 14:33

## 2023-09-30 RX ADMIN — LIDOCAINE PATCH 5% 2 PATCH: 700 PATCH TOPICAL at 14:30

## 2023-09-30 RX ADMIN — PROPAFENONE HYDROCHLORIDE 150 MG: 150 TABLET, FILM COATED ORAL at 17:02

## 2023-09-30 RX ADMIN — PROPAFENONE HYDROCHLORIDE 150 MG: 150 TABLET, FILM COATED ORAL at 05:17

## 2023-09-30 RX ADMIN — OXYCODONE HYDROCHLORIDE 10 MG: 10 TABLET ORAL at 04:43

## 2023-09-30 RX ADMIN — OXYCODONE HYDROCHLORIDE 10 MG: 10 TABLET ORAL at 22:59

## 2023-09-30 RX ADMIN — ALPRAZOLAM 0.25 MG: 0.25 TABLET ORAL at 04:42

## 2023-09-30 RX ADMIN — LEVOTHYROXINE SODIUM 88 MCG: 0.09 TABLET ORAL at 04:43

## 2023-09-30 RX ADMIN — ALPRAZOLAM 0.25 MG: 0.25 TABLET ORAL at 21:20

## 2023-09-30 RX ADMIN — RIVAROXABAN 15 MG: 15 TABLET, FILM COATED ORAL at 17:02

## 2023-09-30 RX ADMIN — OMEPRAZOLE 40 MG: 20 CAPSULE, DELAYED RELEASE ORAL at 04:42

## 2023-09-30 RX ADMIN — SIMVASTATIN 20 MG: 20 TABLET, FILM COATED ORAL at 17:02

## 2023-09-30 RX ADMIN — AMLODIPINE BESYLATE 2.5 MG: 5 TABLET ORAL at 04:42

## 2023-09-30 ASSESSMENT — PAIN DESCRIPTION - PAIN TYPE
TYPE: ACUTE PAIN

## 2023-09-30 ASSESSMENT — ENCOUNTER SYMPTOMS
CONSTIPATION: 1
FEVER: 0
ABDOMINAL PAIN: 0

## 2023-09-30 NOTE — CARE PLAN
The patient is Stable - Low risk of patient condition declining or worsening    Shift Goals  Clinical Goals: pain control, safety, mobility  Patient Goals: pain control, comfort  Family Goals: no family present    Progress made toward(s) clinical / shift goals:    Problem: Pain - Standard  Goal: Alleviation of pain or a reduction in pain to the patient’s comfort goal  Outcome: Progressing   Encouraged to verbalize pain. Will medicate as per MAR.    Problem: Skin Integrity  Goal: Skin integrity is maintained or improved  Outcome: Progressing  On Low Airloss mattress. Barrier cram applied for every after incontinence episode. Q 2 hour turn in effect. Frequent incontinence checks.     Problem: Knowledge Deficit - Standard  Goal: Patient and family/care givers will demonstrate understanding of plan of care, disease process/condition, diagnostic tests and medications  Outcome: Progressing  Note: POC discussed with the patient. Questions answered. Verbalized understanding.     Patient is not progressing towards the following goals:

## 2023-09-30 NOTE — PROGRESS NOTES
0650 Patient's in bed. Bedside report received from ABRAHAM Sheets RN's at the beginning of the shift.     [id number] Patient's sitting up in bed. Educated on the importance/use of IS at least 10x every hour while awake, able to reach 1250. Rates left axilla pain 4/10, head pack given. Declined pain medication at this time. Noted confusion, re oriented. Fall protocol in effect. Call light within reach. Reminded patient to call for assist. Assessment completed. No distress noted. Plan of care reviewed with the patient. Verbalized understanding.    0945  Patient's in bed. No distress noted.     1110 Patient's in bed. No distress noted.     1305 Patient's in bed.  No distress noted.     1344 New order received and acknowledged from Dr Michael Delaney (see MAR).     1525 Patient's in bed. No distress noted. Daughter visiting.     1715 Patient's sitting up in bed, having Dinner. No distress noted. Daughter visiting.    1900  Patient's in bed. No changes in status. Bedside report given to Oncoming ABRAHAM RN's (Kortney).

## 2023-09-30 NOTE — CARE PLAN
The patient is Stable - Low risk of patient condition declining or worsening    Shift Goals  Clinical Goals: reorient, mobility, pain  Patient Goals: comfort  Family Goals: no family present    Progress made toward(s) clinical / shift goals:  yes    Problem: Pain - Standard  Goal: Alleviation of pain or a reduction in pain to the patient’s comfort goal  Outcome: Progressing     Problem: Skin Integrity  Goal: Skin integrity is maintained or improved  Outcome: Progressing     Problem: Mobility  Goal: Patient's capacity to carry out activities will improve  Outcome: Progressing     Problem: Self Care  Goal: Patient will have the ability to perform ADLs independently or with assistance (bathe, groom, dress, toilet and feed)  Outcome: Progressing     Problem: Wound/ / Incision Healing  Goal: Patient's wound/surgical incision will decrease in size and heals properly  Outcome: Progressing       Patient is not progressing towards the following goals:

## 2023-09-30 NOTE — ASSESSMENT & PLAN NOTE
Improved continue support stockings  Tolerating titration of BP meds  Goal is in the 140s SBP.  Avoiding tight control in deference to her postural symptoms  High fall risk

## 2023-09-30 NOTE — PROGRESS NOTES
[facility] Medicine Daily Progress Note    Date of Service  9/30/2023    Chief Complaint  Eun Be is a 87 y.o. female admitted 9/25/2023 with chest wall pain after a fall    Hospital Course  Ms. Eun Be is a 87 y.o. female with past medical history of COPD, chronic hypoxic respiratory failure on 2 L of oxygen, atrial fibrillation on Xarelto who presents on 9/25/2023  for a fall and chest pain.      The patient was going to the bathroom when she suddenly fell on the right elbow that caused a bruise.  Afterwards she started complaining about pain in the left-sided chest that gets worse when she takes a deep breath. The pain is nonradiating and nonpositional.  She has not tried to exert herself since the pain started.  She denies losing consciousness or hitting her head.  She does take anticoagulation at home.     In ER, patient presents with a blood pressure of 242/104.  Chest x-ray interpreted by me found no acute pulmonary process. No definite rib fractures were seen. EKG found normal sinus rhythm with left bundle branch block.  Patient admitted to hospital medicine for management of care.    During this course of stay, an echocardiogram was pursued noting small pericardial effusion without evidence of hemodynamic compromise with normal left ventricular chamber size and LVEF approximately 60%, normal IVC size and normal RV size and systolic function..  Patient was also evaluated by physical therapy with noted possible left pectoralis muscle tear.  Left upper chest pain is reproducible specially when palpated.  Hold ACS work-up as this is unlikely cardiac related.  Troponin also negative and plateau.    Home health ordered for patient as she will likely need continuous PT/OT.  Patient lives alone but has family here in town.    Interval Problem Update    SBP elevated last night up to 200 systolic received IV labetalol  I increased amlodipine 5 mg  I reviewed BMP electrolytes are normal  I  reviewed CBC hemoglobin 10.8      I have discussed this patient's plan of care and discharge plan at IDT rounds today with Case Management, Nursing, Nursing leadership, and other members of the IDT team.    Consultants/Specialty       Code Status  Full Code    Disposition  Medically Cleared  I have placed the appropriate orders for post-discharge needs.    Review of Systems  Review of Systems   Constitutional:  Negative for fever.   Cardiovascular:  Positive for chest pain (Chest wall pain improved).   Gastrointestinal:  Positive for constipation. Negative for abdominal pain.   Skin:         Ecchymosis and bruising   All other systems reviewed and are negative.       Physical Exam  Temp:  [36.5 °C (97.7 °F)] 36.5 °C (97.7 °F)  Pulse:  [61-78] 61  Resp:  [16-18] 16  BP: ()/(52-78) 146/58  SpO2:  [95 %-99 %] 99 %    Physical Exam  Vitals and nursing note reviewed.   Constitutional:       General: She is not in acute distress.  HENT:      Head: Normocephalic and atraumatic.      Nose: Nose normal. No rhinorrhea.      Mouth/Throat:      Pharynx: No oropharyngeal exudate or posterior oropharyngeal erythema.   Eyes:      General: No scleral icterus.        Right eye: No discharge.         Left eye: No discharge.   Cardiovascular:      Rate and Rhythm: Normal rate and regular rhythm.      Heart sounds: Normal heart sounds. No murmur heard.     No friction rub. No gallop.   Pulmonary:      Effort: Pulmonary effort is normal. No respiratory distress.      Breath sounds: No stridor. Decreased breath sounds present. No wheezing, rhonchi or rales.   Chest:      Chest wall: Tenderness present.   Abdominal:      General: Bowel sounds are normal. There is no distension.      Palpations: Abdomen is soft. There is no mass.      Tenderness: There is no abdominal tenderness. There is no rebound.   Musculoskeletal:         General: No swelling or tenderness.      Cervical back: Neck supple. No rigidity.   Skin:     General: Skin  is warm and dry.      Coloration: Skin is not cyanotic or jaundiced.      Findings: Bruising present.      Nails: There is no clubbing.   Neurological:      General: No focal deficit present.      Mental Status: She is alert and oriented to person, place, and time.      Cranial Nerves: No cranial nerve deficit.      Motor: No weakness.   Psychiatric:         Mood and Affect: Mood normal.         Behavior: Behavior normal.         Fluids    Intake/Output Summary (Last 24 hours) at 9/30/2023 1343  Last data filed at 9/30/2023 0900  Gross per 24 hour   Intake 240 ml   Output 600 ml   Net -360 ml         Laboratory  Recent Labs     09/30/23  0158   WBC 4.7*   RBC 3.37*   HEMOGLOBIN 10.8*   HEMATOCRIT 32.4*   MCV 96.1   MCH 32.0   MCHC 33.3   RDW 49.7   PLATELETCT 176   MPV 10.4       Recent Labs     09/30/23  0158   SODIUM 139   POTASSIUM 4.5   CHLORIDE 108   CO2 24   GLUCOSE 103*   BUN 21   CREATININE 0.76   CALCIUM 9.2                       Imaging  EC-ECHOCARDIOGRAM COMPLETE W/O CONT   Final Result      DX-CHEST-PORTABLE (1 VIEW)   Final Result         1.  Left basilar atelectasis, no focal infiltrate   2.  Atherosclerosis      CT-CSPINE WITHOUT PLUS RECONS   Final Result         1.  Multilevel degenerative changes of the cervical spine limit diagnostic sensitivity of this examination.   2.  Mild anterolisthesis C4 on C5, appears likely related to degenerative facet arthrosis and stable since prior study, otherwise no acute traumatic bony injury of the cervical spine is apparent.   3.  Atherosclerosis      CT-HEAD W/O   Final Result         1.  No acute intracranial abnormality is identified, there are nonspecific white matter changes, commonly associated with small vessel ischemic disease.  Associated mild cerebral atrophy is noted.                Assessment/Plan  * Chest pain- (present on admission)  Assessment & Plan  Atypical chest pain with chest wall tenderness  Likely related to musculoskeletal  Continue pain  management with topical lidocaine  Scheduled Tylenol    Orthostatic hypotension- (present on admission)  Assessment & Plan  Improved continue support stockings  Monitor with titration of antihypertensive therapy    Debility  Assessment & Plan  PT OT courtney    Fall from ground level- (present on admission)  Assessment & Plan  PT OT eval's   support stockings for orthostatic symptoms  SNF referral    Chronic respiratory failure with hypoxia (HCC)  Assessment & Plan  On 2 L of O2 which is her baseline    COPD (chronic obstructive pulmonary disease) (HCC)  Assessment & Plan  Not in exacerbation  Continue home inhalers  Continue home oxygen    Atrial fibrillation (HCC)- (present on admission)  Assessment & Plan  Continue Rythmol and Xarelto    Essential hypertension- (present on admission)  Assessment & Plan  Uncontrolled   Given symptomatic orthostatic drop she will need close monitoring and slow titration of antihypertensive therapy  Increase amlodipine 5 mg daily  Monitor blood pressure  Fall precautions           VTE prophylaxis: Xarelto    I have performed a physical exam and reviewed and updated ROS and Plan today (9/30/2023). In review of yesterday's note (9/29/2023), there are no changes except as documented above.

## 2023-09-30 NOTE — DISCHARGE PLANNING
Case Management Discharge Planning    Admission Date: 9/25/2023  GMLOS: 2.7  ALOS: 1    6-Clicks ADL Score: 18  6-Clicks Mobility Score: 10     Pt discussed in rounds this AM. Patient Is anticipated to d/c home with  on Monday, 10/2/2023.

## 2023-10-01 LAB
APPEARANCE UR: CLEAR
BILIRUB UR QL STRIP.AUTO: NEGATIVE
COLOR UR: YELLOW
GLUCOSE UR STRIP.AUTO-MCNC: NEGATIVE MG/DL
KETONES UR STRIP.AUTO-MCNC: NEGATIVE MG/DL
LEUKOCYTE ESTERASE UR QL STRIP.AUTO: NEGATIVE
MICRO URNS: NORMAL
NITRITE UR QL STRIP.AUTO: NEGATIVE
PH UR STRIP.AUTO: 6.5 [PH] (ref 5–8)
PROT UR QL STRIP: NEGATIVE MG/DL
RBC UR QL AUTO: NEGATIVE
SP GR UR STRIP.AUTO: 1.01
UROBILINOGEN UR STRIP.AUTO-MCNC: 1 MG/DL

## 2023-10-01 PROCEDURE — A9270 NON-COVERED ITEM OR SERVICE: HCPCS | Performed by: HOSPITALIST

## 2023-10-01 PROCEDURE — 99232 SBSQ HOSP IP/OBS MODERATE 35: CPT | Performed by: HOSPITALIST

## 2023-10-01 PROCEDURE — 700101 HCHG RX REV CODE 250: Performed by: NURSE PRACTITIONER

## 2023-10-01 PROCEDURE — 81003 URINALYSIS AUTO W/O SCOPE: CPT

## 2023-10-01 PROCEDURE — 700102 HCHG RX REV CODE 250 W/ 637 OVERRIDE(OP): Performed by: HOSPITALIST

## 2023-10-01 PROCEDURE — 770001 HCHG ROOM/CARE - MED/SURG/GYN PRIV*

## 2023-10-01 RX ORDER — LISINOPRIL 2.5 MG/1
2.5 TABLET ORAL
Status: DISCONTINUED | OUTPATIENT
Start: 2023-10-01 | End: 2023-10-03 | Stop reason: HOSPADM

## 2023-10-01 RX ADMIN — LIDOCAINE PATCH 5% 2 PATCH: 700 PATCH TOPICAL at 14:11

## 2023-10-01 RX ADMIN — PROPAFENONE HYDROCHLORIDE 150 MG: 150 TABLET, FILM COATED ORAL at 04:35

## 2023-10-01 RX ADMIN — SIMVASTATIN 20 MG: 20 TABLET, FILM COATED ORAL at 16:30

## 2023-10-01 RX ADMIN — LEVOTHYROXINE SODIUM 88 MCG: 0.09 TABLET ORAL at 04:35

## 2023-10-01 RX ADMIN — AMLODIPINE BESYLATE 5 MG: 5 TABLET ORAL at 05:07

## 2023-10-01 RX ADMIN — TIZANIDINE 4 MG: 4 TABLET ORAL at 20:27

## 2023-10-01 RX ADMIN — DOCUSATE SODIUM 100 MG: 100 CAPSULE, LIQUID FILLED ORAL at 16:30

## 2023-10-01 RX ADMIN — DOCUSATE SODIUM 100 MG: 100 CAPSULE, LIQUID FILLED ORAL at 04:35

## 2023-10-01 RX ADMIN — ALPRAZOLAM 0.25 MG: 0.25 TABLET ORAL at 22:02

## 2023-10-01 RX ADMIN — PROPAFENONE HYDROCHLORIDE 150 MG: 150 TABLET, FILM COATED ORAL at 16:30

## 2023-10-01 RX ADMIN — LISINOPRIL 2.5 MG: 2.5 TABLET ORAL at 08:40

## 2023-10-01 RX ADMIN — OMEPRAZOLE 40 MG: 20 CAPSULE, DELAYED RELEASE ORAL at 04:35

## 2023-10-01 RX ADMIN — RIVAROXABAN 15 MG: 15 TABLET, FILM COATED ORAL at 16:30

## 2023-10-01 RX ADMIN — MAGNESIUM HYDROXIDE 30 ML: 1200 LIQUID ORAL at 14:11

## 2023-10-01 ASSESSMENT — ENCOUNTER SYMPTOMS
LOSS OF CONSCIOUSNESS: 0
SORE THROAT: 0
ABDOMINAL PAIN: 0
DIARRHEA: 0
PALPITATIONS: 0
SHORTNESS OF BREATH: 0
COUGH: 0
NAUSEA: 0
CHILLS: 0
BLURRED VISION: 0
BACK PAIN: 0
FEVER: 0
DOUBLE VISION: 0
DIZZINESS: 0
VOMITING: 0
HEADACHES: 0

## 2023-10-01 ASSESSMENT — PATIENT HEALTH QUESTIONNAIRE - PHQ9
1. LITTLE INTEREST OR PLEASURE IN DOING THINGS: NOT AT ALL
SUM OF ALL RESPONSES TO PHQ9 QUESTIONS 1 AND 2: 0

## 2023-10-01 ASSESSMENT — PAIN DESCRIPTION - PAIN TYPE
TYPE: ACUTE PAIN
TYPE: ACUTE PAIN

## 2023-10-01 NOTE — PROGRESS NOTES
[facility] Medicine Daily Progress Note    Date of Service  10/1/2023    Chief Complaint  Eun eB is a 87 y.o. female admitted 9/25/2023 with GLF    Hospital Course  Ms. Eun Be is a 87 y.o. female with past medical history of COPD, chronic hypoxic respiratory failure on 2 L of oxygen, atrial fibrillation on Xarelto, HTN, HLD, hypothyroid, DVT/PEwho presents on 9/25/2023  for a fall and chest pain.  presented after a GLF.  In ED /100    Interval Problem Update  Hiccups this am no other complaints    -170 overnight, was given one dose of labetalol prn    I have discussed this patient's plan of care and discharge plan at IDT rounds today with Case Management, Nursing, Nursing leadership, and other members of the IDT team.    Consultants/Specialty      Code Status  Full Code    Disposition  The patient is medically cleared for discharge to home or a post-acute facility.      I have placed the appropriate orders for post-discharge needs.    Review of Systems  Review of Systems   Constitutional:  Negative for chills and fever.   HENT:  Negative for nosebleeds and sore throat.    Eyes:  Negative for blurred vision and double vision.   Respiratory:  Negative for cough and shortness of breath.    Cardiovascular:  Positive for chest pain. Negative for palpitations and leg swelling.   Gastrointestinal:  Negative for abdominal pain, diarrhea, nausea and vomiting.   Genitourinary:  Negative for dysuria and urgency.   Musculoskeletal:  Negative for back pain.   Skin:  Negative for rash.   Neurological:  Negative for dizziness, loss of consciousness and headaches.        Physical Exam  Temp:  [36.4 °C (97.5 °F)-37.1 °C (98.8 °F)] 36.4 °C (97.5 °F)  Pulse:  [59-66] 59  Resp:  [16-17] 16  BP: (140-159)/(51-65) 159/65  SpO2:  [96 %-99 %] 99 %    Physical Exam  Constitutional:       General: She is not in acute distress.     Appearance: Normal appearance. She is well-developed. She is not  diaphoretic.   HENT:      Head: Normocephalic and atraumatic.   Neck:      Vascular: No JVD.   Cardiovascular:      Rate and Rhythm: Normal rate and regular rhythm.      Heart sounds: No murmur heard.  Pulmonary:      Effort: Pulmonary effort is normal. No respiratory distress.      Breath sounds: No stridor. No wheezing or rales.   Abdominal:      Palpations: Abdomen is soft.      Tenderness: There is no abdominal tenderness. There is no guarding or rebound.   Musculoskeletal:         General: No tenderness.      Right lower leg: No edema.      Left lower leg: No edema.   Skin:     General: Skin is warm and dry.      Coloration: Skin is pale.      Findings: No rash.      Comments: Bruising R elbow   Neurological:      Mental Status: She is alert and oriented to person, place, and time.   Psychiatric:         Mood and Affect: Mood normal.         Behavior: Behavior normal.         Thought Content: Thought content normal.         Fluids    Intake/Output Summary (Last 24 hours) at 10/1/2023 0634  Last data filed at 9/30/2023 1823  Gross per 24 hour   Intake 480 ml   Output 500 ml   Net -20 ml       Laboratory  Recent Labs     09/30/23  0158   WBC 4.7*   RBC 3.37*   HEMOGLOBIN 10.8*   HEMATOCRIT 32.4*   MCV 96.1   MCH 32.0   MCHC 33.3   RDW 49.7   PLATELETCT 176   MPV 10.4     Recent Labs     09/30/23  0158   SODIUM 139   POTASSIUM 4.5   CHLORIDE 108   CO2 24   GLUCOSE 103*   BUN 21   CREATININE 0.76   CALCIUM 9.2                   Imaging  EC-ECHOCARDIOGRAM COMPLETE W/O CONT   Final Result      DX-CHEST-PORTABLE (1 VIEW)   Final Result         1.  Left basilar atelectasis, no focal infiltrate   2.  Atherosclerosis      CT-CSPINE WITHOUT PLUS RECONS   Final Result         1.  Multilevel degenerative changes of the cervical spine limit diagnostic sensitivity of this examination.   2.  Mild anterolisthesis C4 on C5, appears likely related to degenerative facet arthrosis and stable since prior study, otherwise no acute  traumatic bony injury of the cervical spine is apparent.   3.  Atherosclerosis      CT-HEAD W/O   Final Result         1.  No acute intracranial abnormality is identified, there are nonspecific white matter changes, commonly associated with small vessel ischemic disease.  Associated mild cerebral atrophy is noted.              Assessment/Plan  * Chest pain- (present on admission)  Assessment & Plan  Atypical chest pain with chest wall tenderness  Likely related to musculoskeletal  Continue pain management with topical lidocaine  Scheduled Tylenol    Orthostatic hypotension- (present on admission)  Assessment & Plan  Improved continue support stockings  Monitor with titration of antihypertensive therapy  High fall risk    Debility  Assessment & Plan  PT OT eval  Plan SNF Dc for further rehab    Fall from ground level- (present on admission)  Assessment & Plan  PT OT eval's   support stockings for orthostatic symptoms  SNF referral    Chronic respiratory failure with hypoxia (HCC)  Assessment & Plan  On 2 L of O2 which is her baseline    COPD (chronic obstructive pulmonary disease) (HCC)  Assessment & Plan  Not in exacerbation  Continue home inhalers  Continue home oxygen  O2/RT protocols    Atrial fibrillation (HCC)- (present on admission)  Assessment & Plan  Propafenone  rivaroxaban    Essential hypertension- (present on admission)  Assessment & Plan  Uncontrolled   Given symptomatic orthostatic drop she will need close monitoring and slow titration of antihypertensive therapy  amlodipine 5 mg daily  Start low dose lisinopril  Monitor blood pressure  Fall precautions           VTE prophylaxis:    therapeutic anticoagulation with xarelto 15 mg daily with meals      I have performed a physical exam and reviewed and updated ROS and Plan today (10/1/2023). In review of yesterday's note (9/30/2023), there are no changes except as documented above.

## 2023-10-01 NOTE — CARE PLAN
The patient is Stable - Low risk of patient condition declining or worsening    Shift Goals  Clinical Goals: pain, safety  Patient Goals: pain, sleep  Family Goals: na    Progress made toward(s) clinical / shift goals:  yes    Problem: Pain - Standard  Goal: Alleviation of pain or a reduction in pain to the patient’s comfort goal  Outcome: Progressing     Problem: Skin Integrity  Goal: Skin integrity is maintained or improved  Outcome: Progressing     Problem: Mobility  Goal: Patient's capacity to carry out activities will improve  Outcome: Progressing     Problem: Self Care  Goal: Patient will have the ability to perform ADLs independently or with assistance (bathe, groom, dress, toilet and feed)  Outcome: Progressing     Problem: Wound/ / Incision Healing  Goal: Patient's wound/surgical incision will decrease in size and heals properly  Outcome: Progressing       Patient is not progressing towards the following goals:

## 2023-10-01 NOTE — CARE PLAN
The patient is Watcher - Medium risk of patient condition declining or worsening    Shift Goals  Clinical Goals: pain management, BP control, mobility  Patient Goals: comfort  Family Goals: not present    Progress made toward(s) clinical / shift goals:  yes    Problem: Pain - Standard  Goal: Alleviation of pain or a reduction in pain to the patient’s comfort goal  Outcome: Progressing     Problem: Skin Integrity  Goal: Skin integrity is maintained or improved  Outcome: Progressing     Problem: Mobility  Goal: Patient's capacity to carry out activities will improve  Outcome: Progressing  Flowsheets (Taken 10/1/2023 1052)  Mobility:   Encouraged mobilization per interdisciplinary team recommendations   Provided assistive devices   Administered pain management to allow progressive mobilization   Monitored for signs of activity intolerance   Provided rest periods between activities   Collaborated with PT/OT     Problem: Self Care  Goal: Patient will have the ability to perform ADLs independently or with assistance (bathe, groom, dress, toilet and feed)  Outcome: Progressing     Problem: Wound/ / Incision Healing  Goal: Patient's wound/surgical incision will decrease in size and heals properly  Outcome: Progressing          none

## 2023-10-02 PROCEDURE — A9270 NON-COVERED ITEM OR SERVICE: HCPCS | Performed by: HOSPITALIST

## 2023-10-02 PROCEDURE — 700102 HCHG RX REV CODE 250 W/ 637 OVERRIDE(OP): Performed by: HOSPITALIST

## 2023-10-02 PROCEDURE — 770001 HCHG ROOM/CARE - MED/SURG/GYN PRIV*

## 2023-10-02 PROCEDURE — 94664 DEMO&/EVAL PT USE INHALER: CPT

## 2023-10-02 PROCEDURE — 99231 SBSQ HOSP IP/OBS SF/LOW 25: CPT | Performed by: HOSPITALIST

## 2023-10-02 PROCEDURE — 97530 THERAPEUTIC ACTIVITIES: CPT

## 2023-10-02 PROCEDURE — 700101 HCHG RX REV CODE 250: Performed by: NURSE PRACTITIONER

## 2023-10-02 RX ADMIN — PROPAFENONE HYDROCHLORIDE 150 MG: 150 TABLET, FILM COATED ORAL at 04:48

## 2023-10-02 RX ADMIN — RIVAROXABAN 15 MG: 15 TABLET, FILM COATED ORAL at 17:15

## 2023-10-02 RX ADMIN — OMEPRAZOLE 40 MG: 20 CAPSULE, DELAYED RELEASE ORAL at 04:53

## 2023-10-02 RX ADMIN — LIDOCAINE PATCH 5% 2 PATCH: 700 PATCH TOPICAL at 17:15

## 2023-10-02 RX ADMIN — LEVOTHYROXINE SODIUM 88 MCG: 0.09 TABLET ORAL at 04:47

## 2023-10-02 RX ADMIN — LISINOPRIL 2.5 MG: 2.5 TABLET ORAL at 04:48

## 2023-10-02 RX ADMIN — SIMVASTATIN 20 MG: 20 TABLET, FILM COATED ORAL at 17:15

## 2023-10-02 RX ADMIN — AMLODIPINE BESYLATE 5 MG: 5 TABLET ORAL at 04:48

## 2023-10-02 RX ADMIN — PROPAFENONE HYDROCHLORIDE 150 MG: 150 TABLET, FILM COATED ORAL at 17:15

## 2023-10-02 RX ADMIN — ALPRAZOLAM 0.25 MG: 0.25 TABLET ORAL at 21:41

## 2023-10-02 RX ADMIN — DOCUSATE SODIUM 100 MG: 100 CAPSULE, LIQUID FILLED ORAL at 17:15

## 2023-10-02 ASSESSMENT — ENCOUNTER SYMPTOMS
NAUSEA: 0
DIARRHEA: 0
DOUBLE VISION: 0
PALPITATIONS: 0
ABDOMINAL PAIN: 0
HEADACHES: 0
BACK PAIN: 0
LOSS OF CONSCIOUSNESS: 0
VOMITING: 0
DIZZINESS: 0
BLURRED VISION: 0
FEVER: 0
CHILLS: 0
COUGH: 0
SHORTNESS OF BREATH: 0

## 2023-10-02 ASSESSMENT — GAIT ASSESSMENTS
GAIT LEVEL OF ASSIST: MINIMAL ASSIST
ASSISTIVE DEVICE: FRONT WHEEL WALKER
DISTANCE (FEET): 3
DEVIATION: SHUFFLED GAIT;BRADYKINETIC

## 2023-10-02 ASSESSMENT — COGNITIVE AND FUNCTIONAL STATUS - GENERAL
DRESSING REGULAR UPPER BODY CLOTHING: A LITTLE
SUGGESTED CMS G CODE MODIFIER MOBILITY: CL
DRESSING REGULAR LOWER BODY CLOTHING: A LOT
WALKING IN HOSPITAL ROOM: A LITTLE
MOBILITY SCORE: 13
TOILETING: A LITTLE
SUGGESTED CMS G CODE MODIFIER MOBILITY: CL
CLIMB 3 TO 5 STEPS WITH RAILING: TOTAL
STANDING UP FROM CHAIR USING ARMS: A LITTLE
MOVING TO AND FROM BED TO CHAIR: A LOT
TURNING FROM BACK TO SIDE WHILE IN FLAT BAD: UNABLE
SUGGESTED CMS G CODE MODIFIER DAILY ACTIVITY: CK
TURNING FROM BACK TO SIDE WHILE IN FLAT BAD: A LITTLE
MOBILITY SCORE: 10
MOVING TO AND FROM BED TO CHAIR: UNABLE
DAILY ACTIVITIY SCORE: 18
HELP NEEDED FOR BATHING: A LOT
WALKING IN HOSPITAL ROOM: A LITTLE
CLIMB 3 TO 5 STEPS WITH RAILING: TOTAL
STANDING UP FROM CHAIR USING ARMS: A LITTLE
MOVING FROM LYING ON BACK TO SITTING ON SIDE OF FLAT BED: UNABLE
MOVING FROM LYING ON BACK TO SITTING ON SIDE OF FLAT BED: UNABLE

## 2023-10-02 ASSESSMENT — PAIN DESCRIPTION - PAIN TYPE
TYPE: ACUTE PAIN
TYPE: ACUTE PAIN

## 2023-10-02 NOTE — RESPIRATORY CARE
"COPD EDUCATION by COPD CLINICAL EDUCATOR  10/2/2023  at  4:17 PM by Roxanne Jameson, RRT     Patient interviewed by COPD education team.  Patient unable to participate in full program.  A short intervention has been conducted.  A comprehensive packet including information about COPD, types of treatments to manage their disease and safe home Oxygen usage was provided and reviewed with patient at the bedside. Went over action plan with when to use advair and albuterol inhaler. Patient states she has a spacer at home and uses it with her albuterol.     Patient states she has lots of help at home between her children and nurses coming to the house. Pending D/C to SNF. Patient quit smoking in 1999.    COPD Assessment  COPD Clinical Specialists ONLY  COPD Education Initiated: Yes--Short Intervention   Is this a COPD exacerbation patient?: No  DME Company: unknown  DME Equipment Type: 2L  Physician Name: NICOLA ALEGRIA  Pulmonologist Name: not established with a pulmonologist  Referrals Initiated: Yes  Pulmonary Rehab: N/A  Smoking Cessation: N/A (quit smoking in 1999)  Hospice: N/A  Home Health Care: N/A  Mobile Urgent Care Services: N/A  [facility]iatric Special[facility] Group: Yes  Private In-Home Care Agency: N/A  $ Demo/Eval of SVN's, MDI's and Aerosols: Yes  (OP) Pulmonary Function Testing:  (not on file)  Interdisciplinary Rounds: Attendance at Rounds (30 Min)    PFT Results    No results found for: \"PFT\"    Meds to Beds  Would the patient like to opt in for Bedside Medication Delivery at Discharge?: Yes, interested     MY COPD ACTION PLAN     It is recommended that patients and physicians /healthcare providers complete this action plan together. This plan should be discussed at each physician visit and updated as needed.    The green, yellow and red zones show groups of symptoms of COPD. This list of symptoms is not comprehensive, and you may experience other symptoms. In the \"Actions\" column, your healthcare provider has " "recommended actions for you to take based on your symptoms.    Patient Name: Eun Be   YOB: 1935   Last Updated on: 10/2/2023  4:16 PM   Green Zone:  I am doing well today Actions     Usual activitiy and exercise level   Take daily medications     Usual amounts of cough and phlegm/mucus   Use oxygen as prescribed     Sleep well at night   Continue regular exercise/diet plan     Appetite is good   At all times avoid cigarette smoke, inhaled irritants     Daily Medications (these medications are taken every day):   Fluticosone/Salmeterol (Advair) 1 Puff Twice daily     Additional Information:  Rinse mouth after taking    Yellow Zone:  I am having a bad day or a COPD flare Actions     More breathless than usual   Continue daily medications     I have less energy for my daily activities   Use quick relief inhaler as ordered     Increased or thicker phlegm/mucus   Use oxygen as prescribed     Using quick relief inhaler/nebulizer more often   Get plenty of rest     Swelling of ankles more than usual   Use pursed lip breathing     More coughing than usual   At all times avoid cigarette smoke, inhaled irritants     I feel like I have a \"chest cold\"     Poor sleep and my symptoms woke me up     My appetite is not good     My medicine is not helping      Call provider immediately if symptoms don’t improve     Continue daily medications, add rescue medications:   Albuterol 2 Puffs Every 6 hours PRN       Medications to be used during a flare up, (as Discussed with Provider):           Additional Information:  Use the spacer with your rescue inhaler    Red Zone:  I need urgent medical care Actions     Severe shortness of breath even at rest   Call 911 or seek medical care immediately     Not able to do any activity because of breathing      Fever or shaking chills      Feeling confused or very drowsy       Chest pains      Coughing up blood                  "

## 2023-10-02 NOTE — DISCHARGE PLANNING
Case Management Discharge Planning    Admission Date: 9/25/2023  GMLOS: 2.7  ALOS: 3    6-Clicks ADL Score: 18  6-Clicks Mobility Score: 10  PT and/or OT Eval ordered: Yes  Post-acute Referrals Ordered: Yes  Post-acute Choice Obtained: Yes  Has referral(s) been sent to post-acute provider:  Yes      Anticipated Discharge Dispo: Discharge Disposition: D/T to home under A care in anticipation of covered skilled care (06)  Discharge Address: [address] [place] [address]  Discharge Contact Phone Number: [phone]    DME Needed: No    Action(s) Taken: Updated Provider/Nurse on Discharge Plan    Escalations Completed: None    Medically Clear: Yes    Next Steps: f/u insurance authorization     Barriers to Discharge: Pending Placement    Is the patient up for discharge tomorrow: Yes    Is transport arranged for discharge disposition: Yes    Patient was to be discharged today, but per the DPA and the , it was communicated to the SNF that the patient was discharged.   COLTEN spoke with Olive with [facility] and she stated that she will obtain an authorization for [facility] on tomorrow since her previous authorization was discarded.

## 2023-10-02 NOTE — DISCHARGE PLANNING
Agency/Facility Name: [facility]  Outcome: Left voice message for [facility] regarding bed availability. Requested a call back.

## 2023-10-02 NOTE — THERAPY
Physical Therapy   Daily Treatment     Patient Name: Eun Be  Age:  87 y.o., Sex:  female  Medical Record #: [id number]  Today's Date: 10/2/2023     Precautions  Precautions: Fall Risk  Comments: L pectoral pain, labile BP    Assessment    Pt requiring assistance with all mobility, continues to be weak, decreased activity tolerance, and dizzy. Pt not tremulous today. Low BP with sitting in recliner. Pt was min soiled with BM. Assisted with pericare. Assisted pt back to bed. Pt will continue to benefit from acute physical therapy to assist towards established goals. Anticipate pt will benefit from post acute placement upon DC from hospital.        Plan    Treatment Plan Status: Continue Current Treatment Plan  Type of Treatment: Bed Mobility, Gait Training, Neuro Re-Education / Balance, Self Care / Home Evaluation, Therapeutic Activities, Therapeutic Exercise  Treatment Frequency: 4 Times per Week  Treatment Duration: Until Therapy Goals Met    DC Equipment Recommendations: None  Discharge Recommendations: Recommend post-acute placement for additional physical therapy services prior to discharge home      Subjective    Pt sitting in recliner since this AM, daughter in room. Pt c/o feeling dizzy. Agreed to PT.      Objective       10/02/23 1407   Precautions   Precautions Fall Risk   Comments L pectoral pain, labile BP   Pain 0 - 10 Group   Location Axilla   Location Orientation Left   Therapist Pain Assessment During Activity;Nurse Notified   Cognition    Cognition / Consciousness X   Level of Consciousness Alert   Comments pt continues to need to be redirected often due to very talkative and possible min decreased short term memory impairment and tangential   Other Treatments   Other Treatments Provided BP: seated in recliner 103/46 HR 73. Sitting at EOB after standing and transferring 119/49 HR 78. pt c.o dizziness before and after transfer. Dizziness subsided once in bed.   Balance   Sitting Balance  (Static) Fair   Sitting Balance (Dynamic) Fair   Standing Balance (Static) Fair -   Standing Balance (Dynamic) Fair -   Weight Shift Sitting Fair   Weight Shift Standing Fair   Skilled Intervention Verbal Cuing;Tactile Cuing;Facilitation   Comments standing with FWW   Bed Mobility    Sit to Supine Standby Assist   Scooting Minimal Assist   Skilled Intervention Verbal Cuing;Tactile Cuing;Sequencing   Comments HOB elevated for back to bed, pt required sequencing for scooting up in bed   Gait Analysis   Gait Level Of Assist Minimal Assist   Assistive Device Front Wheel Walker   Distance (Feet) 3   Deviation Shuffled Gait;Bradykinetic   Skilled Intervention Tactile Cuing;Verbal Cuing   Functional Mobility   Sit to Stand Minimal Assist   Bed, Chair, Wheelchair Transfer Minimal Assist   Transfer Method Stand Step   Mobility with FWW   How much difficulty does the patient currently have...   Turning over in bed (including adjusting bedclothes, sheets and blankets)? 1   Sitting down on and standing up from a chair with arms (e.g., wheelchair, bedside commode, etc.) 1   Moving from lying on back to sitting on the side of the bed? 1   How much help from another person does the patient currently need...   Moving to and from a bed to a chair (including a wheelchair)? 3   Need to walk in a hospital room? 3   Climbing 3-5 steps with a railing? 1   6 clicks Mobility Score 10   Activity Tolerance   Sitting in Chair in recliner upon entry, stated she was getting dizzy   Sitting Edge of Bed 5 minutes   Comments limited amb due to low BP and dizziness   Short Term Goals    Short Term Goal # 1 Pt will perform bed mobility with supervision to progress function in 6 visits.   Goal Outcome # 1 Progressing slower than expected   Short Term Goal # 2 Pt will transfer with FWW and supervision to progress function in 6 visits.   Goal Outcome # 2 Progressing as expected   Short Term Goal # 3 Pt will ambulate 150ft with FWW and supervision to  progress function in 6 visits.   Goal Outcome # 3 Progressing slower than expected   Education Group   Education Provided Role of Physical Therapist;Use of Assistive Device;Gait Training   Role of Physical Therapist Patient Response Patient;Acceptance;Explanation;Verbal Demonstration;Family   Gait Training Patient Response Patient;Acceptance;Explanation;Action Demonstration;Family   Use of Assistive Device Patient Response Patient;Acceptance;Explanation;Action Demonstration;Demonstration;Family   Physical Therapy Treatment Plan   Physical Therapy Treatment Plan Continue Current Treatment Plan   Treatment Plan  Bed Mobility;Gait Training;Neuro Re-Education / Balance;Self Care / Home Evaluation;Therapeutic Activities;Therapeutic Exercise   Treatment Frequency 4 Times per Week   Duration Until Therapy Goals Met   Anticipated Discharge Equipment and Recommendations   DC Equipment Recommendations None   Discharge Recommendations Recommend post-acute placement for additional physical therapy services prior to discharge home   Interdisciplinary Plan of Care Collaboration   IDT Collaboration with  Nursing;Family / Caregiver   Patient Position at End of Therapy In Bed;Bed Alarm On;Call Light within Reach;Tray Table within Reach;Phone within Reach

## 2023-10-02 NOTE — PROGRESS NOTES
Hospital Medicine Daily Progress Note    Date of Service  10/2/2023    Chief Complaint  Eun Be is a 87 y.o. female admitted 9/25/2023 with GLF    Hospital Course  Ms. Eun Be is a 87 y.o. female with past medical history of COPD, chronic hypoxic respiratory failure on 2 L of oxygen, atrial fibrillation on Xarelto, HTN, HLD, hypothyroid, DVT/PEwho presents on 9/25/2023  for a fall and chest pain.  presented after a GLF.  In ED /100    Interval Problem Update  Hiccups resolved  Up with staff this am with no dizziness    -150 overnight; has not required IV coverage for hypertension    I have discussed this patient's plan of care and discharge plan at IDT rounds today with Case Management, Nursing, Nursing leadership, and other members of the IDT team.    Consultants/Specialty      Code Status  Full Code    Disposition  The patient is medically cleared for discharge to home or a post-acute facility.      I have placed the appropriate orders for post-discharge needs.    Review of Systems  Review of Systems   Constitutional:  Negative for chills and fever.   Eyes:  Negative for blurred vision and double vision.   Respiratory:  Negative for cough and shortness of breath.    Cardiovascular:  Positive for chest pain. Negative for palpitations and leg swelling.   Gastrointestinal:  Negative for abdominal pain, diarrhea, nausea and vomiting.   Genitourinary:  Negative for dysuria and urgency.   Musculoskeletal:  Negative for back pain.   Neurological:  Negative for dizziness, loss of consciousness and headaches.        Physical Exam  Temp:  [36.5 °C (97.7 °F)] 36.5 °C (97.7 °F)  Pulse:  [52-70] 64  Resp:  [16-18] 16  BP: (118-160)/(53-68) 118/58  SpO2:  [98 %-99 %] 98 %    Physical Exam  Constitutional:       General: She is not in acute distress.     Appearance: Normal appearance. She is well-developed. She is not diaphoretic.   HENT:      Head: Normocephalic and atraumatic.   Eyes:       Conjunctiva/sclera: Conjunctivae normal.   Neck:      Vascular: No JVD.   Cardiovascular:      Rate and Rhythm: Normal rate and regular rhythm.      Heart sounds: No murmur heard.  Pulmonary:      Effort: Pulmonary effort is normal. No respiratory distress.      Breath sounds: No stridor. No wheezing or rales.   Abdominal:      Palpations: Abdomen is soft.      Tenderness: There is no abdominal tenderness. There is no guarding or rebound.   Musculoskeletal:         General: No tenderness.      Right lower leg: No edema.      Left lower leg: No edema.   Skin:     General: Skin is warm and dry.      Coloration: Skin is pale.      Findings: No rash.      Comments: Bruising R elbow   Neurological:      Mental Status: She is alert and oriented to person, place, and time.   Psychiatric:         Mood and Affect: Mood normal.         Behavior: Behavior normal.         Thought Content: Thought content normal.         Fluids  No intake or output data in the 24 hours ending 10/02/23 1132      Laboratory  Recent Labs     09/30/23  0158   WBC 4.7*   RBC 3.37*   HEMOGLOBIN 10.8*   HEMATOCRIT 32.4*   MCV 96.1   MCH 32.0   MCHC 33.3   RDW 49.7   PLATELETCT 176   MPV 10.4       Recent Labs     09/30/23  0158   SODIUM 139   POTASSIUM 4.5   CHLORIDE 108   CO2 24   GLUCOSE 103*   BUN 21   CREATININE 0.76   CALCIUM 9.2                     Imaging  EC-ECHOCARDIOGRAM COMPLETE W/O CONT   Final Result      DX-CHEST-PORTABLE (1 VIEW)   Final Result         1.  Left basilar atelectasis, no focal infiltrate   2.  Atherosclerosis      CT-CSPINE WITHOUT PLUS RECONS   Final Result         1.  Multilevel degenerative changes of the cervical spine limit diagnostic sensitivity of this examination.   2.  Mild anterolisthesis C4 on C5, appears likely related to degenerative facet arthrosis and stable since prior study, otherwise no acute traumatic bony injury of the cervical spine is apparent.   3.  Atherosclerosis      CT-HEAD W/O   Final Result          1.  No acute intracranial abnormality is identified, there are nonspecific white matter changes, commonly associated with small vessel ischemic disease.  Associated mild cerebral atrophy is noted.                Assessment/Plan  * Chest pain- (present on admission)  Assessment & Plan  Atypical chest pain with chest wall tenderness  Likely related to musculoskeletal  Continue pain management with topical lidocaine  Scheduled Tylenol    Orthostatic hypotension- (present on admission)  Assessment & Plan  Improved continue support stockings  Tolerating titration of BP meds  Goal is in the 140s SBP.  Avoiding tight control in deference to her postural symptoms  High fall risk    Debility  Assessment & Plan  PT OT eval  Plan SNF Dc for further rehab    Fall from ground level- (present on admission)  Assessment & Plan  PT OT eval's   support stockings for orthostatic symptoms  SNF referral    Chronic respiratory failure with hypoxia (HCC)  Assessment & Plan  On 2 L of O2 which is her baseline    COPD (chronic obstructive pulmonary disease) (HCC)  Assessment & Plan  Not in exacerbation  Continue home inhalers  Continue home oxygen  O2/RT protocols    Atrial fibrillation (HCC)- (present on admission)  Assessment & Plan  Propafenone  rivaroxaban    Essential hypertension- (present on admission)  Assessment & Plan  Uncontrolled   Given symptomatic orthostatic drop she will need close monitoring and slow titration of antihypertensive therapy  amlodipine 5 mg daily  Started low dose lisinopril 10/1  Pressures more reasonably controled now 130-150; pt up with staff and no postural symptoms  Not aiming for tight control given problems postural dizziness and subsequent GLFs  Monitor blood pressure  Fall precautions           VTE prophylaxis:    therapeutic anticoagulation with xarelto 15 mg daily with meals      I have performed a physical exam and reviewed and updated ROS and Plan today (10/2/2023). In review of yesterday's  note (10/1/2023), there are no changes except as documented above.

## 2023-10-02 NOTE — CARE PLAN
The patient is Stable - Low risk of patient condition declining or worsening    Shift Goals  Clinical Goals: pain, ambulate, urine output  Patient Goals: ambulate, discharge home  Family Goals: not present      Problem: Pain - Standard  Goal: Alleviation of pain or a reduction in pain to the patient’s comfort goal  Outcome: Progressing  Note: Educated patient on the use of 0-10 pain scale and use of pain descriptors. Administered pain medication when needed per MAR. Non-pharmacological for pain control such as rest, repositioning, and enforcing a calm and conductive environment.      Problem: Skin Integrity  Goal: Skin integrity is maintained or improved  Outcome: Progressing  Note: 4 eyes assessment completed.    Preventative measures in place:    Q2 hour turns. Pillows in place for support and cushion, heels floated, 2 RN skin assessment, ensuring medical devices/tubing are not under patient, repositioning medical equipment q2 hour, mepilex in place, pressure redistribution mattress/low air loss mattress, mobility as tolerated. Skin assessed under bony prominence, and high pressure point areas. Z flow pillow, taps system, gray foam under silicone O2 tubing, moisturizer, and z guard.      Problem: Infection - Standard  Goal: Patient will remain free from infection  Outcome: Progressing  Note: Assessing for signs and symptoms of infection. Standard handwashing precautions in place, antibiotics administered per MAR.

## 2023-10-02 NOTE — DISCHARGE PLANNING
Agency/Facility Name: [facility]  Spoke To: Linn  Outcome: Bed available tomorrow, transport arranged via facility wheelchair van @ 1300.    Received call from [employer]- insurance auth given to [facility].     RNCM notified

## 2023-10-02 NOTE — DISCHARGE PLANNING
Agency/Facility Name: [facility]  Spoke To: Linn  Outcome: Will submit for insurance authorization.

## 2023-10-02 NOTE — PROGRESS NOTES
Assumed care of patient and received report from Missy ANAYA. Assessment completed.Pt A&Ox 4. Respirations are even and unlabored 2LNC. Pt reports overall soreness from fall. Pt's abdomen is slightly firm and distended. Medical pt, VS stable, call light and belongings are within reach. POC updated. Expecting to discharge today. Waiting to hear back from [facility] facility for bed availability. Pt educated on room and call light, pt verbalized understanding. Needs met.

## 2023-10-02 NOTE — CARE PLAN
The patient is Stable - Low risk of patient condition declining or worsening    Shift Goals  Clinical Goals: pain, mobility  Patient Goals: comfort, mobility  Family Goals: na    Progress made toward(s) clinical / shift goals:  yes    Problem: Pain - Standard  Goal: Alleviation of pain or a reduction in pain to the patient’s comfort goal  Outcome: Progressing     Problem: Skin Integrity  Goal: Skin integrity is maintained or improved  Outcome: Progressing     Problem: Mobility  Goal: Patient's capacity to carry out activities will improve  Outcome: Progressing  Flowsheets (Taken 10/1/2023 2127)  Mobility: Encouraged mobilization per interdisciplinary team recommendations     Problem: Self Care  Goal: Patient will have the ability to perform ADLs independently or with assistance (bathe, groom, dress, toilet and feed)  Outcome: Progressing     Problem: Wound/ / Incision Healing  Goal: Patient's wound/surgical incision will decrease in size and heals properly  Outcome: Progressing     Problem: Infection - Standard  Goal: Patient will remain free from infection  Outcome: Progressing       Patient is not progressing towards the following goals:

## 2023-10-02 NOTE — PROGRESS NOTES
@ about 0545 per patient, she is feeling abdominal discomfort and tenderness. @ 0600 complete a bladder scan and bladder scan volume was over 1500 mL. The patient is incontinence to urine but voided three times during the night shift. [facility] the above information. Order was given to straight scan the patient. Patient was straight scan and urine out put volume was 1900 mL.

## 2023-10-03 VITALS
RESPIRATION RATE: 32 BRPM | OXYGEN SATURATION: 97 % | WEIGHT: 132.28 LBS | SYSTOLIC BLOOD PRESSURE: 115 MMHG | HEIGHT: 70 IN | HEART RATE: 64 BPM | DIASTOLIC BLOOD PRESSURE: 51 MMHG | TEMPERATURE: 97.7 F | BODY MASS INDEX: 18.94 KG/M2

## 2023-10-03 LAB
ALBUMIN SERPL BCP-MCNC: 3.2 G/DL (ref 3.2–4.9)
BUN SERPL-MCNC: 48 MG/DL (ref 8–22)
CALCIUM ALBUM COR SERPL-MCNC: 10.1 MG/DL (ref 8.5–10.5)
CALCIUM SERPL-MCNC: 9.5 MG/DL (ref 8.5–10.5)
CHLORIDE SERPL-SCNC: 107 MMOL/L (ref 96–112)
CO2 SERPL-SCNC: 22 MMOL/L (ref 20–33)
CREAT SERPL-MCNC: 0.99 MG/DL (ref 0.5–1.4)
GFR SERPLBLD CREATININE-BSD FMLA CKD-EPI: 55 ML/MIN/1.73 M 2
GLUCOSE SERPL-MCNC: 103 MG/DL (ref 65–99)
PHOSPHATE SERPL-MCNC: 3.5 MG/DL (ref 2.5–4.5)
POTASSIUM SERPL-SCNC: 4.5 MMOL/L (ref 3.6–5.5)
SODIUM SERPL-SCNC: 138 MMOL/L (ref 135–145)

## 2023-10-03 PROCEDURE — A9270 NON-COVERED ITEM OR SERVICE: HCPCS | Performed by: HOSPITALIST

## 2023-10-03 PROCEDURE — 700102 HCHG RX REV CODE 250 W/ 637 OVERRIDE(OP): Performed by: HOSPITALIST

## 2023-10-03 PROCEDURE — 80069 RENAL FUNCTION PANEL: CPT

## 2023-10-03 PROCEDURE — 36415 COLL VENOUS BLD VENIPUNCTURE: CPT

## 2023-10-03 PROCEDURE — 99239 HOSP IP/OBS DSCHRG MGMT >30: CPT | Performed by: STUDENT IN AN ORGANIZED HEALTH CARE EDUCATION/TRAINING PROGRAM

## 2023-10-03 RX ORDER — AMLODIPINE BESYLATE 5 MG/1
5 TABLET ORAL DAILY
Qty: 30 TABLET | Refills: 0 | Status: SHIPPED | OUTPATIENT
Start: 2023-10-04 | End: 2023-11-03

## 2023-10-03 RX ORDER — LISINOPRIL 2.5 MG/1
2.5 TABLET ORAL DAILY
Qty: 30 TABLET | Refills: 0 | Status: SHIPPED | OUTPATIENT
Start: 2023-10-04 | End: 2023-10-03

## 2023-10-03 RX ORDER — LISINOPRIL 2.5 MG/1
2.5 TABLET ORAL DAILY
Qty: 30 TABLET | Refills: 0 | Status: SHIPPED | OUTPATIENT
Start: 2023-10-04 | End: 2023-11-03

## 2023-10-03 RX ADMIN — OMEPRAZOLE 40 MG: 20 CAPSULE, DELAYED RELEASE ORAL at 04:19

## 2023-10-03 RX ADMIN — LEVOTHYROXINE SODIUM 88 MCG: 0.09 TABLET ORAL at 04:18

## 2023-10-03 RX ADMIN — PROPAFENONE HYDROCHLORIDE 150 MG: 150 TABLET, FILM COATED ORAL at 04:19

## 2023-10-03 RX ADMIN — AMLODIPINE BESYLATE 5 MG: 5 TABLET ORAL at 04:18

## 2023-10-03 RX ADMIN — LISINOPRIL 2.5 MG: 2.5 TABLET ORAL at 04:18

## 2023-10-03 ASSESSMENT — PAIN DESCRIPTION - PAIN TYPE: TYPE: ACUTE PAIN

## 2023-10-03 NOTE — PROGRESS NOTES
Assumed care of patient. Patient is A&Ox4. No complaints of pain. Plan of care discussed with the patient, all concerns addressed. Call light is within reach and patient educated on fall precautions, patient calls appropriately, verbalized and demonstrated understanding. Bed in lowest and locked position. Hourly rounding in place.

## 2023-10-03 NOTE — DIETARY
Nutrition Update:    Day 4 of admit.  Eun Be is a 87 y.o. female with admitting DX of Chest pain [R07.9]  Fall from ground level [W18.30XA]  Orthostatic hypotension [I95.1].  Patient being followed to optimize nutrition.    Current Diet: Regular with supplements; PO intake recently decreased with last 2 meals though previously patient was eating >% of majority of meals.      Problem: Nutritional:  Goal: Achieve adequate nutritional intake  Description: Patient will consume ~50% of meals  Outcome: Met    RD monitoring per dept policy

## 2023-10-03 NOTE — DISCHARGE PLANNING
Case Management Discharge Planning    Admission Date: 9/25/2023  GMLOS: 2.3  ALOS: 4    6-Clicks ADL Score: 18  6-Clicks Mobility Score: 13  PT and/or OT Eval ordered: Yes  Post-acute Referrals Ordered: Yes  Post-acute Choice Obtained: Yes  Has referral(s) been sent to post-acute provider:  Yes      Anticipated Discharge Dispo: Discharge Disposition: D/T to home under A care in anticipation of covered skilled care (06)  Discharge Address: [address] [place] [address]  Discharge Contact Phone Number: [phone]    DME Needed: No    Action(s) Taken: Updated Provider/Nurse on Discharge Plan    Escalations Completed: None    Medically Clear: Yes    Next Steps: n/a     Barriers to Discharge: None    Is the patient up for discharge today: Yes    Is transport arranged for discharge disposition: Yes      CM received a call from Margy with [facility] and she confirmed pickup for the patient at 14:30 today via [facility].

## 2023-10-03 NOTE — CARE PLAN
The patient is Stable - Low risk of patient condition declining or worsening    Shift Goals  Clinical Goals: urine output, rest  Patient Goals: Discharge  Family Goals: NA    Progress made toward(s) clinical / shift goals:    Problem: Pain - Standard  Goal: Alleviation of pain or a reduction in pain to the patient’s comfort goal  Outcome: Progressing   Patient is being monitored for pain, but had no complaints overnight.  Problem: Skin Integrity  Goal: Skin integrity is maintained or improved  Outcome: Progressing   Patient can turn self in the bed, low airloss mattress in place.  Problem: Mobility  Goal: Patient's capacity to carry out activities will improve  Outcome: Progressing   Patient ambulates to the restroom with a one person assist using a front wheel walker.    Patient is not progressing towards the following goals:

## 2023-10-03 NOTE — PROGRESS NOTES
Assumed patient care and received report from Night RN Assessment completed. Pt A&Ox 4. Respirations are even and unlabored on 2L n/c baseline. Pt reports pain at this time. Medical patient, VS stable, call light and belongings within reach. POC updated (Placement). Pt educated on room and call light, pt verbalized understanding. Communication board updated. Needs met.

## 2023-10-03 NOTE — CARE PLAN
Problem: Skin Integrity  Goal: Skin integrity is maintained or improved  Outcome: Progressing     Problem: Mobility  Goal: Patient's capacity to carry out activities will improve  Outcome: Progressing     Problem: Self Care  Goal: Patient will have the ability to perform ADLs independently or with assistance (bathe, groom, dress, toilet and feed)  Outcome: Progressing     Problem: Wound/ / Incision Healing  Goal: Patient's wound/surgical incision will decrease in size and heals properly  Outcome: Progressing   The patient is Stable - Low risk of patient condition declining or worsening    Shift Goals  Clinical Goals: rest  Patient Goals: discharge  Family Goals: [place]    Progress made toward(s) clinical / shift goals:  Patient updated on POC    Patient is not progressing towards the following goals:

## 2023-10-03 NOTE — DISCHARGE SUMMARY
Discharge Summary    CHIEF COMPLAINT ON ADMISSION  Chief Complaint   Patient presents with    T-5000 Head Injury     Pt was brought from home by EMS due to GLF, and sustained head injury  No LOC, No external bleeding noted  + thinners  BP is elevated as per EMS: 219/86 mmHg  Noted hematoma at right elbow area  Pt has chronic back pain       Reason for Admission  TBI     Admission Date  9/25/2023    CODE STATUS  Full Code    HPI & HOSPITAL COURSE    Ms. Eun Be is a 87 y.o. female with past medical history of COPD, chronic hypoxic respiratory failure on 2 L of oxygen, atrial fibrillation on Xarelto who presents on 9/25/2023  for a fall and chest pain.       In ER, patient presents with a blood pressure of 242/104.  Chest x-ray interpreted by me found no acute pulmonary process. No definite rib fractures were seen. EKG found normal sinus rhythm with left bundle branch block.  Patient admitted to hospital medicine for management of care.    During this course of stay, an echocardiogram was pursued noting small pericardial effusion without evidence of hemodynamic compromise with normal left ventricular chamber size and LVEF approximately 60%, normal IVC size and normal RV size and systolic function..  Patient was also evaluated by physical therapy with noted possible left pectoralis muscle tear.  Left upper chest pain is reproducible specially when palpated.  Hold ACS work-up as this is unlikely cardiac related.  Troponin also negative and plateau.    PT/OT recommended postacute placement  Presented to separate at the SNF    At the time of discharge patient remain  hemodynamically stable ,asymptomatic ,labs remain unremarkable .  Patient will be discharged with close follow up with PCP . Discharge plan was discussed with patient in details .  Patient agreed with discharge plan  and  all questions answered.     Therefore, she is discharged in good and stable condition to skilled nursing facility.    The  patient met 2-midnight criteria for an inpatient stay at the time of discharge.    Discharge Date  10/3/2023          DISCHARGE DIAGNOSES  Principal Problem:    Chest pain (POA: Yes)  Active Problems:    Essential hypertension (POA: Yes)    Atrial fibrillation (HCC) (POA: Yes)      Overview: Persistant, on antiarrythmics, intolerant of systemic anticoagulation    COPD (chronic obstructive pulmonary disease) (HCC) (POA: Unknown)    Chronic respiratory failure with hypoxia (HCC) (POA: Unknown)    Fall from ground level (POA: Yes)    Debility (POA: Unknown)    Orthostatic hypotension (POA: Yes)  Resolved Problems:    * No resolved hospital problems. *      FOLLOW UP  No future appointments.  Libertad Estrella M.D.  [address] [facility] [id number] [phone]    In 1 week      [facility], Emergency Dept  [address] [facility] [id number] [phone]    in 12-24 hours if symptoms persist, immediately If symptoms worsen, or if you develop any other symptoms or concerns    [facility]  [address] [place] [address] [address]  [facility] [address]  [phone]          MEDICATIONS ON DISCHARGE     Medication List        START taking these medications        Instructions   acetaminophen 500 MG Tabs  Commonly known as: Tylenol   Take 1-2 Tablets by mouth every 6 hours as needed for Mild Pain or Moderate Pain.  Dose: 500-1,000 mg     amLODIPine 5 MG Tabs  Start taking on: October 4, 2023  Commonly known as: Norvasc   Take 1 Tablet by mouth every day for 30 days.  Dose: 5 mg     lidocaine 5 % Ptch  Commonly known as: Lidoderm   Place 2 Patches on the skin every 24 hours (12 hours on, 12 hours off)  Dose: 2 Patch            CONTINUE taking these medications        Instructions   albuterol 108 (90 Base) MCG/ACT Aers inhalation aerosol   INHALE 2 PUFFS BY MOUTH EVERY 6 HOURS AS NEEDED FOR SHORTNESS OF BREATH  Dose: 2 Puff     ALPRAZolam 0.25 MG Tabs  Commonly known as: Xanax   Take 0.25-0.5  mg by mouth 2 times a day as needed for Sleep or Anxiety.  Dose: 0.25-0.5 mg     IMELDA-MAG-ZINC PO   Take 1 Tablet by mouth every day.  Dose: 1 Tablet     fenofibrate micronized 134 MG capsule  Commonly known as: Lofibra   TAKE 1 CAPSULE BY MOUTH EVERY DAY     fluticasone-salmeterol 250-50 MCG/ACT Aepb  Commonly known as: Advair   Inhale 2 Puffs 2 times a day.  Dose: 2 Puff     gabapentin 100 MG Caps  Commonly known as: Neurontin   TAKE 1-3 CAPS BY MOUTH AT BEDTIME AS NEEDED (PAIN).  Dose: 100-300 mg     levothyroxine 88 MCG Tabs  Commonly known as: Synthroid   Take 88 mcg by mouth every morning on an empty stomach.  Dose: 88 mcg     omeprazole 40 MG delayed-release capsule  Commonly known as: PriLOSEC   TAKE 1 CAPSULE BY MOUTH EVERY DAY     Prolia 60 MG/ML Sosy injection  Generic drug: denosumab   Inject 60 mg under the skin every 6 months.  Dose: 60 mg     propafenone 150 MG Tabs  Commonly known as: Rythmol   Take 150 mg by mouth see administration instructions. Takes twice a day scheduled and then at bedtime as needed  Dose: 150 mg     rivaroxaban 15 MG Tabs tablet  Commonly known as: Xarelto   Take  by mouth with dinner.     simvastatin 20 MG Tabs  Commonly known as: Zocor   TAKE 1 TABLET BY MOUTH IN THE EVENING     SUPER B COMPLEX PO   Take 1 Tablet by mouth every day.  Dose: 1 Tablet     tizanidine 4 MG Tabs  Commonly known as: Zanaflex   Take 4 mg by mouth every 6 hours as needed. Indications: Muscle Spasticity  Dose: 4 mg     vitamin D 2000 UNIT Tabs   Take 2,000 Units by mouth every day.  Dose: 2,000 Units              Allergies  Allergies   Allergen Reactions    Asa [Aspirin] Rash     Rash     Latex Rash     Rash     Penicillins Unspecified     Unknown childhood reaction     Tape Hives, Itching and Swelling    Wasp Venom Swelling       DIET  Orders Placed This Encounter   Procedures    Diet Order Diet: Regular     Standing Status:   Standing     Number of Occurrences:   1     Order Specific Question:   Diet:      Answer:   Regular [1]       ACTIVITY  As tolerated.  Weight bearing as tolerated    CONSULTATIONS      PROCEDURES  EC-ECHOCARDIOGRAM COMPLETE W/O CONT   Final Result      DX-CHEST-PORTABLE (1 VIEW)   Final Result         1.  Left basilar atelectasis, no focal infiltrate   2.  Atherosclerosis      CT-CSPINE WITHOUT PLUS RECONS   Final Result         1.  Multilevel degenerative changes of the cervical spine limit diagnostic sensitivity of this examination.   2.  Mild anterolisthesis C4 on C5, appears likely related to degenerative facet arthrosis and stable since prior study, otherwise no acute traumatic bony injury of the cervical spine is apparent.   3.  Atherosclerosis      CT-HEAD W/O   Final Result         1.  No acute intracranial abnormality is identified, there are nonspecific white matter changes, commonly associated with small vessel ischemic disease.  Associated mild cerebral atrophy is noted.               LABORATORY  Lab Results   Component Value Date    SODIUM 138 10/03/2023    POTASSIUM 4.5 10/03/2023    CHLORIDE 107 10/03/2023    CO2 22 10/03/2023    GLUCOSE 103 (H) 10/03/2023    BUN 48 (H) 10/03/2023    CREATININE 0.99 10/03/2023    CREATININE 0.76 03/21/2011    GLOMRATE >59 03/21/2011        Lab Results   Component Value Date    WBC 4.7 (L) 09/30/2023    WBC 4.9 03/21/2011    HEMOGLOBIN 10.8 (L) 09/30/2023    HEMATOCRIT 32.4 (L) 09/30/2023    PLATELETCT 176 09/30/2023        Total time of the discharge process exceeds 37 minutes.

## 2024-03-29 NOTE — TELEPHONE ENCOUNTER
Eun Gruber M.D. 23 hours ago (9:42 AM)         Could you please order samples [facility]? Last pill will be on Friday. Thank you.            82

## 2024-12-27 NOTE — PROGRESS NOTES
Problem: At Risk for Falls  Goal: Patient does not fall  Outcome: Monitoring/Evaluating progress  Goal: Patient takes action to control fall-related risks  Outcome: Monitoring/Evaluating progress     Problem: At Risk for Injury Due to Fall  Goal: Patient does not fall  Outcome: Monitoring/Evaluating progress  Goal: Takes action to control condition specific risks  Outcome: Monitoring/Evaluating progress  Goal: Verbalizes understanding of fall-related injury personal risks  Description: Document education using the patient education activity  Outcome: Monitoring/Evaluating progress     Problem: Skin Integrity Alteration  Goal: Skin remains intact with no new/deterioration of wound or pressure injury  Outcome: Monitoring/Evaluating progress  Goal: Participates in wound care activities  Outcome: Monitoring/Evaluating progress  Goal: Comfort optimized with pressure injury prevention strategies guided by patient/family preference. (Hospice)  Outcome: Monitoring/Evaluating progress  Goal: Wound care provided to promote comfort needs (Hospice)  Outcome: Monitoring/Evaluating progress     Problem: Hemodialysis  Goal: Vascular access device site free of signs & symptoms of infection  Outcome: Monitoring/Evaluating progress  Goal: Dialysis: Safe, effective, and comfortable hemodialysis treatment (Hemodialysis nurse only)  Outcome: Monitoring/Evaluating progress  Goal: Dialysis: Free of complications related to initiation/termination of dialysis (Hemodialysis nurse only)  Outcome: Monitoring/Evaluating progress  Goal: Verbalizes understanding of hemodialysis care  Description: Document on Patient Education Activity  Outcome: Monitoring/Evaluating progress      Chief Complaint   Patient presents with   • Hospital Follow-up       HISTORY OF PRESENT ILLNESS: Patient is a 83 y.o. female established patient here today for the following concerns:    1. Anxiety  2. Fall in home, initial encounter  3. Chronic obstructive pulmonary disease, unspecified COPD type (HCC)  4. Chronic respiratory failure with hypoxia (HCC)    This is a pleasant 83 year old female with hx of chronic hyponatremia who presented to the ED after falling out of bed and having to crawl to the phone to call her daughter.  She was evaluated at the hospital and treated for her COPD as well as her benzodiazepine dependence.  Her dose was reduced.  She finds she is still feeling quite anxious.  She is still struggling with the death of her close friend and neighbor and now another neighbor is moving away making her feel more isolated.  She is amendable to psychiatry evaluation for her medication adjustments.  She is getting a 4 pronged cane and is hoping to change the oxygen tanks that she was sent home for to something more friendly for her mobility.        Past Medical, Social, and Family history reviewed and updated in EPIC    Allergies:Asa [aspirin]; Latex; Penicillins; Tape; and Wasp venom    Current Outpatient Medications   Medication Sig Dispense Refill   • ALPRAZolam (XANAX) 0.25 MG Tab Take 1 Tab by mouth 2 times a day as needed for Sleep or Anxiety for up to 90 days. 10 Tab 0   • lisinopril (PRINIVIL) 20 MG Tab Take 1 Tab by mouth every day. 30 Tab 0   • rivaroxaban (XARELTO) 15 MG Tab tablet Take 1 Tab by mouth with dinner. SAMPLE. 30 Tab 0   • magnesium oxide (MAG-OX) 400 (241.3 Mg) MG Tab tablet Take 1 Tab by mouth every day for 360 days. 90 Tab 3   • metoprolol (LOPRESSOR) 25 MG Tab Take 1 Tab by mouth 2 Times a Day. 60 Tab 0   • fenofibrate micronized (LOFIBRA) 134 MG capsule TAKE 1 CAPSULE BY MOUTH EVERY  Cap 3   • propafenone (RYTHMOL) 150 MG Tab TAKE 1 TABLET BY MOUTH TWICE A  Tab 3    • omeprazole (PRILOSEC) 40 MG delayed-release capsule TAKE 1 CAPSULE BY MOUTH EVERY DAY 90 Cap 1   • albuterol 108 (90 Base) MCG/ACT Aero Soln inhalation aerosol      • levothyroxine (SYNTHROID) 88 MCG Tab Take 1 Tab by mouth Every morning on an empty stomach. 90 Tab 3   • simvastatin (ZOCOR) 20 MG Tab TAKE 1 TABLET BY MOUTH IN THE EVENING 90 Tab 3   • hydrOXYzine HCl (ATARAX) 50 MG Tab TAKE 1 TABLET BY MOUTH 3 TIMES A DAY AS NEEDED FOR ITCHING. 90 Tab 2   • fluticasone (FLONASE) 50 MCG/ACT nasal spray SPRAY 1 SPRAY IN NOSE 2 TIMES A DAY. EACH NOSTRIL 3 Bottle 0   • Mometasone Furo-Formoterol Fum (DULERA) 200-5 MCG/ACT Aerosol Inhale 1 Puff by mouth 2 Times a Day. 1 Inhaler 11   • FIBER SELECT GUMMIES PO Take 1 Tab by mouth 2 times a day as needed (for constipation).     • clobetasol (TEMOVATE) 0.05 % Cream Apply 1 Each to affected area(s) 2 times a day as needed.  5     No current facility-administered medications for this visit.          ROS:  Review of Systems   Constitutional: Negative for fever, chills, weight loss and malaise/fatigue.   HENT: Negative for ear pain, nosebleeds, congestion, sore throat and neck pain.    Eyes: Negative for blurred vision.   Respiratory: Negative for cough, sputum production, shortness of breath and wheezing.    Cardiovascular: Negative for chest pain, palpitations,  and leg swelling.   Gastrointestinal: Negative for heartburn, nausea, vomiting, diarrhea and abdominal pain.   Genitourinary: Negative for dysuria, urgency and frequency.   Musculoskeletal: Negative for myalgias, back pain and joint pain.   Skin: Negative for rash and itching.   Neurological: Negative for dizziness, tingling, tremors, sensory change, focal weakness and headaches.   Endo/Heme/Allergies: Does not bruise/bleed easily.   Psychiatric/Behavioral: Negative for depression, anxiety, suicidal ideas, insomnia and memory loss.      Exam:  /68   Pulse 72   Temp 36.4 °C (97.6 °F)   Resp 18   SpO2 94%      General:  Well nourished, well developed in NAD  Head is grossly normal.  Neck: Supple without JVD   Pulmonary:  Normal effort.   Cardiovascular: Regular rate  Extremities: no clubbing, cyanosis, or edema.  Psych: affect appropriate      Please note that this dictation was created using voice recognition software. I have made every reasonable attempt to correct obvious errors, but I expect that there are errors of grammar and possibly content that I did not discover before finalizing the note.    Assessment/Plan:  1. Anxiety  - REFERRAL TO BEHAVIORAL HEALTH    2. Fall in home, initial encounter  3. Chronic obstructive pulmonary disease, unspecified COPD type (HCC)  4. Chronic respiratory failure with hypoxia (HCC)    Patient is at risks for falls, will continue reducing and stopping alprazolam.  Will also request oxygen conserving device to help with mobility.  Continue O2, inhaled medications as prescribed.      1 month follow up.      Baseline hyponatremia -stable.  Continue magnesium for hypomagnesium.

## 2025-02-03 NOTE — PROGRESS NOTES
Size medium arm sling has been sent to tube sytem # 23 for patients staff to apply and fit to patient when ready.   [facility]